# Patient Record
Sex: MALE | Race: WHITE | NOT HISPANIC OR LATINO
[De-identification: names, ages, dates, MRNs, and addresses within clinical notes are randomized per-mention and may not be internally consistent; named-entity substitution may affect disease eponyms.]

---

## 2017-01-20 ENCOUNTER — RX ONLY (OUTPATIENT)
Age: 82
Setting detail: RX ONLY
End: 2017-01-20

## 2017-04-10 ENCOUNTER — HOSPITAL ENCOUNTER (OUTPATIENT)
Dept: LAB | Facility: MEDICAL CENTER | Age: 82
End: 2017-04-10
Attending: FAMILY MEDICINE
Payer: MEDICARE

## 2017-04-10 LAB
25(OH)D3 SERPL-MCNC: 92 NG/ML (ref 30–100)
ALBUMIN SERPL BCP-MCNC: 4.1 G/DL (ref 3.2–4.9)
ALBUMIN/GLOB SERPL: 1.6 G/DL
ALP SERPL-CCNC: 55 U/L (ref 30–99)
ALT SERPL-CCNC: 16 U/L (ref 2–50)
ANION GAP SERPL CALC-SCNC: 5 MMOL/L (ref 0–11.9)
AST SERPL-CCNC: 22 U/L (ref 12–45)
BILIRUB SERPL-MCNC: 0.6 MG/DL (ref 0.1–1.5)
BUN SERPL-MCNC: 17 MG/DL (ref 8–22)
CALCIUM SERPL-MCNC: 9.4 MG/DL (ref 8.5–10.5)
CHLORIDE SERPL-SCNC: 111 MMOL/L (ref 96–112)
CHOLEST SERPL-MCNC: 122 MG/DL (ref 100–199)
CO2 SERPL-SCNC: 27 MMOL/L (ref 20–33)
CREAT SERPL-MCNC: 1.07 MG/DL (ref 0.5–1.4)
CRP SERPL HS-MCNC: 1.3 MG/L (ref 0–7.5)
GFR SERPL CREATININE-BSD FRML MDRD: >60 ML/MIN/1.73 M 2
GLOBULIN SER CALC-MCNC: 2.5 G/DL (ref 1.9–3.5)
GLUCOSE SERPL-MCNC: 111 MG/DL (ref 65–99)
HDLC SERPL-MCNC: 34 MG/DL
IRON SERPL-MCNC: 83 UG/DL (ref 50–180)
LDLC SERPL CALC-MCNC: 72 MG/DL
POTASSIUM SERPL-SCNC: 3.9 MMOL/L (ref 3.6–5.5)
PROT SERPL-MCNC: 6.6 G/DL (ref 6–8.2)
SODIUM SERPL-SCNC: 143 MMOL/L (ref 135–145)
TRIGL SERPL-MCNC: 80 MG/DL (ref 0–149)

## 2017-04-10 PROCEDURE — 36415 COLL VENOUS BLD VENIPUNCTURE: CPT

## 2017-04-10 PROCEDURE — 84270 ASSAY OF SEX HORMONE GLOBUL: CPT

## 2017-04-10 PROCEDURE — 83540 ASSAY OF IRON: CPT | Mod: GA

## 2017-04-10 PROCEDURE — 82306 VITAMIN D 25 HYDROXY: CPT | Mod: GA

## 2017-04-10 PROCEDURE — 85025 COMPLETE CBC W/AUTO DIFF WBC: CPT

## 2017-04-10 PROCEDURE — 86141 C-REACTIVE PROTEIN HS: CPT

## 2017-04-10 PROCEDURE — 80061 LIPID PANEL: CPT

## 2017-04-10 PROCEDURE — 80053 COMPREHEN METABOLIC PANEL: CPT

## 2017-04-10 PROCEDURE — 84402 ASSAY OF FREE TESTOSTERONE: CPT

## 2017-04-10 PROCEDURE — 84403 ASSAY OF TOTAL TESTOSTERONE: CPT

## 2017-04-11 LAB
BASOPHILS # BLD AUTO: 0.5 % (ref 0–1.8)
BASOPHILS # BLD: 0.04 K/UL (ref 0–0.12)
EOSINOPHIL # BLD AUTO: 0.14 K/UL (ref 0–0.51)
EOSINOPHIL NFR BLD: 1.7 % (ref 0–6.9)
ERYTHROCYTE [DISTWIDTH] IN BLOOD BY AUTOMATED COUNT: 48.8 FL (ref 35.9–50)
HCT VFR BLD AUTO: 50.2 % (ref 42–52)
HGB BLD-MCNC: 16.7 G/DL (ref 14–18)
IMM GRANULOCYTES # BLD AUTO: 0.02 K/UL (ref 0–0.11)
IMM GRANULOCYTES NFR BLD AUTO: 0.2 % (ref 0–0.9)
LYMPHOCYTES # BLD AUTO: 1.73 K/UL (ref 1–4.8)
LYMPHOCYTES NFR BLD: 21.4 % (ref 22–41)
MCH RBC QN AUTO: 32.1 PG (ref 27–33)
MCHC RBC AUTO-ENTMCNC: 33.3 G/DL (ref 33.7–35.3)
MCV RBC AUTO: 96.4 FL (ref 81.4–97.8)
MONOCYTES # BLD AUTO: 0.59 K/UL (ref 0–0.85)
MONOCYTES NFR BLD AUTO: 7.3 % (ref 0–13.4)
NEUTROPHILS # BLD AUTO: 5.57 K/UL (ref 1.82–7.42)
NEUTROPHILS NFR BLD: 68.9 % (ref 44–72)
NRBC # BLD AUTO: 0.02 K/UL
NRBC BLD AUTO-RTO: 0.2 /100 WBC
PLATELET # BLD AUTO: 192 K/UL (ref 164–446)
PMV BLD AUTO: 11.6 FL (ref 9–12.9)
RBC # BLD AUTO: 5.21 M/UL (ref 4.7–6.1)
SHBG SERPL-SCNC: 74 NMOL/L (ref 11–80)
TESTOST FREE MFR SERPL: 1.3 % (ref 1.6–2.9)
TESTOST FREE SERPL-MCNC: 136 PG/ML (ref 47–244)
TESTOST SERPL-MCNC: 1064 NG/DL (ref 300–720)
WBC # BLD AUTO: 8.1 K/UL (ref 4.8–10.8)

## 2017-07-31 ENCOUNTER — HOSPITAL ENCOUNTER (OUTPATIENT)
Dept: LAB | Facility: MEDICAL CENTER | Age: 82
End: 2017-07-31
Attending: NURSE PRACTITIONER
Payer: MEDICARE

## 2017-07-31 LAB
ALBUMIN SERPL BCP-MCNC: 4 G/DL (ref 3.2–4.9)
ALBUMIN/GLOB SERPL: 1.5 G/DL
ALP SERPL-CCNC: 52 U/L (ref 30–99)
ALT SERPL-CCNC: 17 U/L (ref 2–50)
ANION GAP SERPL CALC-SCNC: 7 MMOL/L (ref 0–11.9)
AST SERPL-CCNC: 23 U/L (ref 12–45)
BILIRUB SERPL-MCNC: 0.7 MG/DL (ref 0.1–1.5)
BUN SERPL-MCNC: 15 MG/DL (ref 8–22)
CALCIUM SERPL-MCNC: 9.6 MG/DL (ref 8.5–10.5)
CHLORIDE SERPL-SCNC: 110 MMOL/L (ref 96–112)
CHOLEST SERPL-MCNC: 120 MG/DL (ref 100–199)
CO2 SERPL-SCNC: 26 MMOL/L (ref 20–33)
CREAT SERPL-MCNC: 1.02 MG/DL (ref 0.5–1.4)
CREAT UR-MCNC: 165.6 MG/DL
CRP SERPL HS-MCNC: 0.6 MG/L (ref 0–7.5)
GFR SERPL CREATININE-BSD FRML MDRD: >60 ML/MIN/1.73 M 2
GLOBULIN SER CALC-MCNC: 2.7 G/DL (ref 1.9–3.5)
GLUCOSE SERPL-MCNC: 93 MG/DL (ref 65–99)
HDLC SERPL-MCNC: 38 MG/DL
LDLC SERPL CALC-MCNC: 67 MG/DL
MICROALBUMIN UR-MCNC: 4.8 MG/DL
MICROALBUMIN/CREAT UR: 29 MG/G (ref 0–30)
POTASSIUM SERPL-SCNC: 3.9 MMOL/L (ref 3.6–5.5)
PROT SERPL-MCNC: 6.7 G/DL (ref 6–8.2)
SODIUM SERPL-SCNC: 143 MMOL/L (ref 135–145)
T3FREE SERPL-MCNC: 3.18 PG/ML (ref 2.4–4.2)
T4 FREE SERPL-MCNC: 0.78 NG/DL (ref 0.53–1.43)
TRIGL SERPL-MCNC: 75 MG/DL (ref 0–149)
TSH SERPL DL<=0.005 MIU/L-ACNC: 0.79 UIU/ML (ref 0.3–3.7)

## 2017-07-31 PROCEDURE — 84443 ASSAY THYROID STIM HORMONE: CPT

## 2017-07-31 PROCEDURE — 80053 COMPREHEN METABOLIC PANEL: CPT

## 2017-07-31 PROCEDURE — 84439 ASSAY OF FREE THYROXINE: CPT

## 2017-07-31 PROCEDURE — 84270 ASSAY OF SEX HORMONE GLOBUL: CPT

## 2017-07-31 PROCEDURE — 84402 ASSAY OF FREE TESTOSTERONE: CPT

## 2017-07-31 PROCEDURE — 84481 FREE ASSAY (FT-3): CPT

## 2017-07-31 PROCEDURE — 82570 ASSAY OF URINE CREATININE: CPT

## 2017-07-31 PROCEDURE — 82043 UR ALBUMIN QUANTITATIVE: CPT

## 2017-07-31 PROCEDURE — 84403 ASSAY OF TOTAL TESTOSTERONE: CPT

## 2017-07-31 PROCEDURE — 80061 LIPID PANEL: CPT

## 2017-07-31 PROCEDURE — 86141 C-REACTIVE PROTEIN HS: CPT

## 2017-07-31 PROCEDURE — 36415 COLL VENOUS BLD VENIPUNCTURE: CPT

## 2017-08-01 LAB
SHBG SERPL-SCNC: 71 NMOL/L (ref 11–80)
TESTOST FREE MFR SERPL: 1.2 % (ref 1.6–2.9)
TESTOST FREE SERPL-MCNC: 94 PG/ML (ref 47–244)
TESTOST SERPL-MCNC: 767 NG/DL (ref 300–720)

## 2017-08-15 ENCOUNTER — HOSPITAL ENCOUNTER (OUTPATIENT)
Dept: LAB | Facility: MEDICAL CENTER | Age: 82
End: 2017-08-15
Attending: UROLOGY
Payer: MEDICARE

## 2017-08-15 LAB — PSA SERPL-MCNC: 2.61 NG/ML (ref 0–4)

## 2017-08-15 PROCEDURE — 36415 COLL VENOUS BLD VENIPUNCTURE: CPT

## 2017-08-15 PROCEDURE — 84153 ASSAY OF PSA TOTAL: CPT

## 2018-01-16 ENCOUNTER — HOSPITAL ENCOUNTER (OUTPATIENT)
Dept: LAB | Facility: MEDICAL CENTER | Age: 83
End: 2018-01-16
Attending: FAMILY MEDICINE
Payer: MEDICARE

## 2018-01-16 LAB
ALBUMIN SERPL BCP-MCNC: 4 G/DL (ref 3.2–4.9)
ALBUMIN/GLOB SERPL: 1.7 G/DL
ALP SERPL-CCNC: 47 U/L (ref 30–99)
ALT SERPL-CCNC: 20 U/L (ref 2–50)
ANION GAP SERPL CALC-SCNC: 4 MMOL/L (ref 0–11.9)
AST SERPL-CCNC: 22 U/L (ref 12–45)
BASOPHILS # BLD AUTO: 0.3 % (ref 0–1.8)
BASOPHILS # BLD: 0.02 K/UL (ref 0–0.12)
BILIRUB SERPL-MCNC: 0.5 MG/DL (ref 0.1–1.5)
BUN SERPL-MCNC: 18 MG/DL (ref 8–22)
CALCIUM SERPL-MCNC: 9 MG/DL (ref 8.5–10.5)
CHLORIDE SERPL-SCNC: 111 MMOL/L (ref 96–112)
CHOLEST SERPL-MCNC: 100 MG/DL (ref 100–199)
CO2 SERPL-SCNC: 27 MMOL/L (ref 20–33)
CREAT SERPL-MCNC: 0.97 MG/DL (ref 0.5–1.4)
EOSINOPHIL # BLD AUTO: 0.17 K/UL (ref 0–0.51)
EOSINOPHIL NFR BLD: 2.8 % (ref 0–6.9)
ERYTHROCYTE [DISTWIDTH] IN BLOOD BY AUTOMATED COUNT: 49.4 FL (ref 35.9–50)
GLOBULIN SER CALC-MCNC: 2.3 G/DL (ref 1.9–3.5)
GLUCOSE SERPL-MCNC: 100 MG/DL (ref 65–99)
HCT VFR BLD AUTO: 47.9 % (ref 42–52)
HDLC SERPL-MCNC: 33 MG/DL
HGB BLD-MCNC: 15.9 G/DL (ref 14–18)
IMM GRANULOCYTES # BLD AUTO: 0.01 K/UL (ref 0–0.11)
IMM GRANULOCYTES NFR BLD AUTO: 0.2 % (ref 0–0.9)
LDLC SERPL CALC-MCNC: 54 MG/DL
LYMPHOCYTES # BLD AUTO: 1.31 K/UL (ref 1–4.8)
LYMPHOCYTES NFR BLD: 21.9 % (ref 22–41)
MCH RBC QN AUTO: 32 PG (ref 27–33)
MCHC RBC AUTO-ENTMCNC: 33.2 G/DL (ref 33.7–35.3)
MCV RBC AUTO: 96.4 FL (ref 81.4–97.8)
MONOCYTES # BLD AUTO: 0.63 K/UL (ref 0–0.85)
MONOCYTES NFR BLD AUTO: 10.5 % (ref 0–13.4)
NEUTROPHILS # BLD AUTO: 3.84 K/UL (ref 1.82–7.42)
NEUTROPHILS NFR BLD: 64.3 % (ref 44–72)
NRBC # BLD AUTO: 0 K/UL
NRBC BLD-RTO: 0 /100 WBC
PLATELET # BLD AUTO: 169 K/UL (ref 164–446)
PMV BLD AUTO: 11.8 FL (ref 9–12.9)
POTASSIUM SERPL-SCNC: 3.7 MMOL/L (ref 3.6–5.5)
PROT SERPL-MCNC: 6.3 G/DL (ref 6–8.2)
RBC # BLD AUTO: 4.97 M/UL (ref 4.7–6.1)
SODIUM SERPL-SCNC: 142 MMOL/L (ref 135–145)
TRIGL SERPL-MCNC: 64 MG/DL (ref 0–149)
WBC # BLD AUTO: 6 K/UL (ref 4.8–10.8)

## 2018-01-16 PROCEDURE — 80053 COMPREHEN METABOLIC PANEL: CPT

## 2018-01-16 PROCEDURE — 80061 LIPID PANEL: CPT

## 2018-01-16 PROCEDURE — 84402 ASSAY OF FREE TESTOSTERONE: CPT

## 2018-01-16 PROCEDURE — 84439 ASSAY OF FREE THYROXINE: CPT

## 2018-01-16 PROCEDURE — 84443 ASSAY THYROID STIM HORMONE: CPT

## 2018-01-16 PROCEDURE — 36415 COLL VENOUS BLD VENIPUNCTURE: CPT

## 2018-01-16 PROCEDURE — 85025 COMPLETE CBC W/AUTO DIFF WBC: CPT

## 2018-01-16 PROCEDURE — 86800 THYROGLOBULIN ANTIBODY: CPT

## 2018-01-16 PROCEDURE — 84481 FREE ASSAY (FT-3): CPT

## 2018-01-16 PROCEDURE — 84403 ASSAY OF TOTAL TESTOSTERONE: CPT

## 2018-01-16 PROCEDURE — 82306 VITAMIN D 25 HYDROXY: CPT

## 2018-01-17 LAB
25(OH)D3 SERPL-MCNC: 84 NG/ML (ref 30–100)
T3FREE SERPL-MCNC: 3.94 PG/ML (ref 2.4–4.2)
T4 FREE SERPL-MCNC: 0.87 NG/DL (ref 0.53–1.43)
TESTOST SERPL-MCNC: 314 NG/DL (ref 175–781)
TSH SERPL DL<=0.005 MIU/L-ACNC: 0.03 UIU/ML (ref 0.38–5.33)

## 2018-01-18 LAB
TESTOST FREE SERPL-MCNC: 45 PG/ML (ref 47–244)
THYROGLOB AB SERPL-ACNC: <0.9 IU/ML (ref 0–4)

## 2018-03-22 ENCOUNTER — HOSPITAL ENCOUNTER (OUTPATIENT)
Dept: LAB | Facility: MEDICAL CENTER | Age: 83
End: 2018-03-22
Attending: FAMILY MEDICINE
Payer: MEDICARE

## 2018-03-22 LAB
25(OH)D3 SERPL-MCNC: 87 NG/ML (ref 30–100)
ALBUMIN SERPL BCP-MCNC: 4.2 G/DL (ref 3.2–4.9)
ALBUMIN/GLOB SERPL: 1.8 G/DL
ALP SERPL-CCNC: 48 U/L (ref 30–99)
ALT SERPL-CCNC: 24 U/L (ref 2–50)
ANION GAP SERPL CALC-SCNC: 9 MMOL/L (ref 0–11.9)
AST SERPL-CCNC: 26 U/L (ref 12–45)
BASOPHILS # BLD AUTO: 0.4 % (ref 0–1.8)
BASOPHILS # BLD: 0.03 K/UL (ref 0–0.12)
BILIRUB SERPL-MCNC: 0.9 MG/DL (ref 0.1–1.5)
BUN SERPL-MCNC: 16 MG/DL (ref 8–22)
CALCIUM SERPL-MCNC: 9.3 MG/DL (ref 8.5–10.5)
CHLORIDE SERPL-SCNC: 109 MMOL/L (ref 96–112)
CHOLEST SERPL-MCNC: 136 MG/DL (ref 100–199)
CO2 SERPL-SCNC: 24 MMOL/L (ref 20–33)
CREAT SERPL-MCNC: 0.98 MG/DL (ref 0.5–1.4)
EOSINOPHIL # BLD AUTO: 0.14 K/UL (ref 0–0.51)
EOSINOPHIL NFR BLD: 2 % (ref 0–6.9)
ERYTHROCYTE [DISTWIDTH] IN BLOOD BY AUTOMATED COUNT: 48.9 FL (ref 35.9–50)
GLOBULIN SER CALC-MCNC: 2.4 G/DL (ref 1.9–3.5)
GLUCOSE SERPL-MCNC: 98 MG/DL (ref 65–99)
HCT VFR BLD AUTO: 50.2 % (ref 42–52)
HDLC SERPL-MCNC: 32 MG/DL
HGB BLD-MCNC: 16.7 G/DL (ref 14–18)
IMM GRANULOCYTES # BLD AUTO: 0.02 K/UL (ref 0–0.11)
IMM GRANULOCYTES NFR BLD AUTO: 0.3 % (ref 0–0.9)
LDLC SERPL CALC-MCNC: 88 MG/DL
LYMPHOCYTES # BLD AUTO: 1.9 K/UL (ref 1–4.8)
LYMPHOCYTES NFR BLD: 27.2 % (ref 22–41)
MCH RBC QN AUTO: 32.3 PG (ref 27–33)
MCHC RBC AUTO-ENTMCNC: 33.3 G/DL (ref 33.7–35.3)
MCV RBC AUTO: 97.1 FL (ref 81.4–97.8)
MONOCYTES # BLD AUTO: 0.6 K/UL (ref 0–0.85)
MONOCYTES NFR BLD AUTO: 8.6 % (ref 0–13.4)
NEUTROPHILS # BLD AUTO: 4.3 K/UL (ref 1.82–7.42)
NEUTROPHILS NFR BLD: 61.5 % (ref 44–72)
NRBC # BLD AUTO: 0 K/UL
NRBC BLD-RTO: 0 /100 WBC
PLATELET # BLD AUTO: 196 K/UL (ref 164–446)
PMV BLD AUTO: 11.5 FL (ref 9–12.9)
POTASSIUM SERPL-SCNC: 3.8 MMOL/L (ref 3.6–5.5)
PROT SERPL-MCNC: 6.6 G/DL (ref 6–8.2)
RBC # BLD AUTO: 5.17 M/UL (ref 4.7–6.1)
SODIUM SERPL-SCNC: 142 MMOL/L (ref 135–145)
T3FREE SERPL-MCNC: 2.56 PG/ML (ref 2.4–4.2)
T4 FREE SERPL-MCNC: 0.66 NG/DL (ref 0.53–1.43)
TESTOST SERPL-MCNC: 764 NG/DL (ref 175–781)
TRIGL SERPL-MCNC: 79 MG/DL (ref 0–149)
TSH SERPL DL<=0.005 MIU/L-ACNC: 1.22 UIU/ML (ref 0.38–5.33)
WBC # BLD AUTO: 7 K/UL (ref 4.8–10.8)

## 2018-03-22 PROCEDURE — 85025 COMPLETE CBC W/AUTO DIFF WBC: CPT

## 2018-03-22 PROCEDURE — 84443 ASSAY THYROID STIM HORMONE: CPT

## 2018-03-22 PROCEDURE — 36415 COLL VENOUS BLD VENIPUNCTURE: CPT

## 2018-03-22 PROCEDURE — 86800 THYROGLOBULIN ANTIBODY: CPT

## 2018-03-22 PROCEDURE — 84439 ASSAY OF FREE THYROXINE: CPT

## 2018-03-22 PROCEDURE — 82306 VITAMIN D 25 HYDROXY: CPT

## 2018-03-22 PROCEDURE — 84402 ASSAY OF FREE TESTOSTERONE: CPT

## 2018-03-22 PROCEDURE — 80061 LIPID PANEL: CPT

## 2018-03-22 PROCEDURE — 80053 COMPREHEN METABOLIC PANEL: CPT

## 2018-03-22 PROCEDURE — 84403 ASSAY OF TOTAL TESTOSTERONE: CPT

## 2018-03-22 PROCEDURE — 84481 FREE ASSAY (FT-3): CPT

## 2018-03-23 LAB
TESTOST FREE SERPL-MCNC: 112 PG/ML (ref 47–244)
THYROGLOB AB SERPL-ACNC: <0.9 IU/ML (ref 0–4)

## 2018-06-28 ENCOUNTER — HOSPITAL ENCOUNTER (OUTPATIENT)
Dept: LAB | Facility: MEDICAL CENTER | Age: 83
End: 2018-06-28
Attending: FAMILY MEDICINE
Payer: MEDICARE

## 2018-06-28 LAB
25(OH)D3 SERPL-MCNC: 73 NG/ML (ref 30–100)
ALBUMIN SERPL BCP-MCNC: 3.7 G/DL (ref 3.2–4.9)
ALBUMIN/GLOB SERPL: 1.5 G/DL
ALP SERPL-CCNC: 52 U/L (ref 30–99)
ALT SERPL-CCNC: 18 U/L (ref 2–50)
ANION GAP SERPL CALC-SCNC: 8 MMOL/L (ref 0–11.9)
AST SERPL-CCNC: 22 U/L (ref 12–45)
BASOPHILS # BLD AUTO: 0.5 % (ref 0–1.8)
BASOPHILS # BLD: 0.03 K/UL (ref 0–0.12)
BILIRUB SERPL-MCNC: 0.5 MG/DL (ref 0.1–1.5)
BUN SERPL-MCNC: 15 MG/DL (ref 8–22)
CALCIUM SERPL-MCNC: 8.6 MG/DL (ref 8.5–10.5)
CHLORIDE SERPL-SCNC: 111 MMOL/L (ref 96–112)
CHOLEST SERPL-MCNC: 98 MG/DL (ref 100–199)
CO2 SERPL-SCNC: 25 MMOL/L (ref 20–33)
CREAT SERPL-MCNC: 0.81 MG/DL (ref 0.5–1.4)
EOSINOPHIL # BLD AUTO: 0.1 K/UL (ref 0–0.51)
EOSINOPHIL NFR BLD: 1.6 % (ref 0–6.9)
ERYTHROCYTE [DISTWIDTH] IN BLOOD BY AUTOMATED COUNT: 47.6 FL (ref 35.9–50)
FOLATE SERPL-MCNC: >24 NG/ML
GLOBULIN SER CALC-MCNC: 2.5 G/DL (ref 1.9–3.5)
GLUCOSE SERPL-MCNC: 92 MG/DL (ref 65–99)
HCT VFR BLD AUTO: 44.5 % (ref 42–52)
HDLC SERPL-MCNC: 33 MG/DL
HGB BLD-MCNC: 14.8 G/DL (ref 14–18)
IMM GRANULOCYTES # BLD AUTO: 0.02 K/UL (ref 0–0.11)
IMM GRANULOCYTES NFR BLD AUTO: 0.3 % (ref 0–0.9)
LDLC SERPL CALC-MCNC: 50 MG/DL
LYMPHOCYTES # BLD AUTO: 1.57 K/UL (ref 1–4.8)
LYMPHOCYTES NFR BLD: 25.2 % (ref 22–41)
MCH RBC QN AUTO: 32 PG (ref 27–33)
MCHC RBC AUTO-ENTMCNC: 33.3 G/DL (ref 33.7–35.3)
MCV RBC AUTO: 96.1 FL (ref 81.4–97.8)
MONOCYTES # BLD AUTO: 0.45 K/UL (ref 0–0.85)
MONOCYTES NFR BLD AUTO: 7.2 % (ref 0–13.4)
NEUTROPHILS # BLD AUTO: 4.06 K/UL (ref 1.82–7.42)
NEUTROPHILS NFR BLD: 65.2 % (ref 44–72)
NRBC # BLD AUTO: 0 K/UL
NRBC BLD-RTO: 0 /100 WBC
PLATELET # BLD AUTO: 178 K/UL (ref 164–446)
PMV BLD AUTO: 11.6 FL (ref 9–12.9)
POTASSIUM SERPL-SCNC: 3.9 MMOL/L (ref 3.6–5.5)
PROT SERPL-MCNC: 6.2 G/DL (ref 6–8.2)
RBC # BLD AUTO: 4.63 M/UL (ref 4.7–6.1)
SODIUM SERPL-SCNC: 144 MMOL/L (ref 135–145)
T3FREE SERPL-MCNC: 3.13 PG/ML (ref 2.4–4.2)
T4 FREE SERPL-MCNC: 0.75 NG/DL (ref 0.53–1.43)
TESTOST SERPL-MCNC: 1120 NG/DL (ref 175–781)
THYROPEROXIDASE AB SERPL-ACNC: <0.2 IU/ML (ref 0–9)
TRIGL SERPL-MCNC: 73 MG/DL (ref 0–149)
TSH SERPL DL<=0.005 MIU/L-ACNC: 0.43 UIU/ML (ref 0.38–5.33)
VIT B12 SERPL-MCNC: 636 PG/ML (ref 211–911)
WBC # BLD AUTO: 6.2 K/UL (ref 4.8–10.8)

## 2018-06-28 PROCEDURE — 84439 ASSAY OF FREE THYROXINE: CPT

## 2018-06-28 PROCEDURE — 85025 COMPLETE CBC W/AUTO DIFF WBC: CPT

## 2018-06-28 PROCEDURE — 86376 MICROSOMAL ANTIBODY EACH: CPT

## 2018-06-28 PROCEDURE — 80053 COMPREHEN METABOLIC PANEL: CPT

## 2018-06-28 PROCEDURE — 84402 ASSAY OF FREE TESTOSTERONE: CPT

## 2018-06-28 PROCEDURE — 84443 ASSAY THYROID STIM HORMONE: CPT

## 2018-06-28 PROCEDURE — 80061 LIPID PANEL: CPT

## 2018-06-28 PROCEDURE — 82746 ASSAY OF FOLIC ACID SERUM: CPT

## 2018-06-28 PROCEDURE — 84481 FREE ASSAY (FT-3): CPT

## 2018-06-28 PROCEDURE — 84403 ASSAY OF TOTAL TESTOSTERONE: CPT

## 2018-06-28 PROCEDURE — 82306 VITAMIN D 25 HYDROXY: CPT

## 2018-06-28 PROCEDURE — 82607 VITAMIN B-12: CPT

## 2018-06-28 PROCEDURE — 36415 COLL VENOUS BLD VENIPUNCTURE: CPT

## 2018-06-29 LAB — TESTOST FREE SERPL-MCNC: NORMAL PG/ML (ref 47–244)

## 2018-10-10 ENCOUNTER — HOSPITAL ENCOUNTER (OUTPATIENT)
Dept: LAB | Facility: MEDICAL CENTER | Age: 83
End: 2018-10-10
Attending: FAMILY MEDICINE
Payer: MEDICARE

## 2018-10-10 LAB
ALBUMIN SERPL BCP-MCNC: 4 G/DL (ref 3.2–4.9)
ALBUMIN/GLOB SERPL: 1.7 G/DL
ALP SERPL-CCNC: 50 U/L (ref 30–99)
ALT SERPL-CCNC: 19 U/L (ref 2–50)
ANION GAP SERPL CALC-SCNC: 7 MMOL/L (ref 0–11.9)
APPEARANCE UR: CLEAR
AST SERPL-CCNC: 26 U/L (ref 12–45)
BASOPHILS # BLD AUTO: 0.6 % (ref 0–1.8)
BASOPHILS # BLD: 0.04 K/UL (ref 0–0.12)
BILIRUB SERPL-MCNC: 0.6 MG/DL (ref 0.1–1.5)
BILIRUB UR QL STRIP.AUTO: NEGATIVE
BUN SERPL-MCNC: 17 MG/DL (ref 8–22)
CALCIUM SERPL-MCNC: 9.2 MG/DL (ref 8.5–10.5)
CHLORIDE SERPL-SCNC: 110 MMOL/L (ref 96–112)
CHOLEST SERPL-MCNC: 110 MG/DL (ref 100–199)
CO2 SERPL-SCNC: 26 MMOL/L (ref 20–33)
COLOR UR: YELLOW
CREAT SERPL-MCNC: 1.03 MG/DL (ref 0.5–1.4)
EOSINOPHIL # BLD AUTO: 0.15 K/UL (ref 0–0.51)
EOSINOPHIL NFR BLD: 2.3 % (ref 0–6.9)
ERYTHROCYTE [DISTWIDTH] IN BLOOD BY AUTOMATED COUNT: 49 FL (ref 35.9–50)
FASTING STATUS PATIENT QL REPORTED: NORMAL
GLOBULIN SER CALC-MCNC: 2.3 G/DL (ref 1.9–3.5)
GLUCOSE SERPL-MCNC: 94 MG/DL (ref 65–99)
GLUCOSE UR STRIP.AUTO-MCNC: NEGATIVE MG/DL
HCT VFR BLD AUTO: 50.3 % (ref 42–52)
HDLC SERPL-MCNC: 31 MG/DL
HGB BLD-MCNC: 16.4 G/DL (ref 14–18)
IMM GRANULOCYTES # BLD AUTO: 0.02 K/UL (ref 0–0.11)
IMM GRANULOCYTES NFR BLD AUTO: 0.3 % (ref 0–0.9)
KETONES UR STRIP.AUTO-MCNC: NEGATIVE MG/DL
LDLC SERPL CALC-MCNC: 63 MG/DL
LEUKOCYTE ESTERASE UR QL STRIP.AUTO: NEGATIVE
LYMPHOCYTES # BLD AUTO: 1.36 K/UL (ref 1–4.8)
LYMPHOCYTES NFR BLD: 20.5 % (ref 22–41)
MCH RBC QN AUTO: 31.1 PG (ref 27–33)
MCHC RBC AUTO-ENTMCNC: 32.6 G/DL (ref 33.7–35.3)
MCV RBC AUTO: 95.3 FL (ref 81.4–97.8)
MICRO URNS: NORMAL
MONOCYTES # BLD AUTO: 0.64 K/UL (ref 0–0.85)
MONOCYTES NFR BLD AUTO: 9.6 % (ref 0–13.4)
NEUTROPHILS # BLD AUTO: 4.44 K/UL (ref 1.82–7.42)
NEUTROPHILS NFR BLD: 66.7 % (ref 44–72)
NITRITE UR QL STRIP.AUTO: NEGATIVE
NRBC # BLD AUTO: 0 K/UL
NRBC BLD-RTO: 0 /100 WBC
PH UR STRIP.AUTO: 6 [PH]
PLATELET # BLD AUTO: 185 K/UL (ref 164–446)
PMV BLD AUTO: 11.1 FL (ref 9–12.9)
POTASSIUM SERPL-SCNC: 3.7 MMOL/L (ref 3.6–5.5)
PROT SERPL-MCNC: 6.3 G/DL (ref 6–8.2)
PROT UR QL STRIP: NEGATIVE MG/DL
RBC # BLD AUTO: 5.28 M/UL (ref 4.7–6.1)
RBC UR QL AUTO: NEGATIVE
SODIUM SERPL-SCNC: 143 MMOL/L (ref 135–145)
SP GR UR STRIP.AUTO: 1.02
TRIGL SERPL-MCNC: 81 MG/DL (ref 0–149)
UROBILINOGEN UR STRIP.AUTO-MCNC: 0.2 MG/DL
WBC # BLD AUTO: 6.7 K/UL (ref 4.8–10.8)

## 2018-10-10 PROCEDURE — 87086 URINE CULTURE/COLONY COUNT: CPT | Mod: GA

## 2018-10-10 PROCEDURE — 80061 LIPID PANEL: CPT

## 2018-10-10 PROCEDURE — 81003 URINALYSIS AUTO W/O SCOPE: CPT

## 2018-10-10 PROCEDURE — 80053 COMPREHEN METABOLIC PANEL: CPT

## 2018-10-10 PROCEDURE — 36415 COLL VENOUS BLD VENIPUNCTURE: CPT

## 2018-10-10 PROCEDURE — 85025 COMPLETE CBC W/AUTO DIFF WBC: CPT

## 2018-10-12 LAB
BACTERIA UR CULT: NORMAL
SIGNIFICANT IND 70042: NORMAL
SITE SITE: NORMAL
SOURCE SOURCE: NORMAL

## 2018-11-13 ENCOUNTER — HOSPITAL ENCOUNTER (OUTPATIENT)
Dept: LAB | Facility: MEDICAL CENTER | Age: 83
End: 2018-11-13
Attending: UROLOGY
Payer: MEDICARE

## 2018-11-13 PROCEDURE — 84153 ASSAY OF PSA TOTAL: CPT

## 2018-11-13 PROCEDURE — 36415 COLL VENOUS BLD VENIPUNCTURE: CPT

## 2018-11-14 LAB — PSA SERPL-MCNC: 2.74 NG/ML (ref 0–4)

## 2018-11-21 ENCOUNTER — APPOINTMENT (RX ONLY)
Dept: URBAN - METROPOLITAN AREA CLINIC 20 | Facility: CLINIC | Age: 83
Setting detail: DERMATOLOGY
End: 2018-11-21

## 2018-11-21 DIAGNOSIS — Z71.89 OTHER SPECIFIED COUNSELING: ICD-10-CM

## 2018-11-21 DIAGNOSIS — D22 MELANOCYTIC NEVI: ICD-10-CM

## 2018-11-21 DIAGNOSIS — L81.4 OTHER MELANIN HYPERPIGMENTATION: ICD-10-CM

## 2018-11-21 DIAGNOSIS — L72.8 OTHER FOLLICULAR CYSTS OF THE SKIN AND SUBCUTANEOUS TISSUE: ICD-10-CM

## 2018-11-21 DIAGNOSIS — L57.0 ACTINIC KERATOSIS: ICD-10-CM

## 2018-11-21 DIAGNOSIS — D18.0 HEMANGIOMA: ICD-10-CM

## 2018-11-21 DIAGNOSIS — L82.1 OTHER SEBORRHEIC KERATOSIS: ICD-10-CM

## 2018-11-21 PROBLEM — D22.61 MELANOCYTIC NEVI OF RIGHT UPPER LIMB, INCLUDING SHOULDER: Status: ACTIVE | Noted: 2018-11-21

## 2018-11-21 PROBLEM — D48.5 NEOPLASM OF UNCERTAIN BEHAVIOR OF SKIN: Status: ACTIVE | Noted: 2018-11-21

## 2018-11-21 PROBLEM — D22.72 MELANOCYTIC NEVI OF LEFT LOWER LIMB, INCLUDING HIP: Status: ACTIVE | Noted: 2018-11-21

## 2018-11-21 PROBLEM — D18.01 HEMANGIOMA OF SKIN AND SUBCUTANEOUS TISSUE: Status: ACTIVE | Noted: 2018-11-21

## 2018-11-21 PROBLEM — D22.39 MELANOCYTIC NEVI OF OTHER PARTS OF FACE: Status: ACTIVE | Noted: 2018-11-21

## 2018-11-21 PROBLEM — D22.5 MELANOCYTIC NEVI OF TRUNK: Status: ACTIVE | Noted: 2018-11-21

## 2018-11-21 PROBLEM — D22.62 MELANOCYTIC NEVI OF LEFT UPPER LIMB, INCLUDING SHOULDER: Status: ACTIVE | Noted: 2018-11-21

## 2018-11-21 PROBLEM — D22.71 MELANOCYTIC NEVI OF RIGHT LOWER LIMB, INCLUDING HIP: Status: ACTIVE | Noted: 2018-11-21

## 2018-11-21 PROCEDURE — 17000 DESTRUCT PREMALG LESION: CPT

## 2018-11-21 PROCEDURE — 99214 OFFICE O/P EST MOD 30 MIN: CPT | Mod: 25

## 2018-11-21 PROCEDURE — ? SUNSCREEN RECOMMENDATIONS

## 2018-11-21 PROCEDURE — ? COUNSELING

## 2018-11-21 PROCEDURE — ? BIOPSY BY SHAVE METHOD

## 2018-11-21 PROCEDURE — 17003 DESTRUCT PREMALG LES 2-14: CPT

## 2018-11-21 PROCEDURE — ? LIQUID NITROGEN

## 2018-11-21 PROCEDURE — 11100: CPT | Mod: 59

## 2018-11-21 ASSESSMENT — LOCATION SIMPLE DESCRIPTION DERM
LOCATION SIMPLE: RIGHT UPPER BACK
LOCATION SIMPLE: RIGHT LOWER BACK
LOCATION SIMPLE: RIGHT FOREHEAD
LOCATION SIMPLE: LEFT POSTERIOR UPPER ARM
LOCATION SIMPLE: RIGHT CHEEK
LOCATION SIMPLE: POSTERIOR NECK
LOCATION SIMPLE: RIGHT PRETIBIAL REGION
LOCATION SIMPLE: RIGHT EAR
LOCATION SIMPLE: LEFT PRETIBIAL REGION
LOCATION SIMPLE: UPPER BACK
LOCATION SIMPLE: RIGHT FOREARM
LOCATION SIMPLE: LEFT POSTERIOR THIGH
LOCATION SIMPLE: RIGHT POSTERIOR UPPER ARM
LOCATION SIMPLE: LEFT FOREARM
LOCATION SIMPLE: LEFT FOREHEAD
LOCATION SIMPLE: RIGHT POSTERIOR THIGH

## 2018-11-21 ASSESSMENT — LOCATION ZONE DERM
LOCATION ZONE: LEG
LOCATION ZONE: NECK
LOCATION ZONE: EAR
LOCATION ZONE: ARM
LOCATION ZONE: TRUNK
LOCATION ZONE: FACE

## 2018-11-21 ASSESSMENT — LOCATION DETAILED DESCRIPTION DERM
LOCATION DETAILED: RIGHT PROXIMAL PRETIBIAL REGION
LOCATION DETAILED: RIGHT INFERIOR CENTRAL MALAR CHEEK
LOCATION DETAILED: RIGHT INFERIOR MEDIAL UPPER BACK
LOCATION DETAILED: LEFT INFERIOR LATERAL FOREHEAD
LOCATION DETAILED: RIGHT VENTRAL PROXIMAL FOREARM
LOCATION DETAILED: RIGHT INFERIOR FOREHEAD
LOCATION DETAILED: LEFT LATERAL PROXIMAL PRETIBIAL REGION
LOCATION DETAILED: INFERIOR THORACIC SPINE
LOCATION DETAILED: LEFT PROXIMAL POSTERIOR UPPER ARM
LOCATION DETAILED: RIGHT DISTAL POSTERIOR THIGH
LOCATION DETAILED: RIGHT SUPERIOR UPPER BACK
LOCATION DETAILED: RIGHT INFERIOR MEDIAL MIDBACK
LOCATION DETAILED: RIGHT DISTAL POSTERIOR UPPER ARM
LOCATION DETAILED: LEFT VENTRAL PROXIMAL FOREARM
LOCATION DETAILED: MID POSTERIOR NECK
LOCATION DETAILED: RIGHT INFERIOR HELIX
LOCATION DETAILED: LEFT DISTAL POSTERIOR THIGH

## 2018-11-21 NOTE — PROCEDURE: LIQUID NITROGEN
Duration Of Freeze Thaw-Cycle (Seconds): 10
Post-Care Instructions: I reviewed with the patient in detail post-care instructions. Patient is to wear sunprotection, and avoid picking at any of the treated lesions. Pt may apply Vaseline to crusted or scabbing areas.
Consent: The patient's consent was obtained including but not limited to risks of crusting, scabbing, blistering, scarring, darker or lighter pigmentary change, recurrence, incomplete removal and infection. RTC in 2 months if lesion(s) persistent.
Render Post-Care Instructions In Note?: yes
Number Of Freeze-Thaw Cycles: 2 freeze-thaw cycles
Detail Level: Detailed

## 2018-11-21 NOTE — PROCEDURE: BIOPSY BY SHAVE METHOD
Size Of Lesion In Cm: 0
Render Post-Care Instructions In Note?: yes
Lab Facility: 
Wound Care: Bacitracin
Bill [id number] For Specimen Handling/Conveyance To Laboratory?: no
Billing Type: Third-Party Bill
Biopsy Type: H and E
Depth Of Biopsy: dermis
Anesthesia Volume In Cc: 1
Type Of Destruction Used: Curettage
Dressing: Band-Aid
Post-Care Instructions: I reviewed with the patient in detail post-care instructions. Patient is to keep the biopsy site clean once daily and then apply petroleum and bandaid  until healed.
Notification Instructions: Patient will be notified of biopsy results. However, patient instructed to call the office if not contacted within 2 weeks.
Lab: [id number]
Detail Level: Detailed
Hemostasis: Drysol and Electrocautery
Biopsy Method: Personna blade
Anesthesia Type: 1% lidocaine with 1:100,000 epinephrine and a 1:12 solution of 8.4% sodium bicarbonate
Consent: Written consent was obtained and risks were reviewed including but not limited to scarring, infection, bleeding, scabbing, incomplete removal, nerve damage and allergy to anesthesia.

## 2018-12-03 ENCOUNTER — APPOINTMENT (RX ONLY)
Dept: URBAN - METROPOLITAN AREA CLINIC 20 | Facility: CLINIC | Age: 83
Setting detail: DERMATOLOGY
End: 2018-12-03

## 2018-12-03 DIAGNOSIS — L72.8 OTHER FOLLICULAR CYSTS OF THE SKIN AND SUBCUTANEOUS TISSUE: ICD-10-CM

## 2018-12-03 PROBLEM — D48.5 NEOPLASM OF UNCERTAIN BEHAVIOR OF SKIN: Status: ACTIVE | Noted: 2018-12-03

## 2018-12-03 PROCEDURE — 12041 INTMD RPR N-HF/GENIT 2.5CM/<: CPT

## 2018-12-03 PROCEDURE — 11422 EXC H-F-NK-SP B9+MARG 1.1-2: CPT

## 2018-12-03 PROCEDURE — ? EXCISION

## 2018-12-03 ASSESSMENT — LOCATION ZONE DERM: LOCATION ZONE: NECK

## 2018-12-03 ASSESSMENT — LOCATION SIMPLE DESCRIPTION DERM: LOCATION SIMPLE: POSTERIOR NECK

## 2018-12-03 ASSESSMENT — LOCATION DETAILED DESCRIPTION DERM: LOCATION DETAILED: MID POSTERIOR NECK

## 2018-12-03 NOTE — PROCEDURE: EXCISION
Mucosal Advancement Flap Text: Given the location of the defect, shape of the defect and the proximity to free margins a mucosal advancement flap was deemed most appropriate. Incisions were made with a 15 blade scalpel in the appropriate fashion along the cutaneous vermillion border and the mucosal lip. The remaining actinically damaged mucosal tissue was excised.  The mucosal advancement flap was then elevated to the gingival sulcus with care taken to preserve the neurovascular structures and advanced into the primary defect. Care was taken to ensure that precise realignment of the vermilion border was achieved.
Anesthesia Type: 1% lidocaine with 1:100,000 epinephrine and a 1:12 solution of 8.4% sodium bicarbonate
Anesthesia Volume In Cc: 0
Island Pedicle Flap With Canthal Suspension Text: The defect edges were debeveled with a #15 scalpel blade.  Given the location of the defect, shape of the defect and the proximity to free margins an island pedicle advancement flap was deemed most appropriate.  Using a sterile surgical marker, an appropriate advancement flap was drawn incorporating the defect, outlining the appropriate donor tissue and placing the expected incisions within the relaxed skin tension lines where possible. The area thus outlined was incised deep to adipose tissue with a #15 scalpel blade.  The skin margins were undermined to an appropriate distance in all directions around the primary defect and laterally outward around the island pedicle utilizing iris scissors.  There was minimal undermining beneath the pedicle flap. A suspension suture was placed in the canthal tendon to prevent tension and prevent ectropion.
Complex Repair And Tissue Cultured Epidermal Autograft Text: The defect edges were debeveled with a #15 scalpel blade.  The primary defect was closed partially with a complex linear closure.  Given the location of the defect, shape of the defect and the proximity to free margins an tissue cultured epidermal autograft was deemed most appropriate to repair the remaining defect.  The graft was trimmed to fit the size of the remaining defect.  The graft was then placed in the primary defect, oriented appropriately, and sutured into place.
Fusiform Excision Additional Text (Leave Blank If You Do Not Want): The margin was drawn around the clinically apparent lesion.  A fusiform shape was then drawn on the skin incorporating the lesion and margins.  Incisions were then made along these lines to the appropriate tissue plane and the lesion was extirpated.
Interpolation Flap Text: A decision was made to reconstruct the defect utilizing an interpolation axial flap and a staged reconstruction.  A telfa template was made of the defect.  This telfa template was then used to outline the interpolation flap.  The donor area for the pedicle flap was then injected with anesthesia.  The flap was excised through the skin and subcutaneous tissue down to the layer of the underlying musculature.  The interpolation flap was carefully excised within this deep plane to maintain its blood supply.  The edges of the donor site were undermined.   The donor site was closed in a primary fashion.  The pedicle was then rotated into position and sutured.  Once the tube was sutured into place, adequate blood supply was confirmed with blanching and refill.  The pedicle was then wrapped with xeroform gauze and dressed appropriately with a telfa and gauze bandage to ensure continued blood supply and protect the attached pedicle.
Complex Repair And Modified Advancement Flap Text: The defect edges were debeveled with a #15 scalpel blade.  The primary defect was closed partially with a complex linear closure.  Given the location of the remaining defect, shape of the defect and the proximity to free margins a modified advancement flap was deemed most appropriate for complete closure of the defect.  Using a sterile surgical marker, an appropriate advancement flap was drawn incorporating the defect and placing the expected incisions within the relaxed skin tension lines where possible.    The area thus outlined was incised deep to adipose tissue with a #15 scalpel blade.  The skin margins were undermined to an appropriate distance in all directions utilizing iris scissors.
Excision Method: Elliptical
Show Additional Anesthesia Variables: Yes
Repair Type: Intermediate
Medical Necessity Clause: This procedure was medically necessary because the lesion that was treated was painful
Eliptical Excision Additional Text (Leave Blank If You Do Not Want): The margin was drawn around the clinically apparent lesion.  An elliptical shape was then drawn on the skin incorporating the lesion and margins.  Incisions were then made along these lines to the appropriate tissue plane and the lesion was extirpated.
Undermining Location (Optional): in the superficial subcutaneous fat
Hatchet Flap Text: The defect edges were debeveled with a #15 scalpel blade.  Given the location of the defect, shape of the defect and the proximity to free margins a hatchet flap was deemed most appropriate.  Using a sterile surgical marker, an appropriate hatchet flap was drawn incorporating the defect and placing the expected incisions within the relaxed skin tension lines where possible.    The area thus outlined was incised deep to adipose tissue with a #15 scalpel blade.  The skin margins were undermined to an appropriate distance in all directions utilizing iris scissors.
Complex Repair And Xenograft Text: The defect edges were debeveled with a #15 scalpel blade.  The primary defect was closed partially with a complex linear closure.  Given the location of the defect, shape of the defect and the proximity to free margins a xenograft was deemed most appropriate to repair the remaining defect.  The graft was trimmed to fit the size of the remaining defect.  The graft was then placed in the primary defect, oriented appropriately, and sutured into place.
Alar Island Pedicle Flap Text: The defect edges were debeveled with a #15 scalpel blade.  Given the location of the defect, shape of the defect and the proximity to the alar rim an island pedicle advancement flap was deemed most appropriate.  Using a sterile surgical marker, an appropriate advancement flap was drawn incorporating the defect, outlining the appropriate donor tissue and placing the expected incisions within the nasal ala running parallel to the alar rim. The area thus outlined was incised with a #15 scalpel blade.  The skin margins were undermined minimally to an appropriate distance in all directions around the primary defect and laterally outward around the island pedicle utilizing iris scissors.  There was minimal undermining beneath the pedicle flap.
Melolabial Interpolation Flap Text: A decision was made to reconstruct the defect utilizing an interpolation axial flap and a staged reconstruction.  A telfa template was made of the defect.  This telfa template was then used to outline the melolabial interpolation flap.  The donor area for the pedicle flap was then injected with anesthesia.  The flap was excised through the skin and subcutaneous tissue down to the layer of the underlying musculature.  The pedicle flap was carefully excised within this deep plane to maintain its blood supply.  The edges of the donor site were undermined.   The donor site was closed in a primary fashion.  The pedicle was then rotated into position and sutured.  Once the tube was sutured into place, adequate blood supply was confirmed with blanching and refill.  The pedicle was then wrapped with xeroform gauze and dressed appropriately with a telfa and gauze bandage to ensure continued blood supply and protect the attached pedicle.
Render Path Notes In Note?: no
Complex Repair And A-T Advancement Flap Text: The defect edges were debeveled with a #15 scalpel blade.  The primary defect was closed partially with a complex linear closure.  Given the location of the remaining defect, shape of the defect and the proximity to free margins an A-T advancement flap was deemed most appropriate for complete closure of the defect.  Using a sterile surgical marker, an appropriate advancement flap was drawn incorporating the defect and placing the expected incisions within the relaxed skin tension lines where possible.    The area thus outlined was incised deep to adipose tissue with a #15 scalpel blade.  The skin margins were undermined to an appropriate distance in all directions utilizing iris scissors.
Complex Repair And O-L Flap Text: The defect edges were debeveled with a #15 scalpel blade.  The primary defect was closed partially with a complex linear closure.  Given the location of the remaining defect, shape of the defect and the proximity to free margins an O-L flap was deemed most appropriate for complete closure of the defect.  Using a sterile surgical marker, an appropriate flap was drawn incorporating the defect and placing the expected incisions within the relaxed skin tension lines where possible.    The area thus outlined was incised deep to adipose tissue with a #15 scalpel blade.  The skin margins were undermined to an appropriate distance in all directions utilizing iris scissors.
Information: Selecting Yes will display possible errors in your note based on the variables you have selected. This validation is only offered as a suggestion for you. PLEASE NOTE THAT THE VALIDATION TEXT WILL BE REMOVED WHEN YOU FINALIZE YOUR NOTE. IF YOU WANT TO FAX A PRELIMINARY NOTE YOU WILL NEED TO TOGGLE THIS TO 'NO' IF YOU DO NOT WANT IT IN YOUR FAXED NOTE.
Wound Care: Petrolatum
Path Notes (To The Dermatopathologist): Please check margins.
Mastoid Interpolation Flap Text: A decision was made to reconstruct the defect utilizing an interpolation axial flap and a staged reconstruction.  A telfa template was made of the defect.  This telfa template was then used to outline the mastoid interpolation flap.  The donor area for the pedicle flap was then injected with anesthesia.  The flap was excised through the skin and subcutaneous tissue down to the layer of the underlying musculature.  The pedicle flap was carefully excised within this deep plane to maintain its blood supply.  The edges of the donor site were undermined.   The donor site was closed in a primary fashion.  The pedicle was then rotated into position and sutured.  Once the tube was sutured into place, adequate blood supply was confirmed with blanching and refill.  The pedicle was then wrapped with xeroform gauze and dressed appropriately with a telfa and gauze bandage to ensure continued blood supply and protect the attached pedicle.
Lab: [id number]
Saucerization Excision Additional Text (Leave Blank If You Do Not Want): The margin was drawn around the clinically apparent lesion.  Incisions were then made along these lines, in a tangential fashion, to the appropriate tissue plane and the lesion was extirpated.
Rotation Flap Text: The defect edges were debeveled with a #15 scalpel blade.  Given the location of the defect, shape of the defect and the proximity to free margins a rotation flap was deemed most appropriate.  Using a sterile surgical marker, an appropriate rotation flap was drawn incorporating the defect and placing the expected incisions within the relaxed skin tension lines where possible.    The area thus outlined was incised deep to adipose tissue with a #15 scalpel blade.  The skin margins were undermined to an appropriate distance in all directions utilizing iris scissors.
Complex Repair And O-T Advancement Flap Text: The defect edges were debeveled with a #15 scalpel blade.  The primary defect was closed partially with a complex linear closure.  Given the location of the remaining defect, shape of the defect and the proximity to free margins an O-T advancement flap was deemed most appropriate for complete closure of the defect.  Using a sterile surgical marker, an appropriate advancement flap was drawn incorporating the defect and placing the expected incisions within the relaxed skin tension lines where possible.    The area thus outlined was incised deep to adipose tissue with a #15 scalpel blade.  The skin margins were undermined to an appropriate distance in all directions utilizing iris scissors.
Double Island Pedicle Flap Text: The defect edges were debeveled with a #15 scalpel blade.  Given the location of the defect, shape of the defect and the proximity to free margins a double island pedicle advancement flap was deemed most appropriate.  Using a sterile surgical marker, an appropriate advancement flap was drawn incorporating the defect, outlining the appropriate donor tissue and placing the expected incisions within the relaxed skin tension lines where possible.    The area thus outlined was incised deep to adipose tissue with a #15 scalpel blade.  The skin margins were undermined to an appropriate distance in all directions around the primary defect and laterally outward around the island pedicle utilizing iris scissors.  There was minimal undermining beneath the pedicle flap.
Complex Repair And Skin Substitute Graft Text: The defect edges were debeveled with a #15 scalpel blade.  The primary defect was closed partially with a complex linear closure.  Given the location of the remaining defect, shape of the defect and the proximity to free margins a skin substitute graft was deemed most appropriate to repair the remaining defect.  The graft was trimmed to fit the size of the remaining defect.  The graft was then placed in the primary defect, oriented appropriately, and sutured into place.
Paramedian Forehead Flap Text: A decision was made to reconstruct the defect utilizing an interpolation axial flap and a staged reconstruction.  A telfa template was made of the defect.  This telfa template was then used to outline the paramedian forehead pedicle flap.  The donor area for the pedicle flap was then injected with anesthesia.  The flap was excised through the skin and subcutaneous tissue down to the layer of the underlying musculature.  The pedicle flap was carefully excised within this deep plane to maintain its blood supply.  The edges of the donor site were undermined.   The donor site was closed in a primary fashion.  The pedicle was then rotated into position and sutured.  Once the tube was sutured into place, adequate blood supply was confirmed with blanching and refill.  The pedicle was then wrapped with xeroform gauze and dressed appropriately with a telfa and gauze bandage to ensure continued blood supply and protect the attached pedicle.
Excisional Biopsy Additional Text (Leave Blank If You Do Not Want): The margin was drawn around the clinically apparent lesion. An elliptical shape was then drawn on the skin incorporating the lesion and margins.  Incisions were then made along these lines to the appropriate tissue plane and the lesion was extirpated.
Complex Repair And Bilobe Flap Text: The defect edges were debeveled with a #15 scalpel blade.  The primary defect was closed partially with a complex linear closure.  Given the location of the remaining defect, shape of the defect and the proximity to free margins a bilobe flap was deemed most appropriate for complete closure of the defect.  Using a sterile surgical marker, an appropriate advancement flap was drawn incorporating the defect and placing the expected incisions within the relaxed skin tension lines where possible.    The area thus outlined was incised deep to adipose tissue with a #15 scalpel blade.  The skin margins were undermined to an appropriate distance in all directions utilizing iris scissors.
Spiral Flap Text: The defect edges were debeveled with a #15 scalpel blade.  Given the location of the defect, shape of the defect and the proximity to free margins a spiral flap was deemed most appropriate.  Using a sterile surgical marker, an appropriate rotation flap was drawn incorporating the defect and placing the expected incisions within the relaxed skin tension lines where possible. The area thus outlined was incised deep to adipose tissue with a #15 scalpel blade.  The skin margins were undermined to an appropriate distance in all directions utilizing iris scissors.
Billing Type: Third-Party Bill
Deep Sutures: 3-0 Vicryl
Intermediate / Complex Repair - Final Wound Length In Cm: 1.2
Lab Facility: 
Island Pedicle Flap-Requiring Vessel Identification Text: The defect edges were debeveled with a #15 scalpel blade.  Given the location of the defect, shape of the defect and the proximity to free margins an island pedicle advancement flap was deemed most appropriate.  Using a sterile surgical marker, an appropriate advancement flap was drawn, based on the axial vessel mentioned above, incorporating the defect, outlining the appropriate donor tissue and placing the expected incisions within the relaxed skin tension lines where possible.    The area thus outlined was incised deep to adipose tissue with a #15 scalpel blade.  The skin margins were undermined to an appropriate distance in all directions around the primary defect and laterally outward around the island pedicle utilizing iris scissors.  There was minimal undermining beneath the pedicle flap.
Slit Excision Additional Text (Leave Blank If You Do Not Want): A linear line was drawn on the skin overlying the lesion. An incision was made slowly until the lesion was visualized.  Once visualized, the lesion was removed with blunt dissection.
Posterior Auricular Interpolation Flap Text: A decision was made to reconstruct the defect utilizing an interpolation axial flap and a staged reconstruction.  A telfa template was made of the defect.  This telfa template was then used to outline the posterior auricular interpolation flap.  The donor area for the pedicle flap was then injected with anesthesia.  The flap was excised through the skin and subcutaneous tissue down to the layer of the underlying musculature.  The pedicle flap was carefully excised within this deep plane to maintain its blood supply.  The edges of the donor site were undermined.   The donor site was closed in a primary fashion.  The pedicle was then rotated into position and sutured.  Once the tube was sutured into place, adequate blood supply was confirmed with blanching and refill.  The pedicle was then wrapped with xeroform gauze and dressed appropriately with a telfa and gauze bandage to ensure continued blood supply and protect the attached pedicle.
Perilesional Excision Additional Text (Leave Blank If You Do Not Want): The margin was drawn around the clinically apparent lesion. Incisions were then made along these lines to the appropriate tissue plane and the lesion was extirpated.
Lip Wedge Excision Repair Text: Given the location of the defect and the proximity to free margins a full thickness wedge repair was deemed most appropriate.  Using a sterile surgical marker, the appropriate repair was drawn incorporating the defect and placing the expected incisions perpendicular to the vermilion border.  The vermilion border was also meticulously outlined to ensure appropriate reapproximation during the repair.  The area thus outlined was incised through and through with a #15 scalpel blade.  The muscularis and dermis were reaproximated with deep sutures following hemostasis. Care was taken to realign the vermilion border before proceeding with the superficial closure.  Once the vermilion was realigned the superfical and mucosal closure was finished.
Complex Repair And Melolabial Flap Text: The defect edges were debeveled with a #15 scalpel blade.  The primary defect was closed partially with a complex linear closure.  Given the location of the remaining defect, shape of the defect and the proximity to free margins a melolabial flap was deemed most appropriate for complete closure of the defect.  Using a sterile surgical marker, an appropriate advancement flap was drawn incorporating the defect and placing the expected incisions within the relaxed skin tension lines where possible.    The area thus outlined was incised deep to adipose tissue with a #15 scalpel blade.  The skin margins were undermined to an appropriate distance in all directions utilizing iris scissors.
Transposition Flap Text: The defect edges were debeveled with a #15 scalpel blade.  Given the location of the defect and the proximity to free margins a transposition flap was deemed most appropriate.  Using a sterile surgical marker, an appropriate transposition flap was drawn incorporating the defect.    The area thus outlined was incised deep to adipose tissue with a #15 scalpel blade.  The skin margins were undermined to an appropriate distance in all directions utilizing iris scissors.
Dressing: pressure dressing
Star Wedge Flap Text: The defect edges were debeveled with a #15 scalpel blade.  Given the location of the defect, shape of the defect and the proximity to free margins a star wedge flap was deemed most appropriate.  Using a sterile surgical marker, an appropriate rotation flap was drawn incorporating the defect and placing the expected incisions within the relaxed skin tension lines where possible. The area thus outlined was incised deep to adipose tissue with a #15 scalpel blade.  The skin margins were undermined to an appropriate distance in all directions utilizing iris scissors.
Additional Anesthesia Volume In Cc: 6
Muscle Hinge Flap Text: The defect edges were debeveled with a #15 scalpel blade.  Given the size, depth and location of the defect and the proximity to free margins a muscle hinge flap was deemed most appropriate.  Using a sterile surgical marker, an appropriate hinge flap was drawn incorporating the defect. The area thus outlined was incised with a #15 scalpel blade.  The skin margins were undermined to an appropriate distance in all directions utilizing iris scissors.
Complex Repair And Rotation Flap Text: The defect edges were debeveled with a #15 scalpel blade.  The primary defect was closed partially with a complex linear closure.  Given the location of the remaining defect, shape of the defect and the proximity to free margins a rotation flap was deemed most appropriate for complete closure of the defect.  Using a sterile surgical marker, an appropriate advancement flap was drawn incorporating the defect and placing the expected incisions within the relaxed skin tension lines where possible.    The area thus outlined was incised deep to adipose tissue with a #15 scalpel blade.  The skin margins were undermined to an appropriate distance in all directions utilizing iris scissors.
Epidermal Closure Graft Donor Site (Optional): simple interrupted
Ftsg Text: The defect edges were debeveled with a #15 scalpel blade.  Given the location of the defect, shape of the defect and the proximity to free margins a full thickness skin graft was deemed most appropriate.  Using a sterile surgical marker, the primary defect shape was transferred to the donor site. The area thus outlined was incised deep to adipose tissue with a #15 scalpel blade.  The harvested graft was then trimmed of adipose tissue until only dermis and epidermis was left.  The skin margins of the secondary defect were undermined to an appropriate distance in all directions utilizing iris scissors.  The secondary defect was closed with interrupted buried subcutaneous sutures.  The skin edges were then re-apposed with running  sutures.  The skin graft was then placed in the primary defect and oriented appropriately.
Consent was obtained from the patient. The risks and benefits to therapy were discussed in detail. Specifically, the risks of infection, scarring, bleeding, prolonged wound healing, incomplete removal, allergy to anesthesia, nerve injury and recurrence were addressed. Prior to the procedure, the treatment site was clearly identified and confirmed by the patient. All components of Universal Protocol/PAUSE Rule completed.
Hemostasis: Electrocautery
Complex Repair And Rhombic Flap Text: The defect edges were debeveled with a #15 scalpel blade.  The primary defect was closed partially with a complex linear closure.  Given the location of the remaining defect, shape of the defect and the proximity to free margins a rhombic flap was deemed most appropriate for complete closure of the defect.  Using a sterile surgical marker, an appropriate advancement flap was drawn incorporating the defect and placing the expected incisions within the relaxed skin tension lines where possible.    The area thus outlined was incised deep to adipose tissue with a #15 scalpel blade.  The skin margins were undermined to an appropriate distance in all directions utilizing iris scissors.
Melolabial Transposition Flap Text: The defect edges were debeveled with a #15 scalpel blade.  Given the location of the defect and the proximity to free margins a melolabial flap was deemed most appropriate.  Using a sterile surgical marker, an appropriate melolabial transposition flap was drawn incorporating the defect.    The area thus outlined was incised deep to adipose tissue with a #15 scalpel blade.  The skin margins were undermined to an appropriate distance in all directions utilizing iris scissors.
Number Of Deep Sutures (Optional): 2
Split-Thickness Skin Graft Text: The defect edges were debeveled with a #15 scalpel blade.  Given the location of the defect, shape of the defect and the proximity to free margins a split thickness skin graft was deemed most appropriate.  Using a sterile surgical marker, the primary defect shape was transferred to the donor site. The split thickness graft was then harvested.  The skin graft was then placed in the primary defect and oriented appropriately.
Repair Performed By Another Provider Text (Leave Blank If You Do Not Want): After the tissue was excised the defect was repaired by another provider.
Graft Donor Site Bandage (Optional-Leave Blank If You Don't Want In Note): Steri-strips and a pressure bandage were applied to the donor site.
Keystone Flap Text: The defect edges were debeveled with a #15 scalpel blade.  Given the location of the defect, shape of the defect a keystone flap was deemed most appropriate.  Using a sterile surgical marker, an appropriate keystone flap was drawn incorporating the defect, outlining the appropriate donor tissue and placing the expected incisions within the relaxed skin tension lines where possible. The area thus outlined was incised deep to adipose tissue with a #15 scalpel blade.  The skin margins were undermined to an appropriate distance in all directions around the primary defect and laterally outward around the flap utilizing iris scissors.
No Repair - Repaired With Adjacent Surgical Defect Text (Leave Blank If You Do Not Want): After the excision the defect was repaired concurrently with another surgical defect which was in close approximation.
Cartilage Graft Text: The defect edges were debeveled with a #15 scalpel blade.  Given the location of the defect, shape of the defect, the fact the defect involved a full thickness cartilage defect a cartilage graft was deemed most appropriate.  An appropriate donor site was identified, cleansed, and anesthetized. The cartilage graft was then harvested and transferred to the recipient site, oriented appropriately and then sutured into place.  The secondary defect was then repaired using a primary closure.
Rhombic Flap Text: The defect edges were debeveled with a #15 scalpel blade.  Given the location of the defect and the proximity to free margins a rhombic flap was deemed most appropriate.  Using a sterile surgical marker, an appropriate rhombic flap was drawn incorporating the defect.    The area thus outlined was incised deep to adipose tissue with a #15 scalpel blade.  The skin margins were undermined to an appropriate distance in all directions utilizing iris scissors.
Complex Repair And Transposition Flap Text: The defect edges were debeveled with a #15 scalpel blade.  The primary defect was closed partially with a complex linear closure.  Given the location of the remaining defect, shape of the defect and the proximity to free margins a transposition flap was deemed most appropriate for complete closure of the defect.  Using a sterile surgical marker, an appropriate advancement flap was drawn incorporating the defect and placing the expected incisions within the relaxed skin tension lines where possible.    The area thus outlined was incised deep to adipose tissue with a #15 scalpel blade.  The skin margins were undermined to an appropriate distance in all directions utilizing iris scissors.
O-T Plasty Text: The defect edges were debeveled with a #15 scalpel blade.  Given the location of the defect, shape of the defect and the proximity to free margins an O-T plasty was deemed most appropriate.  Using a sterile surgical marker, an appropriate O-T plasty was drawn incorporating the defect and placing the expected incisions within the relaxed skin tension lines where possible.    The area thus outlined was incised deep to adipose tissue with a #15 scalpel blade.  The skin margins were undermined to an appropriate distance in all directions utilizing iris scissors.
Epidermal Autograft Text: The defect edges were debeveled with a #15 scalpel blade.  Given the location of the defect, shape of the defect and the proximity to free margins an epidermal autograft was deemed most appropriate.  Using a sterile surgical marker, the primary defect shape was transferred to the donor site. The epidermal graft was then harvested.  The skin graft was then placed in the primary defect and oriented appropriately.
Advancement Flap (Double) Text: The defect edges were debeveled with a #15 scalpel blade.  Given the location of the defect and the proximity to free margins a double advancement flap was deemed most appropriate.  Using a sterile surgical marker, the appropriate advancement flaps were drawn incorporating the defect and placing the expected incisions within the relaxed skin tension lines where possible.    The area thus outlined was incised deep to adipose tissue with a #15 scalpel blade.  The skin margins were undermined to an appropriate distance in all directions utilizing iris scissors.
Estimated Blood Loss (Cc): minimal
Medical Necessity Information: It is in your best interest to select a reason for this procedure from the list below. All of these items fulfill various CMS LCD requirements except lesion extends to a margin.
Dermal Closure: buried vertical mattress
Bi-Rhombic Flap Text: The defect edges were debeveled with a #15 scalpel blade.  Given the location of the defect and the proximity to free margins a bi-rhombic flap was deemed most appropriate.  Using a sterile surgical marker, an appropriate rhombic flap was drawn incorporating the defect. The area thus outlined was incised deep to adipose tissue with a #15 scalpel blade.  The skin margins were undermined to an appropriate distance in all directions utilizing iris scissors.
Complex Repair Preamble Text (Leave Blank If You Do Not Want): Extensive wide undermining was performed.
Composite Graft Text: The defect edges were debeveled with a #15 scalpel blade.  Given the location of the defect, shape of the defect, the proximity to free margins and the fact the defect was full thickness a composite graft was deemed most appropriate.  The defect was outline and then transferred to the donor site.  A full thickness graft was then excised from the donor site. The graft was then placed in the primary defect, oriented appropriately and then sutured into place.  The secondary defect was then repaired using a primary closure.
Detail Level: Detailed
Complex Repair And V-Y Plasty Text: The defect edges were debeveled with a #15 scalpel blade.  The primary defect was closed partially with a complex linear closure.  Given the location of the remaining defect, shape of the defect and the proximity to free margins a V-Y plasty was deemed most appropriate for complete closure of the defect.  Using a sterile surgical marker, an appropriate advancement flap was drawn incorporating the defect and placing the expected incisions within the relaxed skin tension lines where possible.    The area thus outlined was incised deep to adipose tissue with a #15 scalpel blade.  The skin margins were undermined to an appropriate distance in all directions utilizing iris scissors.
Advancement Flap (Single) Text: The defect edges were debeveled with a #15 scalpel blade.  Given the location of the defect and the proximity to free margins a single advancement flap was deemed most appropriate.  Using a sterile surgical marker, an appropriate advancement flap was drawn incorporating the defect and placing the expected incisions within the relaxed skin tension lines where possible.    The area thus outlined was incised deep to adipose tissue with a #15 scalpel blade.  The skin margins were undermined to an appropriate distance in all directions utilizing iris scissors.
Post-Care Instructions: I reviewed with the patient in detail post-care instructions. Patient is not to engage in any heavy lifting, exercise, or swimming for the next 14 days. Should the patient develop any fevers, chills, bleeding, severe pain patient will contact the office immediately.
Burow's Advancement Flap Text: The defect edges were debeveled with a #15 scalpel blade.  Given the location of the defect and the proximity to free margins a Burow's advancement flap was deemed most appropriate.  Using a sterile surgical marker, the appropriate advancement flap was drawn incorporating the defect and placing the expected incisions within the relaxed skin tension lines where possible.    The area thus outlined was incised deep to adipose tissue with a #15 scalpel blade.  The skin margins were undermined to an appropriate distance in all directions utilizing iris scissors.
Dermal Autograft Text: The defect edges were debeveled with a #15 scalpel blade.  Given the location of the defect, shape of the defect and the proximity to free margins a dermal autograft was deemed most appropriate.  Using a sterile surgical marker, the primary defect shape was transferred to the donor site. The area thus outlined was incised deep to adipose tissue with a #15 scalpel blade.  The harvested graft was then trimmed of adipose and epidermal tissue until only dermis was left.  The skin graft was then placed in the primary defect and oriented appropriately.
Bilateral Helical Rim Advancement Flap Text: The defect edges were debeveled with a #15 blade scalpel.  Given the location of the defect and the proximity to free margins (helical rim) a bilateral helical rim advancement flap was deemed most appropriate.  Using a sterile surgical marker, the appropriate advancement flaps were drawn incorporating the defect and placing the expected incisions between the helical rim and antihelix where possible.  The area thus outlined was incised through and through with a #15 scalpel blade.  With a skin hook and iris scissors, the flaps were gently and sharply undermined and freed up.
Suture Removal: 10 days
Complex Repair And Double M Plasty Text: The defect edges were debeveled with a #15 scalpel blade.  The primary defect was closed partially with a complex linear closure.  Given the location of the remaining defect, shape of the defect and the proximity to free margins a double M plasty was deemed most appropriate for complete closure of the defect.  Using a sterile surgical marker, an appropriate advancement flap was drawn incorporating the defect and placing the expected incisions within the relaxed skin tension lines where possible.    The area thus outlined was incised deep to adipose tissue with a #15 scalpel blade.  The skin margins were undermined to an appropriate distance in all directions utilizing iris scissors.
O-Z Plasty Text: The defect edges were debeveled with a #15 scalpel blade.  Given the location of the defect, shape of the defect and the proximity to free margins an O-Z plasty (double transposition flap) was deemed most appropriate.  Using a sterile surgical marker, the appropriate transposition flaps were drawn incorporating the defect and placing the expected incisions within the relaxed skin tension lines where possible.    The area thus outlined was incised deep to adipose tissue with a #15 scalpel blade.  The skin margins were undermined to an appropriate distance in all directions utilizing iris scissors.  Hemostasis was achieved with electrocautery.  The flaps were then transposed into place, one clockwise and the other counterclockwise, and anchored with interrupted buried subcutaneous sutures.
Complex Repair And M Plasty Text: The defect edges were debeveled with a #15 scalpel blade.  The primary defect was closed partially with a complex linear closure.  Given the location of the remaining defect, shape of the defect and the proximity to free margins an M plasty was deemed most appropriate for complete closure of the defect.  Using a sterile surgical marker, an appropriate advancement flap was drawn incorporating the defect and placing the expected incisions within the relaxed skin tension lines where possible.    The area thus outlined was incised deep to adipose tissue with a #15 scalpel blade.  The skin margins were undermined to an appropriate distance in all directions utilizing iris scissors.
Helical Rim Advancement Flap Text: The defect edges were debeveled with a #15 blade scalpel.  Given the location of the defect and the proximity to free margins (helical rim) a double helical rim advancement flap was deemed most appropriate.  Using a sterile surgical marker, the appropriate advancement flaps were drawn incorporating the defect and placing the expected incisions between the helical rim and antihelix where possible.  The area thus outlined was incised through and through with a #15 scalpel blade.  With a skin hook and iris scissors, the flaps were gently and sharply undermined and freed up.
Intermediate Repair Preamble Text (Leave Blank If You Do Not Want): Undermining was performed with blunt dissection.
Skin Substitute Text: The defect edges were debeveled with a #15 scalpel blade.  Given the location of the defect, shape of the defect and the proximity to free margins a skin substitute graft was deemed most appropriate.  The graft material was trimmed to fit the size of the defect. The graft was then placed in the primary defect and oriented appropriately.
Crescentic Advancement Flap Text: The defect edges were debeveled with a #15 scalpel blade.  Given the location of the defect and the proximity to free margins a crescentic advancement flap was deemed most appropriate.  Using a sterile surgical marker, the appropriate advancement flap was drawn incorporating the defect and placing the expected incisions within the relaxed skin tension lines where possible.    The area thus outlined was incised deep to adipose tissue with a #15 scalpel blade.  The skin margins were undermined to an appropriate distance in all directions utilizing iris scissors.
Complex Repair And W Plasty Text: The defect edges were debeveled with a #15 scalpel blade.  The primary defect was closed partially with a complex linear closure.  Given the location of the remaining defect, shape of the defect and the proximity to free margins a W plasty was deemed most appropriate for complete closure of the defect.  Using a sterile surgical marker, an appropriate advancement flap was drawn incorporating the defect and placing the expected incisions within the relaxed skin tension lines where possible.    The area thus outlined was incised deep to adipose tissue with a #15 scalpel blade.  The skin margins were undermined to an appropriate distance in all directions utilizing iris scissors.
Ear Star Wedge Flap Text: The defect edges were debeveled with a #15 blade scalpel.  Given the location of the defect and the proximity to free margins (helical rim) an ear star wedge flap was deemed most appropriate.  Using a sterile surgical marker, the appropriate flap was drawn incorporating the defect and placing the expected incisions between the helical rim and antihelix where possible.  The area thus outlined was incised through and through with a #15 scalpel blade.
S Plasty Text: Given the location and shape of the defect, and the orientation of relaxed skin tension lines, an S-plasty was deemed most appropriate for repair.  Using a sterile surgical marker, the appropriate outline of the S-plasty was drawn, incorporating the defect and placing the expected incisions within the relaxed skin tension lines where possible.  The area thus outlined was incised deep to adipose tissue with a #15 scalpel blade.  The skin margins were undermined to an appropriate distance in all directions utilizing iris scissors. The skin flaps were advanced over the defect.  The opposing margins were then approximated with interrupted buried subcutaneous sutures.
Excision Depth: adipose tissue
Scalpel Size: 15C blade
Complex Repair And Z Plasty Text: The defect edges were debeveled with a #15 scalpel blade.  The primary defect was closed partially with a complex linear closure.  Given the location of the remaining defect, shape of the defect and the proximity to free margins a Z plasty was deemed most appropriate for complete closure of the defect.  Using a sterile surgical marker, an appropriate advancement flap was drawn incorporating the defect and placing the expected incisions within the relaxed skin tension lines where possible.    The area thus outlined was incised deep to adipose tissue with a #15 scalpel blade.  The skin margins were undermined to an appropriate distance in all directions utilizing iris scissors.
Banner Transposition Flap Text: The defect edges were debeveled with a #15 scalpel blade.  Given the location of the defect and the proximity to free margins a Banner transposition flap was deemed most appropriate.  Using a sterile surgical marker, an appropriate flap drawn around the defect. The area thus outlined was incised deep to adipose tissue with a #15 scalpel blade.  The skin margins were undermined to an appropriate distance in all directions utilizing iris scissors.
V-Y Plasty Text: The defect edges were debeveled with a #15 scalpel blade.  Given the location of the defect, shape of the defect and the proximity to free margins an V-Y advancement flap was deemed most appropriate.  Using a sterile surgical marker, an appropriate advancement flap was drawn incorporating the defect and placing the expected incisions within the relaxed skin tension lines where possible.    The area thus outlined was incised deep to adipose tissue with a #15 scalpel blade.  The skin margins were undermined to an appropriate distance in all directions utilizing iris scissors.
Tissue Cultured Epidermal Autograft Text: The defect edges were debeveled with a #15 scalpel blade.  Given the location of the defect, shape of the defect and the proximity to free margins a tissue cultured epidermal autograft was deemed most appropriate.  The graft was then trimmed to fit the size of the defect.  The graft was then placed in the primary defect and oriented appropriately.
A-T Advancement Flap Text: The defect edges were debeveled with a #15 scalpel blade.  Given the location of the defect, shape of the defect and the proximity to free margins an A-T advancement flap was deemed most appropriate.  Using a sterile surgical marker, an appropriate advancement flap was drawn incorporating the defect and placing the expected incisions within the relaxed skin tension lines where possible.    The area thus outlined was incised deep to adipose tissue with a #15 scalpel blade.  The skin margins were undermined to an appropriate distance in all directions utilizing iris scissors.
H Plasty Text: Given the location of the defect, shape of the defect and the proximity to free margins a H-plasty was deemed most appropriate for repair.  Using a sterile surgical marker, the appropriate advancement arms of the H-plasty were drawn incorporating the defect and placing the expected incisions within the relaxed skin tension lines where possible. The area thus outlined was incised deep to adipose tissue with a #15 scalpel blade. The skin margins were undermined to an appropriate distance in all directions utilizing iris scissors.  The opposing advancement arms were then advanced into place in opposite direction and anchored with interrupted buried subcutaneous sutures.
O-T Advancement Flap Text: The defect edges were debeveled with a #15 scalpel blade.  Given the location of the defect, shape of the defect and the proximity to free margins an O-T advancement flap was deemed most appropriate.  Using a sterile surgical marker, an appropriate advancement flap was drawn incorporating the defect and placing the expected incisions within the relaxed skin tension lines where possible.    The area thus outlined was incised deep to adipose tissue with a #15 scalpel blade.  The skin margins were undermined to an appropriate distance in all directions utilizing iris scissors.
Xenograft Text: The defect edges were debeveled with a #15 scalpel blade.  Given the location of the defect, shape of the defect and the proximity to free margins a xenograft was deemed most appropriate.  The graft was then trimmed to fit the size of the defect.  The graft was then placed in the primary defect and oriented appropriately.
Bilobed Flap Text: The defect edges were debeveled with a #15 scalpel blade.  Given the location of the defect and the proximity to free margins a bilobe flap was deemed most appropriate.  Using a sterile surgical marker, an appropriate bilobe flap drawn around the defect.    The area thus outlined was incised deep to adipose tissue with a #15 scalpel blade.  The skin margins were undermined to an appropriate distance in all directions utilizing iris scissors.
Complex Repair And Dorsal Nasal Flap Text: The defect edges were debeveled with a #15 scalpel blade.  The primary defect was closed partially with a complex linear closure.  Given the location of the remaining defect, shape of the defect and the proximity to free margins a dorsal nasal flap was deemed most appropriate for complete closure of the defect.  Using a sterile surgical marker, an appropriate flap was drawn incorporating the defect and placing the expected incisions within the relaxed skin tension lines where possible.    The area thus outlined was incised deep to adipose tissue with a #15 scalpel blade.  The skin margins were undermined to an appropriate distance in all directions utilizing iris scissors.
V-Y Flap Text: The defect edges were debeveled with a #15 scalpel blade.  Given the location of the defect, shape of the defect and the proximity to free margins a V-Y flap was deemed most appropriate.  Using a sterile surgical marker, an appropriate advancement flap was drawn incorporating the defect and placing the expected incisions within the relaxed skin tension lines where possible.    The area thus outlined was incised deep to adipose tissue with a #15 scalpel blade.  The skin margins were undermined to an appropriate distance in all directions utilizing iris scissors.
Purse String (Simple) Text: Given the location of the defect and the characteristics of the surrounding skin a purse string simple closure was deemed most appropriate.  Undermining was performed circumferentially around the surgical defect.  A purse string suture was then placed and tightened.
Number Of Epidermal Sutures (Optional): 4
W Plasty Text: The lesion was extirpated to the level of the fat with a #15 scalpel blade.  Given the location of the defect, shape of the defect and the proximity to free margins a W-plasty was deemed most appropriate for repair.  Using a sterile surgical marker, the appropriate transposition arms of the W-plasty were drawn incorporating the defect and placing the expected incisions within the relaxed skin tension lines where possible.    The area thus outlined was incised deep to adipose tissue with a #15 scalpel blade.  The skin margins were undermined to an appropriate distance in all directions utilizing iris scissors.  The opposing transposition arms were then transposed into place in opposite direction and anchored with interrupted buried subcutaneous sutures.
Purse String (Intermediate) Text: Given the location of the defect and the characteristics of the surrounding skin a purse string intermediate closure was deemed most appropriate.  Undermining was performed circumferentially around the surgical defect.  A purse string suture was then placed and tightened.
O-L Flap Text: The defect edges were debeveled with a #15 scalpel blade.  Given the location of the defect, shape of the defect and the proximity to free margins an O-L flap was deemed most appropriate.  Using a sterile surgical marker, an appropriate advancement flap was drawn incorporating the defect and placing the expected incisions within the relaxed skin tension lines where possible.    The area thus outlined was incised deep to adipose tissue with a #15 scalpel blade.  The skin margins were undermined to an appropriate distance in all directions utilizing iris scissors.
Bilobed Transposition Flap Text: The defect edges were debeveled with a #15 scalpel blade.  Given the location of the defect and the proximity to free margins a bilobed transposition flap was deemed most appropriate.  Using a sterile surgical marker, an appropriate bilobe flap drawn around the defect.    The area thus outlined was incised deep to adipose tissue with a #15 scalpel blade.  The skin margins were undermined to an appropriate distance in all directions utilizing iris scissors.
Complex Repair And Ftsg Text: The defect edges were debeveled with a #15 scalpel blade.  The primary defect was closed partially with a complex linear closure.  Given the location of the defect, shape of the defect and the proximity to free margins a full thickness skin graft was deemed most appropriate to repair the remaining defect.  The graft was trimmed to fit the size of the remaining defect.  The graft was then placed in the primary defect, oriented appropriately, and sutured into place.
Complex Repair And Epidermal Autograft Text: The defect edges were debeveled with a #15 scalpel blade.  The primary defect was closed partially with a complex linear closure.  Given the location of the defect, shape of the defect and the proximity to free margins an epidermal autograft was deemed most appropriate to repair the remaining defect.  The graft was trimmed to fit the size of the remaining defect.  The graft was then placed in the primary defect, oriented appropriately, and sutured into place.
Dorsal Nasal Flap Text: The defect edges were debeveled with a #15 scalpel blade.  Given the location of the defect and the proximity to free margins a dorsal nasal flap was deemed most appropriate.  Using a sterile surgical marker, an appropriate dorsal nasal flap was drawn around the defect.    The area thus outlined was incised deep to adipose tissue with a #15 scalpel blade.  The skin margins were undermined to an appropriate distance in all directions utilizing iris scissors.
Complex Repair And Single Advancement Flap Text: The defect edges were debeveled with a #15 scalpel blade.  The primary defect was closed partially with a complex linear closure.  Given the location of the remaining defect, shape of the defect and the proximity to free margins a single advancement flap was deemed most appropriate for complete closure of the defect.  Using a sterile surgical marker, an appropriate advancement flap was drawn incorporating the defect and placing the expected incisions within the relaxed skin tension lines where possible.    The area thus outlined was incised deep to adipose tissue with a #15 scalpel blade.  The skin margins were undermined to an appropriate distance in all directions utilizing iris scissors.
Complex Repair And Split-Thickness Skin Graft Text: The defect edges were debeveled with a #15 scalpel blade.  The primary defect was closed partially with a complex linear closure.  Given the location of the defect, shape of the defect and the proximity to free margins a split thickness skin graft was deemed most appropriate to repair the remaining defect.  The graft was trimmed to fit the size of the remaining defect.  The graft was then placed in the primary defect, oriented appropriately, and sutured into place.
Trilobed Flap Text: The defect edges were debeveled with a #15 scalpel blade.  Given the location of the defect and the proximity to free margins a trilobed flap was deemed most appropriate.  Using a sterile surgical marker, an appropriate trilobed flap drawn around the defect.    The area thus outlined was incised deep to adipose tissue with a #15 scalpel blade.  The skin margins were undermined to an appropriate distance in all directions utilizing iris scissors.
Z Plasty Text: The lesion was extirpated to the level of the fat with a #15 scalpel blade.  Given the location of the defect, shape of the defect and the proximity to free margins a Z-plasty was deemed most appropriate for repair.  Using a sterile surgical marker, the appropriate transposition arms of the Z-plasty were drawn incorporating the defect and placing the expected incisions within the relaxed skin tension lines where possible.    The area thus outlined was incised deep to adipose tissue with a #15 scalpel blade.  The skin margins were undermined to an appropriate distance in all directions utilizing iris scissors.  The opposing transposition arms were then transposed into place in opposite direction and anchored with interrupted buried subcutaneous sutures.
Cheek-To-Nose Interpolation Flap Text: A decision was made to reconstruct the defect utilizing an interpolation axial flap and a staged reconstruction.  A telfa template was made of the defect.  This telfa template was then used to outline the Cheek-To-Nose Interpolation flap.  The donor area for the pedicle flap was then injected with anesthesia.  The flap was excised through the skin and subcutaneous tissue down to the layer of the underlying musculature.  The interpolation flap was carefully excised within this deep plane to maintain its blood supply.  The edges of the donor site were undermined.   The donor site was closed in a primary fashion.  The pedicle was then rotated into position and sutured.  Once the tube was sutured into place, adequate blood supply was confirmed with blanching and refill.  The pedicle was then wrapped with xeroform gauze and dressed appropriately with a telfa and gauze bandage to ensure continued blood supply and protect the attached pedicle.
Modified Advancement Flap Text: The defect edges were debeveled with a #15 scalpel blade.  Given the location of the defect, shape of the defect and the proximity to free margins a modified advancement flap was deemed most appropriate.  Using a sterile surgical marker, an appropriate advancement flap was drawn incorporating the defect and placing the expected incisions within the relaxed skin tension lines where possible.    The area thus outlined was incised deep to adipose tissue with a #15 scalpel blade.  The skin margins were undermined to an appropriate distance in all directions utilizing iris scissors.
Complex Repair And Double Advancement Flap Text: The defect edges were debeveled with a #15 scalpel blade.  The primary defect was closed partially with a complex linear closure.  Given the location of the remaining defect, shape of the defect and the proximity to free margins a double advancement flap was deemed most appropriate for complete closure of the defect.  Using a sterile surgical marker, an appropriate advancement flap was drawn incorporating the defect and placing the expected incisions within the relaxed skin tension lines where possible.    The area thus outlined was incised deep to adipose tissue with a #15 scalpel blade.  The skin margins were undermined to an appropriate distance in all directions utilizing iris scissors.
Island Pedicle Flap Text: The defect edges were debeveled with a #15 scalpel blade.  Given the location of the defect, shape of the defect and the proximity to free margins an island pedicle advancement flap was deemed most appropriate.  Using a sterile surgical marker, an appropriate advancement flap was drawn incorporating the defect, outlining the appropriate donor tissue and placing the expected incisions within the relaxed skin tension lines where possible.    The area thus outlined was incised deep to adipose tissue with a #15 scalpel blade.  The skin margins were undermined to an appropriate distance in all directions around the primary defect and laterally outward around the island pedicle utilizing iris scissors.  There was minimal undermining beneath the pedicle flap.
Complex Repair And Dermal Autograft Text: The defect edges were debeveled with a #15 scalpel blade.  The primary defect was closed partially with a complex linear closure.  Given the location of the defect, shape of the defect and the proximity to free margins an dermal autograft was deemed most appropriate to repair the remaining defect.  The graft was trimmed to fit the size of the remaining defect.  The graft was then placed in the primary defect, oriented appropriately, and sutured into place.
Cheek Interpolation Flap Text: A decision was made to reconstruct the defect utilizing an interpolation axial flap and a staged reconstruction.  A telfa template was made of the defect.  This telfa template was then used to outline the Cheek Interpolation flap.  The donor area for the pedicle flap was then injected with anesthesia.  The flap was excised through the skin and subcutaneous tissue down to the layer of the underlying musculature.  The interpolation flap was carefully excised within this deep plane to maintain its blood supply.  The edges of the donor site were undermined.   The donor site was closed in a primary fashion.  The pedicle was then rotated into position and sutured.  Once the tube was sutured into place, adequate blood supply was confirmed with blanching and refill.  The pedicle was then wrapped with xeroform gauze and dressed appropriately with a telfa and gauze bandage to ensure continued blood supply and protect the attached pedicle.
Anesthesia Type: 1% lidocaine with epinephrine
Mercedes Flap Text: The defect edges were debeveled with a #15 scalpel blade.  Given the location of the defect, shape of the defect and the proximity to free margins a Mercedes flap was deemed most appropriate.  Using a sterile surgical marker, an appropriate advancement flap was drawn incorporating the defect and placing the expected incisions within the relaxed skin tension lines where possible. The area thus outlined was incised deep to adipose tissue with a #15 scalpel blade.  The skin margins were undermined to an appropriate distance in all directions utilizing iris scissors.

## 2018-12-12 ENCOUNTER — APPOINTMENT (RX ONLY)
Dept: URBAN - METROPOLITAN AREA CLINIC 20 | Facility: CLINIC | Age: 83
Setting detail: DERMATOLOGY
End: 2018-12-12

## 2018-12-12 PROBLEM — C44.311 BASAL CELL CARCINOMA OF SKIN OF NOSE: Status: ACTIVE | Noted: 2018-12-12

## 2018-12-12 PROCEDURE — ? MOHS SURGERY PHONE CONSULTATION

## 2018-12-12 NOTE — PROCEDURE: MOHS SURGERY PHONE CONSULTATION
Has The Patient Ever Had A Heart Attack Or Stroke?: No
Has The Pathology Report Been Received?: Yes
Time Of Mohs Surgery: 9:00am
Detail Level: Simple
Date Of Surgical Consultation If Needed: 12/12/2018
Patient Reported Location: Nasal Supratip
Referring Provider: Shelly Garcia
If Yes- Additional Details: right first toe, 10 years ago
Which Antibiotic Do They Take For Surgical Prophylaxis?: Amoxicillin (2 grams)
Date Of Mohs Surgery: 01/15/2019
Patient's Insurance: 
Pathology Accession #: [id number]
Patient Preferred Phone Number: [phone]
Office Location Of Mohs Surgery: [place] [id number]

## 2018-12-14 ENCOUNTER — APPOINTMENT (RX ONLY)
Dept: URBAN - METROPOLITAN AREA CLINIC 20 | Facility: CLINIC | Age: 83
Setting detail: DERMATOLOGY
End: 2018-12-14

## 2018-12-21 ENCOUNTER — HOSPITAL ENCOUNTER (OUTPATIENT)
Dept: RADIOLOGY | Facility: MEDICAL CENTER | Age: 83
End: 2018-12-21
Attending: PHYSICAL MEDICINE & REHABILITATION
Payer: MEDICARE

## 2018-12-21 DIAGNOSIS — M54.17 LUMBOSACRAL RADICULOPATHY: ICD-10-CM

## 2018-12-21 PROCEDURE — 72148 MRI LUMBAR SPINE W/O DYE: CPT

## 2019-01-15 ENCOUNTER — APPOINTMENT (RX ONLY)
Dept: URBAN - METROPOLITAN AREA CLINIC 36 | Facility: CLINIC | Age: 84
Setting detail: DERMATOLOGY
End: 2019-01-15

## 2019-01-15 VITALS — DIASTOLIC BLOOD PRESSURE: 79 MMHG | HEART RATE: 62 BPM | SYSTOLIC BLOOD PRESSURE: 126 MMHG

## 2019-01-15 DIAGNOSIS — L57.0 ACTINIC KERATOSIS: ICD-10-CM

## 2019-01-15 DIAGNOSIS — L57.8 OTHER SKIN CHANGES DUE TO CHRONIC EXPOSURE TO NONIONIZING RADIATION: ICD-10-CM

## 2019-01-15 PROBLEM — C44.319 BASAL CELL CARCINOMA OF SKIN OF OTHER PARTS OF FACE: Status: ACTIVE | Noted: 2019-01-15

## 2019-01-15 PROCEDURE — 17312 MOHS ADDL STAGE: CPT

## 2019-01-15 PROCEDURE — ? PRESCRIPTION

## 2019-01-15 PROCEDURE — 14040 TIS TRNFR F/C/C/M/N/A/G/H/F: CPT

## 2019-01-15 PROCEDURE — ? COUNSELING

## 2019-01-15 PROCEDURE — ? PHOTODYNAMIC THERAPY COUNSELING

## 2019-01-15 PROCEDURE — ? MOHS SURGERY

## 2019-01-15 PROCEDURE — 17311 MOHS 1 STAGE H/N/HF/G: CPT

## 2019-01-15 RX ORDER — CEPHALEXIN 500 MG/1
CAPSULE ORAL
Qty: 21 | Refills: 0 | Status: ERX | COMMUNITY
Start: 2019-01-15

## 2019-01-15 RX ADMIN — CEPHALEXIN: 500 CAPSULE ORAL at 20:16

## 2019-01-15 NOTE — PROCEDURE: MOHS SURGERY
Rhombic Flap Text: The defect edges were debeveled with a #15 scalpel blade.  Given the location of the defect and the proximity to free margins a rhombic flap was deemed most appropriate.  Using a sterile surgical marker, an appropriate rhombic flap was drawn incorporating the defect.    The area thus outlined was incised deep to adipose tissue with a #15 scalpel blade.  The skin margins were undermined to an appropriate distance in all directions utilizing iris scissors.
Repair Hemostasis (Optional): Pinpoint electrocautery
Consent 3/Introductory Paragraph: I gave the patient a chance to ask questions they had about the procedure.  Following this I explained the Mohs procedure and consent was obtained. The risks, benefits and alternatives to therapy were discussed in detail. Specifically, the risks of infection, scarring, bleeding, prolonged wound healing, incomplete removal, allergy to anesthesia, nerve injury and recurrence were addressed. Prior to the procedure, the treatment site was clearly identified and confirmed by the patient. All components of Universal Protocol/PAUSE Rule completed.
Tarsorrhaphy Text: A tarsorrhaphy was performed using Frost sutures.
Muscle Hinge Flap Text: The defect edges were debeveled with a #15 scalpel blade.  Given the size, depth and location of the defect and the proximity to free margins a muscle hinge flap was deemed most appropriate.  Using a sterile surgical marker, an appropriate hinge flap was drawn incorporating the defect. The area thus outlined was incised with a #15 scalpel blade.  The skin margins were undermined to an appropriate distance in all directions utilizing iris scissors.
Surgical Defect Length In Cm (Optional): 1.7
Star Wedge Flap Text: The defect edges were debeveled with a #15 scalpel blade.  Given the location of the defect, shape of the defect and the proximity to free margins a star wedge flap was deemed most appropriate.  Using a sterile surgical marker, an appropriate rotation flap was drawn incorporating the defect and placing the expected incisions within the relaxed skin tension lines where possible. The area thus outlined was incised deep to adipose tissue with a #15 scalpel blade.  The skin margins were undermined to an appropriate distance in all directions utilizing iris scissors.
Crescentic Advancement Flap Text: The defect edges were debeveled with a #15 scalpel blade.  Given the location of the defect and the proximity to free margins a crescentic advancement flap was deemed most appropriate.  Using a sterile surgical marker, the appropriate advancement flap was drawn incorporating the defect and placing the expected incisions within the relaxed skin tension lines where possible.    The area thus outlined was incised deep to adipose tissue with a #15 scalpel blade.  The skin margins were undermined to an appropriate distance in all directions utilizing iris scissors.
Postop Diagnosis: same
Dermal Autograft Text: The defect edges were debeveled with a #15 scalpel blade.  Given the location of the defect, shape of the defect and the proximity to free margins a dermal autograft was deemed most appropriate.  Using a sterile surgical marker, the primary defect shape was transferred to the donor site. The area thus outlined was incised deep to adipose tissue with a #15 scalpel blade.  The harvested graft was then trimmed of adipose and epidermal tissue until only dermis was left.  The skin graft was then placed in the primary defect and oriented appropriately.
Show Repair Surgeon Variable: Yes
Stage 6: Number Of Blocks?: 0
Same Histology In Subsequent Stages Text: The pattern and morphology of the tumor is as described in the first stage.
O-T Advancement Flap Text: The defect edges were debeveled with a #15 scalpel blade.  Given the location of the defect, shape of the defect and the proximity to free margins an O-T advancement flap was deemed most appropriate.  Using a sterile surgical marker, an appropriate advancement flap was drawn incorporating the defect and placing the expected incisions within the relaxed skin tension lines where possible.    The area thus outlined was incised deep to adipose tissue with a #15 scalpel blade.  The skin margins were undermined to an appropriate distance in all directions utilizing iris scissors.
M-Plasty Complex Repair Preamble Text (Leave Blank If You Do Not Want): Extensive wide undermining was performed.
Area M Indication Text: Tumors in this location are included in Area M (cheek, forehead, scalp, neck, jawline and pretibial skin).  Mohs surgery is indicated for tumors in these anatomic locations.
Tarsorrhaphy Performed?: No
Ear Wedge Repair Text: A wedge excision was completed by carrying down an excision through the full thickness of the ear and cartilage with an inward facing Burow's triangle. The wound was then closed in a layered fashion.
Cheek Interpolation Flap Text: A decision was made to reconstruct the defect utilizing an interpolation axial flap and a staged reconstruction.  A telfa template was made of the defect.  This telfa template was then used to outline the Cheek Interpolation flap.  The donor area for the pedicle flap was then injected with anesthesia.  The flap was excised through the skin and subcutaneous tissue down to the layer of the underlying musculature.  The interpolation flap was carefully excised within this deep plane to maintain its blood supply.  The edges of the donor site were undermined.   The donor site was closed in a primary fashion.  The pedicle was then rotated into position and sutured.  Once the tube was sutured into place, adequate blood supply was confirmed with blanching and refill.  The pedicle was then wrapped with xeroform gauze and dressed appropriately with a telfa and gauze bandage to ensure continued blood supply and protect the attached pedicle.
Closure 2 Information: This tab is for additional flaps and grafts, including complex repair and grafts and complex repair and flaps. You can also specify a different location for the additional defect, if the location is the same you do not need to select a new one. We will insert the automated text for the repair you select below just as we do for solitary flaps and grafts. Please note that at this time if you select a location with a different insurance zone you will need to override the ICD10 and CPT if appropriate.
Surgeon/Pathologist Verbiage (Will Incorporate Name Of Surgeon From Intro If Not Blank): operated in two distinct and integrated capacities as the surgeon and pathologist.
Mid-Level Procedure Text (C): After obtaining clear surgical margins the patient was sent to a mid-level provider for surgical repair.  The patient understands they will receive post-surgical care and follow-up from the mid-level provider.
Consent (Lip)/Introductory Paragraph: The rationale for Mohs was explained to the patient and consent was obtained. The risks, benefits and alternatives to therapy were discussed in detail. Specifically, the risks of lip deformity, changes in the oral aperture, infection, scarring, bleeding, prolonged wound healing, incomplete removal, allergy to anesthesia, nerve injury and recurrence were addressed. Prior to the procedure, the treatment site was clearly identified and confirmed by the patient. All components of Universal Protocol/PAUSE Rule completed.
Consent (Temporal Branch)/Introductory Paragraph: The rationale for Mohs was explained to the patient and consent was obtained. The risks, benefits and alternatives to therapy were discussed in detail. Specifically, the risks of damage to the temporal branch of the facial nerve, infection, scarring, bleeding, prolonged wound healing, incomplete removal, allergy to anesthesia, and recurrence were addressed. Prior to the procedure, the treatment site was clearly identified and confirmed by the patient. All components of Universal Protocol/PAUSE Rule completed.
O-Z Plasty Text: The defect edges were debeveled with a #15 scalpel blade.  Given the location of the defect, shape of the defect and the proximity to free margins an O-Z plasty (double transposition flap) was deemed most appropriate.  Using a sterile surgical marker, the appropriate transposition flaps were drawn incorporating the defect and placing the expected incisions within the relaxed skin tension lines where possible.    The area thus outlined was incised deep to adipose tissue with a #15 scalpel blade.  The skin margins were undermined to an appropriate distance in all directions utilizing iris scissors.  Hemostasis was achieved with electrocautery.  The flaps were then transposed into place, one clockwise and the other counterclockwise, and anchored with interrupted buried subcutaneous sutures.
Surgeon Performing Repair (Optional): Keily Davila MD
Oculoplastic Surgeon Procedure Text (F): After obtaining clear surgical margins the patient was sent to oculoplastics for surgical repair.  The patient understands they will receive post-surgical care and follow-up from the referring physician's office.
Stage 3: Additional Anesthesia Type: 1% lidocaine with epinephrine
Otolaryngologist Procedure Text (F): After obtaining clear surgical margins the patient was sent to otolaryngology for surgical repair.  The patient understands they will receive post-surgical care and follow-up from the referring physician's office.
Mohs Method Verbiage: An incision at a 45 degree angle following the standard Mohs approach was done and the specimen was harvested as a microscopic controlled layer.
Information: Selecting Yes will display possible errors in your note based on the variables you have selected. This validation is only offered as a suggestion for you. PLEASE NOTE THAT THE VALIDATION TEXT WILL BE REMOVED WHEN YOU FINALIZE YOUR NOTE. IF YOU WANT TO FAX A PRELIMINARY NOTE YOU WILL NEED TO TOGGLE THIS TO 'NO' IF YOU DO NOT WANT IT IN YOUR FAXED NOTE.
Transposition Flap Text: The defect edges were debeveled with a #15 scalpel blade.  Given the location of the defect and the proximity to free margins a transposition flap was deemed most appropriate.  Using a sterile surgical marker, an appropriate transposition flap was drawn incorporating the defect.    The area thus outlined was incised deep to adipose tissue with a #15 scalpel blade.  The skin margins were undermined to an appropriate distance in all directions utilizing iris scissors.
Epidermal Closure Graft Donor Site (Optional): running
Split-Thickness Skin Graft Text: The defect edges were debeveled with a #15 scalpel blade.  Given the location of the defect, shape of the defect and the proximity to free margins a split thickness skin graft was deemed most appropriate.  Using a sterile surgical marker, the primary defect shape was transferred to the donor site. The split thickness graft was then harvested.  The skin graft was then placed in the primary defect and oriented appropriately.
Plastic Surgeon Procedure Text (E): After obtaining clear surgical margins the patient was sent to plastics for surgical repair.  The patient understands they will receive post-surgical care and follow-up from the referring physician's office.
Consent (Near Eyelid Margin)/Introductory Paragraph: The rationale for Mohs was explained to the patient and consent was obtained. The risks, benefits and alternatives to therapy were discussed in detail. Specifically, the risks of ectropion or eyelid deformity, infection, scarring, bleeding, prolonged wound healing, incomplete removal, allergy to anesthesia, nerve injury and recurrence were addressed. Prior to the procedure, the treatment site was clearly identified and confirmed by the patient. All components of Universal Protocol/PAUSE Rule completed.
Non-Graft Cartilage Fenestration Text: The cartilage was fenestrated with a 2mm punch biopsy to help facilitate healing.
Anesthesia Volume In Cc: 9
Bcc Infiltrative Histology Text: There were numerous aggregates of basaloid cells demonstrating an infiltrative pattern.
Island Pedicle Flap Text: The defect edges were debeveled with a #15 scalpel blade.  Given the location of the defect, shape of the defect and the proximity to free margins an island pedicle advancement flap was deemed most appropriate.  Using a sterile surgical marker, an appropriate advancement flap was drawn incorporating the defect, outlining the appropriate donor tissue and placing the expected incisions within the relaxed skin tension lines where possible.    The area thus outlined was incised deep to adipose tissue with a #15 scalpel blade.  The skin margins were undermined to an appropriate distance in all directions around the primary defect and laterally outward around the island pedicle utilizing iris scissors.  There was minimal undermining beneath the pedicle flap.
Undermining Location (Optional): in the superficial subcutaneous fat
Purse String (Simple) Text: Given the location of the defect and the characteristics of the surrounding skin a purse string closure was deemed most appropriate.  Undermining was performed circumfirentially around the surgical defect.  A purse string suture was then placed and tightened.
Cheiloplasty (Complex) Text: A decision was made to reconstruct the defect with a  cheiloplasty.  The defect was undermined extensively.  Additional obicularis oris muscle was excised with a 15 blade scalpel.  The defect was converted into a full thickness wedge to facilite a better cosmetic result.  Small vessels were then tied off with 5-0 monocyrl. The obicularis oris, superficial fascia, adipose and dermis were then reapproximated.  After the deeper layers were approximated the epidermis was reapproximated with particular care given to realign the vermilion border.
Closure 4 Information: This tab is for additional flaps and grafts above and beyond our usual structured repairs.  Please note if you enter information here it will not currently bill and you will need to add the billing information manually.
Hatchet Flap Text: The defect edges were debeveled with a #15 scalpel blade.  Given the location of the defect, shape of the defect and the proximity to free margins a hatchet flap was deemed most appropriate.  Using a sterile surgical marker, an appropriate hatchet flap was drawn incorporating the defect and placing the expected incisions within the relaxed skin tension lines where possible.    The area thus outlined was incised deep to adipose tissue with a #15 scalpel blade.  The skin margins were undermined to an appropriate distance in all directions utilizing iris scissors.
A-T Advancement Flap Text: The defect edges were debeveled with a #15 scalpel blade.  Given the location of the defect, shape of the defect and the proximity to free margins an A-T advancement flap was deemed most appropriate.  Using a sterile surgical marker, an appropriate advancement flap was drawn incorporating the defect and placing the expected incisions within the relaxed skin tension lines where possible.    The area thus outlined was incised deep to adipose tissue with a #15 scalpel blade.  The skin margins were undermined to an appropriate distance in all directions utilizing iris scissors.
Partial Purse String (Intermediate) Text: Given the location of the defect and the characteristics of the surrounding skin an intermediate purse string closure was deemed most appropriate.  Undermining was performed circumfirentially around the surgical defect.  A purse string suture was then placed and tightened. Wound tension only allowed a partial closure of the circular defect.
Burow's Advancement Flap Text: The defect edges were debeveled with a #15 scalpel blade.  Given the location of the defect and the proximity to free margins a Burow's advancement flap was deemed most appropriate.  Using a sterile surgical marker, the appropriate advancement flap was drawn incorporating the defect and placing the expected incisions within the relaxed skin tension lines where possible.    The area thus outlined was incised deep to adipose tissue with a #15 scalpel blade.  The skin margins were undermined to an appropriate distance in all directions utilizing iris scissors.
Dorsal Nasal Flap Text: The defect edges were debeveled with a #15 scalpel blade.  Given the location of the defect and the proximity to free margins a dorsal nasal flap was deemed most appropriate.  Using a sterile surgical marker, an appropriate dorsal nasal flap was drawn around the defect.    The area thus outlined was incised deep to adipose tissue with a #15 scalpel blade.  The skin margins were undermined to an appropriate distance in all directions utilizing iris scissors.
Wound Check: 6 weeks
No Repair - Repaired With Adjacent Surgical Defect Text (Leave Blank If You Do Not Want): After obtaining clear surgical margins the defect was repaired concurrently with another surgical defect which was in close approximation.
Secondary Intention Text (Leave Blank If You Do Not Want): The defect will heal with secondary intention.
Medical Necessity Statement: Based on my medical judgement, Mohs surgery is the most appropriate treatment for this cancer compared to other treatments.
Advancement Flap (Single) Text: The defect edges were debeveled with a #15 scalpel blade.  Given the location of the defect and the proximity to free margins a single advancement flap was deemed most appropriate.  Using a sterile surgical marker, an appropriate advancement flap was drawn incorporating the defect and placing the expected incisions within the relaxed skin tension lines where possible.    The area thus outlined was incised deep to adipose tissue with a #15 scalpel blade.  The skin margins were undermined to an appropriate distance in all directions utilizing iris scissors.
Consent Type: Consent 1 (Standard)
Referring Physician (Optional): [email or web address] Sutton
Stage 1: Number Of Blocks?: 1
Mauc Instructions: By selecting yes to the question below the MAUC number will be added into the note.  This will be calculated automatically based on the diagnosis chosen, the size entered, the body zone selected (H,M,L) and the specific indications you chose. You will also have the option to override the Mohs AUC if you disagree with the automatically calculated number and this option is found in the Case Summary tab.
Mohs Rapid Report Verbiage: The area of clinically evident tumor was marked with skin marking ink and appropriately hatched.  The initial incision was made following the Mohs approach through the skin.  The specimen was taken to the lab, divided into the necessary number of pieces, chromacoded and processed according to the Mohs protocol.  This was repeated in successive stages until a tumor free defect was achieved.
S Plasty Text: Given the location and shape of the defect, and the orientation of relaxed skin tension lines, an S-plasty was deemed most appropriate for repair.  Using a sterile surgical marker, the appropriate outline of the S-plasty was drawn, incorporating the defect and placing the expected incisions within the relaxed skin tension lines where possible.  The area thus outlined was incised deep to adipose tissue with a #15 scalpel blade.  The skin margins were undermined to an appropriate distance in all directions utilizing iris scissors. The skin flaps were advanced over the defect.  The opposing margins were then approximated with interrupted buried subcutaneous sutures.
Graft Donor Site Bandage (Optional-Leave Blank If You Don't Want In Note): Aquaplast was fitted to the graft site and sewn into place. A pressure bandage were applied to the donor site and over the aquaplast bolster.
Purse String (Intermediate) Text: Given the location of the defect and the characteristics of the surrounding skin a purse string intermediate closure was deemed most appropriate.  Undermining was performed circumfirentially around the surgical defect.  A purse string suture was then placed and tightened.
Advancement Flap (Double) Text: The defect edges were debeveled with a #15 scalpel blade.  Given the location of the defect and the proximity to free margins a double advancement flap was deemed most appropriate.  Using a sterile surgical marker, the appropriate advancement flaps were drawn incorporating the defect and placing the expected incisions within the relaxed skin tension lines where possible.    The area thus outlined was incised deep to adipose tissue with a #15 scalpel blade.  The skin margins were undermined to an appropriate distance in all directions utilizing iris scissors.
Additional Anesthesia Volume In Cc: 6
Modified Advancement Flap Text: The defect edges were debeveled with a #15 scalpel blade.  Given the location of the defect, shape of the defect and the proximity to free margins a modified advancement flap was deemed most appropriate.  Using a sterile surgical marker, an appropriate advancement flap was drawn incorporating the defect and placing the expected incisions within the relaxed skin tension lines where possible.    The area thus outlined was incised deep to adipose tissue with a #15 scalpel blade.  The skin margins were undermined to an appropriate distance in all directions utilizing iris scissors.
Mohs Histo Method Verbiage: Each section was then chromacoded and processed in the Mohs lab using the Mohs protocol and submitted for frozen section.
Xenograft Text: The defect edges were debeveled with a #15 scalpel blade.  Given the location of the defect, shape of the defect and the proximity to free margins a xenograft was deemed most appropriate.  The graft was then trimmed to fit the size of the defect.  The graft was then placed in the primary defect and oriented appropriately.
Complex Repair And Flap Additional Text (Will Appearing After The Standard Complex Repair Text): The complex repair was not sufficient to completely close the primary defect. The remaining additional defect was repaired with the flap mentioned below.
Dressing: pressure dressing with telfa
Skin Substitute Text: The defect edges were debeveled with a #15 scalpel blade.  Given the location of the defect, shape of the defect and the proximity to free margins a skin substitute graft was deemed most appropriate.  The graft material was trimmed to fit the size of the defect. The graft was then placed in the primary defect and oriented appropriately.
Secondary Defect Length In Cm (Required For Flaps): 1.5
Graft Cartilage Fenestration Text: The cartilage was fenestrated with a 2mm punch biopsy to help facilitate graft survival and healing.
Keystone Flap Text: The defect edges were debeveled with a #15 scalpel blade.  Given the location of the defect, shape of the defect a keystone flap was deemed most appropriate.  Using a sterile surgical marker, an appropriate keystone flap was drawn incorporating the defect, outlining the appropriate donor tissue and placing the expected incisions within the relaxed skin tension lines where possible. The area thus outlined was incised deep to adipose tissue with a #15 scalpel blade.  The skin margins were undermined to an appropriate distance in all directions around the primary defect and laterally outward around the flap utilizing iris scissors.
Asc Procedure Text (C): After obtaining clear surgical margins the patient was sent to an ASC for surgical repair.  The patient understands they will receive post-surgical care and follow-up from the ASC physician.
Advancement-Rotation Flap Text: The defect edges were debeveled with a #15 scalpel blade.  Given the location of the defect, shape of the defect and the proximity to free margins an advancement-rotation flap was deemed most appropriate.  Using a sterile surgical marker, an appropriate flap was drawn incorporating the defect and placing the expected incisions within the relaxed skin tension lines where possible. The area thus outlined was incised deep to adipose tissue with a #15 scalpel blade.  The skin margins were undermined to an appropriate distance in all directions utilizing iris scissors.
Bilobed Flap Text: The defect edges were debeveled with a #15 scalpel blade.  Given the location of the defect and the proximity to free margins a bilobe flap was deemed most appropriate.  Using a sterile surgical marker, an appropriate bilobe flap drawn around the defect.    The area thus outlined was incised deep to adipose tissue with a #15 scalpel blade.  The skin margins were undermined to an appropriate distance in all directions utilizing iris scissors.
Consent 2/Introductory Paragraph: Mohs surgery was explained to the patient and consent was obtained. The risks, benefits and alternatives to therapy were discussed in detail. Specifically, the risks of infection, scarring, bleeding, prolonged wound healing, incomplete removal, allergy to anesthesia, nerve injury and recurrence were addressed. Prior to the procedure, the treatment site was clearly identified and confirmed by the patient. All components of Universal Protocol/PAUSE Rule completed.
Primary Defect Width In Cm (Final Defect Size - Required For Flaps/Grafts): 2
Localized Dermabrasion With Wire Brush Text: The patient was draped in routine manner.  Localized dermabrasion using 3 x 17 mm wire brush was performed in routine manner to papillary dermis. This spot dermabrasion is being performed to complete skin cancer reconstruction. It also will eliminate the other sun damaged precancerous cells that are known to be part of the regional effect of a lifetime's worth of sun exposure. This localized dermabrasion is therapeutic and should not be considered cosmetic in any regard.
Full Thickness Lip Wedge Repair (Flap) Text: Given the location of the defect and the proximity to free margins a full thickness wedge repair was deemed most appropriate.  Using a sterile surgical marker, the appropriate repair was drawn incorporating the defect and placing the expected incisions perpendicular to the vermilion border.  The vermilion border was also meticulously outlined to ensure appropriate reapproximation during the repair.  The area thus outlined was incised through and through with a #15 scalpel blade.  The muscularis and dermis were reaproximated with deep sutures following hemostasis. Care was taken to realign the vermilion border before proceeding with the superficial closure.  Once the vermilion was realigned the superfical and mucosal closure was finished.
Repair Type: Flap
Rotation Flap Text: The defect edges were debeveled with a #15 scalpel blade.  Given the location of the defect, shape of the defect and the proximity to free margins a rotation flap was deemed most appropriate.  Using a sterile surgical marker, an appropriate rotation flap was drawn incorporating the defect and placing the expected incisions within the relaxed skin tension lines where possible.    The area thus outlined was incised deep to adipose tissue with a #15 scalpel blade.  The skin margins were undermined to an appropriate distance in all directions utilizing iris scissors.
Consent (Spinal Accessory)/Introductory Paragraph: The rationale for Mohs was explained to the patient and consent was obtained. The risks, benefits and alternatives to therapy were discussed in detail. Specifically, the risks of damage to the spinal accessory nerve, infection, scarring, bleeding, prolonged wound healing, incomplete removal, allergy to anesthesia, and recurrence were addressed. Prior to the procedure, the treatment site was clearly identified and confirmed by the patient. All components of Universal Protocol/PAUSE Rule completed.
Home Suture Removal Text: Patient was provided instructions on removing sutures and will remove their sutures at home.  If they have any questions or difficulties they will call the office.
Complex Repair And Graft Additional Text (Will Appearing After The Standard Complex Repair Text): The complex repair was not sufficient to completely close the primary defect. The remaining additional defect was repaired with the graft mentioned below.
V-Y Plasty Text: The defect edges were debeveled with a #15 scalpel blade.  Given the location of the defect, shape of the defect and the proximity to free margins an V-Y advancement flap was deemed most appropriate.  Using a sterile surgical marker, an appropriate advancement flap was drawn incorporating the defect and placing the expected incisions within the relaxed skin tension lines where possible.    The area thus outlined was incised deep to adipose tissue with a #15 scalpel blade.  The skin margins were undermined to an appropriate distance in all directions utilizing iris scissors.
W Plasty Text: The lesion was extirpated to the level of the fat with a #15 scalpel blade.  Given the location of the defect, shape of the defect and the proximity to free margins a W-plasty was deemed most appropriate for repair.  Using a sterile surgical marker, the appropriate transposition arms of the W-plasty were drawn incorporating the defect and placing the expected incisions within the relaxed skin tension lines where possible.    The area thus outlined was incised deep to adipose tissue with a #15 scalpel blade.  The skin margins were undermined to an appropriate distance in all directions utilizing iris scissors.  The opposing transposition arms were then transposed into place in opposite direction and anchored with interrupted buried subcutaneous sutures.
Bcc Histology Text: There were numerous aggregates of basaloid cells.
Provider Procedure Text (C): After obtaining clear surgical margins the defect was repaired by another provider.
No Residual Tumor Seen Histology Text: There were no malignant cells seen in the sections examined.
Inflammation Suggestive Of Cancer Camouflage Histology Text: There was a dense lymphocytic infiltrate which prevented adequate histologic evaluation of adjacent structures.
Donor Site Anesthesia Type: same as repair anesthesia
Bilobed Transposition Flap Text: The defect edges were debeveled with a #15 scalpel blade.  Given the location of the defect and the proximity to free margins a bilobed transposition flap was deemed most appropriate.  Using a sterile surgical marker, an appropriate bilobe flap drawn around the defect.    The area thus outlined was incised deep to adipose tissue with a #15 scalpel blade.  The skin margins were undermined to an appropriate distance in all directions utilizing iris scissors.
Post-Care Instructions: I reviewed with the patient in detail post-care instructions. Patient is not to engage in any heavy lifting, exercise, or swimming for the next 14 days. Should the patient develop any fevers, chills, bleeding, severe pain patient will contact the office immediately.
Number Of Stages: 3
V-Y Flap Text: The defect edges were debeveled with a #15 scalpel blade.  Given the location of the defect, shape of the defect and the proximity to free margins a V-Y flap was deemed most appropriate.  Using a sterile surgical marker, an appropriate advancement flap was drawn incorporating the defect and placing the expected incisions within the relaxed skin tension lines where possible.    The area thus outlined was incised deep to adipose tissue with a #15 scalpel blade.  The skin margins were undermined to an appropriate distance in all directions utilizing iris scissors.
Composite Graft Text: The defect edges were debeveled with a #15 scalpel blade.  Given the location of the defect, shape of the defect, the proximity to free margins and the fact the defect was full thickness a composite graft was deemed most appropriate.  The defect was outline and then transferred to the donor site.  A full thickness graft was then excised from the donor site. The graft was then placed in the primary defect, oriented appropriately and then sutured into place.  The secondary defect was then repaired using a primary closure.
Z Plasty Text: The lesion was extirpated to the level of the fat with a #15 scalpel blade.  Given the location of the defect, shape of the defect and the proximity to free margins a Z-plasty was deemed most appropriate for repair.  Using a sterile surgical marker, the appropriate transposition arms of the Z-plasty were drawn incorporating the defect and placing the expected incisions within the relaxed skin tension lines where possible.    The area thus outlined was incised deep to adipose tissue with a #15 scalpel blade.  The skin margins were undermined to an appropriate distance in all directions utilizing iris scissors.  The opposing transposition arms were then transposed into place in opposite direction and anchored with interrupted buried subcutaneous sutures.
Wound Care: Vaseline
Crescentic Intermediate Repair Preamble Text (Leave Blank If You Do Not Want): Undermining was performed with blunt dissection.
Banner Transposition Flap Text: The defect edges were debeveled with a #15 scalpel blade.  Given the location of the defect and the proximity to free margins a Banner transposition flap was deemed most appropriate.  Using a sterile surgical marker, an appropriate flap drawn around the defect. The area thus outlined was incised deep to adipose tissue with a #15 scalpel blade.  The skin margins were undermined to an appropriate distance in all directions utilizing iris scissors.
Consent (Ear)/Introductory Paragraph: The rationale for Mohs was explained to the patient and consent was obtained. The risks, benefits and alternatives to therapy were discussed in detail. Specifically, the risks of ear deformity, infection, scarring, bleeding, prolonged wound healing, incomplete removal, allergy to anesthesia, nerve injury and recurrence were addressed. Prior to the procedure, the treatment site was clearly identified and confirmed by the patient. All components of Universal Protocol/PAUSE Rule completed.
Helical Rim Advancement Flap Text: The defect edges were debeveled with a #15 blade scalpel.  Given the location of the defect and the proximity to free margins (helical rim) a double helical rim advancement flap was deemed most appropriate.  Using a sterile surgical marker, the appropriate advancement flaps were drawn incorporating the defect and placing the expected incisions between the helical rim and antihelix where possible.  The area thus outlined was incised through and through with a #15 scalpel blade.  With a skin hook and iris scissors, the flaps were gently and sharply undermined and freed up.
Alar Island Pedicle Flap Text: The defect edges were debeveled with a #15 scalpel blade.  Given the location of the defect, shape of the defect and the proximity to the alar rim an island pedicle advancement flap was deemed most appropriate.  Using a sterile surgical marker, an appropriate advancement flap was drawn incorporating the defect, outlining the appropriate donor tissue and placing the expected incisions within the nasal ala running parallel to the alar rim. The area thus outlined was incised with a #15 scalpel blade.  The skin margins were undermined minimally to an appropriate distance in all directions around the primary defect and laterally outward around the island pedicle utilizing iris scissors.  There was minimal undermining beneath the pedicle flap.
Subsequent Stages Histo Method Verbiage: Using a similar technique to that described above, a thin layer of tissue was removed from all areas where tumor was visible on the previous stage.  The tissue was again oriented, mapped, dyed, and processed as above.
Consent 1/Introductory Paragraph: The rationale for Mohs was explained to the patient and consent was obtained. The risks, benefits and alternatives to therapy were discussed in detail. Specifically, the risks of infection, scarring, bleeding, prolonged wound healing, incomplete removal, allergy to anesthesia, nerve injury and recurrence were addressed. Prior to the procedure, the treatment site was clearly identified and confirmed by the patient. All components of Universal Protocol/PAUSE Rule completed.
Suture Removal: 7 days
O-L Flap Text: The defect edges were debeveled with a #15 scalpel blade.  Given the location of the defect, shape of the defect and the proximity to free margins an O-L flap was deemed most appropriate.  Using a sterile surgical marker, an appropriate advancement flap was drawn incorporating the defect and placing the expected incisions within the relaxed skin tension lines where possible.    The area thus outlined was incised deep to adipose tissue with a #15 scalpel blade.  The skin margins were undermined to an appropriate distance in all directions utilizing iris scissors.
Unna Boot Text: An Unna boot was placed to help immobilize the limb and facilitate more rapid healing.
H Plasty Text: Given the location of the defect, shape of the defect and the proximity to free margins a H-plasty was deemed most appropriate for repair.  Using a sterile surgical marker, the appropriate advancement arms of the H-plasty were drawn incorporating the defect and placing the expected incisions within the relaxed skin tension lines where possible. The area thus outlined was incised deep to adipose tissue with a #15 scalpel blade. The skin margins were undermined to an appropriate distance in all directions utilizing iris scissors.  The opposing advancement arms were then advanced into place in opposite direction and anchored with interrupted buried subcutaneous sutures.
Mastoid Interpolation Flap Text: A decision was made to reconstruct the defect utilizing an interpolation axial flap and a staged reconstruction.  A telfa template was made of the defect.  This telfa template was then used to outline the mastoid interpolation flap.  The donor area for the pedicle flap was then injected with anesthesia.  The flap was excised through the skin and subcutaneous tissue down to the layer of the underlying musculature.  The pedicle flap was carefully excised within this deep plane to maintain its blood supply.  The edges of the donor site were undermined.   The donor site was closed in a primary fashion.  The pedicle was then rotated into position and sutured.  Once the tube was sutured into place, adequate blood supply was confirmed with blanching and refill.  The pedicle was then wrapped with xeroform gauze and dressed appropriately with a telfa and gauze bandage to ensure continued blood supply and protect the attached pedicle.
Melolabial Transposition Flap Text: The defect edges were debeveled with a #15 scalpel blade.  Given the location of the defect and the proximity to free margins a melolabial flap was deemed most appropriate.  Using a sterile surgical marker, an appropriate melolabial transposition flap was drawn incorporating the defect.    The area thus outlined was incised deep to adipose tissue with a #15 scalpel blade.  The skin margins were undermined to an appropriate distance in all directions utilizing iris scissors.
Ftsg Text: The defect edges were debeveled with a #15 scalpel blade.  Given the location of the defect, shape of the defect and the proximity to free margins a full thickness skin graft was deemed most appropriate.  Using a sterile surgical marker, the primary defect shape was transferred to the donor site. The area thus outlined was incised deep to adipose tissue with a #15 scalpel blade.  The harvested graft was then trimmed of adipose tissue until only dermis and epidermis was left.  The skin margins of the secondary defect were undermined to an appropriate distance in all directions utilizing iris scissors.  The secondary defect was closed with interrupted buried subcutaneous sutures.  The skin edges were then re-apposed with running  sutures.  The skin graft was then placed in the primary defect and oriented appropriately.
Trilobed Flap Text: The defect edges were debeveled with a #15 scalpel blade.  Given the location of the defect and the proximity to free margins a trilobed flap was deemed most appropriate.  Using a sterile surgical marker, an appropriate trilobed flap drawn around the defect.    The area thus outlined was incised deep to adipose tissue with a #15 scalpel blade.  The skin margins were undermined to an appropriate distance in all directions utilizing iris scissors.
Flap Type: Advancement Flap (Single)
Area H Indication Text: Tumors in this location are included in Area H (eyelids, eyebrows, nose, lips, chin, ear, pre-auricular, post-auricular, temple, genitalia, hands, feet, ankles and areola).  Tissue conservation is critical in these anatomic locations.
Surgical Defect Width In Cm (Optional): 0.8
X Size Of Lesion In Cm (Optional): 0.6
Mohs Case Number: [id number]
Posterior Auricular Interpolation Flap Text: A decision was made to reconstruct the defect utilizing an interpolation axial flap and a staged reconstruction.  A telfa template was made of the defect.  This telfa template was then used to outline the posterior auricular interpolation flap.  The donor area for the pedicle flap was then injected with anesthesia.  The flap was excised through the skin and subcutaneous tissue down to the layer of the underlying musculature.  The pedicle flap was carefully excised within this deep plane to maintain its blood supply.  The edges of the donor site were undermined.   The donor site was closed in a primary fashion.  The pedicle was then rotated into position and sutured.  Once the tube was sutured into place, adequate blood supply was confirmed with blanching and refill.  The pedicle was then wrapped with xeroform gauze and dressed appropriately with a telfa and gauze bandage to ensure continued blood supply and protect the attached pedicle.
Island Pedicle Flap With Canthal Suspension Text: The defect edges were debeveled with a #15 scalpel blade.  Given the location of the defect, shape of the defect and the proximity to free margins an island pedicle advancement flap was deemed most appropriate.  Using a sterile surgical marker, an appropriate advancement flap was drawn incorporating the defect, outlining the appropriate donor tissue and placing the expected incisions within the relaxed skin tension lines where possible. The area thus outlined was incised deep to adipose tissue with a #15 scalpel blade.  The skin margins were undermined to an appropriate distance in all directions around the primary defect and laterally outward around the island pedicle utilizing iris scissors.  There was minimal undermining beneath the pedicle flap. A suspension suture was placed in the canthal tendon to prevent tension and prevent ectropion.
Repair Anesthesia Method: local infiltration
Mercedes Flap Text: The defect edges were debeveled with a #15 scalpel blade.  Given the location of the defect, shape of the defect and the proximity to free margins a Mercedes flap was deemed most appropriate.  Using a sterile surgical marker, an appropriate advancement flap was drawn incorporating the defect and placing the expected incisions within the relaxed skin tension lines where possible. The area thus outlined was incised deep to adipose tissue with a #15 scalpel blade.  The skin margins were undermined to an appropriate distance in all directions utilizing iris scissors.
Mucosal Advancement Flap Text: Given the location of the defect, shape of the defect and the proximity to free margins a mucosal advancement flap was deemed most appropriate. Incisions were made with a 15 blade scalpel in the appropriate fashion along the cutaneous vermilion border and the mucosal lip. The remaining actinically damaged mucosal tissue was excised.  The mucosal advancement flap was then elevated to the gingival sulcus with care taken to preserve the neurovascular structures and advanced into the primary defect. Care was taken to ensure that precise realignment of the vermilion border was achieved.
Tissue Cultured Epidermal Autograft Text: The defect edges were debeveled with a #15 scalpel blade.  Given the location of the defect, shape of the defect and the proximity to free margins a tissue cultured epidermal autograft was deemed most appropriate.  The graft was then trimmed to fit the size of the defect.  The graft was then placed in the primary defect and oriented appropriately.
Detail Level: Detailed
Alternatives Discussed Intro (Do Not Add Period): I discussed alternative treatments to Mohs surgery and specifically discussed the risks and benefits of
Body Location Override (Optional - Billing Will Still Be Based On Selected Body Map Location If Applicable): Nasal supratip
Epidermal Autograft Text: The defect edges were debeveled with a #15 scalpel blade.  Given the location of the defect, shape of the defect and the proximity to free margins an epidermal autograft was deemed most appropriate.  Using a sterile surgical marker, the primary defect shape was transferred to the donor site. The epidermal graft was then harvested.  The skin graft was then placed in the primary defect and oriented appropriately.
Surgical Defect Length In Cm (Optional): 1.3
Double Island Pedicle Flap Text: The defect edges were debeveled with a #15 scalpel blade.  Given the location of the defect, shape of the defect and the proximity to free margins a double island pedicle advancement flap was deemed most appropriate.  Using a sterile surgical marker, an appropriate advancement flap was drawn incorporating the defect, outlining the appropriate donor tissue and placing the expected incisions within the relaxed skin tension lines where possible.    The area thus outlined was incised deep to adipose tissue with a #15 scalpel blade.  The skin margins were undermined to an appropriate distance in all directions around the primary defect and laterally outward around the island pedicle utilizing iris scissors.  There was minimal undermining beneath the pedicle flap.
Bi-Rhombic Flap Text: The defect edges were debeveled with a #15 scalpel blade.  Given the location of the defect and the proximity to free margins a bi-rhombic flap was deemed most appropriate.  Using a sterile surgical marker, an appropriate rhombic flap was drawn incorporating the defect. The area thus outlined was incised deep to adipose tissue with a #15 scalpel blade.  The skin margins were undermined to an appropriate distance in all directions utilizing iris scissors.
Depth Of Tumor Invasion (For Histology): dermis
Ear Star Wedge Flap Text: The defect edges were debeveled with a #15 blade scalpel.  Given the location of the defect and the proximity to free margins (helical rim) an ear star wedge flap was deemed most appropriate.  Using a sterile surgical marker, the appropriate flap was drawn incorporating the defect and placing the expected incisions between the helical rim and antihelix where possible.  The area thus outlined was incised through and through with a #15 scalpel blade.
Interpolation Flap Text: A decision was made to reconstruct the defect utilizing an interpolation axial flap and a staged reconstruction.  A telfa template was made of the defect.  This telfa template was then used to outline the interpolation flap.  The donor area for the pedicle flap was then injected with anesthesia.  The flap was excised through the skin and subcutaneous tissue down to the layer of the underlying musculature.  The interpolation flap was carefully excised within this deep plane to maintain its blood supply.  The edges of the donor site were undermined.   The donor site was closed in a primary fashion.  The pedicle was then rotated into position and sutured.  Once the tube was sutured into place, adequate blood supply was confirmed with blanching and refill.  The pedicle was then wrapped with xeroform gauze and dressed appropriately with a telfa and gauze bandage to ensure continued blood supply and protect the attached pedicle.
Cheek-To-Nose Interpolation Flap Text: A decision was made to reconstruct the defect utilizing an interpolation axial flap and a staged reconstruction.  A telfa template was made of the defect.  This telfa template was then used to outline the Cheek-To-Nose Interpolation flap.  The donor area for the pedicle flap was then injected with anesthesia.  The flap was excised through the skin and subcutaneous tissue down to the layer of the underlying musculature.  The interpolation flap was carefully excised within this deep plane to maintain its blood supply.  The edges of the donor site were undermined.   The donor site was closed in a primary fashion.  The pedicle was then rotated into position and sutured.  Once the tube was sutured into place, adequate blood supply was confirmed with blanching and refill.  The pedicle was then wrapped with xeroform gauze and dressed appropriately with a telfa and gauze bandage to ensure continued blood supply and protect the attached pedicle.
Consent (Scalp)/Introductory Paragraph: The rationale for Mohs was explained to the patient and consent was obtained. The risks, benefits and alternatives to therapy were discussed in detail. Specifically, the risks of changes in hair growth pattern secondary to repair, infection, scarring, bleeding, prolonged wound healing, incomplete removal, allergy to anesthesia, nerve injury and recurrence were addressed. Prior to the procedure, the treatment site was clearly identified and confirmed by the patient. All components of Universal Protocol/PAUSE Rule completed.
Histology Selection Override (Optional- Will Default To Parent Diagnosis If N/A): Squamous Cell Carcinoma
Cheiloplasty (Less Than 50%) Text: A decision was made to reconstruct the defect with a  cheiloplasty.  The defect was undermined extensively.  Additional obicularis oris muscle was excised with a 15 blade scalpel.  The defect was converted into a full thickness wedge, of less than 50% of the vertical height of the lip, to facilite a better cosmetic result.  Small vessels were then tied off with 5-0 monocyrl. The obicularis oris, superficial fascia, adipose and dermis were then reapproximated.  After the deeper layers were approximated the epidermis was reapproximated with particular care given to realign the vermilion border.
Area L Indication Text: Tumors in this location are included in Area L (trunk and extremities).  Mohs surgery is indicated for larger tumors, or tumors with aggressive histologic features, in these anatomic locations.
O-T Plasty Text: The defect edges were debeveled with a #15 scalpel blade.  Given the location of the defect, shape of the defect and the proximity to free margins an O-T plasty was deemed most appropriate.  Using a sterile surgical marker, an appropriate O-T plasty was drawn incorporating the defect and placing the expected incisions within the relaxed skin tension lines where possible.    The area thus outlined was incised deep to adipose tissue with a #15 scalpel blade.  The skin margins were undermined to an appropriate distance in all directions utilizing iris scissors.
Location Indication Override (Is Already Calculated Based On Selected Body Location): Area H
Previous Accession (Optional): [id number]
Wound Care (No Sutures): Petrolatum
Eye Protection Verbiage: Before proceeding with the stage, a plastic scleral shield was inserted. The globe was anesthetized with a few drops of 1% lidocaine with 1:100,000 epinephrine. Then, an appropriate sized scleral shield was chosen and coated with lacrilube ointment. The shield was gently inserted and left in place for the duration of each stage. After the stage was completed, the shield was gently removed.
Bilateral Helical Rim Advancement Flap Text: The defect edges were debeveled with a #15 blade scalpel.  Given the location of the defect and the proximity to free margins (helical rim) a bilateral helical rim advancement flap was deemed most appropriate.  Using a sterile surgical marker, the appropriate advancement flaps were drawn incorporating the defect and placing the expected incisions between the helical rim and antihelix where possible.  The area thus outlined was incised through and through with a #15 scalpel blade.  With a skin hook and iris scissors, the flaps were gently and sharply undermined and freed up.
Spiral Flap Text: The defect edges were debeveled with a #15 scalpel blade.  Given the location of the defect, shape of the defect and the proximity to free margins a spiral flap was deemed most appropriate.  Using a sterile surgical marker, an appropriate rotation flap was drawn incorporating the defect and placing the expected incisions within the relaxed skin tension lines where possible. The area thus outlined was incised deep to adipose tissue with a #15 scalpel blade.  The skin margins were undermined to an appropriate distance in all directions utilizing iris scissors.
Consent (Marginal Mandibular)/Introductory Paragraph: The rationale for Mohs was explained to the patient and consent was obtained. The risks, benefits and alternatives to therapy were discussed in detail. Specifically, the risks of damage to the marginal mandibular branch of the facial nerve, infection, scarring, bleeding, prolonged wound healing, incomplete removal, allergy to anesthesia, and recurrence were addressed. Prior to the procedure, the treatment site was clearly identified and confirmed by the patient. All components of Universal Protocol/PAUSE Rule completed.
Island Pedicle Flap-Requiring Vessel Identification Text: The defect edges were debeveled with a #15 scalpel blade.  Given the location of the defect, shape of the defect and the proximity to free margins an island pedicle advancement flap was deemed most appropriate.  Using a sterile surgical marker, an appropriate advancement flap was drawn, based on the axial vessel mentioned above, incorporating the defect, outlining the appropriate donor tissue and placing the expected incisions within the relaxed skin tension lines where possible.    The area thus outlined was incised deep to adipose tissue with a #15 scalpel blade.  The skin margins were undermined to an appropriate distance in all directions around the primary defect and laterally outward around the island pedicle utilizing iris scissors.  There was minimal undermining beneath the pedicle flap.
Manual Repair Warning Statement: We plan on removing the manually selected variable below in favor of our much easier automatic structured text blocks found in the previous tab. We decided to do this to help make the flow better and give you the full power of structured data. Manual selection is never going to be ideal in our platform and I would encourage you to avoid using manual selection from this point on, especially since I will be sunsetting this feature. It is important that you do one of two things with the customized text below. First, you can save all of the text in a word file so you can have it for future reference. Second, transfer the text to the appropriate area in the Library tab. Lastly, if there is a flap or graft type which we do not have you need to let us know right away so I can add it in before the variable is hidden. No need to panic, we plan to give you roughly 6 months to make the change.
Surgical Defect Width In Cm (Optional): 1.8
Epidermal Closure: running cuticular
Epidermal Sutures: 5-0 Surgipro
Partial Purse String (Simple) Text: Given the location of the defect and the characteristics of the surrounding skin a simple purse string closure was deemed most appropriate.  Undermining was performed circumfirentially around the surgical defect.  A purse string suture was then placed and tightened. Wound tension only allowed a partial closure of the circular defect.
Deep Sutures: 5-0 Polysorb
Estimated Blood Loss (Cc): minimal
Cartilage Graft Text: The defect edges were debeveled with a #15 scalpel blade.  Given the location of the defect, shape of the defect, the fact the defect involved a full thickness cartilage defect a cartilage graft was deemed most appropriate.  An appropriate donor site was identified, cleansed, and anesthetized. The cartilage graft was then harvested and transferred to the recipient site, oriented appropriately and then sutured into place.  The secondary defect was then repaired using a primary closure.
Consent (Nose)/Introductory Paragraph: The rationale for Mohs was explained to the patient and consent was obtained. The risks, benefits and alternatives to therapy were discussed in detail. Specifically, the risks of nasal deformity, changes in the flow of air through the nose, infection, scarring, bleeding, prolonged wound healing, incomplete removal, allergy to anesthesia, nerve injury and recurrence were addressed. Prior to the procedure, the treatment site was clearly identified and confirmed by the patient. All components of Universal Protocol/PAUSE Rule completed.
Melolabial Interpolation Flap Text: A decision was made to reconstruct the defect utilizing an interpolation axial flap and a staged reconstruction.  A telfa template was made of the defect.  This telfa template was then used to outline the melolabial interpolation flap.  The donor area for the pedicle flap was then injected with anesthesia.  The flap was excised through the skin and subcutaneous tissue down to the layer of the underlying musculature.  The pedicle flap was carefully excised within this deep plane to maintain its blood supply.  The edges of the donor site were undermined.   The donor site was closed in a primary fashion.  The pedicle was then rotated into position and sutured.  Once the tube was sutured into place, adequate blood supply was confirmed with blanching and refill.  The pedicle was then wrapped with xeroform gauze and dressed appropriately with a telfa and gauze bandage to ensure continued blood supply and protect the attached pedicle.
Paramedian Forehead Flap Text: A decision was made to reconstruct the defect utilizing an interpolation axial flap and a staged reconstruction.  A telfa template was made of the defect.  This telfa template was then used to outline the paramedian forehead pedicle flap.  The donor area for the pedicle flap was then injected with anesthesia.  The flap was excised through the skin and subcutaneous tissue down to the layer of the underlying musculature.  The pedicle flap was carefully excised within this deep plane to maintain its blood supply.  The edges of the donor site were undermined.   The donor site was closed in a primary fashion.  The pedicle was then rotated into position and sutured.  Once the tube was sutured into place, adequate blood supply was confirmed with blanching and refill.  The pedicle was then wrapped with xeroform gauze and dressed appropriately with a telfa and gauze bandage to ensure continued blood supply and protect the attached pedicle.
Hemostasis: Electrocautery
Rhomboid Transposition Flap Text: The defect edges were debeveled with a #15 scalpel blade.  Given the location of the defect and the proximity to free margins a rhomboid transposition flap was deemed most appropriate.  Using a sterile surgical marker, an appropriate rhomboid flap was drawn incorporating the defect.    The area thus outlined was incised deep to adipose tissue with a #15 scalpel blade.  The skin margins were undermined to an appropriate distance in all directions utilizing iris scissors.
Double O-Z Plasty Text: The defect edges were debeveled with a #15 scalpel blade.  Given the location of the defect, shape of the defect and the proximity to free margins a Double O-Z plasty (double transposition flap) was deemed most appropriate.  Using a sterile surgical marker, the appropriate transposition flaps were drawn incorporating the defect and placing the expected incisions within the relaxed skin tension lines where possible. The area thus outlined was incised deep to adipose tissue with a #15 scalpel blade.  The skin margins were undermined to an appropriate distance in all directions utilizing iris scissors.  Hemostasis was achieved with electrocautery.  The flaps were then transposed into place, one clockwise and the other counterclockwise, and anchored with interrupted buried subcutaneous sutures.

## 2019-01-25 ENCOUNTER — APPOINTMENT (RX ONLY)
Dept: URBAN - METROPOLITAN AREA CLINIC 36 | Facility: CLINIC | Age: 84
Setting detail: DERMATOLOGY
End: 2019-01-25

## 2019-01-25 DIAGNOSIS — Z48.02 ENCOUNTER FOR REMOVAL OF SUTURES: ICD-10-CM

## 2019-01-25 PROCEDURE — ? SUTURE REMOVAL (GLOBAL PERIOD)

## 2019-01-25 PROCEDURE — 99024 POSTOP FOLLOW-UP VISIT: CPT

## 2019-01-25 ASSESSMENT — LOCATION SIMPLE DESCRIPTION DERM: LOCATION SIMPLE: NOSE

## 2019-01-25 ASSESSMENT — LOCATION DETAILED DESCRIPTION DERM: LOCATION DETAILED: NASAL DORSUM

## 2019-01-25 ASSESSMENT — LOCATION ZONE DERM: LOCATION ZONE: NOSE

## 2019-01-25 NOTE — PROCEDURE: SUTURE REMOVAL (GLOBAL PERIOD)
Detail Level: Detailed
Add 53350 Cpt? (Important Note: In 2017 The Use Of 79897 Is Being Tracked By Cms To Determine Future Global Period Reimbursement For Global Periods): yes

## 2019-02-26 ENCOUNTER — HOSPITAL ENCOUNTER (OUTPATIENT)
Dept: LAB | Facility: MEDICAL CENTER | Age: 84
End: 2019-02-26
Attending: FAMILY MEDICINE
Payer: MEDICARE

## 2019-02-26 LAB
25(OH)D3 SERPL-MCNC: 91 NG/ML (ref 30–100)
ALBUMIN SERPL BCP-MCNC: 4.4 G/DL (ref 3.2–4.9)
ALBUMIN/GLOB SERPL: 2.1 G/DL
ALP SERPL-CCNC: 57 U/L (ref 30–99)
ALT SERPL-CCNC: 20 U/L (ref 2–50)
ANION GAP SERPL CALC-SCNC: 5 MMOL/L (ref 0–11.9)
AST SERPL-CCNC: 24 U/L (ref 12–45)
BASOPHILS # BLD AUTO: 0.6 % (ref 0–1.8)
BASOPHILS # BLD: 0.04 K/UL (ref 0–0.12)
BILIRUB SERPL-MCNC: 0.6 MG/DL (ref 0.1–1.5)
BUN SERPL-MCNC: 19 MG/DL (ref 8–22)
CALCIUM SERPL-MCNC: 9.8 MG/DL (ref 8.5–10.5)
CHLORIDE SERPL-SCNC: 108 MMOL/L (ref 96–112)
CHOLEST SERPL-MCNC: 155 MG/DL (ref 100–199)
CO2 SERPL-SCNC: 28 MMOL/L (ref 20–33)
CREAT SERPL-MCNC: 0.91 MG/DL (ref 0.5–1.4)
CRP SERPL HS-MCNC: 0.6 MG/L (ref 0–7.5)
EOSINOPHIL # BLD AUTO: 0.16 K/UL (ref 0–0.51)
EOSINOPHIL NFR BLD: 2.3 % (ref 0–6.9)
ERYTHROCYTE [DISTWIDTH] IN BLOOD BY AUTOMATED COUNT: 49.8 FL (ref 35.9–50)
FASTING STATUS PATIENT QL REPORTED: NORMAL
GLOBULIN SER CALC-MCNC: 2.1 G/DL (ref 1.9–3.5)
GLUCOSE SERPL-MCNC: 105 MG/DL (ref 65–99)
HCT VFR BLD AUTO: 52 % (ref 42–52)
HDLC SERPL-MCNC: 35 MG/DL
HGB BLD-MCNC: 17.4 G/DL (ref 14–18)
IMM GRANULOCYTES # BLD AUTO: 0.02 K/UL (ref 0–0.11)
IMM GRANULOCYTES NFR BLD AUTO: 0.3 % (ref 0–0.9)
LDLC SERPL CALC-MCNC: 98 MG/DL
LYMPHOCYTES # BLD AUTO: 1.61 K/UL (ref 1–4.8)
LYMPHOCYTES NFR BLD: 23.5 % (ref 22–41)
MCH RBC QN AUTO: 32.8 PG (ref 27–33)
MCHC RBC AUTO-ENTMCNC: 33.5 G/DL (ref 33.7–35.3)
MCV RBC AUTO: 97.9 FL (ref 81.4–97.8)
MONOCYTES # BLD AUTO: 0.58 K/UL (ref 0–0.85)
MONOCYTES NFR BLD AUTO: 8.5 % (ref 0–13.4)
NEUTROPHILS # BLD AUTO: 4.45 K/UL (ref 1.82–7.42)
NEUTROPHILS NFR BLD: 64.8 % (ref 44–72)
NRBC # BLD AUTO: 0 K/UL
NRBC BLD-RTO: 0 /100 WBC
PLATELET # BLD AUTO: 198 K/UL (ref 164–446)
PMV BLD AUTO: 10.8 FL (ref 9–12.9)
POTASSIUM SERPL-SCNC: 3.9 MMOL/L (ref 3.6–5.5)
PROT SERPL-MCNC: 6.5 G/DL (ref 6–8.2)
RBC # BLD AUTO: 5.31 M/UL (ref 4.7–6.1)
SODIUM SERPL-SCNC: 141 MMOL/L (ref 135–145)
T3FREE SERPL-MCNC: 2.54 PG/ML (ref 2.4–4.2)
T4 FREE SERPL-MCNC: 0.73 NG/DL (ref 0.53–1.43)
TESTOST SERPL-MCNC: 480 NG/DL (ref 175–781)
TRIGL SERPL-MCNC: 109 MG/DL (ref 0–149)
TSH SERPL DL<=0.005 MIU/L-ACNC: 0.54 UIU/ML (ref 0.38–5.33)
WBC # BLD AUTO: 6.9 K/UL (ref 4.8–10.8)

## 2019-02-26 PROCEDURE — 84443 ASSAY THYROID STIM HORMONE: CPT

## 2019-02-26 PROCEDURE — 84403 ASSAY OF TOTAL TESTOSTERONE: CPT

## 2019-02-26 PROCEDURE — 36415 COLL VENOUS BLD VENIPUNCTURE: CPT

## 2019-02-26 PROCEDURE — 82306 VITAMIN D 25 HYDROXY: CPT

## 2019-02-26 PROCEDURE — 86141 C-REACTIVE PROTEIN HS: CPT

## 2019-02-26 PROCEDURE — 84481 FREE ASSAY (FT-3): CPT

## 2019-02-26 PROCEDURE — 86800 THYROGLOBULIN ANTIBODY: CPT

## 2019-02-26 PROCEDURE — 84402 ASSAY OF FREE TESTOSTERONE: CPT

## 2019-02-26 PROCEDURE — 85025 COMPLETE CBC W/AUTO DIFF WBC: CPT

## 2019-02-26 PROCEDURE — 80061 LIPID PANEL: CPT

## 2019-02-26 PROCEDURE — 80053 COMPREHEN METABOLIC PANEL: CPT

## 2019-02-26 PROCEDURE — 84439 ASSAY OF FREE THYROXINE: CPT

## 2019-02-27 ENCOUNTER — APPOINTMENT (RX ONLY)
Dept: URBAN - METROPOLITAN AREA CLINIC 36 | Facility: CLINIC | Age: 84
Setting detail: DERMATOLOGY
End: 2019-02-27

## 2019-02-27 DIAGNOSIS — L90.5 SCAR CONDITIONS AND FIBROSIS OF SKIN: ICD-10-CM

## 2019-02-27 LAB
TESTOST FREE SERPL-MCNC: 75 PG/ML (ref 47–244)
THYROGLOB AB SERPL-ACNC: <0.9 IU/ML (ref 0–4)

## 2019-02-27 PROCEDURE — ? POST-OP WOUND CHECK

## 2019-02-27 PROCEDURE — 99024 POSTOP FOLLOW-UP VISIT: CPT

## 2019-02-27 ASSESSMENT — LOCATION ZONE DERM: LOCATION ZONE: NOSE

## 2019-02-27 ASSESSMENT — LOCATION DETAILED DESCRIPTION DERM: LOCATION DETAILED: LEFT NASAL SIDEWALL

## 2019-02-27 ASSESSMENT — LOCATION SIMPLE DESCRIPTION DERM: LOCATION SIMPLE: LEFT NOSE

## 2019-02-27 NOTE — PROCEDURE: POST-OP WOUND CHECK
Detail Level: Detailed
Add 65218 Cpt? (Important Note: In 2017 The Use Of 92510 Is Being Tracked By Cms To Determine Future Global Period Reimbursement For Global Periods): yes
Wound Evaluated By: Keily Davila MD
Additional Comments: Follow up with primary dermatologist for continued skin surveillance

## 2019-03-18 ENCOUNTER — APPOINTMENT (RX ONLY)
Dept: URBAN - METROPOLITAN AREA CLINIC 20 | Facility: CLINIC | Age: 84
Setting detail: DERMATOLOGY
End: 2019-03-18

## 2019-03-18 DIAGNOSIS — L82.0 INFLAMED SEBORRHEIC KERATOSIS: ICD-10-CM

## 2019-03-18 DIAGNOSIS — L57.0 ACTINIC KERATOSIS: ICD-10-CM

## 2019-03-18 DIAGNOSIS — L81.4 OTHER MELANIN HYPERPIGMENTATION: ICD-10-CM

## 2019-03-18 PROBLEM — D48.5 NEOPLASM OF UNCERTAIN BEHAVIOR OF SKIN: Status: ACTIVE | Noted: 2019-03-18

## 2019-03-18 PROBLEM — Z85.828 PERSONAL HISTORY OF OTHER MALIGNANT NEOPLASM OF SKIN: Status: ACTIVE | Noted: 2019-03-18

## 2019-03-18 PROCEDURE — 11102 TANGNTL BX SKIN SINGLE LES: CPT | Mod: 79

## 2019-03-18 PROCEDURE — 99213 OFFICE O/P EST LOW 20 MIN: CPT | Mod: 24,25

## 2019-03-18 PROCEDURE — ? COUNSELING

## 2019-03-18 PROCEDURE — 17000 DESTRUCT PREMALG LESION: CPT | Mod: 59,79

## 2019-03-18 PROCEDURE — ? SUNSCREEN RECOMMENDATIONS

## 2019-03-18 PROCEDURE — ? LIQUID NITROGEN

## 2019-03-18 PROCEDURE — ? BIOPSY BY SHAVE METHOD

## 2019-03-18 ASSESSMENT — LOCATION DETAILED DESCRIPTION DERM
LOCATION DETAILED: LEFT CENTRAL FRONTAL SCALP
LOCATION DETAILED: GLABELLA
LOCATION DETAILED: LEFT ANTECUBITAL SKIN

## 2019-03-18 ASSESSMENT — LOCATION SIMPLE DESCRIPTION DERM
LOCATION SIMPLE: LEFT SCALP
LOCATION SIMPLE: LEFT ELBOW
LOCATION SIMPLE: GLABELLA

## 2019-03-18 ASSESSMENT — LOCATION ZONE DERM
LOCATION ZONE: ARM
LOCATION ZONE: SCALP
LOCATION ZONE: FACE

## 2019-03-18 NOTE — PROCEDURE: BIOPSY BY SHAVE METHOD
Type Of Destruction Used: Curettage
Bill 01419 For Specimen Handling/Conveyance To Laboratory?: no
Billing Type: Third-Party Bill
Anesthesia Volume In Cc: 1
Dressing: Band-Aid
Post-Care Instructions: I reviewed with the patient in detail post-care instructions. Patient is to keep the biopsy site clean once daily and then apply petroleum and bandaid  until healed.
Was A Bandage Applied: Yes
Biopsy Method: Personna blade
Hemostasis: Drysol and Electrocautery
Detail Level: Detailed
Notification Instructions: Patient will be notified of biopsy results. However, patient instructed to call the office if not contacted within 2 weeks.
Lab: [id number]
Consent: Written consent was obtained and risks were reviewed including but not limited to scarring, infection, bleeding, scabbing, incomplete removal, nerve damage and allergy to anesthesia.
Size Of Lesion In Cm: 0
Anesthesia Type: 1% lidocaine with 1:100,000 epinephrine and a 1:12 solution of 8.4% sodium bicarbonate
Wound Care: Bacitracin
Depth Of Biopsy: dermis
Lab Facility: 
Biopsy Type: H and E

## 2019-03-18 NOTE — PROCEDURE: LIQUID NITROGEN
Consent: The patient's consent was obtained including but not limited to risks of crusting, scabbing, blistering, scarring, darker or lighter pigmentary change, recurrence, incomplete removal and infection. RTC in 2 months if lesion(s) persistent.
Detail Level: Detailed
Duration Of Freeze Thaw-Cycle (Seconds): 10
Post-Care Instructions: I reviewed with the patient in detail post-care instructions. Patient is to wear sunprotection, and avoid picking at any of the treated lesions. Pt may apply Vaseline to crusted or scabbing areas.
Number Of Freeze-Thaw Cycles: 2 freeze-thaw cycles
Render Post-Care Instructions In Note?: yes

## 2019-06-10 ENCOUNTER — APPOINTMENT (RX ONLY)
Dept: URBAN - METROPOLITAN AREA CLINIC 20 | Facility: CLINIC | Age: 84
Setting detail: DERMATOLOGY
End: 2019-06-10

## 2019-06-10 DIAGNOSIS — D18.0 HEMANGIOMA: ICD-10-CM

## 2019-06-10 DIAGNOSIS — L82.1 OTHER SEBORRHEIC KERATOSIS: ICD-10-CM

## 2019-06-10 DIAGNOSIS — Z85.828 PERSONAL HISTORY OF OTHER MALIGNANT NEOPLASM OF SKIN: ICD-10-CM

## 2019-06-10 DIAGNOSIS — Z71.89 OTHER SPECIFIED COUNSELING: ICD-10-CM

## 2019-06-10 DIAGNOSIS — L82.0 INFLAMED SEBORRHEIC KERATOSIS: ICD-10-CM

## 2019-06-10 DIAGNOSIS — D22 MELANOCYTIC NEVI: ICD-10-CM

## 2019-06-10 DIAGNOSIS — L81.4 OTHER MELANIN HYPERPIGMENTATION: ICD-10-CM

## 2019-06-10 PROBLEM — D22.62 MELANOCYTIC NEVI OF LEFT UPPER LIMB, INCLUDING SHOULDER: Status: ACTIVE | Noted: 2019-06-10

## 2019-06-10 PROBLEM — D18.01 HEMANGIOMA OF SKIN AND SUBCUTANEOUS TISSUE: Status: ACTIVE | Noted: 2019-06-10

## 2019-06-10 PROBLEM — D22.5 MELANOCYTIC NEVI OF TRUNK: Status: ACTIVE | Noted: 2019-06-10

## 2019-06-10 PROBLEM — L57.0 ACTINIC KERATOSIS: Status: ACTIVE | Noted: 2019-06-10

## 2019-06-10 PROBLEM — D22.61 MELANOCYTIC NEVI OF RIGHT UPPER LIMB, INCLUDING SHOULDER: Status: ACTIVE | Noted: 2019-06-10

## 2019-06-10 PROCEDURE — ? PRESCRIPTION SAMPLES PROVIDED

## 2019-06-10 PROCEDURE — ? SUNSCREEN RECOMMENDATIONS

## 2019-06-10 PROCEDURE — 17110 DESTRUCTION B9 LES UP TO 14: CPT

## 2019-06-10 PROCEDURE — ? LIQUID NITROGEN

## 2019-06-10 PROCEDURE — ? COUNSELING

## 2019-06-10 PROCEDURE — 99214 OFFICE O/P EST MOD 30 MIN: CPT | Mod: 25

## 2019-06-10 ASSESSMENT — LOCATION ZONE DERM
LOCATION ZONE: FACE
LOCATION ZONE: NOSE
LOCATION ZONE: ARM
LOCATION ZONE: HAND
LOCATION ZONE: TRUNK

## 2019-06-10 ASSESSMENT — LOCATION DETAILED DESCRIPTION DERM
LOCATION DETAILED: STERNUM
LOCATION DETAILED: RIGHT RADIAL DORSAL HAND
LOCATION DETAILED: NASAL SUPRATIP
LOCATION DETAILED: LEFT PROXIMAL DORSAL FOREARM
LOCATION DETAILED: RIGHT INFERIOR UPPER BACK
LOCATION DETAILED: RIGHT MEDIAL UPPER BACK
LOCATION DETAILED: LEFT RADIAL DORSAL HAND
LOCATION DETAILED: INFERIOR THORACIC SPINE
LOCATION DETAILED: SUPERIOR THORACIC SPINE
LOCATION DETAILED: RIGHT INFERIOR MEDIAL FOREHEAD
LOCATION DETAILED: RIGHT VENTRAL DISTAL FOREARM
LOCATION DETAILED: LEFT MEDIAL INFERIOR CHEST
LOCATION DETAILED: LEFT VENTRAL PROXIMAL FOREARM
LOCATION DETAILED: RIGHT PROXIMAL DORSAL FOREARM

## 2019-06-10 ASSESSMENT — LOCATION SIMPLE DESCRIPTION DERM
LOCATION SIMPLE: LEFT FOREARM
LOCATION SIMPLE: NOSE
LOCATION SIMPLE: UPPER BACK
LOCATION SIMPLE: CHEST
LOCATION SIMPLE: RIGHT FOREARM
LOCATION SIMPLE: RIGHT HAND
LOCATION SIMPLE: RIGHT UPPER BACK
LOCATION SIMPLE: RIGHT FOREHEAD
LOCATION SIMPLE: LEFT HAND

## 2019-06-10 NOTE — PROCEDURE: LIQUID NITROGEN
Detail Level: Detailed
Consent: The patient's consent was obtained including but not limited to risks of crusting, scabbing, blistering, scarring, darker or lighter pigmentary change, recurrence, incomplete removal and infection.
Include Z78.9 (Other Specified Conditions Influencing Health Status) As An Associated Diagnosis?: No
Number Of Freeze-Thaw Cycles: 2 freeze-thaw cycles
Medical Necessity Clause: This procedure was medically necessary because the lesions that were treated were:
Post-Care Instructions: I reviewed with the patient in detail post-care instructions. Patient is to wear sunprotection, and avoid picking at any of the treated lesions. Pt may apply Vaseline to crusted or scabbing areas.
Medical Necessity Information: It is in your best interest to select a reason for this procedure from the list below. All of these items fulfill various CMS LCD requirements except the new and changing color options.

## 2019-06-17 ENCOUNTER — HOSPITAL ENCOUNTER (OUTPATIENT)
Dept: LAB | Facility: MEDICAL CENTER | Age: 84
End: 2019-06-17
Attending: FAMILY MEDICINE
Payer: MEDICARE

## 2019-06-17 LAB
25(OH)D3 SERPL-MCNC: 53 NG/ML (ref 30–100)
ALBUMIN SERPL BCP-MCNC: 3.9 G/DL (ref 3.2–4.9)
ALBUMIN/GLOB SERPL: 1.6 G/DL
ALP SERPL-CCNC: 52 U/L (ref 30–99)
ALT SERPL-CCNC: 20 U/L (ref 2–50)
ANION GAP SERPL CALC-SCNC: 8 MMOL/L (ref 0–11.9)
AST SERPL-CCNC: 24 U/L (ref 12–45)
BASOPHILS # BLD AUTO: 0.5 % (ref 0–1.8)
BASOPHILS # BLD: 0.04 K/UL (ref 0–0.12)
BILIRUB SERPL-MCNC: 0.7 MG/DL (ref 0.1–1.5)
BUN SERPL-MCNC: 14 MG/DL (ref 8–22)
CALCIUM SERPL-MCNC: 9.6 MG/DL (ref 8.5–10.5)
CHLORIDE SERPL-SCNC: 107 MMOL/L (ref 96–112)
CHOLEST SERPL-MCNC: 120 MG/DL (ref 100–199)
CO2 SERPL-SCNC: 28 MMOL/L (ref 20–33)
CREAT SERPL-MCNC: 0.97 MG/DL (ref 0.5–1.4)
EOSINOPHIL # BLD AUTO: 0.15 K/UL (ref 0–0.51)
EOSINOPHIL NFR BLD: 1.8 % (ref 0–6.9)
ERYTHROCYTE [DISTWIDTH] IN BLOOD BY AUTOMATED COUNT: 49.7 FL (ref 35.9–50)
FASTING STATUS PATIENT QL REPORTED: NORMAL
GLOBULIN SER CALC-MCNC: 2.5 G/DL (ref 1.9–3.5)
GLUCOSE SERPL-MCNC: 110 MG/DL (ref 65–99)
HCT VFR BLD AUTO: 53.4 % (ref 42–52)
HDLC SERPL-MCNC: 32 MG/DL
HGB BLD-MCNC: 17 G/DL (ref 14–18)
IMM GRANULOCYTES # BLD AUTO: 0.02 K/UL (ref 0–0.11)
IMM GRANULOCYTES NFR BLD AUTO: 0.2 % (ref 0–0.9)
LDLC SERPL CALC-MCNC: 66 MG/DL
LYMPHOCYTES # BLD AUTO: 1.99 K/UL (ref 1–4.8)
LYMPHOCYTES NFR BLD: 24 % (ref 22–41)
MCH RBC QN AUTO: 31.2 PG (ref 27–33)
MCHC RBC AUTO-ENTMCNC: 31.8 G/DL (ref 33.7–35.3)
MCV RBC AUTO: 98 FL (ref 81.4–97.8)
MONOCYTES # BLD AUTO: 0.69 K/UL (ref 0–0.85)
MONOCYTES NFR BLD AUTO: 8.3 % (ref 0–13.4)
NEUTROPHILS # BLD AUTO: 5.39 K/UL (ref 1.82–7.42)
NEUTROPHILS NFR BLD: 65.2 % (ref 44–72)
NRBC # BLD AUTO: 0 K/UL
NRBC BLD-RTO: 0 /100 WBC
PLATELET # BLD AUTO: 196 K/UL (ref 164–446)
PMV BLD AUTO: 10.6 FL (ref 9–12.9)
POTASSIUM SERPL-SCNC: 3.9 MMOL/L (ref 3.6–5.5)
PROT SERPL-MCNC: 6.4 G/DL (ref 6–8.2)
RBC # BLD AUTO: 5.45 M/UL (ref 4.7–6.1)
SODIUM SERPL-SCNC: 143 MMOL/L (ref 135–145)
T3FREE SERPL-MCNC: 3.08 PG/ML (ref 2.4–4.2)
T4 FREE SERPL-MCNC: 0.8 NG/DL (ref 0.53–1.43)
TESTOST SERPL-MCNC: 835 NG/DL (ref 175–781)
TRIGL SERPL-MCNC: 109 MG/DL (ref 0–149)
TSH SERPL DL<=0.005 MIU/L-ACNC: 0.7 UIU/ML (ref 0.38–5.33)
WBC # BLD AUTO: 8.3 K/UL (ref 4.8–10.8)

## 2019-06-17 PROCEDURE — 84403 ASSAY OF TOTAL TESTOSTERONE: CPT

## 2019-06-17 PROCEDURE — 84443 ASSAY THYROID STIM HORMONE: CPT

## 2019-06-17 PROCEDURE — 80053 COMPREHEN METABOLIC PANEL: CPT

## 2019-06-17 PROCEDURE — 80061 LIPID PANEL: CPT

## 2019-06-17 PROCEDURE — 82306 VITAMIN D 25 HYDROXY: CPT

## 2019-06-17 PROCEDURE — 86800 THYROGLOBULIN ANTIBODY: CPT

## 2019-06-17 PROCEDURE — 36415 COLL VENOUS BLD VENIPUNCTURE: CPT

## 2019-06-17 PROCEDURE — 85025 COMPLETE CBC W/AUTO DIFF WBC: CPT

## 2019-06-17 PROCEDURE — 84439 ASSAY OF FREE THYROXINE: CPT

## 2019-06-17 PROCEDURE — 84481 FREE ASSAY (FT-3): CPT

## 2019-06-17 NOTE — HPI: PROCEDURE (MOHS)
Message noted. Thank you.  See pt on my schedule
Has The Growth Been Previously Biopsied?: has been previously biopsied
Additional History: Referral from YAAKOV Sutton.

## 2019-06-18 LAB
TESTOST FREE SERPL-MCNC: 123 PG/ML (ref 47–244)
THYROGLOB AB SERPL-ACNC: <0.9 IU/ML (ref 0–4)

## 2019-08-06 ENCOUNTER — HOSPITAL ENCOUNTER (OUTPATIENT)
Dept: LAB | Facility: MEDICAL CENTER | Age: 84
End: 2019-08-06
Attending: FAMILY MEDICINE
Payer: MEDICARE

## 2019-12-10 ENCOUNTER — APPOINTMENT (RX ONLY)
Dept: URBAN - METROPOLITAN AREA CLINIC 20 | Facility: CLINIC | Age: 84
Setting detail: DERMATOLOGY
End: 2019-12-10

## 2019-12-10 DIAGNOSIS — D22 MELANOCYTIC NEVI: ICD-10-CM

## 2019-12-10 DIAGNOSIS — L81.4 OTHER MELANIN HYPERPIGMENTATION: ICD-10-CM

## 2019-12-10 DIAGNOSIS — L82.1 OTHER SEBORRHEIC KERATOSIS: ICD-10-CM

## 2019-12-10 DIAGNOSIS — Z85.828 PERSONAL HISTORY OF OTHER MALIGNANT NEOPLASM OF SKIN: ICD-10-CM

## 2019-12-10 DIAGNOSIS — L57.0 ACTINIC KERATOSIS: ICD-10-CM

## 2019-12-10 DIAGNOSIS — L72.8 OTHER FOLLICULAR CYSTS OF THE SKIN AND SUBCUTANEOUS TISSUE: ICD-10-CM

## 2019-12-10 DIAGNOSIS — Z71.89 OTHER SPECIFIED COUNSELING: ICD-10-CM

## 2019-12-10 DIAGNOSIS — D18.0 HEMANGIOMA: ICD-10-CM

## 2019-12-10 PROBLEM — D22.5 MELANOCYTIC NEVI OF TRUNK: Status: ACTIVE | Noted: 2019-12-10

## 2019-12-10 PROBLEM — D22.62 MELANOCYTIC NEVI OF LEFT UPPER LIMB, INCLUDING SHOULDER: Status: ACTIVE | Noted: 2019-12-10

## 2019-12-10 PROBLEM — D22.61 MELANOCYTIC NEVI OF RIGHT UPPER LIMB, INCLUDING SHOULDER: Status: ACTIVE | Noted: 2019-12-10

## 2019-12-10 PROBLEM — D18.01 HEMANGIOMA OF SKIN AND SUBCUTANEOUS TISSUE: Status: ACTIVE | Noted: 2019-12-10

## 2019-12-10 PROCEDURE — 17003 DESTRUCT PREMALG LES 2-14: CPT

## 2019-12-10 PROCEDURE — ? COUNSELING

## 2019-12-10 PROCEDURE — ? PRESCRIPTION SAMPLES PROVIDED

## 2019-12-10 PROCEDURE — ? SUNSCREEN RECOMMENDATIONS

## 2019-12-10 PROCEDURE — 17000 DESTRUCT PREMALG LESION: CPT

## 2019-12-10 PROCEDURE — 99214 OFFICE O/P EST MOD 30 MIN: CPT | Mod: 25

## 2019-12-10 PROCEDURE — ? LIQUID NITROGEN

## 2019-12-10 ASSESSMENT — LOCATION DETAILED DESCRIPTION DERM
LOCATION DETAILED: RIGHT CENTRAL FRONTAL SCALP
LOCATION DETAILED: LEFT VENTRAL PROXIMAL FOREARM
LOCATION DETAILED: LEFT PROXIMAL DORSAL FOREARM
LOCATION DETAILED: LEFT CENTRAL FRONTAL SCALP
LOCATION DETAILED: LEFT SUPERIOR MEDIAL BUCCAL CHEEK
LOCATION DETAILED: RIGHT SUPERIOR LATERAL BUCCAL CHEEK
LOCATION DETAILED: RIGHT INFERIOR MEDIAL FOREHEAD
LOCATION DETAILED: RIGHT RADIAL DORSAL HAND
LOCATION DETAILED: INFERIOR THORACIC SPINE
LOCATION DETAILED: LEFT RADIAL DORSAL HAND
LOCATION DETAILED: LEFT INFERIOR CENTRAL MALAR CHEEK
LOCATION DETAILED: SUPERIOR THORACIC SPINE
LOCATION DETAILED: LEFT LOWER CUTANEOUS LIP
LOCATION DETAILED: RIGHT INFERIOR UPPER BACK
LOCATION DETAILED: RIGHT PROXIMAL DORSAL FOREARM
LOCATION DETAILED: RIGHT MEDIAL UPPER BACK
LOCATION DETAILED: NASAL SUPRATIP
LOCATION DETAILED: RIGHT VENTRAL DISTAL FOREARM
LOCATION DETAILED: LEFT LATERAL FOREHEAD

## 2019-12-10 ASSESSMENT — LOCATION SIMPLE DESCRIPTION DERM
LOCATION SIMPLE: RIGHT UPPER BACK
LOCATION SIMPLE: LEFT SCALP
LOCATION SIMPLE: RIGHT CHEEK
LOCATION SIMPLE: LEFT FOREHEAD
LOCATION SIMPLE: LEFT CHEEK
LOCATION SIMPLE: LEFT HAND
LOCATION SIMPLE: RIGHT SCALP
LOCATION SIMPLE: RIGHT FOREHEAD
LOCATION SIMPLE: NOSE
LOCATION SIMPLE: RIGHT HAND
LOCATION SIMPLE: LEFT LIP
LOCATION SIMPLE: RIGHT FOREARM
LOCATION SIMPLE: UPPER BACK
LOCATION SIMPLE: LEFT FOREARM

## 2019-12-10 ASSESSMENT — LOCATION ZONE DERM
LOCATION ZONE: NOSE
LOCATION ZONE: TRUNK
LOCATION ZONE: SCALP
LOCATION ZONE: HAND
LOCATION ZONE: LIP
LOCATION ZONE: FACE
LOCATION ZONE: ARM

## 2019-12-10 NOTE — PROCEDURE: LIQUID NITROGEN
Detail Level: Detailed
Consent: The patient's consent was obtained including but not limited to risks of crusting, scabbing, blistering, scarring, darker or lighter pigmentary change, recurrence, incomplete removal and infection. RTC in 2 months if lesion(s) persistent.
Render Note In Bullet Format When Appropriate: No
Post-Care Instructions: I reviewed with the patient in detail post-care instructions. Patient is to wear sunprotection, and avoid picking at any of the treated lesions. Pt may apply Vaseline to crusted or scabbing areas.
Duration Of Freeze Thaw-Cycle (Seconds): 10
Number Of Freeze-Thaw Cycles: 2 freeze-thaw cycles
Render Post-Care Instructions In Note?: yes

## 2020-01-01 ENCOUNTER — APPOINTMENT (OUTPATIENT)
Dept: RADIOLOGY | Facility: MEDICAL CENTER | Age: 85
DRG: 291 | End: 2020-01-01
Attending: FAMILY MEDICINE
Payer: MEDICARE

## 2020-01-01 ENCOUNTER — HOSPITAL ENCOUNTER (OUTPATIENT)
Dept: LAB | Facility: MEDICAL CENTER | Age: 85
End: 2020-01-01
Attending: FAMILY MEDICINE
Payer: MEDICARE

## 2020-01-01 ENCOUNTER — OFFICE VISIT (OUTPATIENT)
Dept: PULMONOLOGY | Facility: HOSPICE | Age: 85
End: 2020-01-01
Payer: MEDICARE

## 2020-01-01 ENCOUNTER — APPOINTMENT (OUTPATIENT)
Dept: RADIOLOGY | Facility: MEDICAL CENTER | Age: 85
End: 2020-01-01
Attending: STUDENT IN AN ORGANIZED HEALTH CARE EDUCATION/TRAINING PROGRAM
Payer: MEDICARE

## 2020-01-01 ENCOUNTER — APPOINTMENT (OUTPATIENT)
Dept: RADIOLOGY | Facility: MEDICAL CENTER | Age: 85
DRG: 186 | End: 2020-01-01
Attending: EMERGENCY MEDICINE
Payer: MEDICARE

## 2020-01-01 ENCOUNTER — APPOINTMENT (OUTPATIENT)
Dept: RADIOLOGY | Facility: MEDICAL CENTER | Age: 85
DRG: 516 | End: 2020-01-01
Attending: HOSPITALIST
Payer: MEDICARE

## 2020-01-01 ENCOUNTER — HOSPITAL ENCOUNTER (OUTPATIENT)
Dept: RADIOLOGY | Facility: MEDICAL CENTER | Age: 85
End: 2020-02-26
Attending: FAMILY MEDICINE
Payer: MEDICARE

## 2020-01-01 ENCOUNTER — HOSPITAL ENCOUNTER (INPATIENT)
Facility: MEDICAL CENTER | Age: 85
LOS: 11 days | DRG: 291 | End: 2020-06-06
Attending: EMERGENCY MEDICINE | Admitting: INTERNAL MEDICINE
Payer: MEDICARE

## 2020-01-01 ENCOUNTER — APPOINTMENT (OUTPATIENT)
Dept: RADIOLOGY | Facility: MEDICAL CENTER | Age: 85
DRG: 516 | End: 2020-01-01
Attending: EMERGENCY MEDICINE
Payer: MEDICARE

## 2020-01-01 ENCOUNTER — APPOINTMENT (OUTPATIENT)
Dept: RADIOLOGY | Facility: MEDICAL CENTER | Age: 85
DRG: 291 | End: 2020-01-01
Attending: INTERNAL MEDICINE
Payer: MEDICARE

## 2020-01-01 ENCOUNTER — HOSPITAL ENCOUNTER (OUTPATIENT)
Dept: RADIOLOGY | Facility: MEDICAL CENTER | Age: 85
End: 2020-05-26
Attending: FAMILY MEDICINE
Payer: MEDICARE

## 2020-01-01 ENCOUNTER — APPOINTMENT (OUTPATIENT)
Dept: RADIOLOGY | Facility: MEDICAL CENTER | Age: 85
End: 2020-01-01
Attending: EMERGENCY MEDICINE
Payer: MEDICARE

## 2020-01-01 ENCOUNTER — TELEPHONE (OUTPATIENT)
Dept: PULMONOLOGY | Facility: HOSPICE | Age: 85
End: 2020-01-01

## 2020-01-01 ENCOUNTER — PATIENT OUTREACH (OUTPATIENT)
Dept: HEALTH INFORMATION MANAGEMENT | Facility: OTHER | Age: 85
End: 2020-01-01

## 2020-01-01 ENCOUNTER — APPOINTMENT (OUTPATIENT)
Dept: RADIOLOGY | Facility: MEDICAL CENTER | Age: 85
End: 2020-01-01
Attending: INTERNAL MEDICINE
Payer: MEDICARE

## 2020-01-01 ENCOUNTER — APPOINTMENT (OUTPATIENT)
Dept: RADIOLOGY | Facility: MEDICAL CENTER | Age: 85
DRG: 186 | End: 2020-01-01
Attending: INTERNAL MEDICINE
Payer: MEDICARE

## 2020-01-01 ENCOUNTER — HOSPITAL ENCOUNTER (INPATIENT)
Facility: MEDICAL CENTER | Age: 85
LOS: 1 days | DRG: 516 | End: 2020-09-30
Attending: EMERGENCY MEDICINE | Admitting: HOSPITALIST
Payer: MEDICARE

## 2020-01-01 ENCOUNTER — HOSPITAL ENCOUNTER (OUTPATIENT)
Dept: LAB | Facility: MEDICAL CENTER | Age: 85
End: 2020-08-19
Attending: FAMILY MEDICINE
Payer: MEDICARE

## 2020-01-01 ENCOUNTER — HOSPITAL ENCOUNTER (OUTPATIENT)
Facility: MEDICAL CENTER | Age: 85
End: 2020-11-06
Attending: EMERGENCY MEDICINE | Admitting: HOSPITALIST
Payer: MEDICARE

## 2020-01-01 ENCOUNTER — HOSPITAL ENCOUNTER (OUTPATIENT)
Facility: MEDICAL CENTER | Age: 85
End: 2020-02-28
Attending: EMERGENCY MEDICINE | Admitting: HOSPITALIST
Payer: MEDICARE

## 2020-01-01 ENCOUNTER — APPOINTMENT (OUTPATIENT)
Dept: CARDIOLOGY | Facility: MEDICAL CENTER | Age: 85
DRG: 291 | End: 2020-01-01
Attending: FAMILY MEDICINE
Payer: MEDICARE

## 2020-01-01 ENCOUNTER — HOSPITAL ENCOUNTER (INPATIENT)
Facility: MEDICAL CENTER | Age: 85
LOS: 6 days | DRG: 186 | End: 2020-09-16
Attending: EMERGENCY MEDICINE | Admitting: HOSPITALIST
Payer: MEDICARE

## 2020-01-01 ENCOUNTER — HOSPITAL ENCOUNTER (OUTPATIENT)
Dept: LAB | Facility: MEDICAL CENTER | Age: 85
End: 2020-05-19
Attending: FAMILY MEDICINE
Payer: MEDICARE

## 2020-01-01 ENCOUNTER — APPOINTMENT (OUTPATIENT)
Dept: RADIOLOGY | Facility: MEDICAL CENTER | Age: 85
DRG: 186 | End: 2020-01-01
Attending: STUDENT IN AN ORGANIZED HEALTH CARE EDUCATION/TRAINING PROGRAM
Payer: MEDICARE

## 2020-01-01 ENCOUNTER — HOSPITAL ENCOUNTER (OUTPATIENT)
Facility: MEDICAL CENTER | Age: 85
End: 2020-08-28
Attending: EMERGENCY MEDICINE | Admitting: INTERNAL MEDICINE
Payer: MEDICARE

## 2020-01-01 ENCOUNTER — HOME HEALTH ADMISSION (OUTPATIENT)
Dept: HOME HEALTH SERVICES | Facility: HOME HEALTHCARE | Age: 85
End: 2020-01-01
Payer: MEDICARE

## 2020-01-01 ENCOUNTER — APPOINTMENT (OUTPATIENT)
Dept: RADIOLOGY | Facility: MEDICAL CENTER | Age: 85
End: 2020-01-01
Attending: HOSPITALIST
Payer: MEDICARE

## 2020-01-01 ENCOUNTER — HOSPITAL ENCOUNTER (OUTPATIENT)
Dept: LAB | Facility: MEDICAL CENTER | Age: 85
End: 2020-02-10
Attending: UROLOGY
Payer: MEDICARE

## 2020-01-01 ENCOUNTER — PHARMACY VISIT (OUTPATIENT)
Dept: PHARMACY | Facility: MEDICAL CENTER | Age: 85
End: 2020-01-01
Payer: MEDICARE

## 2020-01-01 ENCOUNTER — TELEPHONE (OUTPATIENT)
Dept: SLEEP MEDICINE | Facility: MEDICAL CENTER | Age: 85
End: 2020-01-01

## 2020-01-01 ENCOUNTER — APPOINTMENT (OUTPATIENT)
Dept: PULMONOLOGY | Facility: HOSPICE | Age: 85
End: 2020-01-01
Payer: MEDICARE

## 2020-01-01 ENCOUNTER — HOSPITAL ENCOUNTER (OUTPATIENT)
Dept: RADIOLOGY | Facility: MEDICAL CENTER | Age: 85
End: 2020-05-13
Attending: FAMILY MEDICINE
Payer: MEDICARE

## 2020-01-01 ENCOUNTER — APPOINTMENT (OUTPATIENT)
Dept: RADIOLOGY | Facility: MEDICAL CENTER | Age: 85
End: 2020-01-01
Payer: MEDICARE

## 2020-01-01 VITALS
SYSTOLIC BLOOD PRESSURE: 119 MMHG | BODY MASS INDEX: 21.27 KG/M2 | OXYGEN SATURATION: 96 % | RESPIRATION RATE: 17 BRPM | WEIGHT: 151.9 LBS | HEIGHT: 71 IN | DIASTOLIC BLOOD PRESSURE: 77 MMHG | HEART RATE: 88 BPM | TEMPERATURE: 97.5 F

## 2020-01-01 VITALS
HEIGHT: 71 IN | TEMPERATURE: 98 F | OXYGEN SATURATION: 97 % | SYSTOLIC BLOOD PRESSURE: 124 MMHG | HEART RATE: 57 BPM | DIASTOLIC BLOOD PRESSURE: 67 MMHG | RESPIRATION RATE: 16 BRPM | WEIGHT: 162.92 LBS | BODY MASS INDEX: 22.81 KG/M2

## 2020-01-01 VITALS
TEMPERATURE: 98.8 F | HEIGHT: 71 IN | DIASTOLIC BLOOD PRESSURE: 75 MMHG | OXYGEN SATURATION: 94 % | SYSTOLIC BLOOD PRESSURE: 113 MMHG | BODY MASS INDEX: 18.92 KG/M2 | HEART RATE: 85 BPM | RESPIRATION RATE: 18 BRPM | WEIGHT: 135.14 LBS

## 2020-01-01 VITALS
BODY MASS INDEX: 20.3 KG/M2 | TEMPERATURE: 97.8 F | HEART RATE: 82 BPM | RESPIRATION RATE: 17 BRPM | SYSTOLIC BLOOD PRESSURE: 108 MMHG | HEIGHT: 71 IN | DIASTOLIC BLOOD PRESSURE: 67 MMHG | WEIGHT: 145 LBS | OXYGEN SATURATION: 93 %

## 2020-01-01 VITALS
HEART RATE: 77 BPM | DIASTOLIC BLOOD PRESSURE: 58 MMHG | HEIGHT: 71 IN | TEMPERATURE: 98.7 F | OXYGEN SATURATION: 96 % | WEIGHT: 155 LBS | RESPIRATION RATE: 14 BRPM | BODY MASS INDEX: 21.7 KG/M2 | SYSTOLIC BLOOD PRESSURE: 104 MMHG

## 2020-01-01 VITALS
DIASTOLIC BLOOD PRESSURE: 70 MMHG | HEIGHT: 71 IN | SYSTOLIC BLOOD PRESSURE: 116 MMHG | BODY MASS INDEX: 21.6 KG/M2 | OXYGEN SATURATION: 94 % | WEIGHT: 154.25 LBS | HEART RATE: 76 BPM

## 2020-01-01 VITALS
SYSTOLIC BLOOD PRESSURE: 130 MMHG | DIASTOLIC BLOOD PRESSURE: 60 MMHG | WEIGHT: 135 LBS | HEIGHT: 71 IN | BODY MASS INDEX: 18.9 KG/M2 | HEART RATE: 82 BPM | OXYGEN SATURATION: 96 % | RESPIRATION RATE: 16 BRPM

## 2020-01-01 VITALS
BODY MASS INDEX: 20.91 KG/M2 | HEART RATE: 65 BPM | WEIGHT: 149.38 LBS | SYSTOLIC BLOOD PRESSURE: 110 MMHG | OXYGEN SATURATION: 97 % | DIASTOLIC BLOOD PRESSURE: 60 MMHG | HEIGHT: 71 IN

## 2020-01-01 VITALS
BODY MASS INDEX: 20.68 KG/M2 | RESPIRATION RATE: 19 BRPM | HEART RATE: 75 BPM | DIASTOLIC BLOOD PRESSURE: 71 MMHG | TEMPERATURE: 98.2 F | SYSTOLIC BLOOD PRESSURE: 117 MMHG | WEIGHT: 147.71 LBS | OXYGEN SATURATION: 96 % | HEIGHT: 71 IN

## 2020-01-01 DIAGNOSIS — J96.11 CHRONIC HYPOXEMIC RESPIRATORY FAILURE (HCC): ICD-10-CM

## 2020-01-01 DIAGNOSIS — R07.81 PLEURITIC CHEST PAIN: ICD-10-CM

## 2020-01-01 DIAGNOSIS — S32.010D COMPRESSION FRACTURE OF L1 VERTEBRA WITH ROUTINE HEALING, SUBSEQUENT ENCOUNTER: ICD-10-CM

## 2020-01-01 DIAGNOSIS — I26.99 OTHER ACUTE PULMONARY EMBOLISM WITHOUT ACUTE COR PULMONALE (HCC): ICD-10-CM

## 2020-01-01 DIAGNOSIS — F03.90 DEMENTIA WITHOUT BEHAVIORAL DISTURBANCE, UNSPECIFIED DEMENTIA TYPE: ICD-10-CM

## 2020-01-01 DIAGNOSIS — J90 PLEURAL EFFUSION ON LEFT: ICD-10-CM

## 2020-01-01 DIAGNOSIS — S32.010A CLOSED COMPRESSION FRACTURE OF BODY OF L1 VERTEBRA (HCC): ICD-10-CM

## 2020-01-01 DIAGNOSIS — R41.3 MEMORY DIFFICULTIES: ICD-10-CM

## 2020-01-01 DIAGNOSIS — J96.01 ACUTE HYPOXEMIC RESPIRATORY FAILURE (HCC): ICD-10-CM

## 2020-01-01 DIAGNOSIS — R91.1 PULMONARY NODULE: ICD-10-CM

## 2020-01-01 DIAGNOSIS — R42 DIZZINESS: ICD-10-CM

## 2020-01-01 DIAGNOSIS — Z09 HOSPITAL DISCHARGE FOLLOW-UP: ICD-10-CM

## 2020-01-01 DIAGNOSIS — I50.23 ACUTE ON CHRONIC SYSTOLIC (CONGESTIVE) HEART FAILURE (HCC): ICD-10-CM

## 2020-01-01 DIAGNOSIS — R10.30 LOWER ABDOMINAL PAIN: ICD-10-CM

## 2020-01-01 DIAGNOSIS — J98.4 DISEASE OF LUNG: ICD-10-CM

## 2020-01-01 DIAGNOSIS — J90 RECURRENT PLEURAL EFFUSION ON LEFT: ICD-10-CM

## 2020-01-01 DIAGNOSIS — R06.00 DYSPNEA, PAROXYSMAL NOCTURNAL: ICD-10-CM

## 2020-01-01 DIAGNOSIS — M54.50 LUMBAR BACK PAIN: ICD-10-CM
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
## 2020-01-01 DIAGNOSIS — R07.9 CHEST PAIN, UNSPECIFIED TYPE: ICD-10-CM

## 2020-01-01 DIAGNOSIS — R42 VERTIGO: ICD-10-CM

## 2020-01-01 DIAGNOSIS — Z78.9 NONSMOKER: ICD-10-CM

## 2020-01-01 DIAGNOSIS — H65.191 ACUTE EFFUSION OF RIGHT EAR: ICD-10-CM

## 2020-01-01 DIAGNOSIS — R07.9 ACUTE CHEST PAIN: ICD-10-CM

## 2020-01-01 DIAGNOSIS — R93.2 ABNORMAL LIVER CT: ICD-10-CM

## 2020-01-01 DIAGNOSIS — J90 PLEURAL EFFUSION: ICD-10-CM

## 2020-01-01 DIAGNOSIS — J96.11 CHRONIC HYPOXEMIC RESPIRATORY FAILURE (HCC): Chronic | ICD-10-CM

## 2020-01-01 LAB
25(OH)D3 SERPL-MCNC: 56 NG/ML (ref 30–100)
25(OH)D3 SERPL-MCNC: 73 NG/ML (ref 30–100)
AFP-TM SERPL-MCNC: 2 NG/ML (ref 0–9)
ALBUMIN SERPL BCP-MCNC: 2.7 G/DL (ref 3.2–4.9)
ALBUMIN SERPL BCP-MCNC: 2.7 G/DL (ref 3.2–4.9)
ALBUMIN SERPL BCP-MCNC: 2.8 G/DL (ref 3.2–4.9)
ALBUMIN SERPL BCP-MCNC: 3 G/DL (ref 3.2–4.9)
ALBUMIN SERPL BCP-MCNC: 3.1 G/DL (ref 3.2–4.9)
ALBUMIN SERPL BCP-MCNC: 3.2 G/DL (ref 3.2–4.9)
ALBUMIN SERPL BCP-MCNC: 3.3 G/DL (ref 3.2–4.9)
ALBUMIN SERPL BCP-MCNC: 3.3 G/DL (ref 3.2–4.9)
ALBUMIN SERPL BCP-MCNC: 3.4 G/DL (ref 3.2–4.9)
ALBUMIN SERPL BCP-MCNC: 3.4 G/DL (ref 3.2–4.9)
ALBUMIN SERPL BCP-MCNC: 3.8 G/DL (ref 3.2–4.9)
ALBUMIN SERPL BCP-MCNC: 3.9 G/DL (ref 3.2–4.9)
ALBUMIN/GLOB SERPL: 0.8 G/DL
ALBUMIN/GLOB SERPL: 0.8 G/DL
ALBUMIN/GLOB SERPL: 0.9 G/DL
ALBUMIN/GLOB SERPL: 1 G/DL
ALBUMIN/GLOB SERPL: 1.2 G/DL
ALBUMIN/GLOB SERPL: 1.4 G/DL
ALBUMIN/GLOB SERPL: 1.5 G/DL
ALBUMIN/GLOB SERPL: 1.5 G/DL
ALP SERPL-CCNC: 59 U/L (ref 30–99)
ALP SERPL-CCNC: 60 U/L (ref 30–99)
ALP SERPL-CCNC: 65 U/L (ref 30–99)
ALP SERPL-CCNC: 66 U/L (ref 30–99)
ALP SERPL-CCNC: 68 U/L (ref 30–99)
ALP SERPL-CCNC: 69 U/L (ref 30–99)
ALP SERPL-CCNC: 73 U/L (ref 30–99)
ALP SERPL-CCNC: 73 U/L (ref 30–99)
ALP SERPL-CCNC: 74 U/L (ref 30–99)
ALP SERPL-CCNC: 81 U/L (ref 30–99)
ALP SERPL-CCNC: 81 U/L (ref 30–99)
ALP SERPL-CCNC: 84 U/L (ref 30–99)
ALP SERPL-CCNC: 84 U/L (ref 30–99)
ALP SERPL-CCNC: 85 U/L (ref 30–99)
ALP SERPL-CCNC: 86 U/L (ref 30–99)
ALP SERPL-CCNC: 93 U/L (ref 30–99)
ALT SERPL-CCNC: 11 U/L (ref 2–50)
ALT SERPL-CCNC: 11 U/L (ref 2–50)
ALT SERPL-CCNC: 12 U/L (ref 2–50)
ALT SERPL-CCNC: 13 U/L (ref 2–50)
ALT SERPL-CCNC: 14 U/L (ref 2–50)
ALT SERPL-CCNC: 14 U/L (ref 2–50)
ALT SERPL-CCNC: 15 U/L (ref 2–50)
ALT SERPL-CCNC: 22 U/L (ref 2–50)
ALT SERPL-CCNC: 26 U/L (ref 2–50)
ALT SERPL-CCNC: 27 U/L (ref 2–50)
ALT SERPL-CCNC: 6 U/L (ref 2–50)
ALT SERPL-CCNC: 6 U/L (ref 2–50)
ALT SERPL-CCNC: 7 U/L (ref 2–50)
ALT SERPL-CCNC: 7 U/L (ref 2–50)
ALT SERPL-CCNC: 8 U/L (ref 2–50)
ALT SERPL-CCNC: 8 U/L (ref 2–50)
AMYLASE FLD-CCNC: 66 U/L
ANA INTERPRETIVE COMMENT Q5143: ABNORMAL
ANA PATTERN Q5144: ABNORMAL
ANA TITER Q5145: ABNORMAL
ANION GAP SERPL CALC-SCNC: 10 MMOL/L (ref 7–16)
ANION GAP SERPL CALC-SCNC: 11 MMOL/L (ref 7–16)
ANION GAP SERPL CALC-SCNC: 11 MMOL/L (ref 7–16)
ANION GAP SERPL CALC-SCNC: 12 MMOL/L (ref 7–16)
ANION GAP SERPL CALC-SCNC: 13 MMOL/L (ref 7–16)
ANION GAP SERPL CALC-SCNC: 13 MMOL/L (ref 7–16)
ANION GAP SERPL CALC-SCNC: 14 MMOL/L (ref 7–16)
ANION GAP SERPL CALC-SCNC: 15 MMOL/L (ref 7–16)
ANION GAP SERPL CALC-SCNC: 15 MMOL/L (ref 7–16)
ANION GAP SERPL CALC-SCNC: 16 MMOL/L (ref 7–16)
ANION GAP SERPL CALC-SCNC: 8 MMOL/L (ref 7–16)
ANION GAP SERPL CALC-SCNC: 9 MMOL/L (ref 7–16)
ANTINUCLEAR ANTIBODY (ANA), HEP-2, IGG Q5142: DETECTED
APPEARANCE FLD: NORMAL
APPEARANCE FLD: NORMAL
APPEARANCE UR: CLEAR
APTT PPP: 28.7 SEC (ref 24.7–36)
APTT PPP: 29.9 SEC (ref 24.7–36)
APTT PPP: 35.3 SEC (ref 24.7–36)
APTT PPP: 38.5 SEC (ref 24.7–36)
APTT PPP: 45.2 SEC (ref 24.7–36)
AST SERPL-CCNC: 11 U/L (ref 12–45)
AST SERPL-CCNC: 12 U/L (ref 12–45)
AST SERPL-CCNC: 12 U/L (ref 12–45)
AST SERPL-CCNC: 16 U/L (ref 12–45)
AST SERPL-CCNC: 16 U/L (ref 12–45)
AST SERPL-CCNC: 19 U/L (ref 12–45)
AST SERPL-CCNC: 20 U/L (ref 12–45)
AST SERPL-CCNC: 20 U/L (ref 12–45)
AST SERPL-CCNC: 22 U/L (ref 12–45)
AST SERPL-CCNC: 24 U/L (ref 12–45)
AST SERPL-CCNC: 25 U/L (ref 12–45)
AST SERPL-CCNC: 25 U/L (ref 12–45)
AST SERPL-CCNC: 28 U/L (ref 12–45)
AST SERPL-CCNC: 29 U/L (ref 12–45)
AST SERPL-CCNC: 8 U/L (ref 12–45)
AST SERPL-CCNC: 8 U/L (ref 12–45)
BACTERIA BLD CULT: NORMAL
BACTERIA FLD AEROBE CULT: NORMAL
BACTERIA FLD AEROBE CULT: NORMAL
BACTERIA SPEC ANAEROBE CULT: NORMAL
BACTERIA SPEC ANAEROBE CULT: NORMAL
BACTERIA UR CULT: NORMAL
BASOPHILS # BLD AUTO: 0.2 % (ref 0–1.8)
BASOPHILS # BLD AUTO: 0.3 % (ref 0–1.8)
BASOPHILS # BLD AUTO: 0.4 % (ref 0–1.8)
BASOPHILS # BLD AUTO: 0.6 % (ref 0–1.8)
BASOPHILS # BLD AUTO: 0.6 % (ref 0–1.8)
BASOPHILS # BLD AUTO: 0.7 % (ref 0–1.8)
BASOPHILS # BLD: 0.02 K/UL (ref 0–0.12)
BASOPHILS # BLD: 0.03 K/UL (ref 0–0.12)
BASOPHILS # BLD: 0.04 K/UL (ref 0–0.12)
BASOPHILS # BLD: 0.05 K/UL (ref 0–0.12)
BASOPHILS # BLD: 0.07 K/UL (ref 0–0.12)
BASOPHILS NFR FLD: 1 %
BASOPHILS NFR FLD: 2 %
BILIRUB SERPL-MCNC: 0.2 MG/DL (ref 0.1–1.5)
BILIRUB SERPL-MCNC: 0.3 MG/DL (ref 0.1–1.5)
BILIRUB SERPL-MCNC: 0.4 MG/DL (ref 0.1–1.5)
BILIRUB SERPL-MCNC: 0.5 MG/DL (ref 0.1–1.5)
BILIRUB SERPL-MCNC: 0.5 MG/DL (ref 0.1–1.5)
BILIRUB SERPL-MCNC: 0.6 MG/DL (ref 0.1–1.5)
BILIRUB UR QL STRIP.AUTO: NEGATIVE
BODY FLD TYPE: NORMAL
BUN SERPL-MCNC: 10 MG/DL (ref 8–22)
BUN SERPL-MCNC: 11 MG/DL (ref 8–22)
BUN SERPL-MCNC: 11 MG/DL (ref 8–22)
BUN SERPL-MCNC: 12 MG/DL (ref 8–22)
BUN SERPL-MCNC: 13 MG/DL (ref 8–22)
BUN SERPL-MCNC: 15 MG/DL (ref 8–22)
BUN SERPL-MCNC: 16 MG/DL (ref 8–22)
BUN SERPL-MCNC: 17 MG/DL (ref 8–22)
BUN SERPL-MCNC: 17 MG/DL (ref 8–22)
BUN SERPL-MCNC: 19 MG/DL (ref 8–22)
BUN SERPL-MCNC: 19 MG/DL (ref 8–22)
BUN SERPL-MCNC: 20 MG/DL (ref 8–22)
BUN SERPL-MCNC: 21 MG/DL (ref 8–22)
BUN SERPL-MCNC: 24 MG/DL (ref 8–22)
BUN SERPL-MCNC: 34 MG/DL (ref 8–22)
BUN SERPL-MCNC: 44 MG/DL (ref 8–22)
BUN SERPL-MCNC: 46 MG/DL (ref 8–22)
BUN SERPL-MCNC: 49 MG/DL (ref 8–22)
BUN SERPL-MCNC: 8 MG/DL (ref 8–22)
BUN SERPL-MCNC: 9 MG/DL (ref 8–22)
CALCIUM SERPL-MCNC: 8.1 MG/DL (ref 8.4–10.2)
CALCIUM SERPL-MCNC: 8.1 MG/DL (ref 8.4–10.2)
CALCIUM SERPL-MCNC: 8.3 MG/DL (ref 8.5–10.5)
CALCIUM SERPL-MCNC: 8.4 MG/DL (ref 8.4–10.2)
CALCIUM SERPL-MCNC: 8.4 MG/DL (ref 8.5–10.5)
CALCIUM SERPL-MCNC: 8.5 MG/DL (ref 8.4–10.2)
CALCIUM SERPL-MCNC: 8.6 MG/DL (ref 8.5–10.5)
CALCIUM SERPL-MCNC: 8.6 MG/DL (ref 8.5–10.5)
CALCIUM SERPL-MCNC: 8.7 MG/DL (ref 8.4–10.2)
CALCIUM SERPL-MCNC: 8.8 MG/DL (ref 8.4–10.2)
CALCIUM SERPL-MCNC: 8.8 MG/DL (ref 8.5–10.5)
CALCIUM SERPL-MCNC: 8.8 MG/DL (ref 8.5–10.5)
CALCIUM SERPL-MCNC: 8.9 MG/DL (ref 8.4–10.2)
CALCIUM SERPL-MCNC: 8.9 MG/DL (ref 8.5–10.5)
CALCIUM SERPL-MCNC: 9 MG/DL (ref 8.4–10.2)
CALCIUM SERPL-MCNC: 9 MG/DL (ref 8.4–10.2)
CALCIUM SERPL-MCNC: 9 MG/DL (ref 8.5–10.5)
CALCIUM SERPL-MCNC: 9 MG/DL (ref 8.5–10.5)
CALCIUM SERPL-MCNC: 9.1 MG/DL (ref 8.4–10.2)
CALCIUM SERPL-MCNC: 9.1 MG/DL (ref 8.4–10.2)
CALCIUM SERPL-MCNC: 9.1 MG/DL (ref 8.5–10.5)
CALCIUM SERPL-MCNC: 9.1 MG/DL (ref 8.5–10.5)
CALCIUM SERPL-MCNC: 9.3 MG/DL (ref 8.4–10.2)
CANCER AG125 SERPL-ACNC: 10.7 U/ML (ref 0–35)
CANCER AG19-9 SERPL-ACNC: 12.7 U/ML (ref 0–35)
CANCER AG19-9 SERPL-ACNC: 14.6 U/ML (ref 0–35)
CCP IGG SERPL-ACNC: 4 UNITS (ref 0–19)
CEA SERPL-MCNC: 1.1 NG/ML (ref 0–3)
CHLORIDE SERPL-SCNC: 100 MMOL/L (ref 96–112)
CHLORIDE SERPL-SCNC: 102 MMOL/L (ref 96–112)
CHLORIDE SERPL-SCNC: 103 MMOL/L (ref 96–112)
CHLORIDE SERPL-SCNC: 103 MMOL/L (ref 96–112)
CHLORIDE SERPL-SCNC: 104 MMOL/L (ref 96–112)
CHLORIDE SERPL-SCNC: 104 MMOL/L (ref 96–112)
CHLORIDE SERPL-SCNC: 105 MMOL/L (ref 96–112)
CHLORIDE SERPL-SCNC: 105 MMOL/L (ref 96–112)
CHLORIDE SERPL-SCNC: 107 MMOL/L (ref 96–112)
CHLORIDE SERPL-SCNC: 108 MMOL/L (ref 96–112)
CHLORIDE SERPL-SCNC: 87 MMOL/L (ref 96–112)
CHLORIDE SERPL-SCNC: 89 MMOL/L (ref 96–112)
CHLORIDE SERPL-SCNC: 89 MMOL/L (ref 96–112)
CHLORIDE SERPL-SCNC: 90 MMOL/L (ref 96–112)
CHLORIDE SERPL-SCNC: 91 MMOL/L (ref 96–112)
CHLORIDE SERPL-SCNC: 92 MMOL/L (ref 96–112)
CHLORIDE SERPL-SCNC: 94 MMOL/L (ref 96–112)
CHLORIDE SERPL-SCNC: 95 MMOL/L (ref 96–112)
CHLORIDE SERPL-SCNC: 97 MMOL/L (ref 96–112)
CHLORIDE SERPL-SCNC: 98 MMOL/L (ref 96–112)
CHLORIDE SERPL-SCNC: 99 MMOL/L (ref 96–112)
CHLORIDE SERPL-SCNC: 99 MMOL/L (ref 96–112)
CHOLEST FLD-MCNC: 72 MG/DL
CHOLEST SERPL-MCNC: 100 MG/DL (ref 100–199)
CHOLEST SERPL-MCNC: 121 MG/DL (ref 100–199)
CHOLEST SERPL-MCNC: 136 MG/DL (ref 100–199)
CO2 SERPL-SCNC: 23 MMOL/L (ref 20–33)
CO2 SERPL-SCNC: 24 MMOL/L (ref 20–33)
CO2 SERPL-SCNC: 24 MMOL/L (ref 20–33)
CO2 SERPL-SCNC: 25 MMOL/L (ref 20–33)
CO2 SERPL-SCNC: 26 MMOL/L (ref 20–33)
CO2 SERPL-SCNC: 27 MMOL/L (ref 20–33)
CO2 SERPL-SCNC: 28 MMOL/L (ref 20–33)
CO2 SERPL-SCNC: 29 MMOL/L (ref 20–33)
CO2 SERPL-SCNC: 30 MMOL/L (ref 20–33)
CO2 SERPL-SCNC: 30 MMOL/L (ref 20–33)
CO2 SERPL-SCNC: 31 MMOL/L (ref 20–33)
CO2 SERPL-SCNC: 31 MMOL/L (ref 20–33)
CO2 SERPL-SCNC: 32 MMOL/L (ref 20–33)
COLOR FLD: NORMAL
COLOR FLD: YELLOW
COLOR UR: YELLOW
COVID ORDER STATUS COVID19: NORMAL
CREAT SERPL-MCNC: 0.44 MG/DL (ref 0.5–1.4)
CREAT SERPL-MCNC: 0.48 MG/DL (ref 0.5–1.4)
CREAT SERPL-MCNC: 0.51 MG/DL (ref 0.5–1.4)
CREAT SERPL-MCNC: 0.52 MG/DL (ref 0.5–1.4)
CREAT SERPL-MCNC: 0.54 MG/DL (ref 0.5–1.4)
CREAT SERPL-MCNC: 0.55 MG/DL (ref 0.5–1.4)
CREAT SERPL-MCNC: 0.56 MG/DL (ref 0.5–1.4)
CREAT SERPL-MCNC: 0.56 MG/DL (ref 0.5–1.4)
CREAT SERPL-MCNC: 0.58 MG/DL (ref 0.5–1.4)
CREAT SERPL-MCNC: 0.6 MG/DL (ref 0.5–1.4)
CREAT SERPL-MCNC: 0.61 MG/DL (ref 0.5–1.4)
CREAT SERPL-MCNC: 0.62 MG/DL (ref 0.5–1.4)
CREAT SERPL-MCNC: 0.64 MG/DL (ref 0.5–1.4)
CREAT SERPL-MCNC: 0.72 MG/DL (ref 0.5–1.4)
CREAT SERPL-MCNC: 0.79 MG/DL (ref 0.5–1.4)
CREAT SERPL-MCNC: 0.81 MG/DL (ref 0.5–1.4)
CREAT SERPL-MCNC: 0.83 MG/DL (ref 0.5–1.4)
CREAT SERPL-MCNC: 0.85 MG/DL (ref 0.5–1.4)
CREAT SERPL-MCNC: 0.87 MG/DL (ref 0.5–1.4)
CREAT SERPL-MCNC: 0.88 MG/DL (ref 0.5–1.4)
CREAT SERPL-MCNC: 0.9 MG/DL (ref 0.5–1.4)
CREAT SERPL-MCNC: 0.91 MG/DL (ref 0.5–1.4)
CREAT SERPL-MCNC: 0.92 MG/DL (ref 0.5–1.4)
CREAT SERPL-MCNC: 1 MG/DL (ref 0.5–1.4)
CREAT SERPL-MCNC: 1.05 MG/DL (ref 0.5–1.4)
CREAT SERPL-MCNC: 1.08 MG/DL (ref 0.5–1.4)
CREAT SERPL-MCNC: 1.17 MG/DL (ref 0.5–1.4)
CREAT SERPL-MCNC: 1.24 MG/DL (ref 0.5–1.4)
CRP SERPL HS-MCNC: 13.27 MG/DL (ref 0–0.75)
CRP SERPL HS-MCNC: 4.8 MG/L (ref 0–7.5)
CSF COMMENTS 1658: NORMAL
CYTOLOGY REG CYTOL: NORMAL
CYTOLOGY REG CYTOL: NORMAL
D DIMER PPP IA.FEU-MCNC: 2.14 UG/ML (FEU) (ref 0–0.5)
DSDNA AB TITR SER CLIF: NORMAL {TITER}
EKG IMPRESSION: NORMAL
ENA JO1 AB TITR SER: 1 AU/ML (ref 0–40)
ENA SCL70 IGG SER QL: 3 AU/ML (ref 0–40)
ENA SM IGG SER-ACNC: 1 AU/ML (ref 0–40)
ENA SS-B IGG SER IA-ACNC: 0 AU/ML (ref 0–40)
EOSINOPHIL # BLD AUTO: 0.02 K/UL (ref 0–0.51)
EOSINOPHIL # BLD AUTO: 0.02 K/UL (ref 0–0.51)
EOSINOPHIL # BLD AUTO: 0.03 K/UL (ref 0–0.51)
EOSINOPHIL # BLD AUTO: 0.04 K/UL (ref 0–0.51)
EOSINOPHIL # BLD AUTO: 0.04 K/UL (ref 0–0.51)
EOSINOPHIL # BLD AUTO: 0.05 K/UL (ref 0–0.51)
EOSINOPHIL # BLD AUTO: 0.08 K/UL (ref 0–0.51)
EOSINOPHIL # BLD AUTO: 0.08 K/UL (ref 0–0.51)
EOSINOPHIL # BLD AUTO: 0.09 K/UL (ref 0–0.51)
EOSINOPHIL # BLD AUTO: 0.13 K/UL (ref 0–0.51)
EOSINOPHIL # BLD AUTO: 0.14 K/UL (ref 0–0.51)
EOSINOPHIL # BLD AUTO: 0.14 K/UL (ref 0–0.51)
EOSINOPHIL # BLD AUTO: 0.15 K/UL (ref 0–0.51)
EOSINOPHIL # BLD AUTO: 0.19 K/UL (ref 0–0.51)
EOSINOPHIL # BLD AUTO: 0.19 K/UL (ref 0–0.51)
EOSINOPHIL # BLD AUTO: 0.2 K/UL (ref 0–0.51)
EOSINOPHIL # BLD AUTO: 0.21 K/UL (ref 0–0.51)
EOSINOPHIL # BLD AUTO: 0.21 K/UL (ref 0–0.51)
EOSINOPHIL # BLD AUTO: 0.23 K/UL (ref 0–0.51)
EOSINOPHIL # BLD AUTO: 0.27 K/UL (ref 0–0.51)
EOSINOPHIL NFR BLD: 0.2 % (ref 0–6.9)
EOSINOPHIL NFR BLD: 0.3 % (ref 0–6.9)
EOSINOPHIL NFR BLD: 0.4 % (ref 0–6.9)
EOSINOPHIL NFR BLD: 0.4 % (ref 0–6.9)
EOSINOPHIL NFR BLD: 0.6 % (ref 0–6.9)
EOSINOPHIL NFR BLD: 0.7 % (ref 0–6.9)
EOSINOPHIL NFR BLD: 0.8 % (ref 0–6.9)
EOSINOPHIL NFR BLD: 0.8 % (ref 0–6.9)
EOSINOPHIL NFR BLD: 1.1 % (ref 0–6.9)
EOSINOPHIL NFR BLD: 1.3 % (ref 0–6.9)
EOSINOPHIL NFR BLD: 1.4 % (ref 0–6.9)
EOSINOPHIL NFR BLD: 1.5 % (ref 0–6.9)
EOSINOPHIL NFR BLD: 1.6 % (ref 0–6.9)
EOSINOPHIL NFR BLD: 1.7 % (ref 0–6.9)
EOSINOPHIL NFR BLD: 1.7 % (ref 0–6.9)
EOSINOPHIL NFR BLD: 1.9 % (ref 0–6.9)
EOSINOPHIL NFR BLD: 1.9 % (ref 0–6.9)
EOSINOPHIL NFR BLD: 2.1 % (ref 0–6.9)
EOSINOPHIL NFR BLD: 2.2 % (ref 0–6.9)
EOSINOPHIL NFR BLD: 2.6 % (ref 0–6.9)
EOSINOPHIL NFR FLD: 1 %
EOSINOPHIL NFR FLD: 2 %
ERYTHROCYTE [DISTWIDTH] IN BLOOD BY AUTOMATED COUNT: 42 FL (ref 35.9–50)
ERYTHROCYTE [DISTWIDTH] IN BLOOD BY AUTOMATED COUNT: 42.8 FL (ref 35.9–50)
ERYTHROCYTE [DISTWIDTH] IN BLOOD BY AUTOMATED COUNT: 43.8 FL (ref 35.9–50)
ERYTHROCYTE [DISTWIDTH] IN BLOOD BY AUTOMATED COUNT: 44.1 FL (ref 35.9–50)
ERYTHROCYTE [DISTWIDTH] IN BLOOD BY AUTOMATED COUNT: 45.3 FL (ref 35.9–50)
ERYTHROCYTE [DISTWIDTH] IN BLOOD BY AUTOMATED COUNT: 45.5 FL (ref 35.9–50)
ERYTHROCYTE [DISTWIDTH] IN BLOOD BY AUTOMATED COUNT: 47.2 FL (ref 35.9–50)
ERYTHROCYTE [DISTWIDTH] IN BLOOD BY AUTOMATED COUNT: 47.3 FL (ref 35.9–50)
ERYTHROCYTE [DISTWIDTH] IN BLOOD BY AUTOMATED COUNT: 47.4 FL (ref 35.9–50)
ERYTHROCYTE [DISTWIDTH] IN BLOOD BY AUTOMATED COUNT: 47.5 FL (ref 35.9–50)
ERYTHROCYTE [DISTWIDTH] IN BLOOD BY AUTOMATED COUNT: 47.9 FL (ref 35.9–50)
ERYTHROCYTE [DISTWIDTH] IN BLOOD BY AUTOMATED COUNT: 47.9 FL (ref 35.9–50)
ERYTHROCYTE [DISTWIDTH] IN BLOOD BY AUTOMATED COUNT: 48 FL (ref 35.9–50)
ERYTHROCYTE [DISTWIDTH] IN BLOOD BY AUTOMATED COUNT: 48 FL (ref 35.9–50)
ERYTHROCYTE [DISTWIDTH] IN BLOOD BY AUTOMATED COUNT: 48.1 FL (ref 35.9–50)
ERYTHROCYTE [DISTWIDTH] IN BLOOD BY AUTOMATED COUNT: 48.1 FL (ref 35.9–50)
ERYTHROCYTE [DISTWIDTH] IN BLOOD BY AUTOMATED COUNT: 48.3 FL (ref 35.9–50)
ERYTHROCYTE [DISTWIDTH] IN BLOOD BY AUTOMATED COUNT: 48.6 FL (ref 35.9–50)
ERYTHROCYTE [DISTWIDTH] IN BLOOD BY AUTOMATED COUNT: 48.6 FL (ref 35.9–50)
ERYTHROCYTE [DISTWIDTH] IN BLOOD BY AUTOMATED COUNT: 48.7 FL (ref 35.9–50)
ERYTHROCYTE [DISTWIDTH] IN BLOOD BY AUTOMATED COUNT: 48.8 FL (ref 35.9–50)
ERYTHROCYTE [DISTWIDTH] IN BLOOD BY AUTOMATED COUNT: 48.9 FL (ref 35.9–50)
ERYTHROCYTE [DISTWIDTH] IN BLOOD BY AUTOMATED COUNT: 49.1 FL (ref 35.9–50)
ERYTHROCYTE [DISTWIDTH] IN BLOOD BY AUTOMATED COUNT: 49.5 FL (ref 35.9–50)
ERYTHROCYTE [DISTWIDTH] IN BLOOD BY AUTOMATED COUNT: 51 FL (ref 35.9–50)
ERYTHROCYTE [DISTWIDTH] IN BLOOD BY AUTOMATED COUNT: 51.5 FL (ref 35.9–50)
ERYTHROCYTE [SEDIMENTATION RATE] IN BLOOD BY WESTERGREN METHOD: 119 MM/HOUR (ref 0–20)
FASTING STATUS PATIENT QL REPORTED: NORMAL
FERRITIN SERPL-MCNC: 1281 NG/ML (ref 22–322)
FOLATE SERPL-MCNC: 10.8 NG/ML
FOLATE SERPL-MCNC: 12.3 NG/ML
GAMMA INTERFERON BACKGROUND BLD IA-ACNC: 0.01 IU/ML
GAMMA INTERFERON BACKGROUND BLD IA-ACNC: 0.04 IU/ML
GLOBULIN SER CALC-MCNC: 2.3 G/DL (ref 1.9–3.5)
GLOBULIN SER CALC-MCNC: 2.5 G/DL (ref 1.9–3.5)
GLOBULIN SER CALC-MCNC: 2.7 G/DL (ref 1.9–3.5)
GLOBULIN SER CALC-MCNC: 2.9 G/DL (ref 1.9–3.5)
GLOBULIN SER CALC-MCNC: 2.9 G/DL (ref 1.9–3.5)
GLOBULIN SER CALC-MCNC: 3 G/DL (ref 1.9–3.5)
GLOBULIN SER CALC-MCNC: 3 G/DL (ref 1.9–3.5)
GLOBULIN SER CALC-MCNC: 3.2 G/DL (ref 1.9–3.5)
GLOBULIN SER CALC-MCNC: 3.3 G/DL (ref 1.9–3.5)
GLOBULIN SER CALC-MCNC: 3.4 G/DL (ref 1.9–3.5)
GLOBULIN SER CALC-MCNC: 3.5 G/DL (ref 1.9–3.5)
GLOBULIN SER CALC-MCNC: 3.5 G/DL (ref 1.9–3.5)
GLOBULIN SER CALC-MCNC: 3.6 G/DL (ref 1.9–3.5)
GLOBULIN SER CALC-MCNC: 3.6 G/DL (ref 1.9–3.5)
GLOBULIN SER CALC-MCNC: 3.7 G/DL (ref 1.9–3.5)
GLUCOSE FLD-MCNC: 134 MG/DL
GLUCOSE FLD-MCNC: 138 MG/DL
GLUCOSE SERPL-MCNC: 105 MG/DL (ref 65–99)
GLUCOSE SERPL-MCNC: 108 MG/DL (ref 65–99)
GLUCOSE SERPL-MCNC: 110 MG/DL (ref 65–99)
GLUCOSE SERPL-MCNC: 115 MG/DL (ref 65–99)
GLUCOSE SERPL-MCNC: 116 MG/DL (ref 65–99)
GLUCOSE SERPL-MCNC: 117 MG/DL (ref 65–99)
GLUCOSE SERPL-MCNC: 118 MG/DL (ref 65–99)
GLUCOSE SERPL-MCNC: 119 MG/DL (ref 65–99)
GLUCOSE SERPL-MCNC: 119 MG/DL (ref 65–99)
GLUCOSE SERPL-MCNC: 121 MG/DL (ref 65–99)
GLUCOSE SERPL-MCNC: 122 MG/DL (ref 65–99)
GLUCOSE SERPL-MCNC: 123 MG/DL (ref 65–99)
GLUCOSE SERPL-MCNC: 123 MG/DL (ref 65–99)
GLUCOSE SERPL-MCNC: 125 MG/DL (ref 65–99)
GLUCOSE SERPL-MCNC: 126 MG/DL (ref 65–99)
GLUCOSE SERPL-MCNC: 127 MG/DL (ref 65–99)
GLUCOSE SERPL-MCNC: 129 MG/DL (ref 65–99)
GLUCOSE SERPL-MCNC: 136 MG/DL (ref 65–99)
GLUCOSE SERPL-MCNC: 147 MG/DL (ref 65–99)
GLUCOSE SERPL-MCNC: 150 MG/DL (ref 65–99)
GLUCOSE SERPL-MCNC: 153 MG/DL (ref 65–99)
GLUCOSE SERPL-MCNC: 232 MG/DL (ref 65–99)
GLUCOSE SERPL-MCNC: 92 MG/DL (ref 65–99)
GLUCOSE SERPL-MCNC: 92 MG/DL (ref 65–99)
GLUCOSE UR STRIP.AUTO-MCNC: NEGATIVE MG/DL
GRAM STN SPEC: NORMAL
HCT VFR BLD AUTO: 36 % (ref 42–52)
HCT VFR BLD AUTO: 37.6 % (ref 42–52)
HCT VFR BLD AUTO: 37.6 % (ref 42–52)
HCT VFR BLD AUTO: 37.8 % (ref 42–52)
HCT VFR BLD AUTO: 38.8 % (ref 42–52)
HCT VFR BLD AUTO: 38.8 % (ref 42–52)
HCT VFR BLD AUTO: 39.1 % (ref 42–52)
HCT VFR BLD AUTO: 39.6 % (ref 42–52)
HCT VFR BLD AUTO: 40.1 % (ref 42–52)
HCT VFR BLD AUTO: 40.1 % (ref 42–52)
HCT VFR BLD AUTO: 40.6 % (ref 42–52)
HCT VFR BLD AUTO: 41.4 % (ref 42–52)
HCT VFR BLD AUTO: 41.4 % (ref 42–52)
HCT VFR BLD AUTO: 41.6 % (ref 42–52)
HCT VFR BLD AUTO: 44.8 % (ref 42–52)
HCT VFR BLD AUTO: 47.6 % (ref 42–52)
HCT VFR BLD AUTO: 49 % (ref 42–52)
HCT VFR BLD AUTO: 50.1 % (ref 42–52)
HCT VFR BLD AUTO: 51 % (ref 42–52)
HCT VFR BLD AUTO: 51.2 % (ref 42–52)
HCT VFR BLD AUTO: 54.1 % (ref 42–52)
HCT VFR BLD AUTO: 55.3 % (ref 42–52)
HCT VFR BLD AUTO: 55.6 % (ref 42–52)
HCT VFR BLD AUTO: 56.7 % (ref 42–52)
HCT VFR BLD AUTO: 56.9 % (ref 42–52)
HCT VFR BLD AUTO: 57.1 % (ref 42–52)
HCT VFR BLD AUTO: 59 % (ref 42–52)
HCT VFR BLD AUTO: 59.1 % (ref 42–52)
HDLC SERPL-MCNC: 25 MG/DL
HDLC SERPL-MCNC: 32 MG/DL
HDLC SERPL-MCNC: 35 MG/DL
HEMOCCULT STL QL: NEGATIVE
HGB BLD-MCNC: 11.5 G/DL (ref 14–18)
HGB BLD-MCNC: 11.9 G/DL (ref 14–18)
HGB BLD-MCNC: 12 G/DL (ref 14–18)
HGB BLD-MCNC: 12 G/DL (ref 14–18)
HGB BLD-MCNC: 12.1 G/DL (ref 14–18)
HGB BLD-MCNC: 12.2 G/DL (ref 14–18)
HGB BLD-MCNC: 12.3 G/DL (ref 14–18)
HGB BLD-MCNC: 12.4 G/DL (ref 14–18)
HGB BLD-MCNC: 12.5 G/DL (ref 14–18)
HGB BLD-MCNC: 12.5 G/DL (ref 14–18)
HGB BLD-MCNC: 12.6 G/DL (ref 14–18)
HGB BLD-MCNC: 12.7 G/DL (ref 14–18)
HGB BLD-MCNC: 12.8 G/DL (ref 14–18)
HGB BLD-MCNC: 13.2 G/DL (ref 14–18)
HGB BLD-MCNC: 14.3 G/DL (ref 14–18)
HGB BLD-MCNC: 15.5 G/DL (ref 14–18)
HGB BLD-MCNC: 15.8 G/DL (ref 14–18)
HGB BLD-MCNC: 16.5 G/DL (ref 14–18)
HGB BLD-MCNC: 16.6 G/DL (ref 14–18)
HGB BLD-MCNC: 16.8 G/DL (ref 14–18)
HGB BLD-MCNC: 17.7 G/DL (ref 14–18)
HGB BLD-MCNC: 18 G/DL (ref 14–18)
HGB BLD-MCNC: 18.1 G/DL (ref 14–18)
HGB BLD-MCNC: 18.5 G/DL (ref 14–18)
HGB BLD-MCNC: 18.7 G/DL (ref 14–18)
HGB BLD-MCNC: 19 G/DL (ref 14–18)
HGB BLD-MCNC: 19.6 G/DL (ref 14–18)
HGB BLD-MCNC: 19.7 G/DL (ref 14–18)
HISTIOCYTES NFR FLD: 6 %
IMM GRANULOCYTES # BLD AUTO: 0.02 K/UL (ref 0–0.11)
IMM GRANULOCYTES # BLD AUTO: 0.03 K/UL (ref 0–0.11)
IMM GRANULOCYTES # BLD AUTO: 0.04 K/UL (ref 0–0.11)
IMM GRANULOCYTES # BLD AUTO: 0.06 K/UL (ref 0–0.11)
IMM GRANULOCYTES # BLD AUTO: 0.06 K/UL (ref 0–0.11)
IMM GRANULOCYTES # BLD AUTO: 0.07 K/UL (ref 0–0.11)
IMM GRANULOCYTES # BLD AUTO: 0.08 K/UL (ref 0–0.11)
IMM GRANULOCYTES # BLD AUTO: 0.09 K/UL (ref 0–0.11)
IMM GRANULOCYTES # BLD AUTO: 0.09 K/UL (ref 0–0.11)
IMM GRANULOCYTES # BLD AUTO: 0.1 K/UL (ref 0–0.11)
IMM GRANULOCYTES # BLD AUTO: 0.11 K/UL (ref 0–0.11)
IMM GRANULOCYTES # BLD AUTO: 0.11 K/UL (ref 0–0.11)
IMM GRANULOCYTES # BLD AUTO: 0.14 K/UL (ref 0–0.11)
IMM GRANULOCYTES # BLD AUTO: 0.14 K/UL (ref 0–0.11)
IMM GRANULOCYTES NFR BLD AUTO: 0.2 % (ref 0–0.9)
IMM GRANULOCYTES NFR BLD AUTO: 0.3 % (ref 0–0.9)
IMM GRANULOCYTES NFR BLD AUTO: 0.5 % (ref 0–0.9)
IMM GRANULOCYTES NFR BLD AUTO: 0.5 % (ref 0–0.9)
IMM GRANULOCYTES NFR BLD AUTO: 0.6 % (ref 0–0.9)
IMM GRANULOCYTES NFR BLD AUTO: 0.7 % (ref 0–0.9)
IMM GRANULOCYTES NFR BLD AUTO: 0.8 % (ref 0–0.9)
IMM GRANULOCYTES NFR BLD AUTO: 0.9 % (ref 0–0.9)
INR PPP: 1.03 (ref 0.87–1.13)
INR PPP: 1.06 (ref 0.87–1.13)
INR PPP: 1.13 (ref 0.87–1.13)
INR PPP: 1.19 (ref 0.87–1.13)
INR PPP: 1.27 (ref 0.87–1.13)
IRON SATN MFR SERPL: 18 % (ref 15–55)
IRON SERPL-MCNC: 26 UG/DL (ref 50–180)
KETONES UR STRIP.AUTO-MCNC: NEGATIVE MG/DL
LACTATE BLD-SCNC: 1.1 MMOL/L (ref 0.5–2)
LACTATE BLD-SCNC: 2 MMOL/L (ref 0.5–2)
LDH FLD L TO P-CCNC: 344 U/L
LDH FLD L TO P-CCNC: 437 U/L
LDH SERPL L TO P-CCNC: 191 U/L (ref 107–266)
LDH SERPL L TO P-CCNC: 197 U/L (ref 107–266)
LDLC SERPL CALC-MCNC: 54 MG/DL
LDLC SERPL CALC-MCNC: 68 MG/DL
LDLC SERPL CALC-MCNC: 79 MG/DL
LEUKOCYTE ESTERASE UR QL STRIP.AUTO: NEGATIVE
LIPASE SERPL-CCNC: 58 U/L (ref 11–82)
LV EJECT FRACT  99904: 45
LYMPHOCYTES # BLD AUTO: 0.83 K/UL (ref 1–4.8)
LYMPHOCYTES # BLD AUTO: 0.96 K/UL (ref 1–4.8)
LYMPHOCYTES # BLD AUTO: 1.16 K/UL (ref 1–4.8)
LYMPHOCYTES # BLD AUTO: 1.22 K/UL (ref 1–4.8)
LYMPHOCYTES # BLD AUTO: 1.23 K/UL (ref 1–4.8)
LYMPHOCYTES # BLD AUTO: 1.33 K/UL (ref 1–4.8)
LYMPHOCYTES # BLD AUTO: 1.37 K/UL (ref 1–4.8)
LYMPHOCYTES # BLD AUTO: 1.39 K/UL (ref 1–4.8)
LYMPHOCYTES # BLD AUTO: 1.4 K/UL (ref 1–4.8)
LYMPHOCYTES # BLD AUTO: 1.5 K/UL (ref 1–4.8)
LYMPHOCYTES # BLD AUTO: 1.51 K/UL (ref 1–4.8)
LYMPHOCYTES # BLD AUTO: 1.57 K/UL (ref 1–4.8)
LYMPHOCYTES # BLD AUTO: 1.62 K/UL (ref 1–4.8)
LYMPHOCYTES # BLD AUTO: 1.64 K/UL (ref 1–4.8)
LYMPHOCYTES # BLD AUTO: 1.67 K/UL (ref 1–4.8)
LYMPHOCYTES # BLD AUTO: 1.7 K/UL (ref 1–4.8)
LYMPHOCYTES # BLD AUTO: 1.71 K/UL (ref 1–4.8)
LYMPHOCYTES # BLD AUTO: 1.71 K/UL (ref 1–4.8)
LYMPHOCYTES # BLD AUTO: 1.72 K/UL (ref 1–4.8)
LYMPHOCYTES # BLD AUTO: 1.8 K/UL (ref 1–4.8)
LYMPHOCYTES # BLD AUTO: 1.87 K/UL (ref 1–4.8)
LYMPHOCYTES # BLD AUTO: 1.88 K/UL (ref 1–4.8)
LYMPHOCYTES NFR BLD: 10.2 % (ref 22–41)
LYMPHOCYTES NFR BLD: 10.6 % (ref 22–41)
LYMPHOCYTES NFR BLD: 11.6 % (ref 22–41)
LYMPHOCYTES NFR BLD: 11.8 % (ref 22–41)
LYMPHOCYTES NFR BLD: 12 % (ref 22–41)
LYMPHOCYTES NFR BLD: 12.6 % (ref 22–41)
LYMPHOCYTES NFR BLD: 13.3 % (ref 22–41)
LYMPHOCYTES NFR BLD: 13.5 % (ref 22–41)
LYMPHOCYTES NFR BLD: 13.6 % (ref 22–41)
LYMPHOCYTES NFR BLD: 15 % (ref 22–41)
LYMPHOCYTES NFR BLD: 15 % (ref 22–41)
LYMPHOCYTES NFR BLD: 15.7 % (ref 22–41)
LYMPHOCYTES NFR BLD: 16.1 % (ref 22–41)
LYMPHOCYTES NFR BLD: 16.7 % (ref 22–41)
LYMPHOCYTES NFR BLD: 16.9 % (ref 22–41)
LYMPHOCYTES NFR BLD: 19 % (ref 22–41)
LYMPHOCYTES NFR BLD: 21.2 % (ref 22–41)
LYMPHOCYTES NFR BLD: 6.7 % (ref 22–41)
LYMPHOCYTES NFR BLD: 8 % (ref 22–41)
LYMPHOCYTES NFR BLD: 9.4 % (ref 22–41)
LYMPHOCYTES NFR BLD: 9.5 % (ref 22–41)
LYMPHOCYTES NFR BLD: 9.9 % (ref 22–41)
LYMPHOCYTES NFR FLD: 28 %
LYMPHOCYTES NFR FLD: 51 %
M TB IFN-G BLD-IMP: NEGATIVE
M TB IFN-G BLD-IMP: NEGATIVE
M TB IFN-G CD4+ BCKGRND COR BLD-ACNC: 0 IU/ML
M TB IFN-G CD4+ BCKGRND COR BLD-ACNC: 0 IU/ML
MAGNESIUM SERPL-MCNC: 2 MG/DL (ref 1.5–2.5)
MAGNESIUM SERPL-MCNC: 2.1 MG/DL (ref 1.5–2.5)
MAGNESIUM SERPL-MCNC: 2.2 MG/DL (ref 1.5–2.5)
MAGNESIUM SERPL-MCNC: 2.6 MG/DL (ref 1.5–2.5)
MCH RBC QN AUTO: 28.4 PG (ref 27–33)
MCH RBC QN AUTO: 28.4 PG (ref 27–33)
MCH RBC QN AUTO: 28.6 PG (ref 27–33)
MCH RBC QN AUTO: 29.7 PG (ref 27–33)
MCH RBC QN AUTO: 29.8 PG (ref 27–33)
MCH RBC QN AUTO: 29.8 PG (ref 27–33)
MCH RBC QN AUTO: 30 PG (ref 27–33)
MCH RBC QN AUTO: 30.1 PG (ref 27–33)
MCH RBC QN AUTO: 30.2 PG (ref 27–33)
MCH RBC QN AUTO: 30.3 PG (ref 27–33)
MCH RBC QN AUTO: 30.4 PG (ref 27–33)
MCH RBC QN AUTO: 30.5 PG (ref 27–33)
MCH RBC QN AUTO: 30.5 PG (ref 27–33)
MCH RBC QN AUTO: 30.6 PG (ref 27–33)
MCH RBC QN AUTO: 30.6 PG (ref 27–33)
MCH RBC QN AUTO: 30.7 PG (ref 27–33)
MCH RBC QN AUTO: 30.9 PG (ref 27–33)
MCH RBC QN AUTO: 31.3 PG (ref 27–33)
MCHC RBC AUTO-ENTMCNC: 30 G/DL (ref 33.7–35.3)
MCHC RBC AUTO-ENTMCNC: 30.4 G/DL (ref 33.7–35.3)
MCHC RBC AUTO-ENTMCNC: 30.5 G/DL (ref 33.7–35.3)
MCHC RBC AUTO-ENTMCNC: 30.8 G/DL (ref 33.7–35.3)
MCHC RBC AUTO-ENTMCNC: 30.9 G/DL (ref 33.7–35.3)
MCHC RBC AUTO-ENTMCNC: 31.5 G/DL (ref 33.7–35.3)
MCHC RBC AUTO-ENTMCNC: 31.5 G/DL (ref 33.7–35.3)
MCHC RBC AUTO-ENTMCNC: 31.6 G/DL (ref 33.7–35.3)
MCHC RBC AUTO-ENTMCNC: 31.7 G/DL (ref 33.7–35.3)
MCHC RBC AUTO-ENTMCNC: 31.9 G/DL (ref 33.7–35.3)
MCHC RBC AUTO-ENTMCNC: 32.2 G/DL (ref 33.7–35.3)
MCHC RBC AUTO-ENTMCNC: 32.5 G/DL (ref 33.7–35.3)
MCHC RBC AUTO-ENTMCNC: 32.5 G/DL (ref 33.7–35.3)
MCHC RBC AUTO-ENTMCNC: 32.6 G/DL (ref 33.7–35.3)
MCHC RBC AUTO-ENTMCNC: 32.7 G/DL (ref 33.7–35.3)
MCHC RBC AUTO-ENTMCNC: 32.9 G/DL (ref 33.7–35.3)
MCHC RBC AUTO-ENTMCNC: 32.9 G/DL (ref 33.7–35.3)
MCHC RBC AUTO-ENTMCNC: 33.1 G/DL (ref 33.7–35.3)
MCHC RBC AUTO-ENTMCNC: 33.2 G/DL (ref 33.7–35.3)
MCHC RBC AUTO-ENTMCNC: 33.3 G/DL (ref 33.7–35.3)
MCHC RBC AUTO-ENTMCNC: 33.4 G/DL (ref 33.7–35.3)
MCHC RBC AUTO-ENTMCNC: 33.5 G/DL (ref 33.7–35.3)
MCV RBC AUTO: 90.8 FL (ref 81.4–97.8)
MCV RBC AUTO: 91 FL (ref 81.4–97.8)
MCV RBC AUTO: 91.2 FL (ref 81.4–97.8)
MCV RBC AUTO: 92.3 FL (ref 81.4–97.8)
MCV RBC AUTO: 92.3 FL (ref 81.4–97.8)
MCV RBC AUTO: 92.6 FL (ref 81.4–97.8)
MCV RBC AUTO: 92.7 FL (ref 81.4–97.8)
MCV RBC AUTO: 93 FL (ref 81.4–97.8)
MCV RBC AUTO: 93.1 FL (ref 81.4–97.8)
MCV RBC AUTO: 93.5 FL (ref 81.4–97.8)
MCV RBC AUTO: 93.5 FL (ref 81.4–97.8)
MCV RBC AUTO: 93.8 FL (ref 81.4–97.8)
MCV RBC AUTO: 93.8 FL (ref 81.4–97.8)
MCV RBC AUTO: 94 FL (ref 81.4–97.8)
MCV RBC AUTO: 94 FL (ref 81.4–97.8)
MCV RBC AUTO: 94.2 FL (ref 81.4–97.8)
MCV RBC AUTO: 94.5 FL (ref 81.4–97.8)
MCV RBC AUTO: 94.6 FL (ref 81.4–97.8)
MCV RBC AUTO: 94.9 FL (ref 81.4–97.8)
MCV RBC AUTO: 95.2 FL (ref 81.4–97.8)
MCV RBC AUTO: 95.4 FL (ref 81.4–97.8)
MCV RBC AUTO: 95.6 FL (ref 81.4–97.8)
MCV RBC AUTO: 95.8 FL (ref 81.4–97.8)
MCV RBC AUTO: 95.9 FL (ref 81.4–97.8)
MCV RBC AUTO: 96 FL (ref 81.4–97.8)
MCV RBC AUTO: 96.7 FL (ref 81.4–97.8)
MCV RBC AUTO: 97.4 FL (ref 81.4–97.8)
MCV RBC AUTO: 97.7 FL (ref 81.4–97.8)
MESOTHL CELL NFR FLD: 2 %
MESOTHL CELL NFR FLD: 4 %
MICRO URNS: ABNORMAL
MICRO URNS: ABNORMAL
MICRO URNS: NORMAL
MITOGEN IGNF BCKGRD COR BLD-ACNC: >10 IU/ML
MITOGEN IGNF BCKGRD COR BLD-ACNC: >10 IU/ML
MONOCYTES # BLD AUTO: 0.65 K/UL (ref 0–0.85)
MONOCYTES # BLD AUTO: 0.72 K/UL (ref 0–0.85)
MONOCYTES # BLD AUTO: 0.73 K/UL (ref 0–0.85)
MONOCYTES # BLD AUTO: 0.75 K/UL (ref 0–0.85)
MONOCYTES # BLD AUTO: 0.79 K/UL (ref 0–0.85)
MONOCYTES # BLD AUTO: 0.84 K/UL (ref 0–0.85)
MONOCYTES # BLD AUTO: 0.85 K/UL (ref 0–0.85)
MONOCYTES # BLD AUTO: 0.87 K/UL (ref 0–0.85)
MONOCYTES # BLD AUTO: 0.87 K/UL (ref 0–0.85)
MONOCYTES # BLD AUTO: 0.91 K/UL (ref 0–0.85)
MONOCYTES # BLD AUTO: 0.92 K/UL (ref 0–0.85)
MONOCYTES # BLD AUTO: 0.98 K/UL (ref 0–0.85)
MONOCYTES # BLD AUTO: 0.99 K/UL (ref 0–0.85)
MONOCYTES # BLD AUTO: 1 K/UL (ref 0–0.85)
MONOCYTES # BLD AUTO: 1.02 K/UL (ref 0–0.85)
MONOCYTES # BLD AUTO: 1.1 K/UL (ref 0–0.85)
MONOCYTES # BLD AUTO: 1.13 K/UL (ref 0–0.85)
MONOCYTES # BLD AUTO: 1.15 K/UL (ref 0–0.85)
MONOCYTES # BLD AUTO: 1.18 K/UL (ref 0–0.85)
MONOCYTES # BLD AUTO: 1.25 K/UL (ref 0–0.85)
MONOCYTES # BLD AUTO: 1.33 K/UL (ref 0–0.85)
MONOCYTES # BLD AUTO: 1.35 K/UL (ref 0–0.85)
MONOCYTES NFR BLD AUTO: 5.2 % (ref 0–13.4)
MONOCYTES NFR BLD AUTO: 6.6 % (ref 0–13.4)
MONOCYTES NFR BLD AUTO: 7 % (ref 0–13.4)
MONOCYTES NFR BLD AUTO: 7.1 % (ref 0–13.4)
MONOCYTES NFR BLD AUTO: 7.4 % (ref 0–13.4)
MONOCYTES NFR BLD AUTO: 7.5 % (ref 0–13.4)
MONOCYTES NFR BLD AUTO: 7.6 % (ref 0–13.4)
MONOCYTES NFR BLD AUTO: 8 % (ref 0–13.4)
MONOCYTES NFR BLD AUTO: 8.3 % (ref 0–13.4)
MONOCYTES NFR BLD AUTO: 8.4 % (ref 0–13.4)
MONOCYTES NFR BLD AUTO: 8.5 % (ref 0–13.4)
MONOCYTES NFR BLD AUTO: 8.7 % (ref 0–13.4)
MONOCYTES NFR BLD AUTO: 8.9 % (ref 0–13.4)
MONOCYTES NFR BLD AUTO: 9.2 % (ref 0–13.4)
MONOCYTES NFR BLD AUTO: 9.2 % (ref 0–13.4)
MONOCYTES NFR BLD AUTO: 9.4 % (ref 0–13.4)
MONOCYTES NFR BLD AUTO: 9.4 % (ref 0–13.4)
MONOCYTES NFR BLD AUTO: 9.8 % (ref 0–13.4)
MONOCYTES NFR BLD AUTO: 9.9 % (ref 0–13.4)
MONONUC CELLS NFR FLD: 14 %
MONONUC CELLS NFR FLD: 49 %
NEUTROPHILS # BLD AUTO: 10.1 K/UL (ref 1.82–7.42)
NEUTROPHILS # BLD AUTO: 10.14 K/UL (ref 1.82–7.42)
NEUTROPHILS # BLD AUTO: 10.18 K/UL (ref 1.82–7.42)
NEUTROPHILS # BLD AUTO: 10.27 K/UL (ref 1.82–7.42)
NEUTROPHILS # BLD AUTO: 10.45 K/UL (ref 1.82–7.42)
NEUTROPHILS # BLD AUTO: 10.7 K/UL (ref 1.82–7.42)
NEUTROPHILS # BLD AUTO: 12.89 K/UL (ref 1.82–7.42)
NEUTROPHILS # BLD AUTO: 14.56 K/UL (ref 1.82–7.42)
NEUTROPHILS # BLD AUTO: 5.03 K/UL (ref 1.82–7.42)
NEUTROPHILS # BLD AUTO: 5.35 K/UL (ref 1.82–7.42)
NEUTROPHILS # BLD AUTO: 7.03 K/UL (ref 1.82–7.42)
NEUTROPHILS # BLD AUTO: 7.06 K/UL (ref 1.82–7.42)
NEUTROPHILS # BLD AUTO: 7.39 K/UL (ref 1.82–7.42)
NEUTROPHILS # BLD AUTO: 7.65 K/UL (ref 1.82–7.42)
NEUTROPHILS # BLD AUTO: 8.04 K/UL (ref 1.82–7.42)
NEUTROPHILS # BLD AUTO: 8.33 K/UL (ref 1.82–7.42)
NEUTROPHILS # BLD AUTO: 8.81 K/UL (ref 1.82–7.42)
NEUTROPHILS # BLD AUTO: 9.09 K/UL (ref 1.82–7.42)
NEUTROPHILS # BLD AUTO: 9.14 K/UL (ref 1.82–7.42)
NEUTROPHILS # BLD AUTO: 9.19 K/UL (ref 1.82–7.42)
NEUTROPHILS # BLD AUTO: 9.23 K/UL (ref 1.82–7.42)
NEUTROPHILS # BLD AUTO: 9.27 K/UL (ref 1.82–7.42)
NEUTROPHILS NFR BLD: 66.5 % (ref 44–72)
NEUTROPHILS NFR BLD: 68.8 % (ref 44–72)
NEUTROPHILS NFR BLD: 71.8 % (ref 44–72)
NEUTROPHILS NFR BLD: 72.2 % (ref 44–72)
NEUTROPHILS NFR BLD: 72.5 % (ref 44–72)
NEUTROPHILS NFR BLD: 73.1 % (ref 44–72)
NEUTROPHILS NFR BLD: 73.3 % (ref 44–72)
NEUTROPHILS NFR BLD: 73.4 % (ref 44–72)
NEUTROPHILS NFR BLD: 74 % (ref 44–72)
NEUTROPHILS NFR BLD: 74.9 % (ref 44–72)
NEUTROPHILS NFR BLD: 75.8 % (ref 44–72)
NEUTROPHILS NFR BLD: 75.9 % (ref 44–72)
NEUTROPHILS NFR BLD: 77.5 % (ref 44–72)
NEUTROPHILS NFR BLD: 78.1 % (ref 44–72)
NEUTROPHILS NFR BLD: 78.2 % (ref 44–72)
NEUTROPHILS NFR BLD: 79.1 % (ref 44–72)
NEUTROPHILS NFR BLD: 80.9 % (ref 44–72)
NEUTROPHILS NFR BLD: 82 % (ref 44–72)
NEUTROPHILS NFR BLD: 82.4 % (ref 44–72)
NEUTROPHILS NFR BLD: 82.4 % (ref 44–72)
NEUTROPHILS NFR BLD: 84.1 % (ref 44–72)
NEUTROPHILS NFR BLD: 84.3 % (ref 44–72)
NEUTROPHILS NFR FLD: 11 %
NEUTROPHILS NFR FLD: 19 %
NITRITE UR QL STRIP.AUTO: NEGATIVE
NRBC # BLD AUTO: 0 K/UL
NRBC BLD-RTO: 0 /100 WBC
NT-PROBNP SERPL IA-MCNC: 120 PG/ML (ref 0–125)
NT-PROBNP SERPL IA-MCNC: 178 PG/ML (ref 0–125)
NT-PROBNP SERPL IA-MCNC: 57 PG/ML (ref 0–125)
NT-PROBNP SERPL IA-MCNC: 81 PG/ML (ref 0–125)
NUCLEAR IGG SER QL IA: DETECTED
PH FLD: 7.5 [PH]
PH FLD: 8 [PH]
PH UR STRIP.AUTO: 5 [PH] (ref 5–8)
PH UR STRIP.AUTO: 7 [PH] (ref 5–8)
PH UR STRIP.AUTO: 7 [PH] (ref 5–8)
PHOSPHATE SERPL-MCNC: 2.5 MG/DL (ref 2.5–4.5)
PHOSPHATE SERPL-MCNC: 3.2 MG/DL (ref 2.5–4.5)
PHOSPHATE SERPL-MCNC: 3.6 MG/DL (ref 2.5–4.5)
PHOSPHATE SERPL-MCNC: 3.7 MG/DL (ref 2.5–4.5)
PHOSPHATE SERPL-MCNC: 3.7 MG/DL (ref 2.5–4.5)
PHOSPHATE SERPL-MCNC: 3.9 MG/DL (ref 2.5–4.5)
PLATELET # BLD AUTO: 213 K/UL (ref 164–446)
PLATELET # BLD AUTO: 246 K/UL (ref 164–446)
PLATELET # BLD AUTO: 254 K/UL (ref 164–446)
PLATELET # BLD AUTO: 263 K/UL (ref 164–446)
PLATELET # BLD AUTO: 264 K/UL (ref 164–446)
PLATELET # BLD AUTO: 265 K/UL (ref 164–446)
PLATELET # BLD AUTO: 266 K/UL (ref 164–446)
PLATELET # BLD AUTO: 278 K/UL (ref 164–446)
PLATELET # BLD AUTO: 281 K/UL (ref 164–446)
PLATELET # BLD AUTO: 282 K/UL (ref 164–446)
PLATELET # BLD AUTO: 315 K/UL (ref 164–446)
PLATELET # BLD AUTO: 322 K/UL (ref 164–446)
PLATELET # BLD AUTO: 325 K/UL (ref 164–446)
PLATELET # BLD AUTO: 328 K/UL (ref 164–446)
PLATELET # BLD AUTO: 337 K/UL (ref 164–446)
PLATELET # BLD AUTO: 338 K/UL (ref 164–446)
PLATELET # BLD AUTO: 343 K/UL (ref 164–446)
PLATELET # BLD AUTO: 356 K/UL (ref 164–446)
PLATELET # BLD AUTO: 360 K/UL (ref 164–446)
PLATELET # BLD AUTO: 373 K/UL (ref 164–446)
PLATELET # BLD AUTO: 383 K/UL (ref 164–446)
PLATELET # BLD AUTO: 387 K/UL (ref 164–446)
PLATELET # BLD AUTO: 392 K/UL (ref 164–446)
PLATELET # BLD AUTO: 395 K/UL (ref 164–446)
PLATELET # BLD AUTO: 401 K/UL (ref 164–446)
PLATELET # BLD AUTO: 403 K/UL (ref 164–446)
PLATELET # BLD AUTO: 409 K/UL (ref 164–446)
PLATELET # BLD AUTO: 419 K/UL (ref 164–446)
PMV BLD AUTO: 10.1 FL (ref 9–12.9)
PMV BLD AUTO: 10.3 FL (ref 9–12.9)
PMV BLD AUTO: 10.5 FL (ref 9–12.9)
PMV BLD AUTO: 10.6 FL (ref 9–12.9)
PMV BLD AUTO: 9 FL (ref 9–12.9)
PMV BLD AUTO: 9.1 FL (ref 9–12.9)
PMV BLD AUTO: 9.2 FL (ref 9–12.9)
PMV BLD AUTO: 9.3 FL (ref 9–12.9)
PMV BLD AUTO: 9.4 FL (ref 9–12.9)
PMV BLD AUTO: 9.5 FL (ref 9–12.9)
PMV BLD AUTO: 9.6 FL (ref 9–12.9)
PMV BLD AUTO: 9.7 FL (ref 9–12.9)
PMV BLD AUTO: 9.8 FL (ref 9–12.9)
PMV BLD AUTO: 9.9 FL (ref 9–12.9)
PMV BLD AUTO: 9.9 FL (ref 9–12.9)
POTASSIUM SERPL-SCNC: 3.3 MMOL/L (ref 3.6–5.5)
POTASSIUM SERPL-SCNC: 3.4 MMOL/L (ref 3.6–5.5)
POTASSIUM SERPL-SCNC: 3.5 MMOL/L (ref 3.6–5.5)
POTASSIUM SERPL-SCNC: 3.6 MMOL/L (ref 3.6–5.5)
POTASSIUM SERPL-SCNC: 3.7 MMOL/L (ref 3.6–5.5)
POTASSIUM SERPL-SCNC: 3.8 MMOL/L (ref 3.6–5.5)
POTASSIUM SERPL-SCNC: 3.9 MMOL/L (ref 3.6–5.5)
POTASSIUM SERPL-SCNC: 4.1 MMOL/L (ref 3.6–5.5)
POTASSIUM SERPL-SCNC: 4.5 MMOL/L (ref 3.6–5.5)
POTASSIUM SERPL-SCNC: 4.7 MMOL/L (ref 3.6–5.5)
PROCALCITONIN SERPL-MCNC: 0.08 NG/ML
PROCALCITONIN SERPL-MCNC: 0.18 NG/ML
PROT FLD-MCNC: 4 G/DL
PROT FLD-MCNC: 4.3 G/DL
PROT SERPL-MCNC: 5.6 G/DL (ref 6–8.2)
PROT SERPL-MCNC: 5.7 G/DL (ref 6–8.2)
PROT SERPL-MCNC: 5.7 G/DL (ref 6–8.2)
PROT SERPL-MCNC: 5.8 G/DL (ref 6–8.2)
PROT SERPL-MCNC: 5.8 G/DL (ref 6–8.2)
PROT SERPL-MCNC: 6 G/DL (ref 6–8.2)
PROT SERPL-MCNC: 6.2 G/DL (ref 6–8.2)
PROT SERPL-MCNC: 6.2 G/DL (ref 6–8.2)
PROT SERPL-MCNC: 6.3 G/DL (ref 6–8.2)
PROT SERPL-MCNC: 6.4 G/DL (ref 6–8.2)
PROT SERPL-MCNC: 6.5 G/DL (ref 6–8.2)
PROT SERPL-MCNC: 6.5 G/DL (ref 6–8.2)
PROT SERPL-MCNC: 6.6 G/DL (ref 6–8.2)
PROT SERPL-MCNC: 6.6 G/DL (ref 6–8.2)
PROT SERPL-MCNC: 6.7 G/DL (ref 6–8.2)
PROT SERPL-MCNC: 6.7 G/DL (ref 6–8.2)
PROT SERPL-MCNC: 6.9 G/DL (ref 6–8.2)
PROT UR QL STRIP: 30 MG/DL
PROT UR QL STRIP: NEGATIVE MG/DL
PROT UR QL STRIP: NEGATIVE MG/DL
PROTHROMBIN TIME: 13.6 SEC (ref 12–14.6)
PROTHROMBIN TIME: 13.9 SEC (ref 12–14.6)
PROTHROMBIN TIME: 14.8 SEC (ref 12–14.6)
PROTHROMBIN TIME: 15.5 SEC (ref 12–14.6)
PROTHROMBIN TIME: 16.3 SEC (ref 12–14.6)
PSA FREE MFR SERPL: 20 %
PSA FREE MFR SERPL: NORMAL %
PSA FREE SERPL-MCNC: 1 NG/ML
PSA FREE SERPL-MCNC: NORMAL NG/ML
PSA SERPL-MCNC: 0.9 NG/ML (ref 0–4)
PSA SERPL-MCNC: 1.35 NG/ML (ref 0–4)
PSA SERPL-MCNC: 4.38 NG/ML (ref 0–4)
PSA SERPL-MCNC: 4.9 NG/ML (ref 0–4)
QFT TB2 - NIL TBQ2: 0 IU/ML
QFT TB2 - NIL TBQ2: 0 IU/ML
RBC # BLD AUTO: 3.83 M/UL (ref 4.7–6.1)
RBC # BLD AUTO: 3.91 M/UL (ref 4.7–6.1)
RBC # BLD AUTO: 3.97 M/UL (ref 4.7–6.1)
RBC # BLD AUTO: 3.99 M/UL (ref 4.7–6.1)
RBC # BLD AUTO: 4.01 M/UL (ref 4.7–6.1)
RBC # BLD AUTO: 4.09 M/UL (ref 4.7–6.1)
RBC # BLD AUTO: 4.19 M/UL (ref 4.7–6.1)
RBC # BLD AUTO: 4.19 M/UL (ref 4.7–6.1)
RBC # BLD AUTO: 4.24 M/UL (ref 4.7–6.1)
RBC # BLD AUTO: 4.27 M/UL (ref 4.7–6.1)
RBC # BLD AUTO: 4.29 M/UL (ref 4.7–6.1)
RBC # BLD AUTO: 4.34 M/UL (ref 4.7–6.1)
RBC # BLD AUTO: 4.4 M/UL (ref 4.7–6.1)
RBC # BLD AUTO: 4.43 M/UL (ref 4.7–6.1)
RBC # BLD AUTO: 4.72 M/UL (ref 4.7–6.1)
RBC # BLD AUTO: 4.96 M/UL (ref 4.7–6.1)
RBC # BLD AUTO: 5.11 M/UL (ref 4.7–6.1)
RBC # BLD AUTO: 5.38 M/UL (ref 4.7–6.1)
RBC # BLD AUTO: 5.38 M/UL (ref 4.7–6.1)
RBC # BLD AUTO: 5.44 M/UL (ref 4.7–6.1)
RBC # BLD AUTO: 5.82 M/UL (ref 4.7–6.1)
RBC # BLD AUTO: 5.88 M/UL (ref 4.7–6.1)
RBC # BLD AUTO: 5.96 M/UL (ref 4.7–6.1)
RBC # BLD AUTO: 6.12 M/UL (ref 4.7–6.1)
RBC # BLD AUTO: 6.14 M/UL (ref 4.7–6.1)
RBC # BLD AUTO: 6.23 M/UL (ref 4.7–6.1)
RBC # BLD AUTO: 6.4 M/UL (ref 4.7–6.1)
RBC # BLD AUTO: 6.47 M/UL (ref 4.7–6.1)
RBC # FLD: 2000 CELLS/UL
RBC # FLD: 5000 CELLS/UL
RBC # URNS HPF: ABNORMAL /HPF
RBC UR QL AUTO: ABNORMAL
RBC UR QL AUTO: NEGATIVE
RBC UR QL AUTO: NEGATIVE
RHEUMATOID FACT SER IA-ACNC: <10 IU/ML (ref 0–14)
S PYO AG THROAT QL: NORMAL
S PYO SPEC QL CULT: NORMAL
SARS-COV-2 RDRP RESP QL NAA+PROBE: NOTDETECTED
SARS-COV-2 RNA RESP QL NAA+PROBE: NOTDETECTED
SIGNIFICANT IND 70042: NORMAL
SITE SITE: NORMAL
SODIUM SERPL-SCNC: 126 MMOL/L (ref 135–145)
SODIUM SERPL-SCNC: 131 MMOL/L (ref 135–145)
SODIUM SERPL-SCNC: 131 MMOL/L (ref 135–145)
SODIUM SERPL-SCNC: 132 MMOL/L (ref 135–145)
SODIUM SERPL-SCNC: 133 MMOL/L (ref 135–145)
SODIUM SERPL-SCNC: 133 MMOL/L (ref 135–145)
SODIUM SERPL-SCNC: 135 MMOL/L (ref 135–145)
SODIUM SERPL-SCNC: 135 MMOL/L (ref 135–145)
SODIUM SERPL-SCNC: 136 MMOL/L (ref 135–145)
SODIUM SERPL-SCNC: 137 MMOL/L (ref 135–145)
SODIUM SERPL-SCNC: 138 MMOL/L (ref 135–145)
SODIUM SERPL-SCNC: 139 MMOL/L (ref 135–145)
SODIUM SERPL-SCNC: 140 MMOL/L (ref 135–145)
SODIUM SERPL-SCNC: 141 MMOL/L (ref 135–145)
SODIUM SERPL-SCNC: 141 MMOL/L (ref 135–145)
SODIUM SERPL-SCNC: 142 MMOL/L (ref 135–145)
SODIUM SERPL-SCNC: 143 MMOL/L (ref 135–145)
SODIUM SERPL-SCNC: 144 MMOL/L (ref 135–145)
SODIUM SERPL-SCNC: 144 MMOL/L (ref 135–145)
SOURCE SOURCE: NORMAL
SP GR UR REFRACTOMETRY: 1.04
SP GR UR STRIP.AUTO: 1.01
SP GR UR STRIP.AUTO: 1.02
SPECIMEN SOURCE: NORMAL
SSA52 R0ENA AB IGG Q0420: 1 AU/ML (ref 0–40)
SSA60 R0ENA AB IGG Q0419: 1 AU/ML (ref 0–40)
T3FREE SERPL-MCNC: 2.6 PG/ML (ref 2–4.4)
T3FREE SERPL-MCNC: 2.87 PG/ML (ref 2–4.4)
T4 FREE SERPL-MCNC: 1.09 NG/DL (ref 0.58–1.64)
T4 FREE SERPL-MCNC: 1.14 NG/DL (ref 0.93–1.7)
T4 FREE SERPL-MCNC: 1.16 NG/DL (ref 0.93–1.7)
TESTOST FREE SERPL-MCNC: 124 PG/ML (ref 47–244)
TESTOST FREE SERPL-MCNC: 2 PG/ML (ref 47–244)
TESTOST SERPL-MCNC: 12 NG/DL (ref 175–781)
TESTOST SERPL-MCNC: 896 NG/DL (ref 175–781)
THYROGLOB AB SERPL-ACNC: <0.9 IU/ML (ref 0–4)
THYROGLOB AB SERPL-ACNC: <0.9 IU/ML (ref 0–4)
THYROPEROXIDASE AB SERPL-ACNC: <0.3 IU/ML (ref 0–9)
TIBC SERPL-MCNC: 142 UG/DL (ref 250–450)
TRIGL FLD-MCNC: 40 MG/DL
TRIGL SERPL-MCNC: 103 MG/DL (ref 0–149)
TRIGL SERPL-MCNC: 103 MG/DL (ref 0–149)
TRIGL SERPL-MCNC: 112 MG/DL (ref 0–149)
TROPONIN T SERPL-MCNC: 10 NG/L (ref 6–19)
TROPONIN T SERPL-MCNC: 11 NG/L (ref 6–19)
TROPONIN T SERPL-MCNC: 12 NG/L (ref 6–19)
TROPONIN T SERPL-MCNC: 13 NG/L (ref 6–19)
TROPONIN T SERPL-MCNC: 14 NG/L (ref 6–19)
TROPONIN T SERPL-MCNC: 38 NG/L (ref 6–19)
TROPONIN T SERPL-MCNC: 47 NG/L (ref 6–19)
TROPONIN T SERPL-MCNC: 48 NG/L (ref 6–19)
TROPONIN T SERPL-MCNC: 7 NG/L (ref 6–19)
TSH SERPL DL<=0.005 MIU/L-ACNC: 0.41 UIU/ML (ref 0.38–5.33)
TSH SERPL DL<=0.005 MIU/L-ACNC: 0.46 UIU/ML (ref 0.38–5.33)
TSH SERPL DL<=0.005 MIU/L-ACNC: 0.62 UIU/ML (ref 0.38–5.33)
U1 SNRNP IGG SER QL: 4 AU/ML (ref 0–40)
UFH PPP CHRO-ACNC: 0.33 IU/ML
UFH PPP CHRO-ACNC: 0.41 IU/ML
UFH PPP CHRO-ACNC: 0.49 IU/ML
UFH PPP CHRO-ACNC: 0.59 IU/ML
UFH PPP CHRO-ACNC: 0.71 IU/ML
UFH PPP CHRO-ACNC: 0.84 IU/ML
UFH PPP CHRO-ACNC: <0.1 IU/ML
UIBC SERPL-MCNC: 116 UG/DL (ref 110–370)
UROBILINOGEN UR STRIP.AUTO-MCNC: 1 MG/DL
VIT B12 SERPL-MCNC: 448 PG/ML (ref 211–911)
VIT B12 SERPL-MCNC: 509 PG/ML (ref 211–911)
WBC # BLD AUTO: 10.2 K/UL (ref 4.8–10.8)
WBC # BLD AUTO: 10.3 K/UL (ref 4.8–10.8)
WBC # BLD AUTO: 10.4 K/UL (ref 4.8–10.8)
WBC # BLD AUTO: 11.3 K/UL (ref 4.8–10.8)
WBC # BLD AUTO: 11.4 K/UL (ref 4.8–10.8)
WBC # BLD AUTO: 11.5 K/UL (ref 4.8–10.8)
WBC # BLD AUTO: 11.8 K/UL (ref 4.8–10.8)
WBC # BLD AUTO: 12 K/UL (ref 4.8–10.8)
WBC # BLD AUTO: 12 K/UL (ref 4.8–10.8)
WBC # BLD AUTO: 12.3 K/UL (ref 4.8–10.8)
WBC # BLD AUTO: 12.3 K/UL (ref 4.8–10.8)
WBC # BLD AUTO: 12.4 K/UL (ref 4.8–10.8)
WBC # BLD AUTO: 12.4 K/UL (ref 4.8–10.8)
WBC # BLD AUTO: 12.5 K/UL (ref 4.8–10.8)
WBC # BLD AUTO: 12.6 K/UL (ref 4.8–10.8)
WBC # BLD AUTO: 12.9 K/UL (ref 4.8–10.8)
WBC # BLD AUTO: 13.6 K/UL (ref 4.8–10.8)
WBC # BLD AUTO: 13.6 K/UL (ref 4.8–10.8)
WBC # BLD AUTO: 13.7 K/UL (ref 4.8–10.8)
WBC # BLD AUTO: 15.9 K/UL (ref 4.8–10.8)
WBC # BLD AUTO: 17.7 K/UL (ref 4.8–10.8)
WBC # BLD AUTO: 7.3 K/UL (ref 4.8–10.8)
WBC # BLD AUTO: 8.1 K/UL (ref 4.8–10.8)
WBC # BLD AUTO: 8.9 K/UL (ref 4.8–10.8)
WBC # BLD AUTO: 9.4 K/UL (ref 4.8–10.8)
WBC # BLD AUTO: 9.6 K/UL (ref 4.8–10.8)
WBC # BLD AUTO: 9.8 K/UL (ref 4.8–10.8)
WBC # BLD AUTO: 9.8 K/UL (ref 4.8–10.8)
WBC # FLD: 203 CELLS/UL
WBC # FLD: 484 CELLS/UL
WBC #/AREA URNS HPF: ABNORMAL /HPF
WBC OTHER NFR FLD: 10 %

## 2020-01-01 PROCEDURE — 80048 BASIC METABOLIC PNL TOTAL CA: CPT

## 2020-01-01 PROCEDURE — 700102 HCHG RX REV CODE 250 W/ 637 OVERRIDE(OP): Performed by: INTERNAL MEDICINE

## 2020-01-01 PROCEDURE — 97535 SELF CARE MNGMENT TRAINING: CPT

## 2020-01-01 PROCEDURE — 84153 ASSAY OF PSA TOTAL: CPT | Mod: GA

## 2020-01-01 PROCEDURE — 86431 RHEUMATOID FACTOR QUANT: CPT

## 2020-01-01 PROCEDURE — 74174 CTA ABD&PLVS W/CONTRAST: CPT

## 2020-01-01 PROCEDURE — 86200 CCP ANTIBODY: CPT

## 2020-01-01 PROCEDURE — 80053 COMPREHEN METABOLIC PANEL: CPT

## 2020-01-01 PROCEDURE — 85610 PROTHROMBIN TIME: CPT

## 2020-01-01 PROCEDURE — 81003 URINALYSIS AUTO W/O SCOPE: CPT

## 2020-01-01 PROCEDURE — A9270 NON-COVERED ITEM OR SERVICE: HCPCS | Performed by: INTERNAL MEDICINE

## 2020-01-01 PROCEDURE — C9803 HOPD COVID-19 SPEC COLLECT: HCPCS | Performed by: HOSPITALIST

## 2020-01-01 PROCEDURE — 86235 NUCLEAR ANTIGEN ANTIBODY: CPT

## 2020-01-01 PROCEDURE — 71250 CT THORAX DX C-: CPT

## 2020-01-01 PROCEDURE — A9270 NON-COVERED ITEM OR SERVICE: HCPCS | Performed by: EMERGENCY MEDICINE

## 2020-01-01 PROCEDURE — 83615 LACTATE (LD) (LDH) ENZYME: CPT

## 2020-01-01 PROCEDURE — 96366 THER/PROPH/DIAG IV INF ADDON: CPT

## 2020-01-01 PROCEDURE — 770020 HCHG ROOM/CARE - TELE (206)

## 2020-01-01 PROCEDURE — 700102 HCHG RX REV CODE 250 W/ 637 OVERRIDE(OP): Performed by: HOSPITALIST

## 2020-01-01 PROCEDURE — 700102 HCHG RX REV CODE 250 W/ 637 OVERRIDE(OP): Performed by: STUDENT IN AN ORGANIZED HEALTH CARE EDUCATION/TRAINING PROGRAM

## 2020-01-01 PROCEDURE — 700111 HCHG RX REV CODE 636 W/ 250 OVERRIDE (IP): Performed by: FAMILY MEDICINE

## 2020-01-01 PROCEDURE — 74177 CT ABD & PELVIS W/CONTRAST: CPT

## 2020-01-01 PROCEDURE — 99226 PR SUBSEQUENT OBSERVATION CARE,LEVEL III: CPT | Performed by: INTERNAL MEDICINE

## 2020-01-01 PROCEDURE — 700101 HCHG RX REV CODE 250: Performed by: STUDENT IN AN ORGANIZED HEALTH CARE EDUCATION/TRAINING PROGRAM

## 2020-01-01 PROCEDURE — 96372 THER/PROPH/DIAG INJ SC/IM: CPT

## 2020-01-01 PROCEDURE — 86376 MICROSOMAL ANTIBODY EACH: CPT

## 2020-01-01 PROCEDURE — 86301 IMMUNOASSAY TUMOR CA 19-9: CPT

## 2020-01-01 PROCEDURE — 700102 HCHG RX REV CODE 250 W/ 637 OVERRIDE(OP): Performed by: EMERGENCY MEDICINE

## 2020-01-01 PROCEDURE — A9270 NON-COVERED ITEM OR SERVICE: HCPCS | Performed by: FAMILY MEDICINE

## 2020-01-01 PROCEDURE — A9270 NON-COVERED ITEM OR SERVICE: HCPCS | Performed by: HOSPITALIST

## 2020-01-01 PROCEDURE — 84443 ASSAY THYROID STIM HORMONE: CPT

## 2020-01-01 PROCEDURE — 86225 DNA ANTIBODY NATIVE: CPT

## 2020-01-01 PROCEDURE — 71046 X-RAY EXAM CHEST 2 VIEWS: CPT

## 2020-01-01 PROCEDURE — 99285 EMERGENCY DEPT VISIT HI MDM: CPT

## 2020-01-01 PROCEDURE — 86480 TB TEST CELL IMMUN MEASURE: CPT

## 2020-01-01 PROCEDURE — 87070 CULTURE OTHR SPECIMN AEROBIC: CPT

## 2020-01-01 PROCEDURE — 700105 HCHG RX REV CODE 258: Performed by: INTERNAL MEDICINE

## 2020-01-01 PROCEDURE — 82607 VITAMIN B-12: CPT

## 2020-01-01 PROCEDURE — 97161 PT EVAL LOW COMPLEX 20 MIN: CPT

## 2020-01-01 PROCEDURE — 770001 HCHG ROOM/CARE - MED/SURG/GYN PRIV*

## 2020-01-01 PROCEDURE — 99219 PR INITIAL OBSERVATION CARE,LEVL II: CPT | Performed by: HOSPITALIST

## 2020-01-01 PROCEDURE — 700111 HCHG RX REV CODE 636 W/ 250 OVERRIDE (IP): Performed by: INTERNAL MEDICINE

## 2020-01-01 PROCEDURE — 71275 CT ANGIOGRAPHY CHEST: CPT

## 2020-01-01 PROCEDURE — G0378 HOSPITAL OBSERVATION PER HR: HCPCS

## 2020-01-01 PROCEDURE — 89051 BODY FLUID CELL COUNT: CPT

## 2020-01-01 PROCEDURE — 83735 ASSAY OF MAGNESIUM: CPT

## 2020-01-01 PROCEDURE — 87075 CULTR BACTERIA EXCEPT BLOOD: CPT

## 2020-01-01 PROCEDURE — 71045 X-RAY EXAM CHEST 1 VIEW: CPT

## 2020-01-01 PROCEDURE — 36415 COLL VENOUS BLD VENIPUNCTURE: CPT

## 2020-01-01 PROCEDURE — 85025 COMPLETE CBC W/AUTO DIFF WBC: CPT

## 2020-01-01 PROCEDURE — A9270 NON-COVERED ITEM OR SERVICE: HCPCS | Performed by: STUDENT IN AN ORGANIZED HEALTH CARE EDUCATION/TRAINING PROGRAM

## 2020-01-01 PROCEDURE — 70450 CT HEAD/BRAIN W/O DYE: CPT

## 2020-01-01 PROCEDURE — 700102 HCHG RX REV CODE 250 W/ 637 OVERRIDE(OP): Performed by: FAMILY MEDICINE

## 2020-01-01 PROCEDURE — 85730 THROMBOPLASTIN TIME PARTIAL: CPT

## 2020-01-01 PROCEDURE — 84484 ASSAY OF TROPONIN QUANT: CPT

## 2020-01-01 PROCEDURE — 700111 HCHG RX REV CODE 636 W/ 250 OVERRIDE (IP): Performed by: STUDENT IN AN ORGANIZED HEALTH CARE EDUCATION/TRAINING PROGRAM

## 2020-01-01 PROCEDURE — 99232 SBSQ HOSP IP/OBS MODERATE 35: CPT | Performed by: INTERNAL MEDICINE

## 2020-01-01 PROCEDURE — 80061 LIPID PANEL: CPT

## 2020-01-01 PROCEDURE — 700111 HCHG RX REV CODE 636 W/ 250 OVERRIDE (IP): Performed by: EMERGENCY MEDICINE

## 2020-01-01 PROCEDURE — RXMED WILLOW AMBULATORY MEDICATION CHARGE: Performed by: STUDENT IN AN ORGANIZED HEALTH CARE EDUCATION/TRAINING PROGRAM

## 2020-01-01 PROCEDURE — 86038 ANTINUCLEAR ANTIBODIES: CPT

## 2020-01-01 PROCEDURE — 87015 SPECIMEN INFECT AGNT CONCNTJ: CPT

## 2020-01-01 PROCEDURE — 84403 ASSAY OF TOTAL TESTOSTERONE: CPT

## 2020-01-01 PROCEDURE — 99232 SBSQ HOSP IP/OBS MODERATE 35: CPT | Performed by: HOSPITALIST

## 2020-01-01 PROCEDURE — 96365 THER/PROPH/DIAG IV INF INIT: CPT

## 2020-01-01 PROCEDURE — A9576 INJ PROHANCE MULTIPACK: HCPCS | Performed by: INTERNAL MEDICINE

## 2020-01-01 PROCEDURE — 86800 THYROGLOBULIN ANTIBODY: CPT

## 2020-01-01 PROCEDURE — 97165 OT EVAL LOW COMPLEX 30 MIN: CPT

## 2020-01-01 PROCEDURE — 0QU03JZ SUPPLEMENT LUMBAR VERTEBRA WITH SYNTHETIC SUBSTITUTE, PERCUTANEOUS APPROACH: ICD-10-PCS | Performed by: RADIOLOGY

## 2020-01-01 PROCEDURE — 700101 HCHG RX REV CODE 250: Performed by: HOSPITALIST

## 2020-01-01 PROCEDURE — 96375 TX/PRO/DX INJ NEW DRUG ADDON: CPT | Mod: XU

## 2020-01-01 PROCEDURE — 72148 MRI LUMBAR SPINE W/O DYE: CPT

## 2020-01-01 PROCEDURE — 72131 CT LUMBAR SPINE W/O DYE: CPT

## 2020-01-01 PROCEDURE — 82746 ASSAY OF FOLIC ACID SERUM: CPT

## 2020-01-01 PROCEDURE — 99223 1ST HOSP IP/OBS HIGH 75: CPT | Mod: AI | Performed by: INTERNAL MEDICINE

## 2020-01-01 PROCEDURE — 87040 BLOOD CULTURE FOR BACTERIA: CPT | Mod: 91

## 2020-01-01 PROCEDURE — 99232 SBSQ HOSP IP/OBS MODERATE 35: CPT | Performed by: FAMILY MEDICINE

## 2020-01-01 PROCEDURE — 85027 COMPLETE CBC AUTOMATED: CPT

## 2020-01-01 PROCEDURE — 95992 CANALITH REPOSITIONING PROC: CPT

## 2020-01-01 PROCEDURE — 99223 1ST HOSP IP/OBS HIGH 75: CPT | Performed by: INTERNAL MEDICINE

## 2020-01-01 PROCEDURE — 700117 HCHG RX CONTRAST REV CODE 255: Performed by: EMERGENCY MEDICINE

## 2020-01-01 PROCEDURE — 85520 HEPARIN ASSAY: CPT | Mod: 91

## 2020-01-01 PROCEDURE — 88112 CYTOPATH CELL ENHANCE TECH: CPT

## 2020-01-01 PROCEDURE — 99217 PR OBSERVATION CARE DISCHARGE: CPT | Mod: GC | Performed by: HOSPITALIST

## 2020-01-01 PROCEDURE — 97530 THERAPEUTIC ACTIVITIES: CPT | Mod: XU

## 2020-01-01 PROCEDURE — 99220 PR INITIAL OBSERVATION CARE,LEVL III: CPT | Performed by: HOSPITALIST

## 2020-01-01 PROCEDURE — U0004 COV-19 TEST NON-CDC HGH THRU: HCPCS

## 2020-01-01 PROCEDURE — 84481 FREE ASSAY (FT-3): CPT

## 2020-01-01 PROCEDURE — 93005 ELECTROCARDIOGRAM TRACING: CPT | Performed by: EMERGENCY MEDICINE

## 2020-01-01 PROCEDURE — 99217 PR OBSERVATION CARE DISCHARGE: CPT | Performed by: INTERNAL MEDICINE

## 2020-01-01 PROCEDURE — 81001 URINALYSIS AUTO W/SCOPE: CPT

## 2020-01-01 PROCEDURE — 700117 HCHG RX CONTRAST REV CODE 255

## 2020-01-01 PROCEDURE — 83605 ASSAY OF LACTIC ACID: CPT

## 2020-01-01 PROCEDURE — 99233 SBSQ HOSP IP/OBS HIGH 50: CPT | Mod: GC | Performed by: INTERNAL MEDICINE

## 2020-01-01 PROCEDURE — 99225 PR SUBSEQUENT OBSERVATION CARE,LEVEL II: CPT | Performed by: HOSPITALIST

## 2020-01-01 PROCEDURE — 99224 PR SUBSEQUENT OBSERVATION CARE,LEVEL I: CPT | Mod: GC | Performed by: HOSPITALIST

## 2020-01-01 PROCEDURE — 82378 CARCINOEMBRYONIC ANTIGEN: CPT

## 2020-01-01 PROCEDURE — 700117 HCHG RX CONTRAST REV CODE 255: Performed by: INTERNAL MEDICINE

## 2020-01-01 PROCEDURE — 84311 SPECTROPHOTOMETRY: CPT

## 2020-01-01 PROCEDURE — 83550 IRON BINDING TEST: CPT

## 2020-01-01 PROCEDURE — 97164 PT RE-EVAL EST PLAN CARE: CPT

## 2020-01-01 PROCEDURE — 71260 CT THORAX DX C+: CPT

## 2020-01-01 PROCEDURE — 85379 FIBRIN DEGRADATION QUANT: CPT

## 2020-01-01 PROCEDURE — 87205 SMEAR GRAM STAIN: CPT

## 2020-01-01 PROCEDURE — 86039 ANTINUCLEAR ANTIBODIES (ANA): CPT

## 2020-01-01 PROCEDURE — 84478 ASSAY OF TRIGLYCERIDES: CPT

## 2020-01-01 PROCEDURE — 700105 HCHG RX REV CODE 258

## 2020-01-01 PROCEDURE — 84153 ASSAY OF PSA TOTAL: CPT

## 2020-01-01 PROCEDURE — 94760 N-INVAS EAR/PLS OXIMETRY 1: CPT

## 2020-01-01 PROCEDURE — C9803 HOPD COVID-19 SPEC COLLECT: HCPCS | Performed by: INTERNAL MEDICINE

## 2020-01-01 PROCEDURE — 87081 CULTURE SCREEN ONLY: CPT

## 2020-01-01 PROCEDURE — 307059 PAD,EAR PROTECTOR: Performed by: STUDENT IN AN ORGANIZED HEALTH CARE EDUCATION/TRAINING PROGRAM

## 2020-01-01 PROCEDURE — 97166 OT EVAL MOD COMPLEX 45 MIN: CPT

## 2020-01-01 PROCEDURE — 96372 THER/PROPH/DIAG INJ SC/IM: CPT | Mod: XU

## 2020-01-01 PROCEDURE — 78452 HT MUSCLE IMAGE SPECT MULT: CPT | Mod: 26 | Performed by: INTERNAL MEDICINE

## 2020-01-01 PROCEDURE — 85652 RBC SED RATE AUTOMATED: CPT

## 2020-01-01 PROCEDURE — 85520 HEPARIN ASSAY: CPT

## 2020-01-01 PROCEDURE — 0W9B3ZZ DRAINAGE OF LEFT PLEURAL CAVITY, PERCUTANEOUS APPROACH: ICD-10-PCS | Performed by: INTERNAL MEDICINE

## 2020-01-01 PROCEDURE — 86304 IMMUNOASSAY TUMOR CA 125: CPT

## 2020-01-01 PROCEDURE — 700105 HCHG RX REV CODE 258: Performed by: EMERGENCY MEDICINE

## 2020-01-01 PROCEDURE — 99214 OFFICE O/P EST MOD 30 MIN: CPT | Performed by: INTERNAL MEDICINE

## 2020-01-01 PROCEDURE — 84439 ASSAY OF FREE THYROXINE: CPT

## 2020-01-01 PROCEDURE — 32555 ASPIRATE PLEURA W/ IMAGING: CPT | Performed by: INTERNAL MEDICINE

## 2020-01-01 PROCEDURE — 99233 SBSQ HOSP IP/OBS HIGH 50: CPT | Performed by: FAMILY MEDICINE

## 2020-01-01 PROCEDURE — 76705 ECHO EXAM OF ABDOMEN: CPT

## 2020-01-01 PROCEDURE — 96374 THER/PROPH/DIAG INJ IV PUSH: CPT | Mod: XU

## 2020-01-01 PROCEDURE — 97162 PT EVAL MOD COMPLEX 30 MIN: CPT

## 2020-01-01 PROCEDURE — 82306 VITAMIN D 25 HYDROXY: CPT

## 2020-01-01 PROCEDURE — 83986 ASSAY PH BODY FLUID NOS: CPT

## 2020-01-01 PROCEDURE — 84155 ASSAY OF PROTEIN SERUM: CPT

## 2020-01-01 PROCEDURE — 99232 SBSQ HOSP IP/OBS MODERATE 35: CPT | Mod: GC | Performed by: INTERNAL MEDICINE

## 2020-01-01 PROCEDURE — 700111 HCHG RX REV CODE 636 W/ 250 OVERRIDE (IP): Performed by: HOSPITALIST

## 2020-01-01 PROCEDURE — 84154 ASSAY OF PSA FREE: CPT

## 2020-01-01 PROCEDURE — 700105 HCHG RX REV CODE 258: Performed by: STUDENT IN AN ORGANIZED HEALTH CARE EDUCATION/TRAINING PROGRAM

## 2020-01-01 PROCEDURE — 700101 HCHG RX REV CODE 250: Performed by: INTERNAL MEDICINE

## 2020-01-01 PROCEDURE — 99233 SBSQ HOSP IP/OBS HIGH 50: CPT | Performed by: INTERNAL MEDICINE

## 2020-01-01 PROCEDURE — 87086 URINE CULTURE/COLONY COUNT: CPT

## 2020-01-01 PROCEDURE — 84295 ASSAY OF SERUM SODIUM: CPT

## 2020-01-01 PROCEDURE — 93306 TTE W/DOPPLER COMPLETE: CPT

## 2020-01-01 PROCEDURE — 99214 OFFICE O/P EST MOD 30 MIN: CPT | Performed by: NURSE PRACTITIONER

## 2020-01-01 PROCEDURE — U0003 INFECTIOUS AGENT DETECTION BY NUCLEIC ACID (DNA OR RNA); SEVERE ACUTE RESPIRATORY SYNDROME CORONAVIRUS 2 (SARS-COV-2) (CORONAVIRUS DISEASE [COVID-19]), AMPLIFIED PROBE TECHNIQUE, MAKING USE OF HIGH THROUGHPUT TECHNOLOGIES AS DESCRIBED BY CMS-2020-01-R: HCPCS

## 2020-01-01 PROCEDURE — 700105 HCHG RX REV CODE 258: Performed by: HOSPITALIST

## 2020-01-01 PROCEDURE — 96365 THER/PROPH/DIAG IV INF INIT: CPT | Mod: XU

## 2020-01-01 PROCEDURE — 83880 ASSAY OF NATRIURETIC PEPTIDE: CPT | Mod: GA

## 2020-01-01 PROCEDURE — 86141 C-REACTIVE PROTEIN HS: CPT

## 2020-01-01 PROCEDURE — 97530 THERAPEUTIC ACTIVITIES: CPT

## 2020-01-01 PROCEDURE — 36415 COLL VENOUS BLD VENIPUNCTURE: CPT | Mod: GA

## 2020-01-01 PROCEDURE — 93005 ELECTROCARDIOGRAM TRACING: CPT

## 2020-01-01 PROCEDURE — 700111 HCHG RX REV CODE 636 W/ 250 OVERRIDE (IP)

## 2020-01-01 PROCEDURE — 99225 PR SUBSEQUENT OBSERVATION CARE,LEVEL II: CPT | Mod: GC | Performed by: HOSPITALIST

## 2020-01-01 PROCEDURE — 83540 ASSAY OF IRON: CPT

## 2020-01-01 PROCEDURE — 84100 ASSAY OF PHOSPHORUS: CPT

## 2020-01-01 PROCEDURE — 93010 ELECTROCARDIOGRAM REPORT: CPT | Performed by: INTERNAL MEDICINE

## 2020-01-01 PROCEDURE — 83690 ASSAY OF LIPASE: CPT

## 2020-01-01 PROCEDURE — 87880 STREP A ASSAY W/OPTIC: CPT

## 2020-01-01 PROCEDURE — 770006 HCHG ROOM/CARE - MED/SURG/GYN SEMI*

## 2020-01-01 PROCEDURE — 84145 PROCALCITONIN (PCT): CPT

## 2020-01-01 PROCEDURE — A9502 TC99M TETROFOSMIN: HCPCS

## 2020-01-01 PROCEDURE — 70553 MRI BRAIN STEM W/O & W/DYE: CPT

## 2020-01-01 PROCEDURE — 82272 OCCULT BLD FECES 1-3 TESTS: CPT

## 2020-01-01 PROCEDURE — C9803 HOPD COVID-19 SPEC COLLECT: HCPCS | Performed by: EMERGENCY MEDICINE

## 2020-01-01 PROCEDURE — 700111 HCHG RX REV CODE 636 W/ 250 OVERRIDE (IP): Performed by: RADIOLOGY

## 2020-01-01 PROCEDURE — 0QS03ZZ REPOSITION LUMBAR VERTEBRA, PERCUTANEOUS APPROACH: ICD-10-PCS | Performed by: RADIOLOGY

## 2020-01-01 PROCEDURE — 93018 CV STRESS TEST I&R ONLY: CPT | Performed by: INTERNAL MEDICINE

## 2020-01-01 PROCEDURE — 83880 ASSAY OF NATRIURETIC PEPTIDE: CPT

## 2020-01-01 PROCEDURE — 97116 GAIT TRAINING THERAPY: CPT

## 2020-01-01 PROCEDURE — 99238 HOSP IP/OBS DSCHRG MGMT 30/<: CPT | Performed by: HOSPITALIST

## 2020-01-01 PROCEDURE — 86140 C-REACTIVE PROTEIN: CPT

## 2020-01-01 PROCEDURE — 51798 US URINE CAPACITY MEASURE: CPT

## 2020-01-01 PROCEDURE — 93005 ELECTROCARDIOGRAM TRACING: CPT | Performed by: INTERNAL MEDICINE

## 2020-01-01 PROCEDURE — 99232 SBSQ HOSP IP/OBS MODERATE 35: CPT | Performed by: STUDENT IN AN ORGANIZED HEALTH CARE EDUCATION/TRAINING PROGRAM

## 2020-01-01 PROCEDURE — 96376 TX/PRO/DX INJ SAME DRUG ADON: CPT

## 2020-01-01 PROCEDURE — 700117 HCHG RX CONTRAST REV CODE 255: Performed by: FAMILY MEDICINE

## 2020-01-01 PROCEDURE — 307059 PAD,EAR PROTECTOR: Performed by: HOSPITALIST

## 2020-01-01 PROCEDURE — 99153 MOD SED SAME PHYS/QHP EA: CPT

## 2020-01-01 PROCEDURE — 99239 HOSP IP/OBS DSCHRG MGMT >30: CPT | Performed by: INTERNAL MEDICINE

## 2020-01-01 PROCEDURE — 82105 ALPHA-FETOPROTEIN SERUM: CPT

## 2020-01-01 PROCEDURE — 99221 1ST HOSP IP/OBS SF/LOW 40: CPT | Performed by: INTERNAL MEDICINE

## 2020-01-01 PROCEDURE — 70551 MRI BRAIN STEM W/O DYE: CPT

## 2020-01-01 PROCEDURE — 82150 ASSAY OF AMYLASE: CPT

## 2020-01-01 PROCEDURE — 88305 TISSUE EXAM BY PATHOLOGIST: CPT

## 2020-01-01 PROCEDURE — 84157 ASSAY OF PROTEIN OTHER: CPT

## 2020-01-01 PROCEDURE — 96367 TX/PROPH/DG ADDL SEQ IV INF: CPT

## 2020-01-01 PROCEDURE — 99220 PR INITIAL OBSERVATION CARE,LEVL III: CPT | Mod: AI,GC | Performed by: HOSPITALIST

## 2020-01-01 PROCEDURE — C9803 HOPD COVID-19 SPEC COLLECT: HCPCS | Performed by: STUDENT IN AN ORGANIZED HEALTH CARE EDUCATION/TRAINING PROGRAM

## 2020-01-01 PROCEDURE — 84402 ASSAY OF FREE TESTOSTERONE: CPT

## 2020-01-01 PROCEDURE — 99233 SBSQ HOSP IP/OBS HIGH 50: CPT | Performed by: HOSPITALIST

## 2020-01-01 PROCEDURE — 700117 HCHG RX CONTRAST REV CODE 255: Performed by: STUDENT IN AN ORGANIZED HEALTH CARE EDUCATION/TRAINING PROGRAM

## 2020-01-01 PROCEDURE — 82728 ASSAY OF FERRITIN: CPT

## 2020-01-01 PROCEDURE — 93880 EXTRACRANIAL BILAT STUDY: CPT

## 2020-01-01 PROCEDURE — 96375 TX/PRO/DX INJ NEW DRUG ADDON: CPT

## 2020-01-01 PROCEDURE — 99239 HOSP IP/OBS DSCHRG MGMT >30: CPT | Performed by: STUDENT IN AN ORGANIZED HEALTH CARE EDUCATION/TRAINING PROGRAM

## 2020-01-01 PROCEDURE — 99220 PR INITIAL OBSERVATION CARE,LEVL III: CPT | Performed by: INTERNAL MEDICINE

## 2020-01-01 PROCEDURE — 80069 RENAL FUNCTION PANEL: CPT

## 2020-01-01 PROCEDURE — 93970 EXTREMITY STUDY: CPT

## 2020-01-01 PROCEDURE — C1729 CATH, DRAINAGE: HCPCS

## 2020-01-01 PROCEDURE — 82945 GLUCOSE OTHER FLUID: CPT

## 2020-01-01 PROCEDURE — 93306 TTE W/DOPPLER COMPLETE: CPT | Mod: 26 | Performed by: INTERNAL MEDICINE

## 2020-01-01 PROCEDURE — 99233 SBSQ HOSP IP/OBS HIGH 50: CPT | Mod: 25 | Performed by: INTERNAL MEDICINE

## 2020-01-01 RX ORDER — AMOXICILLIN 250 MG
2 CAPSULE ORAL 2 TIMES DAILY
Status: DISCONTINUED | OUTPATIENT
Start: 2020-01-01 | End: 2020-01-01 | Stop reason: HOSPADM

## 2020-01-01 RX ORDER — HEPARIN SODIUM 5000 [USP'U]/100ML
0-30 INJECTION, SOLUTION INTRAVENOUS CONTINUOUS
Status: DISCONTINUED | OUTPATIENT
Start: 2020-01-01 | End: 2020-01-01

## 2020-01-01 RX ORDER — IBUPROFEN 800 MG/1
800 TABLET ORAL EVERY 8 HOURS PRN
Status: SHIPPED | COMMUNITY
Start: 2020-01-01 | End: 2020-01-01

## 2020-01-01 RX ORDER — BISACODYL 10 MG
10 SUPPOSITORY, RECTAL RECTAL
Status: DISCONTINUED | OUTPATIENT
Start: 2020-01-01 | End: 2020-01-01

## 2020-01-01 RX ORDER — MECLIZINE HYDROCHLORIDE 25 MG/1
25 TABLET ORAL 3 TIMES DAILY PRN
Qty: 30 TAB | Refills: 0 | Status: ON HOLD | OUTPATIENT
Start: 2020-01-01 | End: 2020-01-01

## 2020-01-01 RX ORDER — BISACODYL 10 MG
10 SUPPOSITORY, RECTAL RECTAL
Status: DISCONTINUED | OUTPATIENT
Start: 2020-01-01 | End: 2020-01-01 | Stop reason: HOSPADM

## 2020-01-01 RX ORDER — LEVOTHYROXINE SODIUM 0.07 MG/1
75 TABLET ORAL
Status: DISCONTINUED | OUTPATIENT
Start: 2020-01-01 | End: 2020-01-01 | Stop reason: HOSPADM

## 2020-01-01 RX ORDER — PRAVASTATIN SODIUM 20 MG
20 TABLET ORAL NIGHTLY
COMMUNITY
End: 2020-01-01

## 2020-01-01 RX ORDER — POLYETHYLENE GLYCOL 3350 17 G/17G
1 POWDER, FOR SOLUTION ORAL
Status: DISCONTINUED | OUTPATIENT
Start: 2020-01-01 | End: 2020-01-01 | Stop reason: HOSPADM

## 2020-01-01 RX ORDER — METOPROLOL SUCCINATE 25 MG/1
25 TABLET, EXTENDED RELEASE ORAL
Status: DISCONTINUED | OUTPATIENT
Start: 2020-01-01 | End: 2020-01-01 | Stop reason: HOSPADM

## 2020-01-01 RX ORDER — ONDANSETRON 2 MG/ML
4 INJECTION INTRAMUSCULAR; INTRAVENOUS PRN
Status: ACTIVE | OUTPATIENT
Start: 2020-01-01 | End: 2020-01-01

## 2020-01-01 RX ORDER — POTASSIUM CHLORIDE 20 MEQ/1
20 TABLET, EXTENDED RELEASE ORAL 2 TIMES DAILY
COMMUNITY
End: 2020-01-01

## 2020-01-01 RX ORDER — ONDANSETRON 2 MG/ML
4 INJECTION INTRAMUSCULAR; INTRAVENOUS EVERY 4 HOURS PRN
Status: DISCONTINUED | OUTPATIENT
Start: 2020-01-01 | End: 2020-01-01 | Stop reason: HOSPADM

## 2020-01-01 RX ORDER — SODIUM CHLORIDE 9 MG/ML
INJECTION, SOLUTION INTRAVENOUS
Status: COMPLETED
Start: 2020-01-01 | End: 2020-01-01

## 2020-01-01 RX ORDER — MECLIZINE HYDROCHLORIDE 25 MG/1
25 TABLET ORAL ONCE
Status: COMPLETED | OUTPATIENT
Start: 2020-01-01 | End: 2020-01-01

## 2020-01-01 RX ORDER — LABETALOL HYDROCHLORIDE 5 MG/ML
10 INJECTION, SOLUTION INTRAVENOUS EVERY 4 HOURS PRN
Status: DISCONTINUED | OUTPATIENT
Start: 2020-01-01 | End: 2020-01-01 | Stop reason: HOSPADM

## 2020-01-01 RX ORDER — ENALAPRILAT 1.25 MG/ML
1.25 INJECTION INTRAVENOUS EVERY 6 HOURS PRN
Status: DISCONTINUED | OUTPATIENT
Start: 2020-01-01 | End: 2020-01-01 | Stop reason: HOSPADM

## 2020-01-01 RX ORDER — METOLAZONE 5 MG/1
5 TABLET ORAL
Status: DISCONTINUED | OUTPATIENT
Start: 2020-01-01 | End: 2020-01-01

## 2020-01-01 RX ORDER — ONDANSETRON 4 MG/1
4 TABLET, ORALLY DISINTEGRATING ORAL EVERY 4 HOURS PRN
Status: DISCONTINUED | OUTPATIENT
Start: 2020-01-01 | End: 2020-01-01 | Stop reason: HOSPADM

## 2020-01-01 RX ORDER — POTASSIUM CHLORIDE 20 MEQ/1
20 TABLET, EXTENDED RELEASE ORAL 2 TIMES DAILY
Status: DISCONTINUED | OUTPATIENT
Start: 2020-01-01 | End: 2020-01-01 | Stop reason: HOSPADM

## 2020-01-01 RX ORDER — FUROSEMIDE 10 MG/ML
20 INJECTION INTRAMUSCULAR; INTRAVENOUS ONCE
Status: COMPLETED | OUTPATIENT
Start: 2020-01-01 | End: 2020-01-01

## 2020-01-01 RX ORDER — ACETAMINOPHEN 325 MG/1
650 TABLET ORAL EVERY 6 HOURS PRN
Status: DISCONTINUED | OUTPATIENT
Start: 2020-01-01 | End: 2020-01-01 | Stop reason: HOSPADM

## 2020-01-01 RX ORDER — POTASSIUM CHLORIDE 20 MEQ/1
40 TABLET, EXTENDED RELEASE ORAL ONCE
Status: COMPLETED | OUTPATIENT
Start: 2020-01-01 | End: 2020-01-01

## 2020-01-01 RX ORDER — AZITHROMYCIN 250 MG/1
500 TABLET, FILM COATED ORAL DAILY
Status: DISCONTINUED | OUTPATIENT
Start: 2020-01-01 | End: 2020-01-01

## 2020-01-01 RX ORDER — MULTIVIT WITH MINERALS/LUTEIN
1 TABLET ORAL DAILY
COMMUNITY
End: 2020-01-01

## 2020-01-01 RX ORDER — FLUTICASONE PROPIONATE 50 MCG
1 SPRAY, SUSPENSION (ML) NASAL DAILY
Status: DISCONTINUED | OUTPATIENT
Start: 2020-01-01 | End: 2020-01-01 | Stop reason: HOSPADM

## 2020-01-01 RX ORDER — HEPARIN SODIUM 1000 [USP'U]/ML
40 INJECTION, SOLUTION INTRAVENOUS; SUBCUTANEOUS PRN
Status: DISCONTINUED | OUTPATIENT
Start: 2020-01-01 | End: 2020-01-01

## 2020-01-01 RX ORDER — FUROSEMIDE 40 MG/1
40 TABLET ORAL 2 TIMES DAILY
Status: DISCONTINUED | OUTPATIENT
Start: 2020-01-01 | End: 2020-01-01

## 2020-01-01 RX ORDER — LIDOCAINE 50 MG/G
1 PATCH TOPICAL EVERY 24 HOURS
Status: DISCONTINUED | OUTPATIENT
Start: 2020-01-01 | End: 2020-01-01 | Stop reason: HOSPADM

## 2020-01-01 RX ORDER — ASPIRIN 325 MG
325 TABLET ORAL DAILY
Status: DISCONTINUED | OUTPATIENT
Start: 2020-01-01 | End: 2020-01-01 | Stop reason: HOSPADM

## 2020-01-01 RX ORDER — REGADENOSON 0.08 MG/ML
0.4 INJECTION, SOLUTION INTRAVENOUS
Status: COMPLETED | OUTPATIENT
Start: 2020-01-01 | End: 2020-01-01

## 2020-01-01 RX ORDER — MORPHINE SULFATE 4 MG/ML
2 INJECTION, SOLUTION INTRAMUSCULAR; INTRAVENOUS
Status: DISCONTINUED | OUTPATIENT
Start: 2020-01-01 | End: 2020-01-01

## 2020-01-01 RX ORDER — PRAVASTATIN SODIUM 20 MG
20 TABLET ORAL NIGHTLY
Status: DISCONTINUED | OUTPATIENT
Start: 2020-01-01 | End: 2020-01-01

## 2020-01-01 RX ORDER — FUROSEMIDE 40 MG/1
40 TABLET ORAL 2 TIMES DAILY
Status: ON HOLD | COMMUNITY
Start: 2020-01-01 | End: 2020-01-01

## 2020-01-01 RX ORDER — LIOTHYRONINE SODIUM 5 UG/1
5 TABLET ORAL DAILY
Status: ON HOLD | COMMUNITY
End: 2020-01-01

## 2020-01-01 RX ORDER — LEVOTHYROXINE SODIUM 0.07 MG/1
TABLET ORAL
Status: SHIPPED | COMMUNITY
Start: 2020-01-01 | End: 2020-01-01

## 2020-01-01 RX ORDER — POLYETHYLENE GLYCOL 3350 17 G/17G
1 POWDER, FOR SOLUTION ORAL DAILY
Status: DISCONTINUED | OUTPATIENT
Start: 2020-01-01 | End: 2020-01-01

## 2020-01-01 RX ORDER — OXYCODONE HYDROCHLORIDE 5 MG/1
5 TABLET ORAL EVERY 6 HOURS PRN
Qty: 28 TAB | Refills: 0 | Status: SHIPPED | OUTPATIENT
Start: 2020-01-01 | End: 2020-01-01

## 2020-01-01 RX ORDER — ASPIRIN 81 MG/1
324 TABLET, CHEWABLE ORAL DAILY
Status: DISCONTINUED | OUTPATIENT
Start: 2020-01-01 | End: 2020-01-01 | Stop reason: HOSPADM

## 2020-01-01 RX ORDER — METOPROLOL SUCCINATE 25 MG/1
12.5 TABLET, EXTENDED RELEASE ORAL
Status: DISCONTINUED | OUTPATIENT
Start: 2020-01-01 | End: 2020-01-01

## 2020-01-01 RX ORDER — DONEPEZIL HYDROCHLORIDE 5 MG/1
5 TABLET, FILM COATED ORAL NIGHTLY
COMMUNITY

## 2020-01-01 RX ORDER — FUROSEMIDE 40 MG/1
20 TABLET ORAL 2 TIMES DAILY
Qty: 30 TAB | Refills: 0 | Status: ON HOLD
Start: 2020-01-01 | End: 2020-01-01

## 2020-01-01 RX ORDER — ASPIRIN 81 MG/1
81 TABLET, CHEWABLE ORAL ONCE
Status: COMPLETED | OUTPATIENT
Start: 2020-01-01 | End: 2020-01-01

## 2020-01-01 RX ORDER — HEPARIN SODIUM 1000 [USP'U]/ML
80 INJECTION, SOLUTION INTRAVENOUS; SUBCUTANEOUS ONCE
Status: COMPLETED | OUTPATIENT
Start: 2020-01-01 | End: 2020-01-01

## 2020-01-01 RX ORDER — MIDAZOLAM HYDROCHLORIDE 1 MG/ML
INJECTION INTRAMUSCULAR; INTRAVENOUS
Status: COMPLETED
Start: 2020-01-01 | End: 2020-01-01

## 2020-01-01 RX ORDER — LEVOTHYROXINE SODIUM 0.07 MG/1
75 TABLET ORAL
COMMUNITY

## 2020-01-01 RX ORDER — LIOTHYRONINE SODIUM 5 UG/1
5 TABLET ORAL DAILY
Status: DISCONTINUED | OUTPATIENT
Start: 2020-01-01 | End: 2020-01-01 | Stop reason: HOSPADM

## 2020-01-01 RX ORDER — AMOXICILLIN 250 MG
2 CAPSULE ORAL 2 TIMES DAILY
Status: DISCONTINUED | OUTPATIENT
Start: 2020-01-01 | End: 2020-01-01

## 2020-01-01 RX ORDER — POTASSIUM CHLORIDE 750 MG/1
20 TABLET, FILM COATED, EXTENDED RELEASE ORAL 2 TIMES DAILY
Status: DISCONTINUED | OUTPATIENT
Start: 2020-01-01 | End: 2020-01-01

## 2020-01-01 RX ORDER — MECLIZINE HYDROCHLORIDE 25 MG/1
25 TABLET ORAL 3 TIMES DAILY PRN
Status: DISCONTINUED | OUTPATIENT
Start: 2020-01-01 | End: 2020-01-01

## 2020-01-01 RX ORDER — TESTOSTERONE CYPIONATE 200 MG/ML
50 INJECTION, SOLUTION INTRAMUSCULAR
COMMUNITY
End: 2020-01-01

## 2020-01-01 RX ORDER — PREDNISONE 20 MG/1
20 TABLET ORAL DAILY
Qty: 3 TAB | Refills: 0 | Status: SHIPPED | OUTPATIENT
Start: 2020-01-01 | End: 2020-01-01

## 2020-01-01 RX ORDER — RANITIDINE 150 MG/1
150 TABLET ORAL EVERY EVENING
COMMUNITY
End: 2020-01-01

## 2020-01-01 RX ORDER — CEFAZOLIN SODIUM 1 G/3ML
INJECTION, POWDER, FOR SOLUTION INTRAMUSCULAR; INTRAVENOUS
Status: COMPLETED
Start: 2020-01-01 | End: 2020-01-01

## 2020-01-01 RX ORDER — METOPROLOL SUCCINATE 25 MG/1
25 TABLET, EXTENDED RELEASE ORAL DAILY
Status: DISCONTINUED | OUTPATIENT
Start: 2020-01-01 | End: 2020-01-01 | Stop reason: HOSPADM

## 2020-01-01 RX ORDER — DONEPEZIL HYDROCHLORIDE 5 MG/1
5 TABLET, FILM COATED ORAL NIGHTLY
Status: DISCONTINUED | OUTPATIENT
Start: 2020-01-01 | End: 2020-01-01 | Stop reason: HOSPADM

## 2020-01-01 RX ORDER — DONEPEZIL HYDROCHLORIDE 5 MG/1
TABLET, FILM COATED ORAL
Status: SHIPPED | COMMUNITY
Start: 2020-01-01 | End: 2020-01-01

## 2020-01-01 RX ORDER — ASPIRIN 600 MG/1
300 SUPPOSITORY RECTAL DAILY
Status: DISCONTINUED | OUTPATIENT
Start: 2020-01-01 | End: 2020-01-01 | Stop reason: HOSPADM

## 2020-01-01 RX ORDER — METAXALONE 800 MG/1
800 TABLET ORAL 3 TIMES DAILY PRN
Status: DISCONTINUED | OUTPATIENT
Start: 2020-01-01 | End: 2020-01-01 | Stop reason: HOSPADM

## 2020-01-01 RX ORDER — PREDNISONE 20 MG/1
TABLET ORAL
Status: COMPLETED
Start: 2020-01-01 | End: 2020-01-01

## 2020-01-01 RX ORDER — OXYCODONE HYDROCHLORIDE AND ACETAMINOPHEN 5; 325 MG/1; MG/1
1 TABLET ORAL 2 TIMES DAILY PRN
Status: DISCONTINUED | OUTPATIENT
Start: 2020-01-01 | End: 2020-01-01 | Stop reason: HOSPADM

## 2020-01-01 RX ORDER — TESTOSTERONE CYPIONATE 200 MG/ML
100 INJECTION, SOLUTION INTRAMUSCULAR
COMMUNITY
End: 2020-01-01

## 2020-01-01 RX ORDER — PRAVASTATIN SODIUM 20 MG
20 TABLET ORAL NIGHTLY
Status: DISCONTINUED | OUTPATIENT
Start: 2020-01-01 | End: 2020-01-01 | Stop reason: HOSPADM

## 2020-01-01 RX ORDER — FUROSEMIDE 40 MG/1
40 TABLET ORAL
Status: DISCONTINUED | OUTPATIENT
Start: 2020-01-01 | End: 2020-01-01 | Stop reason: HOSPADM

## 2020-01-01 RX ORDER — FUROSEMIDE 10 MG/ML
20 INJECTION INTRAMUSCULAR; INTRAVENOUS
Status: DISCONTINUED | OUTPATIENT
Start: 2020-01-01 | End: 2020-01-01

## 2020-01-01 RX ORDER — FAMOTIDINE 20 MG/1
TABLET, FILM COATED ORAL
Status: SHIPPED | COMMUNITY
Start: 2020-01-01 | End: 2020-01-01

## 2020-01-01 RX ORDER — PREDNISONE 20 MG/1
20 TABLET ORAL DAILY
Status: DISCONTINUED | OUTPATIENT
Start: 2020-01-01 | End: 2020-01-01 | Stop reason: HOSPADM

## 2020-01-01 RX ORDER — FAMOTIDINE 20 MG/1
20 TABLET, FILM COATED ORAL 2 TIMES DAILY
COMMUNITY
End: 2020-01-01

## 2020-01-01 RX ORDER — FLUTICASONE PROPIONATE 50 MCG
1 SPRAY, SUSPENSION (ML) NASAL DAILY
COMMUNITY
End: 2020-01-01

## 2020-01-01 RX ORDER — HEPARIN SODIUM 5000 [USP'U]/ML
5000 INJECTION, SOLUTION INTRAVENOUS; SUBCUTANEOUS EVERY 8 HOURS
Status: DISCONTINUED | OUTPATIENT
Start: 2020-01-01 | End: 2020-01-01 | Stop reason: HOSPADM

## 2020-01-01 RX ORDER — FUROSEMIDE 20 MG/1
20 TABLET ORAL
Status: DISCONTINUED | OUTPATIENT
Start: 2020-01-01 | End: 2020-01-01 | Stop reason: HOSPADM

## 2020-01-01 RX ORDER — PRAVASTATIN SODIUM 10 MG
20 TABLET ORAL NIGHTLY
Status: DISCONTINUED | OUTPATIENT
Start: 2020-01-01 | End: 2020-01-01 | Stop reason: HOSPADM

## 2020-01-01 RX ORDER — FLUTICASONE PROPIONATE 50 MCG
1 SPRAY, SUSPENSION (ML) NASAL DAILY
Status: DISCONTINUED | OUTPATIENT
Start: 2020-01-01 | End: 2020-01-01

## 2020-01-01 RX ORDER — POLYETHYLENE GLYCOL 3350 17 G/17G
1 POWDER, FOR SOLUTION ORAL
Status: DISCONTINUED | OUTPATIENT
Start: 2020-01-01 | End: 2020-01-01

## 2020-01-01 RX ORDER — TRIAMCINOLONE ACETONIDE 55 UG/1
1 SPRAY, METERED NASAL
COMMUNITY
End: 2020-01-01

## 2020-01-01 RX ORDER — AMINOPHYLLINE 25 MG/ML
100 INJECTION, SOLUTION INTRAVENOUS
Status: DISCONTINUED | OUTPATIENT
Start: 2020-01-01 | End: 2020-01-01 | Stop reason: HOSPADM

## 2020-01-01 RX ORDER — LEVOTHYROXINE SODIUM 0.05 MG/1
75 TABLET ORAL
Status: DISCONTINUED | OUTPATIENT
Start: 2020-01-01 | End: 2020-01-01 | Stop reason: HOSPADM

## 2020-01-01 RX ORDER — POLYETHYLENE GLYCOL 3350 17 G/17G
17 POWDER, FOR SOLUTION ORAL DAILY
Qty: 15 EACH | Refills: 0 | Status: SHIPPED | OUTPATIENT
Start: 2020-01-01 | End: 2020-01-01

## 2020-01-01 RX ORDER — POLYETHYLENE GLYCOL 3350 17 G/17G
1 POWDER, FOR SOLUTION ORAL DAILY
Status: DISCONTINUED | OUTPATIENT
Start: 2020-01-01 | End: 2020-01-01 | Stop reason: HOSPADM

## 2020-01-01 RX ORDER — FUROSEMIDE 40 MG/1
40 TABLET ORAL 2 TIMES DAILY
Status: ON HOLD | COMMUNITY
End: 2020-01-01 | Stop reason: SDUPTHER

## 2020-01-01 RX ORDER — POTASSIUM CHLORIDE 1500 MG/1
20 TABLET, EXTENDED RELEASE ORAL 2 TIMES DAILY
Status: ON HOLD | COMMUNITY
Start: 2020-01-01 | End: 2020-01-01

## 2020-01-01 RX ORDER — FLUTICASONE PROPIONATE 50 MCG
1 SPRAY, SUSPENSION (ML) NASAL
Status: DISCONTINUED | OUTPATIENT
Start: 2020-01-01 | End: 2020-01-01 | Stop reason: HOSPADM

## 2020-01-01 RX ORDER — LIDOCAINE 50 MG/G
1 PATCH TOPICAL EVERY 24 HOURS
Qty: 10 PATCH | Refills: 0 | Status: SHIPPED | OUTPATIENT
Start: 2020-01-01

## 2020-01-01 RX ORDER — MECLIZINE HYDROCHLORIDE 25 MG/1
25 TABLET ORAL 3 TIMES DAILY PRN
Status: DISCONTINUED | OUTPATIENT
Start: 2020-01-01 | End: 2020-01-01 | Stop reason: HOSPADM

## 2020-01-01 RX ORDER — ASPIRIN 81 MG/1
324 TABLET, CHEWABLE ORAL DAILY
Qty: 100 TAB | Refills: 1 | COMMUNITY
Start: 2020-01-01 | End: 2020-01-01

## 2020-01-01 RX ORDER — NITROGLYCERIN 0.4 MG/1
0.4 TABLET SUBLINGUAL ONCE
Status: COMPLETED | OUTPATIENT
Start: 2020-01-01 | End: 2020-01-01

## 2020-01-01 RX ORDER — AMOXICILLIN 250 MG
2 CAPSULE ORAL 2 TIMES DAILY PRN
Status: DISCONTINUED | OUTPATIENT
Start: 2020-01-01 | End: 2020-01-01 | Stop reason: HOSPADM

## 2020-01-01 RX ORDER — POTASSIUM CHLORIDE 20 MEQ/1
20 TABLET, EXTENDED RELEASE ORAL ONCE
Status: COMPLETED | OUTPATIENT
Start: 2020-01-01 | End: 2020-01-01

## 2020-01-01 RX ORDER — SODIUM CHLORIDE 9 MG/ML
INJECTION, SOLUTION INTRAVENOUS CONTINUOUS
Status: DISCONTINUED | OUTPATIENT
Start: 2020-01-01 | End: 2020-01-01

## 2020-01-01 RX ORDER — MORPHINE SULFATE 4 MG/ML
1 INJECTION, SOLUTION INTRAMUSCULAR; INTRAVENOUS ONCE
Status: COMPLETED | OUTPATIENT
Start: 2020-01-01 | End: 2020-01-01

## 2020-01-01 RX ORDER — NAPHAZOLINE/ZINC SULF/GLYCERIN 0.012-0.25
1 DROPS OPHTHALMIC (EYE) PRN
Status: ON HOLD | COMMUNITY
End: 2020-01-01

## 2020-01-01 RX ORDER — REGADENOSON 0.08 MG/ML
INJECTION, SOLUTION INTRAVENOUS
Status: COMPLETED
Start: 2020-01-01 | End: 2020-01-01

## 2020-01-01 RX ORDER — FAMOTIDINE 20 MG/1
20 TABLET, FILM COATED ORAL EVERY EVENING
COMMUNITY
End: 2020-01-01

## 2020-01-01 RX ORDER — PRAVASTATIN SODIUM 20 MG
20 TABLET ORAL NIGHTLY
Status: ON HOLD | COMMUNITY
End: 2020-01-01

## 2020-01-01 RX ORDER — MIDAZOLAM HYDROCHLORIDE 1 MG/ML
.5-2 INJECTION INTRAMUSCULAR; INTRAVENOUS PRN
Status: ACTIVE | OUTPATIENT
Start: 2020-01-01 | End: 2020-01-01

## 2020-01-01 RX ORDER — FAMOTIDINE 20 MG/1
20 TABLET, FILM COATED ORAL 2 TIMES DAILY
Status: DISCONTINUED | OUTPATIENT
Start: 2020-01-01 | End: 2020-01-01 | Stop reason: HOSPADM

## 2020-01-01 RX ORDER — FAMOTIDINE 20 MG/1
20 TABLET, FILM COATED ORAL EVERY EVENING
Status: DISCONTINUED | OUTPATIENT
Start: 2020-01-01 | End: 2020-01-01 | Stop reason: HOSPADM

## 2020-01-01 RX ORDER — OXYCODONE HYDROCHLORIDE AND ACETAMINOPHEN 5; 325 MG/1; MG/1
1 TABLET ORAL 2 TIMES DAILY PRN
COMMUNITY

## 2020-01-01 RX ORDER — SODIUM CHLORIDE 9 MG/ML
500 INJECTION, SOLUTION INTRAVENOUS
Status: ACTIVE | OUTPATIENT
Start: 2020-01-01 | End: 2020-01-01

## 2020-01-01 RX ORDER — METOPROLOL SUCCINATE 25 MG/1
25 TABLET, EXTENDED RELEASE ORAL DAILY
Qty: 30 TAB | Refills: 1 | Status: SHIPPED | OUTPATIENT
Start: 2020-01-01 | End: 2020-01-01

## 2020-01-01 RX ORDER — CARBOXYMETHYLCELLULOSE SODIUM 5 MG/ML
1 SOLUTION/ DROPS OPHTHALMIC PRN
Status: DISCONTINUED | OUTPATIENT
Start: 2020-01-01 | End: 2020-01-01 | Stop reason: HOSPADM

## 2020-01-01 RX ORDER — OXYCODONE HYDROCHLORIDE 5 MG/1
2.5 TABLET ORAL
Status: DISCONTINUED | OUTPATIENT
Start: 2020-01-01 | End: 2020-01-01 | Stop reason: HOSPADM

## 2020-01-01 RX ORDER — CEFAZOLIN SODIUM 2 G/100ML
2 INJECTION, SOLUTION INTRAVENOUS ONCE
Status: COMPLETED | OUTPATIENT
Start: 2020-01-01 | End: 2020-01-01

## 2020-01-01 RX ORDER — METOPROLOL SUCCINATE 25 MG/1
25 TABLET, EXTENDED RELEASE ORAL
COMMUNITY

## 2020-01-01 RX ORDER — HEPARIN SODIUM 1000 [USP'U]/ML
40 INJECTION, SOLUTION INTRAVENOUS; SUBCUTANEOUS PRN
Status: DISCONTINUED | OUTPATIENT
Start: 2020-01-01 | End: 2020-01-01 | Stop reason: ALTCHOICE

## 2020-01-01 RX ORDER — OXYCODONE HYDROCHLORIDE 5 MG/1
5 TABLET ORAL
Status: DISCONTINUED | OUTPATIENT
Start: 2020-01-01 | End: 2020-01-01 | Stop reason: HOSPADM

## 2020-01-01 RX ORDER — FAMOTIDINE 20 MG/1
20 TABLET, FILM COATED ORAL 2 TIMES DAILY
Status: DISCONTINUED | OUTPATIENT
Start: 2020-01-01 | End: 2020-01-01

## 2020-01-01 RX ORDER — HEPARIN SODIUM 5000 [USP'U]/100ML
0-30 INJECTION, SOLUTION INTRAVENOUS CONTINUOUS
Status: DISCONTINUED | OUTPATIENT
Start: 2020-01-01 | End: 2020-01-01 | Stop reason: ALTCHOICE

## 2020-01-01 RX ADMIN — DONEPEZIL HYDROCHLORIDE 5 MG: 5 TABLET, FILM COATED ORAL at 22:10

## 2020-01-01 RX ADMIN — PRAVASTATIN SODIUM 20 MG: 10 TABLET ORAL at 22:13

## 2020-01-01 RX ADMIN — DOCUSATE SODIUM 50 MG AND SENNOSIDES 8.6 MG 2 TABLET: 8.6; 5 TABLET, FILM COATED ORAL at 05:46

## 2020-01-01 RX ADMIN — RIVAROXABAN 15 MG: 15 TABLET, FILM COATED ORAL at 16:49

## 2020-01-01 RX ADMIN — LEVOTHYROXINE SODIUM 75 MCG: 50 TABLET ORAL at 15:21

## 2020-01-01 RX ADMIN — DOCUSATE SODIUM 50 MG AND SENNOSIDES 8.6 MG 2 TABLET: 8.6; 5 TABLET, FILM COATED ORAL at 05:16

## 2020-01-01 RX ADMIN — RIVAROXABAN 15 MG: 15 TABLET, FILM COATED ORAL at 18:54

## 2020-01-01 RX ADMIN — RIVAROXABAN 15 MG: 15 TABLET, FILM COATED ORAL at 08:04

## 2020-01-01 RX ADMIN — METOPROLOL SUCCINATE 25 MG: 25 TABLET, EXTENDED RELEASE ORAL at 06:00

## 2020-01-01 RX ADMIN — PRAVASTATIN SODIUM 20 MG: 20 TABLET ORAL at 21:11

## 2020-01-01 RX ADMIN — OXYCODONE HYDROCHLORIDE 5 MG: 5 TABLET ORAL at 15:39

## 2020-01-01 RX ADMIN — POTASSIUM CHLORIDE 20 MEQ: 1500 TABLET, EXTENDED RELEASE ORAL at 07:06

## 2020-01-01 RX ADMIN — FUROSEMIDE 20 MG: 10 INJECTION, SOLUTION INTRAMUSCULAR; INTRAVENOUS at 11:58

## 2020-01-01 RX ADMIN — OXYCODONE HYDROCHLORIDE 5 MG: 5 TABLET ORAL at 05:37

## 2020-01-01 RX ADMIN — ACETAMINOPHEN 650 MG: 325 TABLET, FILM COATED ORAL at 09:15

## 2020-01-01 RX ADMIN — SENNOSIDES-DOCUSATE SODIUM TAB 8.6-50 MG 2 TABLET: 8.6-5 TAB at 05:53

## 2020-01-01 RX ADMIN — FAMOTIDINE 20 MG: 20 TABLET, FILM COATED ORAL at 18:54

## 2020-01-01 RX ADMIN — DONEPEZIL HYDROCHLORIDE 5 MG: 5 TABLET, FILM COATED ORAL at 23:58

## 2020-01-01 RX ADMIN — LIDOCAINE 1 PATCH: 50 PATCH CUTANEOUS at 16:00

## 2020-01-01 RX ADMIN — FUROSEMIDE 20 MG: 10 INJECTION, SOLUTION INTRAMUSCULAR; INTRAVENOUS at 16:20

## 2020-01-01 RX ADMIN — ENOXAPARIN SODIUM 80 MG: 100 INJECTION SUBCUTANEOUS at 16:50

## 2020-01-01 RX ADMIN — HEPARIN SODIUM 5000 UNITS: 5000 INJECTION, SOLUTION INTRAVENOUS; SUBCUTANEOUS at 15:25

## 2020-01-01 RX ADMIN — PREDNISONE 20 MG: 20 TABLET ORAL at 12:21

## 2020-01-01 RX ADMIN — DONEPEZIL HYDROCHLORIDE 5 MG: 5 TABLET, FILM COATED ORAL at 22:38

## 2020-01-01 RX ADMIN — FAMOTIDINE 20 MG: 20 TABLET, FILM COATED ORAL at 05:23

## 2020-01-01 RX ADMIN — AZITHROMYCIN MONOHYDRATE 500 MG: 250 TABLET ORAL at 05:33

## 2020-01-01 RX ADMIN — DONEPEZIL HYDROCHLORIDE 5 MG: 5 TABLET, FILM COATED ORAL at 20:27

## 2020-01-01 RX ADMIN — FUROSEMIDE 40 MG: 40 TABLET ORAL at 17:49

## 2020-01-01 RX ADMIN — MIDAZOLAM HYDROCHLORIDE 2 MG: 1 INJECTION, SOLUTION INTRAMUSCULAR; INTRAVENOUS at 16:34

## 2020-01-01 RX ADMIN — FUROSEMIDE 20 MG: 10 INJECTION, SOLUTION INTRAMUSCULAR; INTRAVENOUS at 05:29

## 2020-01-01 RX ADMIN — POTASSIUM CHLORIDE 40 MEQ: 1500 TABLET, EXTENDED RELEASE ORAL at 02:43

## 2020-01-01 RX ADMIN — SODIUM CHLORIDE 250 ML: 9 INJECTION, SOLUTION INTRAVENOUS at 02:46

## 2020-01-01 RX ADMIN — DONEPEZIL HYDROCHLORIDE 5 MG: 5 TABLET, FILM COATED ORAL at 20:36

## 2020-01-01 RX ADMIN — PRAVASTATIN SODIUM 20 MG: 20 TABLET ORAL at 20:15

## 2020-01-01 RX ADMIN — HEPARIN SODIUM 18 UNITS/KG/HR: 5000 INJECTION, SOLUTION INTRAVENOUS at 17:10

## 2020-01-01 RX ADMIN — MORPHINE SULFATE 2 MG: 4 INJECTION INTRAVENOUS at 20:01

## 2020-01-01 RX ADMIN — DOCUSATE SODIUM - SENNOSIDES 2 TABLET: 50; 8.6 TABLET, FILM COATED ORAL at 05:13

## 2020-01-01 RX ADMIN — ACETAMINOPHEN 650 MG: 325 TABLET, FILM COATED ORAL at 09:49

## 2020-01-01 RX ADMIN — POTASSIUM CHLORIDE 20 MEQ: 1500 TABLET, EXTENDED RELEASE ORAL at 16:23

## 2020-01-01 RX ADMIN — FUROSEMIDE 20 MG: 10 INJECTION, SOLUTION INTRAMUSCULAR; INTRAVENOUS at 16:34

## 2020-01-01 RX ADMIN — FUROSEMIDE 20 MG: 10 INJECTION, SOLUTION INTRAMUSCULAR; INTRAVENOUS at 16:44

## 2020-01-01 RX ADMIN — RIVAROXABAN 15 MG: 15 TABLET, FILM COATED ORAL at 07:19

## 2020-01-01 RX ADMIN — LIOTHYRONINE SODIUM 5 MCG: 5 TABLET ORAL at 05:26

## 2020-01-01 RX ADMIN — METOPROLOL SUCCINATE 25 MG: 25 TABLET, EXTENDED RELEASE ORAL at 05:23

## 2020-01-01 RX ADMIN — POTASSIUM CHLORIDE 20 MEQ: 1500 TABLET, EXTENDED RELEASE ORAL at 06:27

## 2020-01-01 RX ADMIN — OXYCODONE HYDROCHLORIDE 5 MG: 5 TABLET ORAL at 10:58

## 2020-01-01 RX ADMIN — FUROSEMIDE 20 MG: 10 INJECTION, SOLUTION INTRAMUSCULAR; INTRAVENOUS at 17:34

## 2020-01-01 RX ADMIN — REGADENOSON 0.4 MG: 0.08 INJECTION, SOLUTION INTRAVENOUS at 11:37

## 2020-01-01 RX ADMIN — METOPROLOL SUCCINATE 25 MG: 25 TABLET, EXTENDED RELEASE ORAL at 06:15

## 2020-01-01 RX ADMIN — METOPROLOL SUCCINATE 25 MG: 25 TABLET, EXTENDED RELEASE ORAL at 05:00

## 2020-01-01 RX ADMIN — OXYCODONE HYDROCHLORIDE 5 MG: 5 TABLET ORAL at 13:36

## 2020-01-01 RX ADMIN — PRAVASTATIN SODIUM 20 MG: 20 TABLET ORAL at 20:32

## 2020-01-01 RX ADMIN — PRAVASTATIN SODIUM 20 MG: 20 TABLET ORAL at 21:15

## 2020-01-01 RX ADMIN — LEVOTHYROXINE SODIUM 75 MCG: 50 TABLET ORAL at 05:54

## 2020-01-01 RX ADMIN — LIDOCAINE 1 PATCH: 50 PATCH TOPICAL at 18:23

## 2020-01-01 RX ADMIN — DONEPEZIL HYDROCHLORIDE 5 MG: 5 TABLET, FILM COATED ORAL at 21:15

## 2020-01-01 RX ADMIN — DOCUSATE SODIUM 50 MG AND SENNOSIDES 8.6 MG 2 TABLET: 8.6; 5 TABLET, FILM COATED ORAL at 06:15

## 2020-01-01 RX ADMIN — HEPARIN SODIUM 5000 UNITS: 5000 INJECTION, SOLUTION INTRAVENOUS; SUBCUTANEOUS at 21:46

## 2020-01-01 RX ADMIN — LIOTHYRONINE SODIUM 5 MCG: 5 TABLET ORAL at 15:20

## 2020-01-01 RX ADMIN — METOLAZONE 5 MG: 5 TABLET ORAL at 05:15

## 2020-01-01 RX ADMIN — FLUTICASONE PROPIONATE 50 MCG: 50 SPRAY, METERED NASAL at 05:49

## 2020-01-01 RX ADMIN — POTASSIUM CHLORIDE 40 MEQ: 1500 TABLET, EXTENDED RELEASE ORAL at 11:12

## 2020-01-01 RX ADMIN — LEVOTHYROXINE SODIUM 75 MCG: 75 TABLET ORAL at 05:14

## 2020-01-01 RX ADMIN — HEPARIN SODIUM 4800 UNITS: 1000 INJECTION, SOLUTION INTRAVENOUS; SUBCUTANEOUS at 17:10

## 2020-01-01 RX ADMIN — METOPROLOL SUCCINATE 12.5 MG: 25 TABLET, EXTENDED RELEASE ORAL at 05:53

## 2020-01-01 RX ADMIN — HEPARIN SODIUM 5000 UNITS: 5000 INJECTION, SOLUTION INTRAVENOUS; SUBCUTANEOUS at 21:09

## 2020-01-01 RX ADMIN — PRAVASTATIN SODIUM 20 MG: 20 TABLET ORAL at 20:02

## 2020-01-01 RX ADMIN — ENOXAPARIN SODIUM 40 MG: 40 INJECTION SUBCUTANEOUS at 05:59

## 2020-01-01 RX ADMIN — OXYCODONE HYDROCHLORIDE AND ACETAMINOPHEN 1 TABLET: 5; 325 TABLET ORAL at 15:56

## 2020-01-01 RX ADMIN — MORPHINE SULFATE 1 MG: 4 INJECTION INTRAVENOUS at 05:59

## 2020-01-01 RX ADMIN — FUROSEMIDE 40 MG: 40 TABLET ORAL at 06:27

## 2020-01-01 RX ADMIN — LEVOTHYROXINE SODIUM 75 MCG: 0.07 TABLET ORAL at 04:23

## 2020-01-01 RX ADMIN — POTASSIUM CHLORIDE 20 MEQ: 1500 TABLET, EXTENDED RELEASE ORAL at 05:31

## 2020-01-01 RX ADMIN — HEPARIN SODIUM 5000 UNITS: 5000 INJECTION, SOLUTION INTRAVENOUS; SUBCUTANEOUS at 16:02

## 2020-01-01 RX ADMIN — METOPROLOL SUCCINATE 25 MG: 25 TABLET, EXTENDED RELEASE ORAL at 06:02

## 2020-01-01 RX ADMIN — FUROSEMIDE 20 MG: 10 INJECTION, SOLUTION INTRAMUSCULAR; INTRAVENOUS at 05:16

## 2020-01-01 RX ADMIN — SENNOSIDES-DOCUSATE SODIUM TAB 8.6-50 MG 2 TABLET: 8.6-5 TAB at 05:30

## 2020-01-01 RX ADMIN — PRAVASTATIN SODIUM 20 MG: 20 TABLET ORAL at 21:46

## 2020-01-01 RX ADMIN — DONEPEZIL HYDROCHLORIDE 5 MG: 5 TABLET, FILM COATED ORAL at 21:51

## 2020-01-01 RX ADMIN — OXYCODONE HYDROCHLORIDE 5 MG: 5 TABLET ORAL at 21:28

## 2020-01-01 RX ADMIN — DONEPEZIL HYDROCHLORIDE 5 MG: 5 TABLET, FILM COATED ORAL at 21:19

## 2020-01-01 RX ADMIN — FAMOTIDINE 20 MG: 20 TABLET ORAL at 17:37

## 2020-01-01 RX ADMIN — PRAVASTATIN SODIUM 20 MG: 20 TABLET ORAL at 20:36

## 2020-01-01 RX ADMIN — DOCUSATE SODIUM 50 MG AND SENNOSIDES 8.6 MG 2 TABLET: 8.6; 5 TABLET, FILM COATED ORAL at 16:01

## 2020-01-01 RX ADMIN — ACETAMINOPHEN 650 MG: 325 TABLET, FILM COATED ORAL at 06:25

## 2020-01-01 RX ADMIN — DONEPEZIL HYDROCHLORIDE 5 MG: 5 TABLET, FILM COATED ORAL at 20:32

## 2020-01-01 RX ADMIN — METOPROLOL SUCCINATE 25 MG: 25 TABLET, EXTENDED RELEASE ORAL at 05:48

## 2020-01-01 RX ADMIN — LIOTHYRONINE SODIUM 5 MCG: 5 TABLET ORAL at 05:52

## 2020-01-01 RX ADMIN — PRAVASTATIN SODIUM 20 MG: 20 TABLET ORAL at 20:09

## 2020-01-01 RX ADMIN — LEVOTHYROXINE SODIUM 75 MCG: 0.07 TABLET ORAL at 05:23

## 2020-01-01 RX ADMIN — LIDOCAINE 1 PATCH: 50 PATCH CUTANEOUS at 15:39

## 2020-01-01 RX ADMIN — PRAVASTATIN SODIUM 20 MG: 20 TABLET ORAL at 23:59

## 2020-01-01 RX ADMIN — DONEPEZIL HYDROCHLORIDE 5 MG: 5 TABLET, FILM COATED ORAL at 21:11

## 2020-01-01 RX ADMIN — FLUTICASONE PROPIONATE 50 MCG: 50 SPRAY, METERED NASAL at 05:37

## 2020-01-01 RX ADMIN — MECLIZINE HYDROCHLORIDE 25 MG: 25 TABLET ORAL at 22:13

## 2020-01-01 RX ADMIN — ASPIRIN 81 MG CHEWABLE TABLET 324 MG: 81 TABLET CHEWABLE at 05:11

## 2020-01-01 RX ADMIN — METOPROLOL SUCCINATE 12.5 MG: 25 TABLET, EXTENDED RELEASE ORAL at 05:12

## 2020-01-01 RX ADMIN — LIOTHYRONINE SODIUM 5 MCG: 5 TABLET ORAL at 05:15

## 2020-01-01 RX ADMIN — POLYETHYLENE GLYCOL 3350 1 PACKET: 17 POWDER, FOR SOLUTION ORAL at 04:23

## 2020-01-01 RX ADMIN — HEPARIN SODIUM 5000 UNITS: 5000 INJECTION, SOLUTION INTRAVENOUS; SUBCUTANEOUS at 14:32

## 2020-01-01 RX ADMIN — LEVOTHYROXINE SODIUM 75 MCG: 50 TABLET ORAL at 06:27

## 2020-01-01 RX ADMIN — FUROSEMIDE 20 MG: 10 INJECTION, SOLUTION INTRAMUSCULAR; INTRAVENOUS at 12:41

## 2020-01-01 RX ADMIN — DONEPEZIL HYDROCHLORIDE 5 MG: 5 TABLET, FILM COATED ORAL at 20:35

## 2020-01-01 RX ADMIN — LEVOTHYROXINE SODIUM 75 MCG: 0.07 TABLET ORAL at 05:00

## 2020-01-01 RX ADMIN — DOCUSATE SODIUM - SENNOSIDES 2 TABLET: 50; 8.6 TABLET, FILM COATED ORAL at 05:45

## 2020-01-01 RX ADMIN — FUROSEMIDE 20 MG: 10 INJECTION, SOLUTION INTRAMUSCULAR; INTRAVENOUS at 05:09

## 2020-01-01 RX ADMIN — FUROSEMIDE 20 MG: 20 TABLET ORAL at 16:15

## 2020-01-01 RX ADMIN — DOCUSATE SODIUM - SENNOSIDES 2 TABLET: 50; 8.6 TABLET, FILM COATED ORAL at 17:03

## 2020-01-01 RX ADMIN — POTASSIUM CHLORIDE 20 MEQ: 1500 TABLET, EXTENDED RELEASE ORAL at 05:38

## 2020-01-01 RX ADMIN — DOCUSATE SODIUM 50 MG AND SENNOSIDES 8.6 MG 2 TABLET: 8.6; 5 TABLET, FILM COATED ORAL at 17:11

## 2020-01-01 RX ADMIN — FAMOTIDINE 20 MG: 20 TABLET, FILM COATED ORAL at 16:48

## 2020-01-01 RX ADMIN — ACETAMINOPHEN 650 MG: 325 TABLET, FILM COATED ORAL at 12:42

## 2020-01-01 RX ADMIN — POLYETHYLENE GLYCOL 3350 1 PACKET: 17 POWDER, FOR SOLUTION ORAL at 04:19

## 2020-01-01 RX ADMIN — LEVOTHYROXINE SODIUM 75 MCG: 0.07 TABLET ORAL at 05:48

## 2020-01-01 RX ADMIN — IOHEXOL 75 ML: 350 INJECTION, SOLUTION INTRAVENOUS at 13:22

## 2020-01-01 RX ADMIN — FLUTICASONE PROPIONATE 50 MCG: 50 SPRAY, METERED NASAL at 15:26

## 2020-01-01 RX ADMIN — METOPROLOL SUCCINATE 12.5 MG: 25 TABLET, EXTENDED RELEASE ORAL at 05:09

## 2020-01-01 RX ADMIN — AMPICILLIN SODIUM AND SULBACTAM SODIUM 3 G: 2; 1 INJECTION, POWDER, FOR SOLUTION INTRAMUSCULAR; INTRAVENOUS at 05:31

## 2020-01-01 RX ADMIN — OXYCODONE HYDROCHLORIDE 5 MG: 5 TABLET ORAL at 16:26

## 2020-01-01 RX ADMIN — RIVAROXABAN 15 MG: 15 TABLET, FILM COATED ORAL at 18:12

## 2020-01-01 RX ADMIN — LIOTHYRONINE SODIUM 5 MCG: 5 TABLET ORAL at 05:29

## 2020-01-01 RX ADMIN — HEPARIN SODIUM 5000 UNITS: 5000 INJECTION, SOLUTION INTRAVENOUS; SUBCUTANEOUS at 14:00

## 2020-01-01 RX ADMIN — HEPARIN SODIUM 5000 UNITS: 5000 INJECTION, SOLUTION INTRAVENOUS; SUBCUTANEOUS at 05:43

## 2020-01-01 RX ADMIN — LEVOTHYROXINE SODIUM 75 MCG: 0.07 TABLET ORAL at 06:25

## 2020-01-01 RX ADMIN — ACETAMINOPHEN 650 MG: 325 TABLET, FILM COATED ORAL at 05:00

## 2020-01-01 RX ADMIN — METOPROLOL SUCCINATE 25 MG: 25 TABLET, EXTENDED RELEASE ORAL at 06:29

## 2020-01-01 RX ADMIN — ENOXAPARIN SODIUM 40 MG: 40 INJECTION SUBCUTANEOUS at 15:26

## 2020-01-01 RX ADMIN — DOCUSATE SODIUM 50 MG AND SENNOSIDES 8.6 MG 2 TABLET: 8.6; 5 TABLET, FILM COATED ORAL at 16:31

## 2020-01-01 RX ADMIN — METOLAZONE 5 MG: 5 TABLET ORAL at 05:52

## 2020-01-01 RX ADMIN — PRAVASTATIN SODIUM 20 MG: 20 TABLET ORAL at 20:25

## 2020-01-01 RX ADMIN — PRAVASTATIN SODIUM 20 MG: 10 TABLET ORAL at 20:32

## 2020-01-01 RX ADMIN — METOPROLOL SUCCINATE 25 MG: 25 TABLET, EXTENDED RELEASE ORAL at 05:37

## 2020-01-01 RX ADMIN — FLUTICASONE PROPIONATE 50 MCG: 50 SPRAY, METERED NASAL at 05:50

## 2020-01-01 RX ADMIN — METOPROLOL SUCCINATE 25 MG: 25 TABLET, EXTENDED RELEASE ORAL at 06:26

## 2020-01-01 RX ADMIN — METOPROLOL SUCCINATE 12.5 MG: 25 TABLET, EXTENDED RELEASE ORAL at 09:58

## 2020-01-01 RX ADMIN — DONEPEZIL HYDROCHLORIDE 5 MG: 5 TABLET, FILM COATED ORAL at 20:00

## 2020-01-01 RX ADMIN — FAMOTIDINE 20 MG: 20 TABLET, FILM COATED ORAL at 18:00

## 2020-01-01 RX ADMIN — LEVOTHYROXINE SODIUM 75 MCG: 0.07 TABLET ORAL at 05:37

## 2020-01-01 RX ADMIN — SENNOSIDES-DOCUSATE SODIUM TAB 8.6-50 MG 2 TABLET: 8.6-5 TAB at 06:28

## 2020-01-01 RX ADMIN — DONEPEZIL HYDROCHLORIDE 5 MG: 5 TABLET, FILM COATED ORAL at 20:34

## 2020-01-01 RX ADMIN — LIOTHYRONINE SODIUM 5 MCG: 5 TABLET ORAL at 05:33

## 2020-01-01 RX ADMIN — FAMOTIDINE 20 MG: 20 TABLET, FILM COATED ORAL at 06:03

## 2020-01-01 RX ADMIN — LEVOTHYROXINE SODIUM 75 MCG: 75 TABLET ORAL at 05:12

## 2020-01-01 RX ADMIN — DONEPEZIL HYDROCHLORIDE 5 MG: 5 TABLET, FILM COATED ORAL at 22:13

## 2020-01-01 RX ADMIN — ENOXAPARIN SODIUM 80 MG: 100 INJECTION SUBCUTANEOUS at 04:18

## 2020-01-01 RX ADMIN — POTASSIUM CHLORIDE 20 MEQ: 1500 TABLET, EXTENDED RELEASE ORAL at 16:16

## 2020-01-01 RX ADMIN — FAMOTIDINE 20 MG: 20 TABLET, FILM COATED ORAL at 05:28

## 2020-01-01 RX ADMIN — FLUTICASONE PROPIONATE 50 MCG: 50 SPRAY, METERED NASAL at 05:23

## 2020-01-01 RX ADMIN — SODIUM CHLORIDE: 9 INJECTION, SOLUTION INTRAVENOUS at 12:08

## 2020-01-01 RX ADMIN — RIVAROXABAN 15 MG: 15 TABLET, FILM COATED ORAL at 09:21

## 2020-01-01 RX ADMIN — HEPARIN SODIUM 5000 UNITS: 5000 INJECTION, SOLUTION INTRAVENOUS; SUBCUTANEOUS at 05:08

## 2020-01-01 RX ADMIN — FAMOTIDINE 20 MG: 20 TABLET, FILM COATED ORAL at 16:59

## 2020-01-01 RX ADMIN — LEVOTHYROXINE SODIUM 75 MCG: 50 TABLET ORAL at 05:30

## 2020-01-01 RX ADMIN — FLUTICASONE PROPIONATE 50 MCG: 50 SPRAY, METERED NASAL at 05:30

## 2020-01-01 RX ADMIN — FAMOTIDINE 20 MG: 20 TABLET, FILM COATED ORAL at 05:44

## 2020-01-01 RX ADMIN — HEPARIN SODIUM 5000 UNITS: 5000 INJECTION, SOLUTION INTRAVENOUS; SUBCUTANEOUS at 05:12

## 2020-01-01 RX ADMIN — RIVAROXABAN 15 MG: 15 TABLET, FILM COATED ORAL at 17:30

## 2020-01-01 RX ADMIN — PRAVASTATIN SODIUM 20 MG: 20 TABLET ORAL at 20:39

## 2020-01-01 RX ADMIN — METOLAZONE 5 MG: 5 TABLET ORAL at 11:41

## 2020-01-01 RX ADMIN — HEPARIN SODIUM 5000 UNITS: 5000 INJECTION, SOLUTION INTRAVENOUS; SUBCUTANEOUS at 14:18

## 2020-01-01 RX ADMIN — FENTANYL CITRATE 25 MCG: 50 INJECTION, SOLUTION INTRAMUSCULAR; INTRAVENOUS at 16:34

## 2020-01-01 RX ADMIN — LIOTHYRONINE SODIUM 5 MCG: 5 TABLET ORAL at 05:11

## 2020-01-01 RX ADMIN — LEVOTHYROXINE SODIUM 75 MCG: 75 TABLET ORAL at 05:15

## 2020-01-01 RX ADMIN — VANCOMYCIN HYDROCHLORIDE 1500 MG: 500 INJECTION, POWDER, LYOPHILIZED, FOR SOLUTION INTRAVENOUS at 02:43

## 2020-01-01 RX ADMIN — DONEPEZIL HYDROCHLORIDE 5 MG: 5 TABLET, FILM COATED ORAL at 20:28

## 2020-01-01 RX ADMIN — IOHEXOL 75 ML: 350 INJECTION, SOLUTION INTRAVENOUS at 15:30

## 2020-01-01 RX ADMIN — DONEPEZIL HYDROCHLORIDE 5 MG: 5 TABLET, FILM COATED ORAL at 20:41

## 2020-01-01 RX ADMIN — METOPROLOL SUCCINATE 25 MG: 25 TABLET, EXTENDED RELEASE ORAL at 05:28

## 2020-01-01 RX ADMIN — HEPARIN SODIUM 5000 UNITS: 5000 INJECTION, SOLUTION INTRAVENOUS; SUBCUTANEOUS at 21:15

## 2020-01-01 RX ADMIN — FUROSEMIDE 40 MG: 40 TABLET ORAL at 06:26

## 2020-01-01 RX ADMIN — LEVOTHYROXINE SODIUM 75 MCG: 0.07 TABLET ORAL at 05:05

## 2020-01-01 RX ADMIN — PRAVASTATIN SODIUM 20 MG: 20 TABLET ORAL at 21:43

## 2020-01-01 RX ADMIN — HEPARIN SODIUM 5000 UNITS: 5000 INJECTION, SOLUTION INTRAVENOUS; SUBCUTANEOUS at 21:41

## 2020-01-01 RX ADMIN — POTASSIUM CHLORIDE 40 MEQ: 1500 TABLET, EXTENDED RELEASE ORAL at 08:43

## 2020-01-01 RX ADMIN — LIDOCAINE 1 PATCH: 50 PATCH TOPICAL at 15:29

## 2020-01-01 RX ADMIN — BENZOCAINE AND MENTHOL, UNSPECIFIED FORM 1 LOZENGE: 15; 3.6 LOZENGE ORAL at 08:41

## 2020-01-01 RX ADMIN — METOLAZONE 5 MG: 5 TABLET ORAL at 05:09

## 2020-01-01 RX ADMIN — LIOTHYRONINE SODIUM 5 MCG: 5 TABLET ORAL at 05:09

## 2020-01-01 RX ADMIN — LEVOTHYROXINE SODIUM 75 MCG: 75 TABLET ORAL at 05:09

## 2020-01-01 RX ADMIN — OXYCODONE HYDROCHLORIDE 5 MG: 5 TABLET ORAL at 22:38

## 2020-01-01 RX ADMIN — FUROSEMIDE 20 MG: 10 INJECTION, SOLUTION INTRAMUSCULAR; INTRAVENOUS at 05:51

## 2020-01-01 RX ADMIN — LEVOTHYROXINE SODIUM 75 MCG: 0.07 TABLET ORAL at 04:18

## 2020-01-01 RX ADMIN — PRAVASTATIN SODIUM 20 MG: 10 TABLET ORAL at 20:35

## 2020-01-01 RX ADMIN — FAMOTIDINE 20 MG: 20 TABLET, FILM COATED ORAL at 05:48

## 2020-01-01 RX ADMIN — OXYCODONE HYDROCHLORIDE AND ACETAMINOPHEN 1 TABLET: 5; 325 TABLET ORAL at 01:16

## 2020-01-01 RX ADMIN — AMPICILLIN SODIUM AND SULBACTAM SODIUM 3 G: 2; 1 INJECTION, POWDER, FOR SOLUTION INTRAMUSCULAR; INTRAVENOUS at 00:00

## 2020-01-01 RX ADMIN — LIDOCAINE 1 PATCH: 50 PATCH CUTANEOUS at 17:54

## 2020-01-01 RX ADMIN — POLYETHYLENE GLYCOL 3350 1 PACKET: 17 POWDER, FOR SOLUTION ORAL at 04:18

## 2020-01-01 RX ADMIN — RIVAROXABAN 15 MG: 15 TABLET, FILM COATED ORAL at 16:59

## 2020-01-01 RX ADMIN — POLYETHYLENE GLYCOL 3350 1 PACKET: 17 POWDER, FOR SOLUTION ORAL at 17:12

## 2020-01-01 RX ADMIN — MECLIZINE HYDROCHLORIDE 25 MG: 25 TABLET ORAL at 16:36

## 2020-01-01 RX ADMIN — ENOXAPARIN SODIUM 40 MG: 40 INJECTION SUBCUTANEOUS at 05:29

## 2020-01-01 RX ADMIN — PRAVASTATIN SODIUM 20 MG: 10 TABLET ORAL at 20:06

## 2020-01-01 RX ADMIN — DONEPEZIL HYDROCHLORIDE 5 MG: 5 TABLET, FILM COATED ORAL at 20:02

## 2020-01-01 RX ADMIN — LEVOTHYROXINE SODIUM 75 MCG: 0.07 TABLET ORAL at 05:44

## 2020-01-01 RX ADMIN — HEPARIN SODIUM 5000 UNITS: 5000 INJECTION, SOLUTION INTRAVENOUS; SUBCUTANEOUS at 05:13

## 2020-01-01 RX ADMIN — LEVOTHYROXINE SODIUM 75 MCG: 75 TABLET ORAL at 05:26

## 2020-01-01 RX ADMIN — METAXALONE 800 MG: 800 TABLET ORAL at 08:06

## 2020-01-01 RX ADMIN — PIPERACILLIN AND TAZOBACTAM 4.5 G: 4; .5 INJECTION, POWDER, LYOPHILIZED, FOR SOLUTION INTRAVENOUS; PARENTERAL at 21:25

## 2020-01-01 RX ADMIN — MECLIZINE HYDROCHLORIDE 25 MG: 25 TABLET ORAL at 10:38

## 2020-01-01 RX ADMIN — FLUTICASONE PROPIONATE 50 MCG: 50 SPRAY, METERED NASAL at 05:39

## 2020-01-01 RX ADMIN — DOCUSATE SODIUM 50 MG AND SENNOSIDES 8.6 MG 2 TABLET: 8.6; 5 TABLET, FILM COATED ORAL at 17:12

## 2020-01-01 RX ADMIN — DONEPEZIL HYDROCHLORIDE 5 MG: 5 TABLET, FILM COATED ORAL at 20:39

## 2020-01-01 RX ADMIN — ASPIRIN 81 MG CHEWABLE TABLET 324 MG: 81 TABLET CHEWABLE at 05:21

## 2020-01-01 RX ADMIN — DOCUSATE SODIUM - SENNOSIDES 2 TABLET: 50; 8.6 TABLET, FILM COATED ORAL at 05:14

## 2020-01-01 RX ADMIN — DOCUSATE SODIUM - SENNOSIDES 2 TABLET: 50; 8.6 TABLET, FILM COATED ORAL at 23:59

## 2020-01-01 RX ADMIN — IOHEXOL 100 ML: 350 INJECTION, SOLUTION INTRAVENOUS at 14:15

## 2020-01-01 RX ADMIN — GADOTERIDOL 15 ML: 279.3 INJECTION, SOLUTION INTRAVENOUS at 23:53

## 2020-01-01 RX ADMIN — LEVOTHYROXINE SODIUM 75 MCG: 0.07 TABLET ORAL at 04:50

## 2020-01-01 RX ADMIN — ENOXAPARIN SODIUM 40 MG: 40 INJECTION SUBCUTANEOUS at 05:36

## 2020-01-01 RX ADMIN — FLUTICASONE PROPIONATE 50 MCG: 50 SPRAY, METERED NASAL at 06:27

## 2020-01-01 RX ADMIN — ACETAMINOPHEN 650 MG: 325 TABLET, FILM COATED ORAL at 05:32

## 2020-01-01 RX ADMIN — ENOXAPARIN SODIUM 80 MG: 100 INJECTION SUBCUTANEOUS at 17:11

## 2020-01-01 RX ADMIN — FLUTICASONE PROPIONATE 50 MCG: 50 SPRAY, METERED NASAL at 06:26

## 2020-01-01 RX ADMIN — NITROGLYCERIN 0.4 MG: 0.4 TABLET, ORALLY DISINTEGRATING SUBLINGUAL at 18:25

## 2020-01-01 RX ADMIN — MIDAZOLAM HYDROCHLORIDE 1 MG: 1 INJECTION, SOLUTION INTRAMUSCULAR; INTRAVENOUS at 16:54

## 2020-01-01 RX ADMIN — FLUTICASONE PROPIONATE 50 MCG: 50 SPRAY, METERED NASAL at 18:57

## 2020-01-01 RX ADMIN — FAMOTIDINE 20 MG: 20 TABLET, FILM COATED ORAL at 05:05

## 2020-01-01 RX ADMIN — RIVAROXABAN 20 MG: 20 TABLET, FILM COATED ORAL at 17:53

## 2020-01-01 RX ADMIN — POTASSIUM CHLORIDE 40 MEQ: 1500 TABLET, EXTENDED RELEASE ORAL at 16:48

## 2020-01-01 RX ADMIN — DONEPEZIL HYDROCHLORIDE 5 MG: 5 TABLET, FILM COATED ORAL at 20:33

## 2020-01-01 RX ADMIN — BENZOCAINE AND MENTHOL, UNSPECIFIED FORM 1 LOZENGE: 15; 3.6 LOZENGE ORAL at 05:09

## 2020-01-01 RX ADMIN — LEVOTHYROXINE SODIUM 75 MCG: 0.07 TABLET ORAL at 06:15

## 2020-01-01 RX ADMIN — HUMAN ALBUMIN MICROSPHERES AND PERFLUTREN 3 ML: 10; .22 INJECTION, SOLUTION INTRAVENOUS at 10:20

## 2020-01-01 RX ADMIN — METOPROLOL SUCCINATE 12.5 MG: 25 TABLET, EXTENDED RELEASE ORAL at 05:13

## 2020-01-01 RX ADMIN — FLUTICASONE PROPIONATE 50 MCG: 50 SPRAY, METERED NASAL at 05:44

## 2020-01-01 RX ADMIN — LIDOCAINE 1 PATCH: 50 PATCH CUTANEOUS at 16:31

## 2020-01-01 RX ADMIN — DONEPEZIL HYDROCHLORIDE 5 MG: 5 TABLET, FILM COATED ORAL at 21:46

## 2020-01-01 RX ADMIN — MORPHINE SULFATE 2 MG: 4 INJECTION INTRAVENOUS at 02:54

## 2020-01-01 RX ADMIN — FUROSEMIDE 20 MG: 10 INJECTION, SOLUTION INTRAMUSCULAR; INTRAVENOUS at 16:16

## 2020-01-01 RX ADMIN — OXYCODONE HYDROCHLORIDE 5 MG: 5 TABLET ORAL at 05:16

## 2020-01-01 RX ADMIN — BENZOCAINE AND MENTHOL, UNSPECIFIED FORM 1 LOZENGE: 15; 3.6 LOZENGE ORAL at 00:01

## 2020-01-01 RX ADMIN — DONEPEZIL HYDROCHLORIDE 5 MG: 5 TABLET, FILM COATED ORAL at 21:41

## 2020-01-01 RX ADMIN — DOCUSATE SODIUM - SENNOSIDES 2 TABLET: 50; 8.6 TABLET, FILM COATED ORAL at 05:24

## 2020-01-01 RX ADMIN — HEPARIN SODIUM 5000 UNITS: 5000 INJECTION, SOLUTION INTRAVENOUS; SUBCUTANEOUS at 08:43

## 2020-01-01 RX ADMIN — IOHEXOL 100 ML: 350 INJECTION, SOLUTION INTRAVENOUS at 18:43

## 2020-01-01 RX ADMIN — LEVOTHYROXINE SODIUM 75 MCG: 0.07 TABLET ORAL at 05:59

## 2020-01-01 RX ADMIN — ASPIRIN 81 MG CHEWABLE TABLET 81 MG: 81 TABLET CHEWABLE at 11:16

## 2020-01-01 RX ADMIN — METOPROLOL SUCCINATE 12.5 MG: 25 TABLET, EXTENDED RELEASE ORAL at 06:00

## 2020-01-01 RX ADMIN — ACETAMINOPHEN 650 MG: 325 TABLET, FILM COATED ORAL at 20:41

## 2020-01-01 RX ADMIN — SENNOSIDES-DOCUSATE SODIUM TAB 8.6-50 MG 2 TABLET: 8.6-5 TAB at 05:37

## 2020-01-01 RX ADMIN — METAXALONE 800 MG: 800 TABLET ORAL at 09:15

## 2020-01-01 RX ADMIN — SENNOSIDES-DOCUSATE SODIUM TAB 8.6-50 MG 2 TABLET: 8.6-5 TAB at 17:13

## 2020-01-01 RX ADMIN — LIDOCAINE 1 PATCH: 50 PATCH TOPICAL at 17:11

## 2020-01-01 RX ADMIN — PRAVASTATIN SODIUM 20 MG: 10 TABLET ORAL at 21:51

## 2020-01-01 RX ADMIN — LEVOTHYROXINE SODIUM 75 MCG: 75 TABLET ORAL at 05:33

## 2020-01-01 RX ADMIN — MECLIZINE HYDROCHLORIDE 25 MG: 25 TABLET ORAL at 16:23

## 2020-01-01 RX ADMIN — LEVOTHYROXINE SODIUM 75 MCG: 0.07 TABLET ORAL at 05:46

## 2020-01-01 RX ADMIN — DOCUSATE SODIUM - SENNOSIDES 2 TABLET: 50; 8.6 TABLET, FILM COATED ORAL at 16:34

## 2020-01-01 RX ADMIN — FENTANYL CITRATE 25 MCG: 50 INJECTION INTRAMUSCULAR; INTRAVENOUS at 16:34

## 2020-01-01 RX ADMIN — OXYCODONE HYDROCHLORIDE AND ACETAMINOPHEN 1 TABLET: 5; 325 TABLET ORAL at 10:40

## 2020-01-01 RX ADMIN — ENOXAPARIN SODIUM 40 MG: 40 INJECTION SUBCUTANEOUS at 05:33

## 2020-01-01 RX ADMIN — ENOXAPARIN SODIUM 80 MG: 100 INJECTION SUBCUTANEOUS at 04:50

## 2020-01-01 RX ADMIN — PRAVASTATIN SODIUM 20 MG: 20 TABLET ORAL at 21:19

## 2020-01-01 RX ADMIN — LIDOCAINE 1 PATCH: 50 PATCH TOPICAL at 17:01

## 2020-01-01 RX ADMIN — FLUTICASONE PROPIONATE 50 MCG: 50 SPRAY, METERED NASAL at 06:03

## 2020-01-01 RX ADMIN — FUROSEMIDE 20 MG: 10 INJECTION, SOLUTION INTRAMUSCULAR; INTRAVENOUS at 16:37

## 2020-01-01 RX ADMIN — OXYCODONE HYDROCHLORIDE 5 MG: 5 TABLET ORAL at 05:09

## 2020-01-01 RX ADMIN — LEVOTHYROXINE SODIUM 75 MCG: 75 TABLET ORAL at 05:32

## 2020-01-01 RX ADMIN — FENTANYL CITRATE 25 MCG: 50 INJECTION, SOLUTION INTRAMUSCULAR; INTRAVENOUS at 16:43

## 2020-01-01 RX ADMIN — DOCUSATE SODIUM - SENNOSIDES 2 TABLET: 50; 8.6 TABLET, FILM COATED ORAL at 05:17

## 2020-01-01 RX ADMIN — OXYCODONE HYDROCHLORIDE 5 MG: 5 TABLET ORAL at 21:51

## 2020-01-01 RX ADMIN — LIDOCAINE 1 PATCH: 50 PATCH TOPICAL at 15:46

## 2020-01-01 RX ADMIN — ENOXAPARIN SODIUM 40 MG: 40 INJECTION SUBCUTANEOUS at 18:57

## 2020-01-01 RX ADMIN — ENOXAPARIN SODIUM 80 MG: 100 INJECTION SUBCUTANEOUS at 16:48

## 2020-01-01 RX ADMIN — METOPROLOL SUCCINATE 25 MG: 25 TABLET, EXTENDED RELEASE ORAL at 05:59

## 2020-01-01 RX ADMIN — METOPROLOL SUCCINATE 12.5 MG: 25 TABLET, EXTENDED RELEASE ORAL at 05:15

## 2020-01-01 RX ADMIN — RIVAROXABAN 15 MG: 15 TABLET, FILM COATED ORAL at 18:39

## 2020-01-01 RX ADMIN — RIVAROXABAN 15 MG: 15 TABLET, FILM COATED ORAL at 07:46

## 2020-01-01 RX ADMIN — FUROSEMIDE 20 MG: 20 TABLET ORAL at 17:53

## 2020-01-01 RX ADMIN — ASPIRIN 325 MG ORAL TABLET 325 MG: 325 PILL ORAL at 15:26

## 2020-01-01 RX ADMIN — LEVOTHYROXINE SODIUM 75 MCG: 50 TABLET ORAL at 05:29

## 2020-01-01 RX ADMIN — MIDAZOLAM HYDROCHLORIDE 1 MG: 1 INJECTION, SOLUTION INTRAMUSCULAR; INTRAVENOUS at 16:51

## 2020-01-01 RX ADMIN — POTASSIUM CHLORIDE 40 MEQ: 1500 TABLET, EXTENDED RELEASE ORAL at 09:58

## 2020-01-01 RX ADMIN — DOCUSATE SODIUM - SENNOSIDES 2 TABLET: 50; 8.6 TABLET, FILM COATED ORAL at 05:52

## 2020-01-01 RX ADMIN — CEFAZOLIN 2 G: 330 INJECTION, POWDER, FOR SOLUTION INTRAMUSCULAR; INTRAVENOUS at 16:31

## 2020-01-01 RX ADMIN — LEVOTHYROXINE SODIUM 75 MCG: 75 TABLET ORAL at 05:22

## 2020-01-01 RX ADMIN — FENTANYL CITRATE 25 MCG: 50 INJECTION, SOLUTION INTRAMUSCULAR; INTRAVENOUS at 16:53

## 2020-01-01 RX ADMIN — ACETAMINOPHEN 650 MG: 325 TABLET, FILM COATED ORAL at 20:09

## 2020-01-01 RX ADMIN — LIOTHYRONINE SODIUM 5 MCG: 5 TABLET ORAL at 05:32

## 2020-01-01 RX ADMIN — HEPARIN SODIUM 5000 UNITS: 5000 INJECTION, SOLUTION INTRAVENOUS; SUBCUTANEOUS at 05:51

## 2020-01-01 RX ADMIN — DONEPEZIL HYDROCHLORIDE 5 MG: 5 TABLET, FILM COATED ORAL at 20:29

## 2020-01-01 RX ADMIN — FLUTICASONE PROPIONATE 50 MCG: 50 SPRAY, METERED NASAL at 05:05

## 2020-01-01 RX ADMIN — LEVOTHYROXINE SODIUM 75 MCG: 50 TABLET ORAL at 05:33

## 2020-01-01 RX ADMIN — OXYCODONE HYDROCHLORIDE 5 MG: 5 TABLET ORAL at 05:46

## 2020-01-01 RX ADMIN — HEPARIN SODIUM 5000 UNITS: 5000 INJECTION, SOLUTION INTRAVENOUS; SUBCUTANEOUS at 05:22

## 2020-01-01 RX ADMIN — LIDOCAINE 1 PATCH: 50 PATCH CUTANEOUS at 17:12

## 2020-01-01 RX ADMIN — DOCUSATE SODIUM 50 MG AND SENNOSIDES 8.6 MG 2 TABLET: 8.6; 5 TABLET, FILM COATED ORAL at 16:48

## 2020-01-01 RX ADMIN — PRAVASTATIN SODIUM 20 MG: 20 TABLET ORAL at 21:42

## 2020-01-01 RX ADMIN — IOHEXOL 100 ML: 350 INJECTION, SOLUTION INTRAVENOUS at 08:54

## 2020-01-01 RX ADMIN — AMPICILLIN SODIUM AND SULBACTAM SODIUM 3 G: 2; 1 INJECTION, POWDER, FOR SOLUTION INTRAMUSCULAR; INTRAVENOUS at 11:48

## 2020-01-01 RX ADMIN — MORPHINE SULFATE 2 MG: 4 INJECTION INTRAVENOUS at 20:12

## 2020-01-01 RX ADMIN — PRAVASTATIN SODIUM 20 MG: 20 TABLET ORAL at 20:17

## 2020-01-01 RX ADMIN — PRAVASTATIN SODIUM 20 MG: 20 TABLET ORAL at 22:10

## 2020-01-01 RX ADMIN — FENTANYL CITRATE 100 MCG: 50 INJECTION INTRAMUSCULAR; INTRAVENOUS at 14:42

## 2020-01-01 RX ADMIN — PRAVASTATIN SODIUM 20 MG: 20 TABLET ORAL at 20:51

## 2020-01-01 RX ADMIN — DOCUSATE SODIUM 50 MG AND SENNOSIDES 8.6 MG 2 TABLET: 8.6; 5 TABLET, FILM COATED ORAL at 05:00

## 2020-01-01 RX ADMIN — LEVOTHYROXINE SODIUM 75 MCG: 0.07 TABLET ORAL at 06:03

## 2020-01-01 RX ADMIN — LEVOTHYROXINE SODIUM 75 MCG: 50 TABLET ORAL at 18:57

## 2020-01-01 RX ADMIN — ASPIRIN 325 MG ORAL TABLET 325 MG: 325 PILL ORAL at 05:29

## 2020-01-01 RX ADMIN — ACETAMINOPHEN 650 MG: 325 TABLET, FILM COATED ORAL at 16:15

## 2020-01-01 RX ADMIN — METOPROLOL SUCCINATE 25 MG: 25 TABLET, EXTENDED RELEASE ORAL at 05:04

## 2020-01-01 RX ADMIN — FUROSEMIDE 20 MG: 10 INJECTION, SOLUTION INTRAMUSCULAR; INTRAVENOUS at 05:39

## 2020-01-01 RX ADMIN — LEVOTHYROXINE SODIUM 75 MCG: 0.07 TABLET ORAL at 05:16

## 2020-01-01 RX ADMIN — POTASSIUM CHLORIDE 20 MEQ: 1500 TABLET, EXTENDED RELEASE ORAL at 17:13

## 2020-01-01 RX ADMIN — POTASSIUM CHLORIDE 40 MEQ: 1500 TABLET, EXTENDED RELEASE ORAL at 09:56

## 2020-01-01 RX ADMIN — METOPROLOL SUCCINATE 25 MG: 25 TABLET, EXTENDED RELEASE ORAL at 18:38

## 2020-01-01 RX ADMIN — LEVOTHYROXINE SODIUM 75 MCG: 75 TABLET ORAL at 05:45

## 2020-01-01 RX ADMIN — FUROSEMIDE 40 MG: 40 TABLET ORAL at 16:17

## 2020-01-01 RX ADMIN — FLUTICASONE PROPIONATE 50 MCG: 50 SPRAY, METERED NASAL at 06:00

## 2020-01-01 RX ADMIN — DONEPEZIL HYDROCHLORIDE 5 MG: 5 TABLET, FILM COATED ORAL at 20:15

## 2020-01-01 RX ADMIN — DONEPEZIL HYDROCHLORIDE 5 MG: 5 TABLET, FILM COATED ORAL at 20:09

## 2020-01-01 RX ADMIN — FAMOTIDINE 20 MG: 20 TABLET, FILM COATED ORAL at 18:12

## 2020-01-01 RX ADMIN — POTASSIUM CHLORIDE 20 MEQ: 1500 TABLET, EXTENDED RELEASE ORAL at 08:27

## 2020-01-01 RX ADMIN — ENOXAPARIN SODIUM 40 MG: 40 INJECTION SUBCUTANEOUS at 06:01

## 2020-01-01 RX ADMIN — LEVOTHYROXINE SODIUM 75 MCG: 0.07 TABLET ORAL at 04:19

## 2020-01-01 RX ADMIN — LIOTHYRONINE SODIUM 5 MCG: 5 TABLET ORAL at 05:22

## 2020-01-01 RX ADMIN — LEVOTHYROXINE SODIUM 75 MCG: 0.07 TABLET ORAL at 05:28

## 2020-01-01 RX ADMIN — FUROSEMIDE 40 MG: 40 TABLET ORAL at 17:12

## 2020-01-01 RX ADMIN — PRAVASTATIN SODIUM 20 MG: 20 TABLET ORAL at 21:41

## 2020-01-01 RX ADMIN — FUROSEMIDE 20 MG: 20 TABLET ORAL at 06:15

## 2020-01-01 RX ADMIN — FUROSEMIDE 20 MG: 10 INJECTION, SOLUTION INTRAMUSCULAR; INTRAVENOUS at 05:14

## 2020-01-01 RX ADMIN — FAMOTIDINE 20 MG: 20 TABLET, FILM COATED ORAL at 17:15

## 2020-01-01 RX ADMIN — LEVOTHYROXINE SODIUM 75 MCG: 75 TABLET ORAL at 05:52

## 2020-01-01 RX ADMIN — HEPARIN SODIUM 5000 UNITS: 5000 INJECTION, SOLUTION INTRAVENOUS; SUBCUTANEOUS at 21:18

## 2020-01-01 RX ADMIN — OXYCODONE HYDROCHLORIDE 5 MG: 5 TABLET ORAL at 18:38

## 2020-01-01 RX ADMIN — ENOXAPARIN SODIUM 80 MG: 100 INJECTION SUBCUTANEOUS at 04:23

## 2020-01-01 RX ADMIN — METAXALONE 800 MG: 800 TABLET ORAL at 22:38

## 2020-01-01 RX ADMIN — LIDOCAINE 1 PATCH: 50 PATCH TOPICAL at 15:56

## 2020-01-01 RX ADMIN — DONEPEZIL HYDROCHLORIDE 5 MG: 5 TABLET, FILM COATED ORAL at 20:17

## 2020-01-01 RX ADMIN — LIOTHYRONINE SODIUM 5 MCG: 5 TABLET ORAL at 05:45

## 2020-01-01 RX ADMIN — DONEPEZIL HYDROCHLORIDE 5 MG: 5 TABLET, FILM COATED ORAL at 21:45

## 2020-01-01 RX ADMIN — FAMOTIDINE 20 MG: 20 TABLET, FILM COATED ORAL at 05:59

## 2020-01-01 RX ADMIN — HEPARIN SODIUM 5000 UNITS: 5000 INJECTION, SOLUTION INTRAVENOUS; SUBCUTANEOUS at 21:42

## 2020-01-01 RX ADMIN — HEPARIN SODIUM 18 UNITS/KG/HR: 5000 INJECTION, SOLUTION INTRAVENOUS at 18:26

## 2020-01-01 RX ADMIN — DOCUSATE SODIUM 50 MG AND SENNOSIDES 8.6 MG 2 TABLET: 8.6; 5 TABLET, FILM COATED ORAL at 17:53

## 2020-01-01 RX ADMIN — DONEPEZIL HYDROCHLORIDE 5 MG: 5 TABLET, FILM COATED ORAL at 21:42

## 2020-01-01 RX ADMIN — FLUTICASONE PROPIONATE 50 MCG: 50 SPRAY, METERED NASAL at 05:59

## 2020-01-01 RX ADMIN — POTASSIUM CHLORIDE 20 MEQ: 1500 TABLET, EXTENDED RELEASE ORAL at 17:49

## 2020-01-01 RX ADMIN — LIOTHYRONINE SODIUM 5 MCG: 5 TABLET ORAL at 05:13

## 2020-01-01 RX ADMIN — DONEPEZIL HYDROCHLORIDE 5 MG: 5 TABLET, FILM COATED ORAL at 20:25

## 2020-01-01 RX ADMIN — OXYCODONE HYDROCHLORIDE 5 MG: 5 TABLET ORAL at 01:41

## 2020-01-01 RX ADMIN — HEPARIN SODIUM 5000 UNITS: 5000 INJECTION, SOLUTION INTRAVENOUS; SUBCUTANEOUS at 14:39

## 2020-01-01 ASSESSMENT — ENCOUNTER SYMPTOMS
VOMITING: 0
MYALGIAS: 1
WHEEZING: 0
EYE DISCHARGE: 0
HEADACHES: 0
ABDOMINAL PAIN: 0
FEVER: 0
COUGH: 0
COUGH: 0
TINGLING: 0
POLYDIPSIA: 0
SHORTNESS OF BREATH: 1
HEADACHES: 0
ORTHOPNEA: 0
VOMITING: 0
SORE THROAT: 0
FALLS: 1
PHOTOPHOBIA: 0
DEPRESSION: 0
SHORTNESS OF BREATH: 1
NECK PAIN: 0
STRIDOR: 0
WEAKNESS: 0
NECK PAIN: 0
SHORTNESS OF BREATH: 0
SHORTNESS OF BREATH: 1
EYE PAIN: 0
LOSS OF CONSCIOUSNESS: 0
HEMOPTYSIS: 0
MEMORY LOSS: 1
SPUTUM PRODUCTION: 0
FEVER: 0
PALPITATIONS: 0
MEMORY LOSS: 1
STRIDOR: 0
WEAKNESS: 0
PALPITATIONS: 0
BLOOD IN STOOL: 0
WHEEZING: 0
EYE DISCHARGE: 0
FALLS: 1
ABDOMINAL PAIN: 0
CLAUDICATION: 0
NERVOUS/ANXIOUS: 0
NERVOUS/ANXIOUS: 0
VOMITING: 0
CHILLS: 0
EYE PAIN: 0
SHORTNESS OF BREATH: 1
NAUSEA: 0
COUGH: 0
COUGH: 0
EYE PAIN: 0
SINUS PAIN: 0
BACK PAIN: 0
WHEEZING: 0
HEADACHES: 0
MYALGIAS: 0
CARDIOVASCULAR NEGATIVE: 1
CHILLS: 0
MEMORY LOSS: 1
TINGLING: 0
DIARRHEA: 0
SORE THROAT: 0
SHORTNESS OF BREATH: 0
ABDOMINAL PAIN: 0
SPEECH CHANGE: 0
HEADACHES: 0
ORTHOPNEA: 0
WHEEZING: 0
PSYCHIATRIC NEGATIVE: 1
VOMITING: 0
SORE THROAT: 0
SPUTUM PRODUCTION: 0
BACK PAIN: 0
SPUTUM PRODUCTION: 0
CHILLS: 0
HEARTBURN: 0
LOSS OF CONSCIOUSNESS: 0
NERVOUS/ANXIOUS: 0
HEADACHES: 0
FOCAL WEAKNESS: 0
COUGH: 1
NECK PAIN: 0
STRIDOR: 0
ORTHOPNEA: 0
CHILLS: 0
SORE THROAT: 0
PSYCHIATRIC NEGATIVE: 1
DOUBLE VISION: 0
CLAUDICATION: 0
ORTHOPNEA: 0
HALLUCINATIONS: 0
SORE THROAT: 0
PALPITATIONS: 0
DIAPHORESIS: 0
SHORTNESS OF BREATH: 0
HEMOPTYSIS: 0
HEADACHES: 0
SEIZURES: 0
FOCAL WEAKNESS: 0
SORE THROAT: 0
BACK PAIN: 0
EYE REDNESS: 0
HEARTBURN: 0
FALLS: 0
BLOOD IN STOOL: 0
HEMOPTYSIS: 0
HEADACHES: 0
BACK PAIN: 0
WEAKNESS: 0
SORE THROAT: 0
ABDOMINAL PAIN: 0
VOMITING: 0
NAUSEA: 0
DIAPHORESIS: 0
FEVER: 0
LOSS OF CONSCIOUSNESS: 0
MYALGIAS: 1
STRIDOR: 0
TREMORS: 0
DEPRESSION: 0
WEIGHT LOSS: 0
MYALGIAS: 1
EYE DISCHARGE: 0
SHORTNESS OF BREATH: 0
SENSORY CHANGE: 0
DIZZINESS: 0
SORE THROAT: 0
EYE PAIN: 0
HEARTBURN: 0
COUGH: 0
MEMORY LOSS: 1
FOCAL WEAKNESS: 0
CHILLS: 0
VOMITING: 1
CHILLS: 0
EYE PAIN: 0
BLURRED VISION: 0
DEPRESSION: 0
ABDOMINAL PAIN: 0
BLURRED VISION: 0
DIZZINESS: 1
SHORTNESS OF BREATH: 1
CLAUDICATION: 0
FEVER: 0
DEPRESSION: 0
BACK PAIN: 0
COUGH: 0
VOMITING: 0
SENSORY CHANGE: 0
VOMITING: 0
ORTHOPNEA: 0
FALLS: 1
VOMITING: 0
FOCAL WEAKNESS: 0
ORTHOPNEA: 0
BRUISES/BLEEDS EASILY: 0
DIARRHEA: 0
TINGLING: 0
SHORTNESS OF BREATH: 0
BLOOD IN STOOL: 0
MYALGIAS: 0
SPUTUM PRODUCTION: 0
CLAUDICATION: 0
DEPRESSION: 0
SINUS PAIN: 0
VOMITING: 0
PSYCHIATRIC NEGATIVE: 1
DOUBLE VISION: 0
BACK PAIN: 0
PHOTOPHOBIA: 0
PALPITATIONS: 0
EYE PAIN: 0
HEARTBURN: 0
WEAKNESS: 1
DOUBLE VISION: 0
LOSS OF CONSCIOUSNESS: 0
BACK PAIN: 0
ORTHOPNEA: 0
HALLUCINATIONS: 0
EYE PAIN: 0
DIZZINESS: 0
FOCAL WEAKNESS: 0
DIZZINESS: 0
EYE DISCHARGE: 0
SPUTUM PRODUCTION: 0
LOSS OF CONSCIOUSNESS: 0
PND: 0
WHEEZING: 0
CHILLS: 0
SINUS PAIN: 0
EYE PAIN: 0
DIZZINESS: 0
BLURRED VISION: 0
FEVER: 0
COUGH: 0
SHORTNESS OF BREATH: 1
COUGH: 0
ABDOMINAL PAIN: 0
PALPITATIONS: 0
CHILLS: 0
SPUTUM PRODUCTION: 0
FEVER: 0
COUGH: 0
EYE DISCHARGE: 0
SINUS PAIN: 0
TREMORS: 0
BLURRED VISION: 0
SHORTNESS OF BREATH: 0
PALPITATIONS: 0
STRIDOR: 0
WEIGHT LOSS: 0
NAUSEA: 0
DIARRHEA: 0
NAUSEA: 0
COUGH: 0
WHEEZING: 0
CONSTIPATION: 0
HALLUCINATIONS: 0
STRIDOR: 0
HEARTBURN: 0
ABDOMINAL PAIN: 0
VOMITING: 0
WEAKNESS: 1
NAUSEA: 0
DIAPHORESIS: 0
MYALGIAS: 0
SORE THROAT: 0
NEUROLOGICAL NEGATIVE: 1
PALPITATIONS: 0
PHOTOPHOBIA: 0
SHORTNESS OF BREATH: 0
HEMOPTYSIS: 0
NAUSEA: 0
HEADACHES: 0
DIARRHEA: 0
SPUTUM PRODUCTION: 0
NAUSEA: 0
FEVER: 0
BLURRED VISION: 0
TINGLING: 0
WEIGHT LOSS: 1
WEIGHT LOSS: 1
SINUS PAIN: 0
TINGLING: 0
HEARTBURN: 0
COUGH: 0
DEPRESSION: 0
FOCAL WEAKNESS: 0
NAUSEA: 0
SORE THROAT: 0
HEADACHES: 0
SENSORY CHANGE: 0
STRIDOR: 0
HEADACHES: 0
CHILLS: 0
PALPITATIONS: 0
SPUTUM PRODUCTION: 0
TREMORS: 0
BACK PAIN: 1
DIZZINESS: 1
DIZZINESS: 0
DOUBLE VISION: 0
HEADACHES: 0
BACK PAIN: 1
FEVER: 0
NECK PAIN: 0
SENSORY CHANGE: 0
CONSTIPATION: 0
SPUTUM PRODUCTION: 0
TINGLING: 0
LOSS OF CONSCIOUSNESS: 0
NAUSEA: 0
PSYCHIATRIC NEGATIVE: 1
MEMORY LOSS: 1
SEIZURES: 0
WEIGHT LOSS: 1
SHORTNESS OF BREATH: 1
SHORTNESS OF BREATH: 0
BACK PAIN: 0
PALPITATIONS: 0
MEMORY LOSS: 1
WEIGHT LOSS: 0
MEMORY LOSS: 1
PHOTOPHOBIA: 0
DIARRHEA: 0
WEIGHT LOSS: 0
WEIGHT LOSS: 1
NAUSEA: 1
DIAPHORESIS: 0
HEADACHES: 0
MYALGIAS: 0
COUGH: 0
ORTHOPNEA: 0
MYALGIAS: 0
HEADACHES: 0
ABDOMINAL PAIN: 0
FEVER: 0
LOSS OF CONSCIOUSNESS: 0
WEIGHT LOSS: 0
WHEEZING: 0
SPEECH CHANGE: 0
PALPITATIONS: 0
ABDOMINAL PAIN: 0
HEARTBURN: 0
TINGLING: 0
SPUTUM PRODUCTION: 0
ABDOMINAL PAIN: 0
DIARRHEA: 0
CHILLS: 0
ABDOMINAL PAIN: 0
EYE DISCHARGE: 0
SORE THROAT: 0
FOCAL WEAKNESS: 0
WHEEZING: 0
COUGH: 1
SINUS PAIN: 0
PSYCHIATRIC NEGATIVE: 1
COUGH: 0
DIZZINESS: 0
FEVER: 0
FEVER: 0
HEADACHES: 0
SORE THROAT: 0
TREMORS: 0
HEARTBURN: 0
COUGH: 0
CHILLS: 0
VOMITING: 0
HEADACHES: 0
DIZZINESS: 0
CONSTIPATION: 1
MYALGIAS: 0
DOUBLE VISION: 0
SHORTNESS OF BREATH: 0
HEADACHES: 0
FOCAL WEAKNESS: 0
HEARTBURN: 0
CHILLS: 0
DEPRESSION: 0
COUGH: 0
HEARTBURN: 0
DIARRHEA: 0
SENSORY CHANGE: 0
HEADACHES: 0
INSOMNIA: 0
WEAKNESS: 0
FOCAL WEAKNESS: 0
DIZZINESS: 0
WEIGHT LOSS: 0
SHORTNESS OF BREATH: 1
WEAKNESS: 0
CHILLS: 0
BLOOD IN STOOL: 0
EYE DISCHARGE: 0
CHILLS: 0
CHILLS: 0
ORTHOPNEA: 0
ORTHOPNEA: 0
NECK PAIN: 0
HEARTBURN: 0
SHORTNESS OF BREATH: 1
HEMOPTYSIS: 0
COUGH: 0
BACK PAIN: 1
COUGH: 0
EYE PAIN: 0
NERVOUS/ANXIOUS: 0
SORE THROAT: 0
FEVER: 0
CONSTIPATION: 0
STRIDOR: 0
NECK PAIN: 0
PALPITATIONS: 0
HEADACHES: 0
FOCAL WEAKNESS: 0
VOMITING: 0
FEVER: 0
WEIGHT LOSS: 0
BLOOD IN STOOL: 0
DEPRESSION: 0
STRIDOR: 0
COUGH: 0
NECK PAIN: 0
DOUBLE VISION: 0
SENSORY CHANGE: 0
HEARTBURN: 0
CHILLS: 0
ABDOMINAL PAIN: 0
ORTHOPNEA: 0
PALPITATIONS: 0
ORTHOPNEA: 0
FOCAL WEAKNESS: 0
HEADACHES: 0
FOCAL WEAKNESS: 0
MYALGIAS: 0
DEPRESSION: 0
COUGH: 0
NECK PAIN: 0
NAUSEA: 0
INSOMNIA: 0
CHILLS: 0
VOMITING: 0
SENSORY CHANGE: 0
DIARRHEA: 0
NAUSEA: 0
VOMITING: 0
MYALGIAS: 0
MYALGIAS: 0
NAUSEA: 0
CLAUDICATION: 0
FALLS: 1
FOCAL WEAKNESS: 0
SENSORY CHANGE: 0
HEARTBURN: 0
SENSORY CHANGE: 0
COUGH: 0
HEMOPTYSIS: 0
SHORTNESS OF BREATH: 1
WEAKNESS: 1
SHORTNESS OF BREATH: 0
MEMORY LOSS: 1
WEAKNESS: 1
NAUSEA: 0
FEVER: 0
PSYCHIATRIC NEGATIVE: 1
SPUTUM PRODUCTION: 0
FEVER: 0
FEVER: 0
BLURRED VISION: 0
SENSORY CHANGE: 0
ABDOMINAL PAIN: 0
WHEEZING: 0
DEPRESSION: 0
BACK PAIN: 0
HEMOPTYSIS: 0
MYALGIAS: 0
CHILLS: 0
STRIDOR: 0
FOCAL WEAKNESS: 0
HALLUCINATIONS: 0
SINUS PAIN: 0
DIZZINESS: 1
MYALGIAS: 0
FALLS: 0
TINGLING: 0
VOMITING: 0
FEVER: 0
SPUTUM PRODUCTION: 0
NECK PAIN: 0
NECK PAIN: 0
DIZZINESS: 0
SHORTNESS OF BREATH: 1
VOMITING: 0
EYE PAIN: 0
CHILLS: 0
NAUSEA: 0
DOUBLE VISION: 0
DIZZINESS: 0
DIARRHEA: 0
DOUBLE VISION: 0
MEMORY LOSS: 0
ABDOMINAL PAIN: 0
DIZZINESS: 0
VOMITING: 0
LOSS OF CONSCIOUSNESS: 0
NERVOUS/ANXIOUS: 0
SHORTNESS OF BREATH: 0
HEMOPTYSIS: 0
NAUSEA: 0
TREMORS: 0
FEVER: 0
BACK PAIN: 0
ORTHOPNEA: 0
FEVER: 0
CLAUDICATION: 0
BACK PAIN: 0
SORE THROAT: 0
CHILLS: 0
ORTHOPNEA: 0
DOUBLE VISION: 0
STRIDOR: 0
NAUSEA: 0
MUSCULOSKELETAL NEGATIVE: 1
VOMITING: 0
HALLUCINATIONS: 0
SHORTNESS OF BREATH: 1
NAUSEA: 0
HEADACHES: 0
WEAKNESS: 0
FEVER: 0
LOSS OF CONSCIOUSNESS: 0
BACK PAIN: 0
CONSTIPATION: 1
MYALGIAS: 0
DIARRHEA: 0
ABDOMINAL PAIN: 0
WEIGHT LOSS: 1
EYE PAIN: 0
PALPITATIONS: 0
SHORTNESS OF BREATH: 0
LOSS OF CONSCIOUSNESS: 0
NAUSEA: 0
CHILLS: 0
MYALGIAS: 0
PSYCHIATRIC NEGATIVE: 1
PALPITATIONS: 0
SHORTNESS OF BREATH: 0
DIZZINESS: 0
NECK PAIN: 0
CLAUDICATION: 0
MYALGIAS: 0
NAUSEA: 0
DIZZINESS: 0
PSYCHIATRIC NEGATIVE: 1
PALPITATIONS: 0
VOMITING: 0
COUGH: 0
DIARRHEA: 0
ABDOMINAL PAIN: 0
HEADACHES: 0
EYES NEGATIVE: 1
PSYCHIATRIC NEGATIVE: 1
INSOMNIA: 0
SPUTUM PRODUCTION: 0
BACK PAIN: 0
VOMITING: 0
HEADACHES: 0
FEVER: 0
SHORTNESS OF BREATH: 1
EYE PAIN: 0
SHORTNESS OF BREATH: 1
WHEEZING: 0
NAUSEA: 0
WEIGHT LOSS: 1
SPEECH CHANGE: 0
PSYCHIATRIC NEGATIVE: 1
TINGLING: 0
ABDOMINAL PAIN: 0
EYE PAIN: 0
HEADACHES: 0
CLAUDICATION: 0
DEPRESSION: 0
SINUS PAIN: 0
HEADACHES: 0
FOCAL WEAKNESS: 0
DIZZINESS: 0
HEADACHES: 0
COUGH: 0
FEVER: 0
PALPITATIONS: 0
LOSS OF CONSCIOUSNESS: 0
SENSORY CHANGE: 0
HEADACHES: 0
STRIDOR: 0
FOCAL WEAKNESS: 0
SPUTUM PRODUCTION: 0
WHEEZING: 0
PHOTOPHOBIA: 0
COUGH: 1
FEVER: 0
ABDOMINAL PAIN: 0
CONSTIPATION: 0
FEVER: 0
BACK PAIN: 0
VOMITING: 0
BRUISES/BLEEDS EASILY: 0
COUGH: 1
EYE DISCHARGE: 0
EYE DISCHARGE: 0
BLOOD IN STOOL: 0
DIARRHEA: 0
SPEECH CHANGE: 0
SENSORY CHANGE: 0
ABDOMINAL PAIN: 0
PALPITATIONS: 0
CHILLS: 0
DIZZINESS: 1
MYALGIAS: 0
SPUTUM PRODUCTION: 0
DIARRHEA: 0
ABDOMINAL PAIN: 0
SPUTUM PRODUCTION: 0
ORTHOPNEA: 0
NECK PAIN: 0
DIARRHEA: 0
FOCAL WEAKNESS: 0
SPUTUM PRODUCTION: 0
TINGLING: 0
MYALGIAS: 0
NERVOUS/ANXIOUS: 0
SENSORY CHANGE: 0
SENSORY CHANGE: 0
MYALGIAS: 0
DIARRHEA: 0
NAUSEA: 0
PHOTOPHOBIA: 0
TREMORS: 0
NECK PAIN: 0
WEIGHT LOSS: 1
SPEECH CHANGE: 0
SENSORY CHANGE: 0
WEAKNESS: 1
DIZZINESS: 1
SPUTUM PRODUCTION: 0
HEADACHES: 0
LOSS OF CONSCIOUSNESS: 0
VOMITING: 0
SORE THROAT: 1
MYALGIAS: 0
NAUSEA: 0
HEADACHES: 0
WEAKNESS: 1
SINUS PAIN: 0
DEPRESSION: 0
NAUSEA: 0
FEVER: 0
SORE THROAT: 0
DIARRHEA: 0
EYE PAIN: 0
STRIDOR: 0
CONSTIPATION: 0
ABDOMINAL PAIN: 0
DIZZINESS: 0
FOCAL WEAKNESS: 0
TINGLING: 0
COUGH: 0
PALPITATIONS: 0
LOSS OF CONSCIOUSNESS: 0
DIARRHEA: 0
VOMITING: 0
HEADACHES: 0
WEIGHT LOSS: 0
EYE DISCHARGE: 0
SORE THROAT: 0
COUGH: 0
FEVER: 0
COUGH: 0
FOCAL WEAKNESS: 0
BACK PAIN: 1
BACK PAIN: 0
WEIGHT LOSS: 1
HEMOPTYSIS: 0
CHILLS: 0
BLURRED VISION: 0
FOCAL WEAKNESS: 0
CONSTIPATION: 0
WHEEZING: 0
NAUSEA: 0
NECK PAIN: 0
EYE DISCHARGE: 0
SHORTNESS OF BREATH: 0
PALPITATIONS: 0
BLURRED VISION: 0
SHORTNESS OF BREATH: 0
DIZZINESS: 0
SPEECH CHANGE: 0
MEMORY LOSS: 1
SPEECH CHANGE: 0
DIARRHEA: 0
BLURRED VISION: 0
BACK PAIN: 1
SENSORY CHANGE: 0
HEADACHES: 0
CHILLS: 0
DEPRESSION: 0
MYALGIAS: 0
CHILLS: 0
SENSORY CHANGE: 0
DEPRESSION: 0
HALLUCINATIONS: 0
PALPITATIONS: 0
VOMITING: 0
PALPITATIONS: 0
FEVER: 0
WEIGHT LOSS: 0
NERVOUS/ANXIOUS: 0
NAUSEA: 0
MYALGIAS: 0
TINGLING: 0
SORE THROAT: 1
DOUBLE VISION: 0
BLURRED VISION: 0
BACK PAIN: 0
SINUS PAIN: 0
EYE PAIN: 0
CHILLS: 0
EYE DISCHARGE: 0
WEIGHT LOSS: 0
SPEECH CHANGE: 0
SPUTUM PRODUCTION: 0
MYALGIAS: 0
WEAKNESS: 0
BLOOD IN STOOL: 0
VOMITING: 0
VOMITING: 0
HEMOPTYSIS: 0
DIAPHORESIS: 0
LOSS OF CONSCIOUSNESS: 0
ORTHOPNEA: 0
DIARRHEA: 0
ORTHOPNEA: 0
SHORTNESS OF BREATH: 1
MYALGIAS: 0
PALPITATIONS: 0
MYALGIAS: 1
FEVER: 0
CHILLS: 0
FOCAL WEAKNESS: 0
ABDOMINAL PAIN: 0
WHEEZING: 0
PALPITATIONS: 0
EYE DISCHARGE: 0
BLOOD IN STOOL: 0
LOSS OF CONSCIOUSNESS: 0
DEPRESSION: 0
NAUSEA: 0
MYALGIAS: 1
BLURRED VISION: 0
FEVER: 0
DEPRESSION: 0
DIZZINESS: 1
DEPRESSION: 0
DIZZINESS: 0
VOMITING: 0
HEADACHES: 0
WHEEZING: 0
PALPITATIONS: 0
SEIZURES: 0
BLOOD IN STOOL: 0
DOUBLE VISION: 0
SPUTUM PRODUCTION: 0
WHEEZING: 0
LOSS OF CONSCIOUSNESS: 0
SPUTUM PRODUCTION: 0
DIZZINESS: 0
BACK PAIN: 0
ABDOMINAL PAIN: 1
FEVER: 0
PALPITATIONS: 0
PALPITATIONS: 0
FEVER: 0
MYALGIAS: 0
DIZZINESS: 0
GASTROINTESTINAL NEGATIVE: 1
LOSS OF CONSCIOUSNESS: 0
HEARTBURN: 0
DIARRHEA: 0
DIZZINESS: 0
CLAUDICATION: 0
ORTHOPNEA: 0
STRIDOR: 0
WHEEZING: 0
ABDOMINAL PAIN: 0
CHILLS: 0
ABDOMINAL PAIN: 0
WEIGHT LOSS: 1
VOMITING: 0
LOSS OF CONSCIOUSNESS: 0
CONSTIPATION: 0
PALPITATIONS: 0
FOCAL WEAKNESS: 0
DOUBLE VISION: 0
CHILLS: 0
NAUSEA: 0
NERVOUS/ANXIOUS: 0
HALLUCINATIONS: 0
TREMORS: 0
WHEEZING: 0
LOSS OF CONSCIOUSNESS: 0
HEARTBURN: 0
COUGH: 0
PALPITATIONS: 0
SINUS PAIN: 0
SORE THROAT: 0
DIARRHEA: 0
SHORTNESS OF BREATH: 0
PALPITATIONS: 0
NAUSEA: 0
TREMORS: 0
DIAPHORESIS: 0
SENSORY CHANGE: 0
MYALGIAS: 0
SPUTUM PRODUCTION: 0
WEAKNESS: 0
NAUSEA: 0
FEVER: 0
SHORTNESS OF BREATH: 0
NAUSEA: 0
VOMITING: 0
BACK PAIN: 1
DOUBLE VISION: 0
HEADACHES: 0
DIZZINESS: 0
SEIZURES: 0
SPEECH CHANGE: 0
VOMITING: 0
TINGLING: 0
HEMOPTYSIS: 0
HEADACHES: 0
NAUSEA: 0
PALPITATIONS: 0
HEADACHES: 0
ABDOMINAL PAIN: 0
WEAKNESS: 1
CHILLS: 0
SORE THROAT: 0
STRIDOR: 0
SPUTUM PRODUCTION: 0
BACK PAIN: 1
ORTHOPNEA: 0
FEVER: 0
FOCAL WEAKNESS: 0
FOCAL WEAKNESS: 0
WEAKNESS: 0
POLYDIPSIA: 0
HEMOPTYSIS: 0
DIAPHORESIS: 0
SHORTNESS OF BREATH: 1
FOCAL WEAKNESS: 0
COUGH: 0
FEVER: 0
HEADACHES: 0
CLAUDICATION: 0
TINGLING: 0
BACK PAIN: 0
DIZZINESS: 0
VOMITING: 0
DIARRHEA: 0
DEPRESSION: 0
WEAKNESS: 1
WEIGHT LOSS: 1
DOUBLE VISION: 0
DIZZINESS: 0
SHORTNESS OF BREATH: 0
VOMITING: 0
LOSS OF CONSCIOUSNESS: 0
HALLUCINATIONS: 0
STRIDOR: 0
EYE DISCHARGE: 0
WEIGHT LOSS: 0
STRIDOR: 0
HEARTBURN: 0
SPUTUM PRODUCTION: 0
SENSORY CHANGE: 0
VOMITING: 0
BACK PAIN: 0
ABDOMINAL PAIN: 0
ORTHOPNEA: 0
CHILLS: 0
CLAUDICATION: 0
NAUSEA: 0
NAUSEA: 0
SORE THROAT: 0
CONSTIPATION: 0
NAUSEA: 0
CHILLS: 0
DIAPHORESIS: 0
DIZZINESS: 0
NERVOUS/ANXIOUS: 0
SINUS PAIN: 0
BLURRED VISION: 0
PSYCHIATRIC NEGATIVE: 1
EYE PAIN: 0
SORE THROAT: 0
CHILLS: 0
ABDOMINAL PAIN: 1
TINGLING: 0
BLOOD IN STOOL: 0
COUGH: 1
EYE DISCHARGE: 0
PND: 0
PHOTOPHOBIA: 0
SORE THROAT: 0
HEARTBURN: 0
CONSTIPATION: 0
WEAKNESS: 1
COUGH: 1
BLURRED VISION: 0
DIZZINESS: 0
NERVOUS/ANXIOUS: 0
NAUSEA: 0
SHORTNESS OF BREATH: 1
CONSTIPATION: 0
PALPITATIONS: 0
WHEEZING: 0
MEMORY LOSS: 1
SORE THROAT: 1
WEIGHT LOSS: 1
DIAPHORESIS: 0
CHILLS: 0
FOCAL WEAKNESS: 0
NAUSEA: 0
TREMORS: 0
BRUISES/BLEEDS EASILY: 0
NECK PAIN: 0
BLOOD IN STOOL: 0
CHILLS: 0
HEARTBURN: 0
CLAUDICATION: 0
CHILLS: 0
NERVOUS/ANXIOUS: 0
SEIZURES: 0
ABDOMINAL PAIN: 0
FEVER: 0
FOCAL WEAKNESS: 0
PALPITATIONS: 0
ABDOMINAL PAIN: 0
INSOMNIA: 0
PHOTOPHOBIA: 0
NAUSEA: 0
CHILLS: 0
BLURRED VISION: 0
HEADACHES: 0
CHILLS: 0
TREMORS: 0
HEADACHES: 0
SHORTNESS OF BREATH: 1
NECK PAIN: 0
NERVOUS/ANXIOUS: 0
VOMITING: 0
PALPITATIONS: 0
MYALGIAS: 1
BACK PAIN: 1
FEVER: 0
WHEEZING: 0
SHORTNESS OF BREATH: 1
PSYCHIATRIC NEGATIVE: 1
PHOTOPHOBIA: 0
FEVER: 0
BACK PAIN: 0
NECK PAIN: 0
NECK PAIN: 0
FOCAL WEAKNESS: 0
PHOTOPHOBIA: 0
HEMOPTYSIS: 0
FALLS: 1
WEAKNESS: 1
PSYCHIATRIC NEGATIVE: 1
SHORTNESS OF BREATH: 0
EYE PAIN: 0
ABDOMINAL PAIN: 0
NAUSEA: 0
SENSORY CHANGE: 0
DIZZINESS: 0
MYALGIAS: 0
INSOMNIA: 0
VOMITING: 0
ABDOMINAL PAIN: 0
WHEEZING: 0
STRIDOR: 0
HEADACHES: 0
BRUISES/BLEEDS EASILY: 0
SENSORY CHANGE: 0
SINUS PAIN: 0
CHILLS: 0
COUGH: 0
MEMORY LOSS: 1
SHORTNESS OF BREATH: 1
COUGH: 0
NECK PAIN: 0
HEARTBURN: 0
SENSORY CHANGE: 0
DIARRHEA: 0
DIZZINESS: 0
BACK PAIN: 0
EYE PAIN: 0
LOSS OF CONSCIOUSNESS: 0
SHORTNESS OF BREATH: 1
HEMOPTYSIS: 0

## 2020-01-01 ASSESSMENT — COGNITIVE AND FUNCTIONAL STATUS - GENERAL
MOBILITY SCORE: 19
CLIMB 3 TO 5 STEPS WITH RAILING: A LOT
SUGGESTED CMS G CODE MODIFIER MOBILITY: CK
MOVING FROM LYING ON BACK TO SITTING ON SIDE OF FLAT BED: A LITTLE
WALKING IN HOSPITAL ROOM: A LITTLE
SUGGESTED CMS G CODE MODIFIER DAILY ACTIVITY: CK
TOILETING: A LITTLE
HELP NEEDED FOR BATHING: A LOT
CLIMB 3 TO 5 STEPS WITH RAILING: A LITTLE
TOILETING: A LOT
SUGGESTED CMS G CODE MODIFIER DAILY ACTIVITY: CJ
CLIMB 3 TO 5 STEPS WITH RAILING: A LITTLE
DAILY ACTIVITIY SCORE: 21
DAILY ACTIVITIY SCORE: 21
EATING MEALS: A LITTLE
HELP NEEDED FOR BATHING: A LITTLE
MOVING FROM LYING ON BACK TO SITTING ON SIDE OF FLAT BED: A LOT
CLIMB 3 TO 5 STEPS WITH RAILING: A LITTLE
SUGGESTED CMS G CODE MODIFIER DAILY ACTIVITY: CK
DAILY ACTIVITIY SCORE: 17
WALKING IN HOSPITAL ROOM: A LITTLE
TOILETING: A LITTLE
HELP NEEDED FOR BATHING: A LITTLE
SUGGESTED CMS G CODE MODIFIER MOBILITY: CL
STANDING UP FROM CHAIR USING ARMS: A LITTLE
CLIMB 3 TO 5 STEPS WITH RAILING: TOTAL
TOILETING: A LOT
CLIMB 3 TO 5 STEPS WITH RAILING: A LOT
DRESSING REGULAR UPPER BODY CLOTHING: A LITTLE
EATING MEALS: A LOT
DAILY ACTIVITIY SCORE: 19
STANDING UP FROM CHAIR USING ARMS: A LITTLE
WALKING IN HOSPITAL ROOM: A LITTLE
PERSONAL GROOMING: A LITTLE
SUGGESTED CMS G CODE MODIFIER DAILY ACTIVITY: CJ
EATING MEALS: A LITTLE
DRESSING REGULAR UPPER BODY CLOTHING: A LITTLE
CLIMB 3 TO 5 STEPS WITH RAILING: A LOT
STANDING UP FROM CHAIR USING ARMS: A LITTLE
TURNING FROM BACK TO SIDE WHILE IN FLAT BAD: A LITTLE
MOBILITY SCORE: 18
DRESSING REGULAR UPPER BODY CLOTHING: A LOT
CLIMB 3 TO 5 STEPS WITH RAILING: A LITTLE
MOBILITY SCORE: 24
PERSONAL GROOMING: A LITTLE
TOILETING: A LITTLE
SUGGESTED CMS G CODE MODIFIER MOBILITY: CK
DRESSING REGULAR UPPER BODY CLOTHING: A LITTLE
MOVING TO AND FROM BED TO CHAIR: A LITTLE
SUGGESTED CMS G CODE MODIFIER DAILY ACTIVITY: CK
DAILY ACTIVITIY SCORE: 20
CLIMB 3 TO 5 STEPS WITH RAILING: A LOT
SUGGESTED CMS G CODE MODIFIER DAILY ACTIVITY: CJ
TURNING FROM BACK TO SIDE WHILE IN FLAT BAD: A LITTLE
HELP NEEDED FOR BATHING: A LITTLE
SUGGESTED CMS G CODE MODIFIER MOBILITY: CK
SUGGESTED CMS G CODE MODIFIER MOBILITY: CJ
MOBILITY SCORE: 21
MOVING FROM LYING ON BACK TO SITTING ON SIDE OF FLAT BED: A LITTLE
MOVING TO AND FROM BED TO CHAIR: A LOT
DAILY ACTIVITIY SCORE: 24
DAILY ACTIVITIY SCORE: 18
SUGGESTED CMS G CODE MODIFIER MOBILITY: CK
HELP NEEDED FOR BATHING: A LITTLE
MOBILITY SCORE: 14
CLIMB 3 TO 5 STEPS WITH RAILING: A LITTLE
SUGGESTED CMS G CODE MODIFIER MOBILITY: CK
SUGGESTED CMS G CODE MODIFIER DAILY ACTIVITY: CH
MOVING FROM LYING ON BACK TO SITTING ON SIDE OF FLAT BED: A LITTLE
PERSONAL GROOMING: A LITTLE
STANDING UP FROM CHAIR USING ARMS: A LITTLE
DRESSING REGULAR LOWER BODY CLOTHING: A LOT
SUGGESTED CMS G CODE MODIFIER DAILY ACTIVITY: CJ
HELP NEEDED FOR BATHING: A LITTLE
HELP NEEDED FOR BATHING: A LITTLE
TURNING FROM BACK TO SIDE WHILE IN FLAT BAD: A LITTLE
MOVING FROM LYING ON BACK TO SITTING ON SIDE OF FLAT BED: A LITTLE
SUGGESTED CMS G CODE MODIFIER DAILY ACTIVITY: CK
DRESSING REGULAR LOWER BODY CLOTHING: A LOT
DRESSING REGULAR UPPER BODY CLOTHING: A LOT
MOVING TO AND FROM BED TO CHAIR: A LITTLE
MOBILITY SCORE: 15
DRESSING REGULAR LOWER BODY CLOTHING: A LITTLE
DRESSING REGULAR LOWER BODY CLOTHING: A LITTLE
MOBILITY SCORE: 18
WALKING IN HOSPITAL ROOM: A LITTLE
MOVING TO AND FROM BED TO CHAIR: A LITTLE
MOVING FROM LYING ON BACK TO SITTING ON SIDE OF FLAT BED: A LITTLE
MOBILITY SCORE: 18
CLIMB 3 TO 5 STEPS WITH RAILING: A LOT
SUGGESTED CMS G CODE MODIFIER MOBILITY: CK
DRESSING REGULAR LOWER BODY CLOTHING: A LITTLE
SUGGESTED CMS G CODE MODIFIER DAILY ACTIVITY: CJ
DRESSING REGULAR LOWER BODY CLOTHING: A LITTLE
DRESSING REGULAR LOWER BODY CLOTHING: A LOT
WALKING IN HOSPITAL ROOM: A LITTLE
PERSONAL GROOMING: A LITTLE
DAILY ACTIVITIY SCORE: 20
MOBILITY SCORE: 17
MOBILITY SCORE: 17
DAILY ACTIVITIY SCORE: 21
TURNING FROM BACK TO SIDE WHILE IN FLAT BAD: A LOT
TOILETING: A LITTLE
WALKING IN HOSPITAL ROOM: A LITTLE
WALKING IN HOSPITAL ROOM: A LITTLE
STANDING UP FROM CHAIR USING ARMS: A LOT
MOVING FROM LYING ON BACK TO SITTING ON SIDE OF FLAT BED: A LITTLE
DRESSING REGULAR UPPER BODY CLOTHING: A LITTLE
SUGGESTED CMS G CODE MODIFIER MOBILITY: CJ
TURNING FROM BACK TO SIDE WHILE IN FLAT BAD: A LITTLE
SUGGESTED CMS G CODE MODIFIER DAILY ACTIVITY: CJ
STANDING UP FROM CHAIR USING ARMS: A LITTLE
WALKING IN HOSPITAL ROOM: A LOT
DAILY ACTIVITIY SCORE: 21
STANDING UP FROM CHAIR USING ARMS: A LITTLE
WALKING IN HOSPITAL ROOM: A LITTLE
HELP NEEDED FOR BATHING: A LITTLE
STANDING UP FROM CHAIR USING ARMS: A LITTLE
TURNING FROM BACK TO SIDE WHILE IN FLAT BAD: A LITTLE
MOVING TO AND FROM BED TO CHAIR: A LITTLE
HELP NEEDED FOR BATHING: A LITTLE
EATING MEALS: A LITTLE
CLIMB 3 TO 5 STEPS WITH RAILING: A LOT
SUGGESTED CMS G CODE MODIFIER MOBILITY: CK
WALKING IN HOSPITAL ROOM: A LITTLE
MOBILITY SCORE: 17
TURNING FROM BACK TO SIDE WHILE IN FLAT BAD: A LITTLE
CLIMB 3 TO 5 STEPS WITH RAILING: A LOT
TOILETING: A LITTLE
MOVING FROM LYING ON BACK TO SITTING ON SIDE OF FLAT BED: A LITTLE
SUGGESTED CMS G CODE MODIFIER MOBILITY: CL
SUGGESTED CMS G CODE MODIFIER MOBILITY: CK
SUGGESTED CMS G CODE MODIFIER DAILY ACTIVITY: CL
STANDING UP FROM CHAIR USING ARMS: A LITTLE
DRESSING REGULAR LOWER BODY CLOTHING: A LITTLE
SUGGESTED CMS G CODE MODIFIER MOBILITY: CK
MOVING TO AND FROM BED TO CHAIR: A LOT
CLIMB 3 TO 5 STEPS WITH RAILING: A LOT
TOILETING: A LITTLE
DRESSING REGULAR UPPER BODY CLOTHING: A LITTLE
WALKING IN HOSPITAL ROOM: A LOT
SUGGESTED CMS G CODE MODIFIER MOBILITY: CK
WALKING IN HOSPITAL ROOM: A LITTLE
MOVING FROM LYING ON BACK TO SITTING ON SIDE OF FLAT BED: A LITTLE
HELP NEEDED FOR BATHING: A LITTLE
STANDING UP FROM CHAIR USING ARMS: A LITTLE
WALKING IN HOSPITAL ROOM: A LOT
MOVING TO AND FROM BED TO CHAIR: A LOT
MOBILITY SCORE: 15
TOILETING: A LITTLE
SUGGESTED CMS G CODE MODIFIER MOBILITY: CK
DAILY ACTIVITIY SCORE: 15
MOVING TO AND FROM BED TO CHAIR: A LITTLE
TURNING FROM BACK TO SIDE WHILE IN FLAT BAD: A LITTLE
MOBILITY SCORE: 18
SUGGESTED CMS G CODE MODIFIER MOBILITY: CH
TURNING FROM BACK TO SIDE WHILE IN FLAT BAD: A LOT
MOVING FROM LYING ON BACK TO SITTING ON SIDE OF FLAT BED: A LITTLE
STANDING UP FROM CHAIR USING ARMS: A LITTLE
MOVING TO AND FROM BED TO CHAIR: A LITTLE
MOVING FROM LYING ON BACK TO SITTING ON SIDE OF FLAT BED: A LITTLE
MOVING FROM LYING ON BACK TO SITTING ON SIDE OF FLAT BED: A LOT
MOVING TO AND FROM BED TO CHAIR: A LITTLE
MOBILITY SCORE: 22
DAILY ACTIVITIY SCORE: 12
MOBILITY SCORE: 12
MOVING TO AND FROM BED TO CHAIR: A LITTLE
DRESSING REGULAR UPPER BODY CLOTHING: A LITTLE
DRESSING REGULAR LOWER BODY CLOTHING: A LITTLE
TOILETING: A LITTLE
STANDING UP FROM CHAIR USING ARMS: A LITTLE
CLIMB 3 TO 5 STEPS WITH RAILING: A LITTLE
PERSONAL GROOMING: A LITTLE
DRESSING REGULAR LOWER BODY CLOTHING: A LITTLE
MOVING FROM LYING ON BACK TO SITTING ON SIDE OF FLAT BED: A LITTLE
DRESSING REGULAR LOWER BODY CLOTHING: A LITTLE
TOILETING: A LITTLE
SUGGESTED CMS G CODE MODIFIER DAILY ACTIVITY: CJ
TURNING FROM BACK TO SIDE WHILE IN FLAT BAD: A LITTLE
WALKING IN HOSPITAL ROOM: A LITTLE
MOVING TO AND FROM BED TO CHAIR: A LITTLE
MOVING TO AND FROM BED TO CHAIR: A LITTLE
WALKING IN HOSPITAL ROOM: A LITTLE
TOILETING: A LITTLE
SUGGESTED CMS G CODE MODIFIER MOBILITY: CK
EATING MEALS: A LITTLE
STANDING UP FROM CHAIR USING ARMS: A LITTLE
TOILETING: A LITTLE
PERSONAL GROOMING: A LOT
STANDING UP FROM CHAIR USING ARMS: A LOT
DRESSING REGULAR LOWER BODY CLOTHING: A LITTLE
MOBILITY SCORE: 19
CLIMB 3 TO 5 STEPS WITH RAILING: A LOT
DAILY ACTIVITIY SCORE: 20
DRESSING REGULAR UPPER BODY CLOTHING: A LITTLE
MOVING FROM LYING ON BACK TO SITTING ON SIDE OF FLAT BED: A LOT
DRESSING REGULAR LOWER BODY CLOTHING: A LOT
STANDING UP FROM CHAIR USING ARMS: A LITTLE
WALKING IN HOSPITAL ROOM: A LOT
MOBILITY SCORE: 15
HELP NEEDED FOR BATHING: A LOT

## 2020-01-01 ASSESSMENT — PATIENT HEALTH QUESTIONNAIRE - PHQ9
2. FEELING DOWN, DEPRESSED, IRRITABLE, OR HOPELESS: NOT AT ALL
SUM OF ALL RESPONSES TO PHQ9 QUESTIONS 1 AND 2: 0
1. LITTLE INTEREST OR PLEASURE IN DOING THINGS: NOT AT ALL
1. LITTLE INTEREST OR PLEASURE IN DOING THINGS: NOT AT ALL
2. FEELING DOWN, DEPRESSED, IRRITABLE, OR HOPELESS: SEVERAL DAYS
8. MOVING OR SPEAKING SO SLOWLY THAT OTHER PEOPLE COULD HAVE NOTICED. OR THE OPPOSITE, BEING SO FIGETY OR RESTLESS THAT YOU HAVE BEEN MOVING AROUND A LOT MORE THAN USUAL: NOT AT ALL
3. TROUBLE FALLING OR STAYING ASLEEP OR SLEEPING TOO MUCH: NOT AT ALL
SUM OF ALL RESPONSES TO PHQ9 QUESTIONS 1 AND 2: 0
1. LITTLE INTEREST OR PLEASURE IN DOING THINGS: NOT AT ALL
SUM OF ALL RESPONSES TO PHQ QUESTIONS 1-9: 5
2. FEELING DOWN, DEPRESSED, IRRITABLE, OR HOPELESS: NOT AT ALL
2. FEELING DOWN, DEPRESSED, IRRITABLE, OR HOPELESS: NOT AT ALL
3. TROUBLE FALLING OR STAYING ASLEEP OR SLEEPING TOO MUCH: NOT AT ALL
9. THOUGHTS THAT YOU WOULD BE BETTER OFF DEAD, OR OF HURTING YOURSELF: NOT AT ALL
2. FEELING DOWN, DEPRESSED, IRRITABLE, OR HOPELESS: NOT AT ALL
1. LITTLE INTEREST OR PLEASURE IN DOING THINGS: NOT AT ALL
SUM OF ALL RESPONSES TO PHQ9 QUESTIONS 1 AND 2: 1
2. FEELING DOWN, DEPRESSED, IRRITABLE, OR HOPELESS: NOT AT ALL
3. TROUBLE FALLING OR STAYING ASLEEP OR SLEEPING TOO MUCH: NOT AT ALL
SUM OF ALL RESPONSES TO PHQ9 QUESTIONS 1 AND 2: 0
7. TROUBLE CONCENTRATING ON THINGS, SUCH AS READING THE NEWSPAPER OR WATCHING TELEVISION: NOT AT ALL
5. POOR APPETITE OR OVEREATING: SEVERAL DAYS
8. MOVING OR SPEAKING SO SLOWLY THAT OTHER PEOPLE COULD HAVE NOTICED. OR THE OPPOSITE, BEING SO FIGETY OR RESTLESS THAT YOU HAVE BEEN MOVING AROUND A LOT MORE THAN USUAL: NOT AT ALL
SUM OF ALL RESPONSES TO PHQ9 QUESTIONS 1 AND 2: 0
1. LITTLE INTEREST OR PLEASURE IN DOING THINGS: NOT AT ALL
2. FEELING DOWN, DEPRESSED, IRRITABLE, OR HOPELESS: NOT AT ALL
1. LITTLE INTEREST OR PLEASURE IN DOING THINGS: NOT AT ALL
SUM OF ALL RESPONSES TO PHQ9 QUESTIONS 1 AND 2: 0
1. LITTLE INTEREST OR PLEASURE IN DOING THINGS: NOT AT ALL
4. FEELING TIRED OR HAVING LITTLE ENERGY: SEVERAL DAYS
5. POOR APPETITE OR OVEREATING: NOT AT ALL
1. LITTLE INTEREST OR PLEASURE IN DOING THINGS: NOT AT ALL
1. LITTLE INTEREST OR PLEASURE IN DOING THINGS: NOT AT ALL
5. POOR APPETITE OR OVEREATING: NOT AT ALL
1. LITTLE INTEREST OR PLEASURE IN DOING THINGS: NOT AT ALL
2. FEELING DOWN, DEPRESSED, IRRITABLE, OR HOPELESS: NOT AT ALL
SUM OF ALL RESPONSES TO PHQ9 QUESTIONS 1 AND 2: 0
2. FEELING DOWN, DEPRESSED, IRRITABLE, OR HOPELESS: NOT AT ALL
2. FEELING DOWN, DEPRESSED, IRRITABLE, OR HOPELESS: NOT AT ALL
1. LITTLE INTEREST OR PLEASURE IN DOING THINGS: NOT AT ALL
2. FEELING DOWN, DEPRESSED, IRRITABLE, OR HOPELESS: NOT AT ALL
SUM OF ALL RESPONSES TO PHQ9 QUESTIONS 1 AND 2: 0
2. FEELING DOWN, DEPRESSED, IRRITABLE, OR HOPELESS: NOT AT ALL
SUM OF ALL RESPONSES TO PHQ9 QUESTIONS 1 AND 2: 0
6. FEELING BAD ABOUT YOURSELF - OR THAT YOU ARE A FAILURE OR HAVE LET YOURSELF OR YOUR FAMILY DOWN: SEVERAL DAYS
SUM OF ALL RESPONSES TO PHQ9 QUESTIONS 1 AND 2: 0
1. LITTLE INTEREST OR PLEASURE IN DOING THINGS: NOT AT ALL
2. FEELING DOWN, DEPRESSED, IRRITABLE, OR HOPELESS: NOT AT ALL
1. LITTLE INTEREST OR PLEASURE IN DOING THINGS: NOT AT ALL
1. LITTLE INTEREST OR PLEASURE IN DOING THINGS: NOT AT ALL
7. TROUBLE CONCENTRATING ON THINGS, SUCH AS READING THE NEWSPAPER OR WATCHING TELEVISION: NOT AT ALL
9. THOUGHTS THAT YOU WOULD BE BETTER OFF DEAD, OR OF HURTING YOURSELF: NOT AT ALL
SUM OF ALL RESPONSES TO PHQ9 QUESTIONS 1 AND 2: 0
9. THOUGHTS THAT YOU WOULD BE BETTER OFF DEAD, OR OF HURTING YOURSELF: NOT AT ALL
SUM OF ALL RESPONSES TO PHQ9 QUESTIONS 1 AND 2: 0
SUM OF ALL RESPONSES TO PHQ9 QUESTIONS 1 AND 2: 1
SUM OF ALL RESPONSES TO PHQ9 QUESTIONS 1 AND 2: 0
1. LITTLE INTEREST OR PLEASURE IN DOING THINGS: NOT AT ALL
4. FEELING TIRED OR HAVING LITTLE ENERGY: SEVERAL DAYS
8. MOVING OR SPEAKING SO SLOWLY THAT OTHER PEOPLE COULD HAVE NOTICED. OR THE OPPOSITE, BEING SO FIGETY OR RESTLESS THAT YOU HAVE BEEN MOVING AROUND A LOT MORE THAN USUAL: NOT AT ALL
2. FEELING DOWN, DEPRESSED, IRRITABLE, OR HOPELESS: SEVERAL DAYS
SUM OF ALL RESPONSES TO PHQ9 QUESTIONS 1 AND 2: 0
1. LITTLE INTEREST OR PLEASURE IN DOING THINGS: NOT AT ALL
SUM OF ALL RESPONSES TO PHQ9 QUESTIONS 1 AND 2: 0
7. TROUBLE CONCENTRATING ON THINGS, SUCH AS READING THE NEWSPAPER OR WATCHING TELEVISION: SEVERAL DAYS
3. TROUBLE FALLING OR STAYING ASLEEP OR SLEEPING TOO MUCH: NOT AT ALL
4. FEELING TIRED OR HAVING LITTLE ENERGY: NOT AT ALL

## 2020-01-01 ASSESSMENT — LIFESTYLE VARIABLES
CONSUMPTION TOTAL: NEGATIVE
TOTAL SCORE: 0
EVER HAD A DRINK FIRST THING IN THE MORNING TO STEADY YOUR NERVES TO GET RID OF A HANGOVER: NO
TOTAL SCORE: 0
EVER FELT BAD OR GUILTY ABOUT YOUR DRINKING: NO
TOTAL SCORE: 0
HAVE YOU EVER FELT YOU SHOULD CUT DOWN ON YOUR DRINKING: NO
TOTAL SCORE: 0
EVER HAD A DRINK FIRST THING IN THE MORNING TO STEADY YOUR NERVES TO GET RID OF A HANGOVER: NO
CONSUMPTION TOTAL: NEGATIVE
HAVE YOU EVER FELT YOU SHOULD CUT DOWN ON YOUR DRINKING: NO
TOTAL SCORE: 0
SUBSTANCE_ABUSE: 0
ALCOHOL_USE: NO
EVER FELT BAD OR GUILTY ABOUT YOUR DRINKING: NO
EVER FELT BAD OR GUILTY ABOUT YOUR DRINKING: NO
ON A TYPICAL DAY WHEN YOU DRINK ALCOHOL HOW MANY DRINKS DO YOU HAVE: 0
CONSUMPTION TOTAL: NEGATIVE
TOTAL SCORE: 0
EVER_SMOKED: NEVER
EVER HAD A DRINK FIRST THING IN THE MORNING TO STEADY YOUR NERVES TO GET RID OF A HANGOVER: NO
TOTAL SCORE: 0
HAVE PEOPLE ANNOYED YOU BY CRITICIZING YOUR DRINKING: NO
AVERAGE NUMBER OF DAYS PER WEEK YOU HAVE A DRINK CONTAINING ALCOHOL: 0
EVER FELT BAD OR GUILTY ABOUT YOUR DRINKING: NO
EVER HAD A DRINK FIRST THING IN THE MORNING TO STEADY YOUR NERVES TO GET RID OF A HANGOVER: NO
HAVE PEOPLE ANNOYED YOU BY CRITICIZING YOUR DRINKING: NO
TOTAL SCORE: 0
TOTAL SCORE: 0
CONSUMPTION TOTAL: NEGATIVE
HOW MANY TIMES IN THE PAST YEAR HAVE YOU HAD 5 OR MORE DRINKS IN A DAY: 0
DOES PATIENT WANT TO STOP DRINKING: NO
TOTAL SCORE: 0
DOES PATIENT WANT TO STOP DRINKING: NO
HAVE PEOPLE ANNOYED YOU BY CRITICIZING YOUR DRINKING: NO
HOW MANY TIMES IN THE PAST YEAR HAVE YOU HAD 5 OR MORE DRINKS IN A DAY: 0
HAVE YOU EVER FELT YOU SHOULD CUT DOWN ON YOUR DRINKING: NO
ON A TYPICAL DAY WHEN YOU DRINK ALCOHOL HOW MANY DRINKS DO YOU HAVE: 0
EVER_SMOKED: NEVER
AVERAGE NUMBER OF DAYS PER WEEK YOU HAVE A DRINK CONTAINING ALCOHOL: 0
TOTAL SCORE: 0
TOTAL SCORE: 0
HAVE PEOPLE ANNOYED YOU BY CRITICIZING YOUR DRINKING: NO
EVER HAD A DRINK FIRST THING IN THE MORNING TO STEADY YOUR NERVES TO GET RID OF A HANGOVER: NO
EVER_SMOKED: NEVER
ALCOHOL_USE: NO
ON A TYPICAL DAY WHEN YOU DRINK ALCOHOL HOW MANY DRINKS DO YOU HAVE: 0
HOW MANY TIMES IN THE PAST YEAR HAVE YOU HAD 5 OR MORE DRINKS IN A DAY: 0
ALCOHOL_USE: NO
EVER FELT BAD OR GUILTY ABOUT YOUR DRINKING: NO
CONSUMPTION TOTAL: NEGATIVE
EVER HAD A DRINK FIRST THING IN THE MORNING TO STEADY YOUR NERVES TO GET RID OF A HANGOVER: NO
HAVE YOU EVER FELT YOU SHOULD CUT DOWN ON YOUR DRINKING: NO
HOW MANY TIMES IN THE PAST YEAR HAVE YOU HAD 5 OR MORE DRINKS IN A DAY: 0
ALCOHOL_USE: NO
AVERAGE NUMBER OF DAYS PER WEEK YOU HAVE A DRINK CONTAINING ALCOHOL: 0
SUBSTANCE_ABUSE: 0
ON A TYPICAL DAY WHEN YOU DRINK ALCOHOL HOW MANY DRINKS DO YOU HAVE: 0
HAVE PEOPLE ANNOYED YOU BY CRITICIZING YOUR DRINKING: NO
TOTAL SCORE: 0
CONSUMPTION TOTAL: NEGATIVE
TOTAL SCORE: 0
EVER HAD A DRINK FIRST THING IN THE MORNING TO STEADY YOUR NERVES TO GET RID OF A HANGOVER: NO
TOTAL SCORE: 0
TOTAL SCORE: 0
SUBSTANCE_ABUSE: 0
HOW MANY TIMES IN THE PAST YEAR HAVE YOU HAD 5 OR MORE DRINKS IN A DAY: 0
AVERAGE NUMBER OF DAYS PER WEEK YOU HAVE A DRINK CONTAINING ALCOHOL: 0
AVERAGE NUMBER OF DAYS PER WEEK YOU HAVE A DRINK CONTAINING ALCOHOL: 0
SUBSTANCE_ABUSE: 0
CONSUMPTION TOTAL: INCOMPLETE
EVER FELT BAD OR GUILTY ABOUT YOUR DRINKING: NO
ALCOHOL_USE: NO
ON A TYPICAL DAY WHEN YOU DRINK ALCOHOL HOW MANY DRINKS DO YOU HAVE: 0
DO YOU DRINK ALCOHOL: NO
AVERAGE NUMBER OF DAYS PER WEEK YOU HAVE A DRINK CONTAINING ALCOHOL: 0
SUBSTANCE_ABUSE: 0
SUBSTANCE_ABUSE: 0
TOTAL SCORE: 0
HAVE PEOPLE ANNOYED YOU BY CRITICIZING YOUR DRINKING: NO
EVER FELT BAD OR GUILTY ABOUT YOUR DRINKING: NO
TOTAL SCORE: 0
TOTAL SCORE: 0
HAVE PEOPLE ANNOYED YOU BY CRITICIZING YOUR DRINKING: NO
HOW MANY TIMES IN THE PAST YEAR HAVE YOU HAD 5 OR MORE DRINKS IN A DAY: 0
ON A TYPICAL DAY WHEN YOU DRINK ALCOHOL HOW MANY DRINKS DO YOU HAVE: 0
ALCOHOL_USE: NO
HAVE YOU EVER FELT YOU SHOULD CUT DOWN ON YOUR DRINKING: NO

## 2020-01-01 ASSESSMENT — GAIT ASSESSMENTS
GAIT LEVEL OF ASSIST: MINIMAL ASSIST
DEVIATION: STEP TO;DECREASED BASE OF SUPPORT;BRADYKINETIC;SHUFFLED GAIT;DECREASED HEEL STRIKE
DISTANCE (FEET): 175
GAIT LEVEL OF ASSIST: MINIMAL ASSIST
DISTANCE (FEET): 200
GAIT LEVEL OF ASSIST: SUPERVISED
DISTANCE (FEET): 15
ASSISTIVE DEVICE: FRONT WHEEL WALKER
DISTANCE (FEET): 100
DEVIATION: STEP TO;DECREASED BASE OF SUPPORT;BRADYKINETIC;SHUFFLED GAIT;DECREASED HEEL STRIKE
DISTANCE (FEET): 120
ASSISTIVE DEVICE: FRONT WHEEL WALKER
DEVIATION: SHUFFLED GAIT
DEVIATION: STEP TO
DEVIATION: SHUFFLED GAIT;INCREASED BASE OF SUPPORT
ASSISTIVE DEVICE: FRONT WHEEL WALKER
DEVIATION: DECREASED BASE OF SUPPORT;DECREASED HEEL STRIKE;DECREASED TOE OFF;OTHER (COMMENT)
ASSISTIVE DEVICE: FRONT WHEEL WALKER
GAIT LEVEL OF ASSIST: MINIMAL ASSIST
ASSISTIVE DEVICE: FRONT WHEEL WALKER
DISTANCE (FEET): 60
GAIT LEVEL OF ASSIST: SUPERVISED
DISTANCE (FEET): 90
DEVIATION: SHUFFLED GAIT;OTHER (COMMENT)
GAIT LEVEL OF ASSIST: SUPERVISED
ASSISTIVE DEVICE: FRONT WHEEL WALKER
GAIT LEVEL OF ASSIST: MINIMAL ASSIST
ASSISTIVE DEVICE: FRONT WHEEL WALKER
DISTANCE (FEET): 10
GAIT LEVEL OF ASSIST: MODERATE ASSIST
DISTANCE (FEET): 100

## 2020-01-01 ASSESSMENT — FIBROSIS 4 INDEX
FIB4 SCORE: 2.19
FIB4 SCORE: 0.81
FIB4 SCORE: 0.74
FIB4 SCORE: 1.11
FIB4 SCORE: 0.81
FIB4 SCORE: 2.2
FIB4 SCORE: 0.74
FIB4 SCORE: 1.46
FIB4 SCORE: 1.11
FIB4 SCORE: 1.47
FIB4 SCORE: 0.81
FIB4 SCORE: 1.12
FIB4 SCORE: 0.81
FIB4 SCORE: 1.98
FIB4 SCORE: 0.81
FIB4 SCORE: 1.349931127719772546
FIB4 SCORE: 1.94
FIB4 SCORE: 0.81
FIB4 SCORE: 1.87
FIB4 SCORE: 1.8
FIB4 SCORE: 0.82
FIB4 SCORE: 1.12

## 2020-01-01 ASSESSMENT — PAIN DESCRIPTION - PAIN TYPE
TYPE: ACUTE PAIN
TYPE: ACUTE PAIN;SURGICAL PAIN
TYPE: ACUTE PAIN
TYPE: ACUTE PAIN
TYPE: SURGICAL PAIN;ACUTE PAIN
TYPE: ACUTE PAIN

## 2020-01-01 ASSESSMENT — ACTIVITIES OF DAILY LIVING (ADL)
TOILETING: INDEPENDENT
TOILETING: REQUIRES ASSIST
TOILETING: INDEPENDENT

## 2020-01-01 ASSESSMENT — COPD QUESTIONNAIRES
DO YOU EVER COUGH UP ANY MUCUS OR PHLEGM?: NO/ONLY WITH OCCASIONAL COLDS OR INFECTIONS
COPD SCREENING SCORE: 3
DURING THE PAST 4 WEEKS HOW MUCH DID YOU FEEL SHORT OF BREATH: SOME OF THE TIME
HAVE YOU SMOKED AT LEAST 100 CIGARETTES IN YOUR ENTIRE LIFE: NO/DON'T KNOW
IN THE PAST 12 MONTHS DO YOU DO LESS THAN YOU USED TO BECAUSE OF YOUR BREATHING PROBLEMS: DISAGREE/UNSURE

## 2020-01-01 ASSESSMENT — PAIN DESCRIPTION - DESCRIPTORS
DESCRIPTORS: SHOOTING
DESCRIPTORS: ACHING

## 2020-02-27 PROBLEM — F03.90 DEMENTIA (HCC): Status: ACTIVE | Noted: 2020-01-01

## 2020-02-27 PROBLEM — E78.5 HLD (HYPERLIPIDEMIA): Status: ACTIVE | Noted: 2020-01-01

## 2020-02-27 PROBLEM — K21.9 GERD (GASTROESOPHAGEAL REFLUX DISEASE): Status: ACTIVE | Noted: 2020-01-01

## 2020-02-27 PROBLEM — E03.9 HYPOTHYROIDISM: Status: ACTIVE | Noted: 2020-01-01

## 2020-02-27 PROBLEM — R07.9 PAIN IN THE CHEST: Status: ACTIVE | Noted: 2020-01-01

## 2020-02-27 NOTE — ED NOTES
Med Rec completed per patient with medication list (returned)  Allergies reviewed  No ORAL antibiotics in last 14 days      Patient stated he takes most everything at night, takes 2 vitamins in the AM but doesn't know which ones

## 2020-02-27 NOTE — H&P
Hospital Medicine History & Physical Note    Date of Service  2/27/2020    Primary Care Physician  Gibson Quintana M.D.    Consultants  None    Code Status  Full Code    Chief Complaint  Chief Complaint   Patient presents with   • Chest Pain     pt stopped taking all his home meds 3 weeks ago because he got tired of taking pill. pt c/o chest pain intermmittently with SOB.        History of Presenting Illness   is a very pleasant 84 y.o. male with a past medical history of Hypothyroidism, HTN,dementia presented to the emergency room on 2/27/2020 for evaluation of chest pain which started several weeks ago apparently after his physician stopped his multivitamins.  Patient says that he has this dull aching pain at times, 4 out of 10 in severity and its maximum, no exacerbating relieving factors.  When I asked him if his discomfort worsened with exertion he says that I really exert myself.  But denies a walking causing any change to that discomfort.  He denies any shortness of breath or accompanying diaphoresis.  Currently says his discomfort is resolved.    I discussed the presenting symptoms, physical examination, lab and radiological study results with the emergency department physician.      Review of Systems  Review of Systems   Constitutional: Negative for chills, fever and malaise/fatigue.   HENT: Negative for congestion, hearing loss, sore throat and tinnitus.    Eyes: Negative for blurred vision, double vision, photophobia and pain.   Respiratory: Negative for cough, hemoptysis, sputum production, shortness of breath and stridor.    Cardiovascular: Positive for chest pain (resolved). Negative for palpitations, orthopnea, claudication and PND.   Gastrointestinal: Negative for blood in stool, constipation, heartburn, melena, nausea and vomiting.   Genitourinary: Negative for dysuria, frequency and urgency.   Musculoskeletal: Negative for back pain, myalgias and neck pain.   Neurological: Negative for  dizziness, tingling, tremors, sensory change, speech change, weakness and headaches.   Psychiatric/Behavioral: Negative for depression, memory loss and suicidal ideas. The patient is not nervous/anxious.    All other systems reviewed and are negative.      Past Medical History  Past Medical History:   Diagnosis Date   • CHEST PAIN 6/1/2011       Surgical History  Tonsillectomy  Hernia operations  Nasal operations   Toe operations     Family History  No family history of cardiac diease.      Social History   reports that he has never smoked. He does not have any smokeless tobacco history on file. He reports that he does not drink alcohol or use drugs.    Allergies  No Known Allergies    Medications  Prior to Admission medications    Medication Sig Start Date End Date Taking? Authorizing Provider   levothyroxine (SYNTHROID) 75 MCG Tab Take 75 mcg by mouth Every morning on an empty stomach.   Yes Physician Outpatient   pravastatin (PRAVACHOL) 20 MG Tab Take 20 mg by mouth every evening.   Yes Physician Outpatient   donepezil (ARICEPT) 5 MG Tab Take 5 mg by mouth every evening.   Yes Physician Outpatient   famotidine (PEPCID) 20 MG Tab Take 20 mg by mouth every evening.   Yes Physician Outpatient   liothyronine (CYTOMEL) 5 MCG Tab Take 5 mcg by mouth every day.   Yes Physician Outpatient   raNITidine (ZANTAC) 150 MG Tab Take 150 mg by mouth every evening.   Yes Physician Outpatient   triamcinolone (NASACORT ALLERGY 24HR) 55 MCG/ACT nasal inhaler Spray 1 Spray in nose every day.   Yes Physician Outpatient   multivitamin (THERAGRAN) Tab Take 1 Tab by mouth every day.   Yes Physician Outpatient   Ascorbic Acid (VITAMIN C) 1000 MG Tab Take 1 Tab by mouth every day.   Yes Physician Outpatient   Calcium-Magnesium-Zinc (PATRIA-MAG-ZINC PO) Take 1 Tab by mouth every day.   Yes Physician Outpatient   5-Methyltetrahydrofolate (METHYL FOLATE) Powder Take 1 Dose by mouth every day.   Yes Physician Outpatient   Cholecalciferol (VITAMIN  D3) 125 MCG (5000 UT) Tab Take 1 Tab by mouth every day.   Yes Physician Outpatient   ASTRAGALUS PO Take 1 Tab by mouth every day.   Yes Physician Outpatient   Omega-3 Fatty Acids (ULTRA OMEGA 3 PO) Take 1 Tab by mouth every day.   Yes Physician Outpatient   Carboxymethylcellul-Glycerin (CLEAR EYES FOR DRY EYES) 1-0.25 % Solution 1 Drop by Ophthalmic route as needed.   Yes Physician Outpatient   testosterone cypionate (DEPO-TESTOSTERONE) 200 MG/ML Solution injection 50 mg by Intramuscular route every 14 days.   Yes Physician Outpatient       Physical Exam  Temp:  [36.8 °C (98.3 °F)] 36.8 °C (98.3 °F)  Pulse:  [52-66] 52  Resp:  [19-51] 19  BP: (103-111)/(61-74) 106/64  SpO2:  [92 %-94 %] 94 %  Physical Exam   Constitutional: He is oriented to person, place, and time. He appears well-developed and well-nourished. No distress.   HENT:   Head: Normocephalic and atraumatic.   Mouth/Throat: No oropharyngeal exudate.   Eyes: Pupils are equal, round, and reactive to light. Conjunctivae are normal. Right eye exhibits no discharge. No scleral icterus.   Neck: Neck supple. No JVD present. No thyromegaly present.   Cardiovascular: Normal rate and intact distal pulses.   No murmur heard.  Pulses:       Dorsalis pedis pulses are 2+ on the right side and 2+ on the left side.   Cap refill < 3 s   Pulmonary/Chest: Effort normal and breath sounds normal. No stridor. No respiratory distress. He has no wheezes. He has no rales.   Abdominal: Soft. Bowel sounds are normal. He exhibits no distension. There is no abdominal tenderness. There is no rebound.   Musculoskeletal: Normal range of motion.         General: No edema.   Neurological: He is alert and oriented to person, place, and time. No cranial nerve deficit.   Skin: Skin is warm and dry. He is not diaphoretic. No erythema.   Psychiatric: He has a normal mood and affect. His behavior is normal. Thought content normal.   Nursing note and vitals reviewed.      Laboratory:  Recent  Labs     02/27/20  1055   WBC 7.3   RBC 5.44   HEMOGLOBIN 16.8   HEMATOCRIT 51.0   MCV 93.8   MCH 30.9   MCHC 32.9*   RDW 47.9   PLATELETCT 246   MPV 9.5     Recent Labs     02/27/20  1055   SODIUM 142   POTASSIUM 3.9   CHLORIDE 107   CO2 23   GLUCOSE 110*   BUN 12   CREATININE 0.91   CALCIUM 9.0     Recent Labs     02/27/20  1055   ALTSGPT 13   ASTSGOT 22   ALKPHOSPHAT 65   TBILIRUBIN 0.4   GLUCOSE 110*               Urinalysis:          Imaging:  DX-CHEST-PORTABLE (1 VIEW)   Final Result      Stable chest with left subpulmonic effusion, basilar consolidation favored over just atelectasis/scarring. Follow up to resolution is recommended      Possible small nodule right midlung zone. Attention in follow-up is also recommended      NM-CARDIAC STRESS TEST    (Results Pending)       Assessment/Plan:  I anticipate this patient is appropriate for observation status at this time.    * Pain in the chest  Assessment & Plan  Rule out cardiac cause.  Initial EKG and troponin's are negative, we will continue to trend serial troponins, If negative we will proceed with a NM Stress test for risk stratification.  We will monitor patient on telemetry,  Aspirin has been ordered.  TSH Pending  Lipid panel ordered and pending   CTA PE ordered to rule out PE and further clarify noted pulmonary nodules on xray      Dementia (HCC)  Assessment & Plan  Resume home dose of aricept    HLD (hyperlipidemia)  Assessment & Plan  Resume pravastatin home dose    GERD (gastroesophageal reflux disease)  Assessment & Plan  Resume pepcid.    Hypothyroidism  Assessment & Plan  Resume home dose of synthroid  Check TSH, Free T4      VTE prophylaxis: Prophylaxis: SCDs

## 2020-02-27 NOTE — ASSESSMENT & PLAN NOTE
Rule out cardiac cause.  Initial EKG and troponin's are negative, we will continue to trend serial troponins, If negative we will proceed with a NM Stress test for risk stratification.  We will monitor patient on telemetry,  Aspirin has been ordered.  TSH Pending  Lipid panel ordered and pending   CTA PE ordered to rule out PE and further clarify noted pulmonary nodules on xray

## 2020-02-27 NOTE — ED PROVIDER NOTES
ED Provider Note    CHIEF COMPLAINT  Chief Complaint   Patient presents with   • Chest Pain     pt stopped taking all his home meds 3 weeks ago because he got tired of taking pill. pt c/o chest pain intermmittently with SOB.        HPI  Chucho Marin is a 84 y.o. male who presents complaint of chest pain off and on for several weeks.  He is not really sure what makes it come or go.  Does note that it increases with his activity through the day.  Seems fine in the morning time.  He describes a dull substernal chest discomfort which radiates up towards his neck.  He stopped his medicine sometime ago and this is when his symptoms began.  They got better when he restarted in 3 weeks ago.  He saw his doctor yesterday who recommended come to the hospital.  He denies any leg pain or swelling.  No fever or chills.  No cough or cold symptoms.  No recent illness.  No recent trauma.  There is no other complaint.    PAST MEDICAL HISTORY  Past Medical History:   Diagnosis Date   • CHEST PAIN 6/1/2011       FAMILY HISTORY  History reviewed. No pertinent family history.    SOCIAL HISTORY  Social History     Tobacco Use   • Smoking status: Never Smoker   Substance Use Topics   • Alcohol use: No   • Drug use: No         SURGICAL HISTORY  History reviewed. No pertinent surgical history.    CURRENT MEDICATIONS  Home Medications     Reviewed by Shar Christian (Pharmacy Tech) on 02/27/20 at 1145  Med List Status: Complete   Medication Last Dose Status   5-Methyltetrahydrofolate (METHYL FOLATE) Powder 2/26/2020 Active   Ascorbic Acid (VITAMIN C) 1000 MG Tab 2/26/2020 Active   ASTRAGALUS PO 2/26/2020 Active   Calcium-Magnesium-Zinc (PATRIA-MAG-ZINC PO) 2/26/2020 Active   Carboxymethylcellul-Glycerin (CLEAR EYES FOR DRY EYES) 1-0.25 % Solution 2/26/2020 Active   Cholecalciferol (VITAMIN D3) 125 MCG (5000 UT) Tab 2/26/2020 Active   donepezil (ARICEPT) 5 MG Tab 2/26/2020 Active   famotidine (PEPCID) 20 MG Tab 2/26/2020 Active  "  levothyroxine (SYNTHROID) 75 MCG Tab 2/26/2020 Active   liothyronine (CYTOMEL) 5 MCG Tab 2/26/2020 Active   multivitamin (THERAGRAN) Tab 2/26/2020 Active   Omega-3 Fatty Acids (ULTRA OMEGA 3 PO) 2/26/2020 Active   pravastatin (PRAVACHOL) 20 MG Tab 2/26/2020 Active   raNITidine (ZANTAC) 150 MG Tab 2/26/2020 Active   testosterone cypionate (DEPO-TESTOSTERONE) 200 MG/ML Solution injection 1 WEEK Active   triamcinolone (NASACORT ALLERGY 24HR) 55 MCG/ACT nasal inhaler 2/26/2020 Active                I have reviewed the nurses notes and/or the list brought with the patient.    ALLERGIES  No Known Allergies    REVIEW OF SYSTEMS  See HPI for further details. Review of systems as above, otherwise all other systems are negative.     PHYSICAL EXAM  VITAL SIGNS: /64   Pulse (!) 52   Temp 36.8 °C (98.3 °F) (Temporal)   Resp 19   Ht 1.803 m (5' 11\")   Wt 72 kg (158 lb 11.7 oz)   SpO2 94%   BMI 22.14 kg/m²     Constitutional: Well appearing patient in no acute distress.  Not toxic, nor ill in appearance.  HENT: Mucus membranes moist.  Oropharynx is clear.  Eyes: Pupils equally round.  No scleral icterus.   Neck: Full nontender range of motion.  Lymphatic: No cervical lymphadenopathy noted.   Cardiovascular: Regular heart rate and rhythm.  No murmurs, rubs, nor gallop appreciated.   Thorax & Lungs: Chest is nontender.  Lungs are clear to auscultation with good air movement bilaterally.  No wheeze, rhonchi, nor rales.   Abdomen: Soft, with no tenderness, rebound nor guarding.  No mass, pulsatile mass, nor hepatosplenomegaly appreciated.  Skin: No purpura nor petechia noted.  Extremities/Musculoskeletal: No sign of trauma.  Calves are nontender with no cords nor edema.  No Frank's sign.  Pulses are intact all around.   Neurologic: Alert & oriented.  Strength and sensation is intact all around.  Gait is normal.  Psychiatric: Normal affect appropriate for the clinical situation.    EKG  I interpreted this EKG myself.  " This is a 12-lead study.  The rhythm is sinus with a rate of 66.  There is a PVC.  There are no ST segment nor T wave abnormalities.  Interpretation: No ST segment elevation myocardial infarction.    LABS  Labs Reviewed   CBC WITH DIFFERENTIAL - Abnormal; Notable for the following components:       Result Value    MCHC 32.9 (*)     Lymphocytes 19.00 (*)     All other components within normal limits   COMP METABOLIC PANEL - Abnormal; Notable for the following components:    Glucose 110 (*)     All other components within normal limits   TROPONIN   MAGNESIUM   ESTIMATED GFR   TROPONIN   TSH   FREE THYROXINE         RADIOLOGY/PROCEDURES  I have reviewed the patient's film interpretations myself, and they are read out by the radiologist as:   DX-CHEST-PORTABLE (1 VIEW)   Final Result      Stable chest with left subpulmonic effusion, basilar consolidation favored over just atelectasis/scarring. Follow up to resolution is recommended      Possible small nodule right midlung zone. Attention in follow-up is also recommended      NM-CARDIAC STRESS TEST    (Results Pending)   CT-CTA CHEST PULMONARY ARTERY W/ RECONS    (Results Pending)     .    MEDICAL RECORD  I have reviewed patient's medical record and pertinent results are listed above.    COURSE & MEDICAL DECISION MAKING  I have reviewed any medical record information, laboratory studies and radiographic results as noted above.  This is a patient who presents with some chest pain, a pressure type sensation.  However upon further questioning he does state that it hurts a little bit when he takes of breath as well.  This combined with his pulmonary nodules, I have ordered a CT scan looking for pulmonary embolism as well as to further characterize that chest.  The hospitalist service will be following up with this.  I order the patient an aspirin for now.  His for troponin is negative.  His EKG is nondiagnostic.  At this point we will put him in the hospital for further  observation and work-up.  The patient's case was discussed with Dr. Lao, who has seen and admitted him.      FINAL IMPRESSION  1. Acute chest pain    2. Pulmonary nodule           This dictation was created using voice recognition software.    Electronically signed by: Jose Manuel Medina M.D., 2/27/2020 12:24 PM

## 2020-02-27 NOTE — ED TRIAGE NOTES
"Chief Complaint   Patient presents with   • Chest Pain     pt stopped taking all his home meds 3 weeks ago because he got tired of taking pill. pt c/o chest pain intermmittently with SOB.      /61   Pulse 61   Temp 36.8 °C (98.3 °F) (Temporal)   Resp 20   Ht 1.803 m (5' 11\")   Wt 72 kg (158 lb 11.7 oz)   SpO2 92%   BMI 22.14 kg/m²     "

## 2020-02-27 NOTE — PROGRESS NOTES
Received report from Samantha HERNÁNDEZ from ED. Pt in bed, comfortable, and understands today's plan. Call bell in reach, bed in low locked position

## 2020-02-28 NOTE — PROGRESS NOTES
Tele strip at 1904 shows SR 55  Measurements:   CA: 0.20  QRS: 0.08  QT: 0.42    Shift summary:    Rhythm: SB to SR  Rate: 40s - 80s  Ectopy: Per CCT, rare PVCs

## 2020-02-28 NOTE — DISCHARGE SUMMARY
Discharge Summary    CHIEF COMPLAINT ON ADMISSION  Chief Complaint   Patient presents with   • Chest Pain     pt stopped taking all his home meds 3 weeks ago because he got tired of taking pill. pt c/o chest pain intermmittently with SOB.        Reason for Admission  Chest Pain     Admission Date  2/27/2020    CODE STATUS  Full Code    HPI & HOSPITAL COURSE  This is a 84 y.o. male with a history of mild dementia, hypothyroidism, hyperlipidemia here with shortness of breath and pleuritic discomfort in his left flank.  Due to his pleuritic pain and elderly status, there was concern for PE therefore a CTA was performed however this was negative.  It did show very mild pulmonary vascular congestion.  He also had what appeared to be atelectasis.  He was given 1 dose of IV Lasix.  Initially, he was requiring oxygen however after Lasix, this was discontinued and his saturations were 97%.  A BNP was checked and was found to be normal therefore he was not continued on Lasix therapy and an echocardiogram was not performed.  He underwent a cardiac stress test that showed no evidence of reversible abnormality.  He improved significantly by the following day.  All of his laboratory evaluations were normal including troponin.  He had no ongoing complaints therefore he was discharged in stable condition.  He should follow-up with his outpatient primary care physician to discuss getting screening echocardiogram if needed however it was not indicated at this time.       Therefore, he is discharged in good and stable condition to home with close outpatient follow-up.    The patient recovered much more quickly than anticipated on admission.    Discharge Date  2/28/20    FOLLOW UP ITEMS POST DISCHARGE  Follow-up with PCP within 1 week    DISCHARGE DIAGNOSES  Principal Problem:    Pain in the chest POA: Unknown  Active Problems:    Hypothyroidism POA: Unknown    GERD (gastroesophageal reflux disease) POA: Unknown    HLD (hyperlipidemia)  POA: Unknown    Dementia (HCC) POA: Unknown  Resolved Problems:    * No resolved hospital problems. *      FOLLOW UP  No future appointments.  Christopher Lord            MEDICATIONS ON DISCHARGE     Medication List      CONTINUE taking these medications      Instructions   aspirin EC 81 MG Tbec  Commonly known as:  ECOTRIN   Take 1 Tab by mouth every day.  Dose:  81 mg     ASTRAGALUS PO   Take 1 Tab by mouth every day.  Dose:  1 Tab     PATRIA-MAG-ZINC PO   Take 1 Tab by mouth every day.  Dose:  1 Tab     Clear Eyes for Dry Eyes 1-0.25 % Soln  Generic drug:  Carboxymethylcellul-Glycerin   1 Drop by Ophthalmic route as needed.  Dose:  1 Drop     donepezil 5 MG Tabs  Commonly known as:  ARICEPT   Take 5 mg by mouth every evening.  Dose:  5 mg     famotidine 20 MG Tabs  Commonly known as:  PEPCID   Take 20 mg by mouth every evening.  Dose:  20 mg     levothyroxine 75 MCG Tabs  Commonly known as:  SYNTHROID   Take 75 mcg by mouth Every morning on an empty stomach.  Dose:  75 mcg     liothyronine 5 MCG Tabs  Commonly known as:  CYTOMEL   Take 5 mcg by mouth every day.  Dose:  5 mcg     Methyl Folate Powd   Take 1 Dose by mouth every day.  Dose:  1 Dose     multivitamin Tabs   Take 1 Tab by mouth every day.  Dose:  1 Tab     Nasacort Allergy 24HR 55 MCG/ACT nasal inhaler  Generic drug:  triamcinolone   Spray 1 Spray in nose every day.  Dose:  1 Spray     pravastatin 20 MG Tabs  Commonly known as:  PRAVACHOL   Take 20 mg by mouth every evening.  Dose:  20 mg     raNITidine 150 MG Tabs  Commonly known as:  ZANTAC   Take 150 mg by mouth every evening.  Dose:  150 mg     testosterone cypionate 200 MG/ML Soln injection  Commonly known as:  DEPO-TESTOSTERONE   50 mg by Intramuscular route every 14 days.  Dose:  50 mg     ULTRA OMEGA 3 PO   Take 1 Tab by mouth every day.  Dose:  1 Tab     Vitamin C 1000 MG Tabs   Take 1 Tab by mouth every day.  Dose:  1 Tab     Vitamin D3 125 MCG (5000 UT) Tabs   Take 1 Tab by mouth every day.  Dose:   1 Tab            Allergies  No Known Allergies    DIET  Orders Placed This Encounter   Procedures   • Diet Order Cardiac, 2 Gram Sodium     Standing Status:   Standing     Number of Occurrences:   1     Order Specific Question:   Diet:     Answer:   Cardiac [6]     Order Specific Question:   Diet:     Answer:   2 Gram Sodium [7]       ACTIVITY  As tolerated.  Weight bearing as tolerated    CONSULTATIONS  None    PROCEDURES  None    LABORATORY  Lab Results   Component Value Date    SODIUM 141 02/28/2020    POTASSIUM 3.8 02/28/2020    CHLORIDE 108 02/28/2020    CO2 25 02/28/2020    GLUCOSE 92 02/28/2020    BUN 19 02/28/2020    CREATININE 1.05 02/28/2020    CREATININE 0.9 10/30/2007        Lab Results   Component Value Date    WBC 8.1 02/28/2020    HEMOGLOBIN 15.5 02/28/2020    HEMATOCRIT 47.6 02/28/2020    PLATELETCT 213 02/28/2020        Total time of the discharge process exceeds 37 minutes.

## 2020-02-28 NOTE — PROGRESS NOTES
Received report from Jonnathan HERNÁNDEZ. Discussed POC. Pt in bed, NPO for stress test since midnight. NPO sign in place.    Breakfast brought to pt in spite of NPO sign and pt ate, but did not have any caffeine. New time for stress test now 10:00am

## 2020-02-28 NOTE — DISCHARGE INSTRUCTIONS
Discharge Instructions    Discharged to home by car with relative. Discharged via wheelchair, hospital escort: Yes.  Special equipment needed: Not Applicable    Be sure to schedule a follow-up appointment with your primary care doctor or any specialists as instructed.     Discharge Plan:   Influenza Vaccine Indication: Not indicated: Previously immunized this influenza season and > 8 years of age    I understand that a diet low in cholesterol, fat, and sodium is recommended for good health. Unless I have been given specific instructions below for another diet, I accept this instruction as my diet prescription.   Other diet: Regular    Special Instructions: None    · Is patient discharged on Warfarin / Coumadin?   No     Depression / Suicide Risk    As you are discharged from this [facility] facility, it is important to learn how to keep safe from harming yourself.    Recognize the warning signs:  · Abrupt changes in personality, positive or negative- including increase in energy   · Giving away possessions  · Change in eating patterns- significant weight changes-  positive or negative  · Change in sleeping patterns- unable to sleep or sleeping all the time   · Unwillingness or inability to communicate  · Depression  · Unusual sadness, discouragement and loneliness  · Talk of wanting to die  · Neglect of personal appearance   · Rebelliousness- reckless behavior  · Withdrawal from people/activities they love  · Confusion- inability to concentrate     If you or a loved one observes any of these behaviors or has concerns about self-harm, here's what you can do:  · Talk about it- your feelings and reasons for harming yourself  · Remove any means that you might use to hurt yourself (examples: pills, rope, extension cords, firearm)  · Get professional help from the community (Mental Health, Substance Abuse, psychological counseling)  · Do not be alone:Call your Safe Contact- someone whom you trust who will be there for  you.  · Call your local CRISIS HOTLINE 968-2527 or 007-583-1531  · Call your local Children's Mobile Crisis Response Team [place] (785) 470-7785 or www.smartclip  · Call the toll free National Suicide Prevention Hotlines   · National Suicide Prevention Lifeline 847-271-JPDT (5165)  · National Hope Line Network 800-SUICIDE (283-4306)      Chest Wall Pain  Introduction  Chest wall pain is pain in or around the bones and muscles of your chest. Sometimes, an injury causes this pain. Sometimes, the cause may not be known. This pain may take several weeks or longer to get better.  Follow these instructions at home:  Pay attention to any changes in your symptoms. Take these actions to help with your pain:  · Rest as told by your doctor.  · Avoid activities that cause pain. Try not to use your chest, belly (abdominal), or side muscles to lift heavy things.  · If directed, apply ice to the painful area:  ¨ Put ice in a plastic bag.  ¨ Place a towel between your skin and the bag.  ¨ Leave the ice on for 20 minutes, 2-3 times per day.  · Take over-the-counter and prescription medicines only as told by your doctor.  · Do not use tobacco products, including cigarettes, chewing tobacco, and e-cigarettes. If you need help quitting, ask your doctor.  · Keep all follow-up visits as told by your doctor. This is important.  Contact a doctor if:  · You have a fever.  · Your chest pain gets worse.  · You have new symptoms.  Get help right away if:  · You feel sick to your stomach (nauseous) or you throw up (vomit).  · You feel sweaty or light-headed.  · You have a cough with phlegm (sputum) or you cough up blood.  · You are short of breath.  This information is not intended to replace advice given to you by your health care provider. Make sure you discuss any questions you have with your health care provider.  Document Released: 06/05/2009 Document Revised: 05/25/2017 Document Reviewed: 03/14/2016  © 2017  [employer]      Shortness of Breath, Adult  Shortness of breath is when a person has trouble breathing enough air, or when a person feels like she or he is having trouble breathing in enough air. Shortness of breath could be a sign of medical problem.  Follow these instructions at home:  Pay attention to any changes in your symptoms. Take these actions to help with your condition:  · Do not smoke. Smoking is a common cause of shortness of breath. If you smoke and you need help quitting, ask your health care provider.  · Avoid things that can irritate your airways, such as:  ¨ Mold.  ¨ Dust.  ¨ Air pollution.  ¨ Chemical fumes.  ¨ Things that can cause allergy symptoms (allergens), if you have allergies.  · Keep your living space clean and free of mold and dust.  · Rest as needed. Slowly return to your usual activities.  · Take over-the-counter and prescription medicines, including oxygen and inhaled medicines, only as told by your health care provider.  · Keep all follow-up visits as told by your health care provider. This is important.  Contact a health care provider if:  · Your condition does not improve as soon as expected.  · You have a hard time doing your normal activities, even after you rest.  · You have new symptoms.  Get help right away if:  · Your shortness of breath gets worse.  · You have shortness of breath when you are resting.  · You feel light-headed or you faint.  · You have a cough that is not controlled with medicines.  · You cough up blood.  · You have pain with breathing.  · You have pain in your chest, arms, shoulders, or abdomen.  · You have a fever.  · You cannot walk up stairs or exercise the way that you normally do.  This information is not intended to replace advice given to you by your health care provider. Make sure you discuss any questions you have with your health care provider.  Document Released: 09/12/2002 Document Revised: 07/08/2017 Document Reviewed: 05/25/2017  [employer]  Interactive Patient Education © 2017 [employer]

## 2020-02-28 NOTE — PROGRESS NOTES
Received report from Jeremiah Connolly. Pt resting in bed. No complaints of chest pain at this time. Discussed POC. Will continue to monitor.

## 2020-02-28 NOTE — PROGRESS NOTES
Patient presents to NM suite for cardiac stress test with MPI. Nursing goals identified: knowledge deficit, potential for anxiety r/t stress test, potential for compromised cardiac output. Care plan includes educating patient, reassurance and access to ACLS cart/team. Labs and ECG reviewed. No caffeine and NPO confirmed. Resting images attained and patient prepped for pharmacological stress study. Lexiscan given while patient SAT IN CHAIR AND MOVED FEET X2 MINUTES DUE TO STATING SOME DIZZINESS UPON STANDING. Patient reported these symptoms: MILD INCREASE IN WOB. TRGIMENY NOTED AT PEAK EXERCISE WITH OCCASIONAL PVC IN RECOVERY. REFUSED CAFFEINE AND WATER. Symptoms resolved.

## 2020-02-28 NOTE — PROGRESS NOTES
Tele strip at 1458 shows SR 61.      Measurements from am strip were as follows:  AR=0.20  QRS=0.10  QT=0.40    Tele Shift Summary:    Rhythm : SB to SR  Rate : 50-60s  Ectopy : Per CCT Charlene, pt had occasional PVCs.     Telemetry monitoring strips placed in pt's chart.

## 2020-02-29 NOTE — PROGRESS NOTES
Discharging Patient home per physician order.  Discharged with self to home at 1459.  Demonstrated understanding of discharge instructions, follow up appointments, home medications, prescriptions sent to na, and nursing care instructions for CP.  Ambulating without assistance, voiding without difficulty, pain well controlled, tolerating oral medications, oxygen saturation greater than 90% on RA , tolerating diet.   Educational handouts given and discussed.  Verbalized understanding of discharge instructions and educational handouts.  All questions answered.  Belongings with patient at time of discharge. Pt was sent home with paperwork, refused Home health.

## 2020-05-26 PROBLEM — J90 PLEURAL EFFUSION ON LEFT: Status: ACTIVE | Noted: 2020-01-01

## 2020-05-26 PROBLEM — R73.9 HYPERGLYCEMIA: Status: ACTIVE | Noted: 2020-01-01

## 2020-05-27 NOTE — ASSESSMENT & PLAN NOTE
Transient, Secondary to pleural effusion  Mild trop elevation, but stable  Pain control  ASA  Lidocaine patch

## 2020-05-27 NOTE — CARE PLAN
Problem: Communication  Goal: The ability to communicate needs accurately and effectively will improve  Outcome: PROGRESSING AS EXPECTED   Pt verbalizes needs      Problem: Safety  Goal: Will remain free from injury  Outcome: PROGRESSING AS EXPECTED  Goal: Will remain free from falls  Outcome: PROGRESSING AS EXPECTED   Safety measures in place      Problem: Venous Thromboembolism (VTW)/Deep Vein Thrombosis (DVT) Prevention:  Goal: Patient will participate in Venous Thrombosis (VTE)/Deep Vein Thrombosis (DVT)Prevention Measures  Outcome: PROGRESSING AS EXPECTED   SCDs on      Problem: Bowel/Gastric:  Goal: Normal bowel function is maintained or improved  Outcome: PROGRESSING AS EXPECTED  Goal: Will not experience complications related to bowel motility  Outcome: PROGRESSING AS EXPECTED   BM this am      Problem: Respiratory:  Goal: Respiratory status will improve  Outcome: PROGRESSING SLOWER THAN EXPECTED   Requiring 2LNC, RA at BL      Problem: Pain Management  Goal: Pain level will decrease to patient's comfort goal  Outcome: PROGRESSING AS EXPECTED   C/o pain in left lung area, declines intervention at this time

## 2020-05-27 NOTE — ED NOTES
Reports SOB x approx 2 weeks. Denies sore throat, fever. Reports he was sent from PCP- note from PCP stating pt. With pleural effusion.

## 2020-05-27 NOTE — ASSESSMENT & PLAN NOTE
Etiology has not been confirmed but is suspicious for malignancy because exudative, recurrent in the absence of volume overload, with abnormal mesothelial cells seen on cytology   Did not improve with diuresis  S/P Thoracentesis (Left) with 2020ml drained and on 6/3 with 1800mL drained  CT chest abdomen pelvis post-thora shows no evidence of malignancy  Negative CA-19-9, , RF, mildly elevated PSA  He will be discharged with supplemental oxygen to SNF  He will follow-up with pulmonology in ~3weeksfor repeat imaging  He may need serial outpatient thoracentesis  Echo showed global hypokinesis and EF of 45

## 2020-05-27 NOTE — PROGRESS NOTES
2 RN skin check complete.   Devices in place Nasocanula.  Skin assessed under devices Yes.  Confirmed pressure ulcers found on n/a.  New potential pressure ulcers noted on n/a. Wound consult placed n/a.  The following interventions in place Encouraged frequent repositioning and ambulation*    2 RN skin assessment done with Mg HERNÁNDEZ; skin intact and WDL.

## 2020-05-27 NOTE — PROGRESS NOTES
Pt resting with eyes closed. Easily to arousal. Regular and unlabored breathing. No signs of acute distress noticed. Safety measures in place.

## 2020-05-27 NOTE — H&P
Hospital Medicine History & Physical Note    Date of Service  5/26/2020    Primary Care Physician  Gibson Quintana M.D.    Code Status  Full Code    Chief Complaint  Chief Complaint   Patient presents with   • Shortness of Breath     pt c/o SOB x 1-2weeks   • Cough     infrequent dry cough per pt       History of Presenting Illness  84 y.o. male who presented 5/26/2020 with chest pain and shortness of breath.  Patient was here in February with similar symptoms, he did have a CTA that showed a small to moderate pleural effusion.  Patient states it became worse over the last 2 weeks, did have an outpatient CT scan today which showed a large left pleural effusion, because of this patient was told to present to the emergency department.  He states his chest pain is located on the left side, varies in nature, occasionally dull other times sharp, 8/10 at its worse.  He also complains of shortness of breath that is always present over the last couple of weeks, does seem to be worsening.  He denied any cough, fever or chills.  He has not traveled or had sick contacts.  While in the ER, he did have a negative COVID test.  I did discuss the case including labs and imaging with the ER physician.    Review of Systems  Review of Systems   Constitutional: Negative for chills, fever and malaise/fatigue.   HENT: Negative for congestion.    Respiratory: Positive for shortness of breath. Negative for cough, sputum production and stridor.    Cardiovascular: Positive for chest pain. Negative for palpitations and leg swelling.   Gastrointestinal: Negative for abdominal pain, constipation, diarrhea, nausea and vomiting.   Genitourinary: Negative for dysuria and urgency.   Musculoskeletal: Negative for falls and myalgias.   Neurological: Negative for dizziness, tingling, loss of consciousness, weakness and headaches.   Psychiatric/Behavioral: Negative for depression and suicidal ideas.   All other systems reviewed and are  negative.      Past Medical History   has a past medical history of hypothyroidism, dementia, dyslipidemia    Surgical History  None    Family History  Cancer    Social History   reports that he has never smoked. He has never used smokeless tobacco. He reports that he does not drink alcohol or use drugs.    Allergies  No Known Allergies    Medications  Prior to Admission Medications   Prescriptions Last Dose Informant Patient Reported? Taking?   5-Methyltetrahydrofolate (METHYL FOLATE) Powder  Patient Yes No   Sig: Take 1 Dose by mouth every day.   ASTRAGALUS PO 2020 at 0800 Patient Yes No   Sig: Take 1 Tab by mouth every day.   Ascorbic Acid (VITAMIN C) 1000 MG Tab 2020 at 0800 Patient Yes No   Sig: Take 1 Tab by mouth every day.   Calcium-Magnesium-Zinc (PATRIA-MAG-ZINC PO) 2020 at 0800 Patient Yes No   Sig: Take 1 Tab by mouth every day.   Carboxymethylcellul-Glycerin (CLEAR EYES FOR DRY EYES) 1-0.25 % Solution 2020 at 0800 Patient Yes No   Si Drop by Ophthalmic route as needed.   Cholecalciferol (VITAMIN D3) 125 MCG (5000 UT) Tab 2020 at 0800 Patient Yes No   Sig: Take 1 Tab by mouth every day.   Omega-3 Fatty Acids (ULTRA OMEGA 3 PO) 2020 at 0800 Patient Yes No   Sig: Take 1 Tab by mouth every day.   donepezil (ARICEPT) 5 MG Tab 2020 at 2200 Patient Yes No   Sig: Take 5 mg by mouth every evening.   levothyroxine (SYNTHROID) 75 MCG Tab 2020 at 2200 Patient Yes No   Sig: Take 75 mcg by mouth Every morning on an empty stomach.   liothyronine (CYTOMEL) 5 MCG Tab 2020 at 0800 Patient Yes No   Sig: Take 5 mcg by mouth every day.   multivitamin (THERAGRAN) Tab 2020 at 0800 Patient Yes No   Sig: Take 1 Tab by mouth every day.   pravastatin (PRAVACHOL) 20 MG Tab 2020 at 2200 Patient Yes No   Sig: Take 20 mg by mouth every evening.   triamcinolone (NASACORT ALLERGY 24HR) 55 MCG/ACT nasal inhaler 2020 at 0800 Patient Yes No   Sig: Spray 1 Spray in nose every  day.      Facility-Administered Medications: None       Physical Exam  Temp:  [36.8 °C (98.3 °F)] 36.8 °C (98.3 °F)  Pulse:  [67-83] 70  Resp:  [19-27] 20  BP: (127-149)/(78-83) 149/80  SpO2:  [91 %-96 %] 95 %    Physical Exam  Vitals signs and nursing note reviewed.   Constitutional:       General: He is not in acute distress.     Appearance: He is well-developed. He is not toxic-appearing or diaphoretic.   HENT:      Head: Normocephalic and atraumatic.      Right Ear: External ear normal.      Left Ear: External ear normal.      Nose: Nose normal. No congestion or rhinorrhea.      Mouth/Throat:      Mouth: Mucous membranes are moist.      Pharynx: No oropharyngeal exudate.   Eyes:      General:         Right eye: No discharge.         Left eye: No discharge.      Extraocular Movements: Extraocular movements intact.   Neck:      Musculoskeletal: Normal range of motion and neck supple. No edema or erythema.      Trachea: No tracheal deviation.   Cardiovascular:      Rate and Rhythm: Normal rate and regular rhythm.      Heart sounds: No murmur. No friction rub. No gallop.    Pulmonary:      Effort: Pulmonary effort is normal. No respiratory distress.      Breath sounds: No stridor. Examination of the left-upper field reveals decreased breath sounds. Examination of the left-middle field reveals decreased breath sounds. Examination of the left-lower field reveals decreased breath sounds. Decreased breath sounds present. No wheezing or rales.   Chest:      Chest wall: No tenderness.   Abdominal:      General: Bowel sounds are normal. There is no distension.      Palpations: Abdomen is soft.      Tenderness: There is no abdominal tenderness.   Musculoskeletal: Normal range of motion.         General: No tenderness.      Right lower leg: No edema.      Left lower leg: No edema.   Lymphadenopathy:      Cervical: No cervical adenopathy.   Skin:     General: Skin is warm and dry.      Findings: No erythema or rash.    Neurological:      General: No focal deficit present.      Mental Status: He is alert and oriented to person, place, and time.      Cranial Nerves: No cranial nerve deficit.   Psychiatric:         Mood and Affect: Mood normal.         Behavior: Behavior normal.         Thought Content: Thought content normal.         Judgment: Judgment normal.         Laboratory:  Recent Labs     05/26/20 2005   WBC 11.5*   RBC 5.38   HEMOGLOBIN 16.6   HEMATOCRIT 50.1   MCV 93.1   MCH 30.9   MCHC 33.1*   RDW 47.5   PLATELETCT 266   MPV 9.5     Recent Labs     05/26/20 2005   SODIUM 143   POTASSIUM 3.7   CHLORIDE 107   CO2 24   GLUCOSE 118*   BUN 20   CREATININE 0.88   CALCIUM 8.5     Recent Labs     05/26/20 2005   ALTSGPT 13   ASTSGOT 25   ALKPHOSPHAT 68   TBILIRUBIN 0.3   GLUCOSE 118*     Recent Labs     05/26/20 2005   APTT 28.7   INR 1.03     No results for input(s): NTPROBNP in the last 72 hours.      Recent Labs     05/26/20 2005   TROPONINT 14       Imaging:  US-THORACENTESIS PUNCTURE LEFT    (Results Pending)         Assessment/Plan:    Pleural effusion on left- (present on admission)  Assessment & Plan  -As well as a small pericardial effusion  -I did personally review his CT of the chest from earlier today, noted a large left-sided effusion, I also compared it to his CTA from February, effusion was present at that time but is markedly worse now  -Certainly unilateral, no changes noted on the right  -Normal TSH/free T4, continue home Synthroid  -Obtain thoracentesis  -Patient does have a mild leukocytosis but nothing profound, he does not have other symptoms to suggest sepsis, however due to the worsening of his effusion and the length it is been present I will start Unasyn and azithromycin  -Depending on the results of the fluid analysis from a thoracentesis, may be able to DC antibiotics  -Possibly malignancy, await fluid analysis and postthoracentesis chest x-ray  -I do not see signs of loculation but since it has  been present since at least February, high risk of it being loculated    Chest pain- (present on admission)  Assessment & Plan  -Mild, located on the left, I think this is likely due to the pleural effusion  -Trend troponins  -He did have negative stress test in February, no need to repeat  -The effusion is unilateral, I do not think an echocardiogram is warranted at this time    Hyperglycemia- (present on admission)  Assessment & Plan  -Mild, no need for coverage at this time    Dementia (HCC)- (present on admission)  Assessment & Plan  -Patient is alert and oriented currently, continue home dose of Aricept    HLD (hyperlipidemia)- (present on admission)  Assessment & Plan  -Continue home statin    Hypothyroidism- (present on admission)  Assessment & Plan  -Patient did have a TSH and a free T4 done earlier this month that were normal  -Hypothyroidism is not causing his effusion

## 2020-05-27 NOTE — PROGRESS NOTES
[facility] Medicine Daily Progress Note    Date of Service  5/27/2020    Chief Complaint  84 y.o. male admitted 5/26/2020 with dyspnea    Hospital Course    84 y.o. male who presented 5/26/2020 with chest pain and shortness of breath.  Patient was here in February with similar symptoms, he did have a CTA that showed a small to moderate pleural effusion.  Patient states it became worse over the last 2 weeks, did have an outpatient CT scan today which showed a large left pleural effusion, because of this patient was told to present to the emergency department.  He states his chest pain is located on the left side, varies in nature, occasionally dull other times sharp, 8/10 at its worse.  He also complains of shortness of breath that is always present over the last couple of weeks, does seem to be worsening.  He denied any cough, fever or chills.  He has not traveled or had sick contacts.    Interval Problem Update  Thoracentesis is pending    Consultants/Specialty  none    Code Status  FULL    Disposition  pending    Review of Systems  Review of Systems   Constitutional: Positive for malaise/fatigue. Negative for chills and fever.   HENT: Negative for ear discharge, ear pain and tinnitus.    Eyes: Negative for blurred vision, double vision and photophobia.   Respiratory: Positive for shortness of breath. Negative for cough and hemoptysis.    Cardiovascular: Negative for chest pain, palpitations and claudication.   Gastrointestinal: Negative for heartburn, nausea and vomiting.   Genitourinary: Negative for dysuria, frequency and urgency.   Musculoskeletal: Negative for back pain, myalgias and neck pain.   Skin: Negative for itching and rash.   Neurological: Negative for dizziness, tingling and headaches.        Physical Exam  Temp:  [36.3 °C (97.3 °F)-36.8 °C (98.3 °F)] 36.3 °C (97.3 °F)  Pulse:  [67-83] 69  Resp:  [18-27] 18  BP: (118-149)/(74-91) 133/81  SpO2:  [91 %-97 %] 91 %    Physical Exam  Constitutional:        General: He is in acute distress.   HENT:      Head: Normocephalic and atraumatic.      Nose: Nose normal.      Mouth/Throat:      Mouth: Mucous membranes are dry.   Eyes:      Extraocular Movements: Extraocular movements intact.      Pupils: Pupils are equal, round, and reactive to light.   Neck:      Musculoskeletal: Normal range of motion and neck supple.   Cardiovascular:      Rate and Rhythm: Normal rate and regular rhythm.      Pulses: Normal pulses.      Heart sounds: Normal heart sounds.   Pulmonary:      Comments: Diminished breath sounds  Abdominal:      General: Abdomen is flat.      Palpations: Abdomen is soft.   Musculoskeletal: Normal range of motion.   Skin:     General: Skin is warm and dry.   Neurological:      General: No focal deficit present.      Mental Status: He is alert. Mental status is at baseline.         Fluids    Intake/Output Summary (Last 24 hours) at 5/27/2020 1038  Last data filed at 5/27/2020 0000  Gross per 24 hour   Intake 100 ml   Output --   Net 100 ml       Laboratory  Recent Labs     05/26/20 2005 05/27/20  0020   WBC 11.5* 9.8   RBC 5.38 5.11   HEMOGLOBIN 16.6 15.8   HEMATOCRIT 50.1 49.0   MCV 93.1 95.9   MCH 30.9 30.9   MCHC 33.1* 32.2*   RDW 47.5 48.9   PLATELETCT 266 265   MPV 9.5 9.9     Recent Labs     05/26/20 2005 05/27/20  0020   SODIUM 143 143   POTASSIUM 3.7 3.6   CHLORIDE 107 107   CO2 24 26   GLUCOSE 118* 119*   BUN 20 17   CREATININE 0.88 0.81   CALCIUM 8.5 8.1*     Recent Labs     05/26/20 2005 05/27/20  0020   APTT 28.7 29.9   INR 1.03 1.06               Imaging       Assessment/Plan  Pleural effusion on left- (present on admission)  Assessment & Plan  cta chest result is noted  Pt is still short of breath  Thoracentesis is pending  Will get cardiac echo  ?of pneumonia and will continue with unasyn and zitromax  And will dc them if procalcitonin is negative    Chest pain- (present on admission)  Assessment & Plan  -Mild, located on the left, I think this is  likely due to the pleural effusion  trops are negative  Cardiac echo is pending    Hyperglycemia- (present on admission)  Assessment & Plan  -Mild, no need for coverage at this time    Dementia (HCC)- (present on admission)  Assessment & Plan  -Patient is alert and oriented currently, continue home dose of Aricept    HLD (hyperlipidemia)- (present on admission)  Assessment & Plan  -Continue home statin    Hypothyroidism- (present on admission)  Assessment & Plan  -Patient did have a TSH and a free T4 done earlier this month that were normal  -Hypothyroidism is not causing his effusion  levothyroxine       VTE prophylaxis: SCD

## 2020-05-27 NOTE — PROGRESS NOTES
Med rec updated and complete  Allergies reviewed  Called pt in his room, went over his vitamins and pt was not sure the names of all his medications.  Pt thinks that he took his medications 2 days ago.  Pt reports that he only goes to [facility] for his medications.   Called [facility] @ [phone] to verify all prescription medications.  Pt and pts pharmacy reports no antibiotics in the last 2 weeks

## 2020-05-27 NOTE — PROGRESS NOTES
Telemetry summary:  Rhythm: SR     HR Range: 70s      Measurements from strip printed at 02:29:40   ND: 0.16   QRS 0.08   QT 0.36     Ectopies: Occasional PVC, rare bigeminy and couplets.

## 2020-05-27 NOTE — ED TRIAGE NOTES
"Chief Complaint   Patient presents with   • Shortness of Breath     pt c/o SOB x 1-2weeks   • Cough     infrequent dry cough per pt     /82   Pulse 83   Temp 36.8 °C (98.3 °F) (Temporal)   Resp (!) 24   Ht 1.803 m (5' 11\")   Wt 75 kg (165 lb 5.5 oz)   SpO2 91%   BMI 23.06 kg/m²     Pt ambulates to triage with stable gait, alert and oriented, regular unlabored respirations. Face mask provided to pt. Pt sent from PCP pleural effusion to be drained. Charge RN made aware of pt complaints. Pt roomed from Triage and primary RN made aware of need for isolation precautions.  "

## 2020-05-27 NOTE — ED PROVIDER NOTES
ED Provider Note    CHIEF COMPLAINT  Chief Complaint   Patient presents with   • Shortness of Breath     pt c/o SOB x 1-2weeks   • Cough     infrequent dry cough per pt         HPI  Chucho Marin is a 84 y.o. male who presents to the ED with chest pain.  The patient states he started belching chest pains in February, was admitted to the hospital, was noted to have a small to moderate pleural effusion, patient cardiac work-up was unremarkable.  Patient states he is feeling well until about 2 weeks ago, he started having increasing shortness of breath and increasing chest pain.  Sharp, pleuritic.  Followed up with his primary care doctor who got a CT PE study today that showed a large pleural effusion.  The patient was sent here for thoracentesis.  Patient denies any fevers, does state he feels short of breath, no abdominal pains, nausea vomiting, leg pain or swelling.    REVIEW OF SYSTEMS  See HPI for further details. All other systems are negative.     PAST MEDICAL HISTORY  Past Medical History:   Diagnosis Date   • CHEST PAIN 6/1/2011       FAMILY HISTORY  History reviewed. No pertinent family history.    SOCIAL HISTORY  Social History     Socioeconomic History   • Marital status:      Spouse name: Not on file   • Number of children: Not on file   • Years of education: Not on file   • Highest education level: Not on file   Occupational History   • Not on file   Social Needs   • Financial resource strain: Not on file   • Food insecurity     Worry: Not on file     Inability: Not on file   • Transportation needs     Medical: Not on file     Non-medical: Not on file   Tobacco Use   • Smoking status: Never Smoker   • Smokeless tobacco: Never Used   Substance and Sexual Activity   • Alcohol use: No   • Drug use: No   • Sexual activity: Not on file     Comment: na   Lifestyle   • Physical activity     Days per week: Not on file     Minutes per session: Not on file   • Stress: Not on file   Relationships   • Social  "connections     Talks on phone: Not on file     Gets together: Not on file     Attends Taoism service: Not on file     Active member of club or organization: Not on file     Attends meetings of clubs or organizations: Not on file     Relationship status: Not on file   • Intimate partner violence     Fear of current or ex partner: Not on file     Emotionally abused: Not on file     Physically abused: Not on file     Forced sexual activity: Not on file   Other Topics Concern   • Not on file   Social History Narrative   • Not on file       SURGICAL HISTORY  History reviewed. No pertinent surgical history.    CURRENT MEDICATIONS  Home Medications     Reviewed by Ny Mallory R.N. (Registered Nurse) on 05/26/20 at [email or web address]  Med List Status: Partial   Medication Last Dose Status   5-Methyltetrahydrofolate (METHYL FOLATE) Powder  Active   Ascorbic Acid (VITAMIN C) 1000 MG Tab 5/26/2020 Active   ASTRAGALUS PO 5/26/2020 Active   Calcium-Magnesium-Zinc (PATRIA-MAG-ZINC PO) 5/26/2020 Active   Carboxymethylcellul-Glycerin (CLEAR EYES FOR DRY EYES) 1-0.25 % Solution 5/26/2020 Active   Cholecalciferol (VITAMIN D3) 125 MCG (5000 UT) Tab 5/26/2020 Active   donepezil (ARICEPT) 5 MG Tab 5/25/2020 Active   levothyroxine (SYNTHROID) 75 MCG Tab 5/25/2020 Active   liothyronine (CYTOMEL) 5 MCG Tab 5/26/2020 Active   multivitamin (THERAGRAN) Tab 5/26/2020 Active   Omega-3 Fatty Acids (ULTRA OMEGA 3 PO) 5/26/2020 Active   pravastatin (PRAVACHOL) 20 MG Tab 5/25/2020 Active   triamcinolone (NASACORT ALLERGY 24HR) 55 MCG/ACT nasal inhaler 5/26/2020 Active                ALLERGIES  No Known Allergies    PHYSICAL EXAM  VITAL SIGNS: /82   Pulse 83   Temp 36.8 °C (98.3 °F) (Temporal)   Resp (!) 24   Ht 1.803 m (5' 11\")   Wt 75 kg (165 lb 5.5 oz)   SpO2 91%   BMI 23.06 kg/m²   Constitutional: Well developed, Well nourished, moderate to severe distress, Non-toxic appearance.   HENT: Normocephalic, Atraumatic, Bilateral external ears " normal, Oropharynx moist, No oral exudates, Nose normal.   Eyes: PERRLA, EOMI, Conjunctiva normal, No discharge.   Neck: Normal range of motion, No tenderness, Supple, No stridor.   Cardiovascular: Regular rhythm, no murmurs  Thorax & Lungs: Decreased breath sounds throughout throughout the left lung field, tachypnea, retractions  Abdomen: Bowel sounds normal, Soft, No tenderness, No masses, No pulsatile masses.   Skin: Warm, Dry, No erythema, No rash.   Back: No tenderness, No CVA tenderness.   Extremities: Intact distal pulses, No tenderness, No cyanosis, No clubbing.  No edema  Neurologic: Alert & oriented x 3, Normal motor function, Normal sensory function, No focal deficits noted.     EKG  Results for orders placed or performed during the hospital encounter of 05/26/20   CBC WITH DIFFERENTIAL   Result Value Ref Range    WBC 11.5 (H) 4.8 - 10.8 K/uL    RBC 5.38 4.70 - 6.10 M/uL    Hemoglobin 16.6 14.0 - 18.0 g/dL    Hematocrit 50.1 42.0 - 52.0 %    MCV 93.1 81.4 - 97.8 fL    MCH 30.9 27.0 - 33.0 pg    MCHC 33.1 (L) 33.7 - 35.3 g/dL    RDW 47.5 35.9 - 50.0 fL    Platelet Count 266 164 - 446 K/uL    MPV 9.5 9.0 - 12.9 fL    Neutrophils-Polys 72.50 (H) 44.00 - 72.00 %    Lymphocytes 15.00 (L) 22.00 - 41.00 %    Monocytes 9.80 0.00 - 13.40 %    Eosinophils 1.70 0.00 - 6.90 %    Basophils 0.40 0.00 - 1.80 %    Immature Granulocytes 0.60 0.00 - 0.90 %    Nucleated RBC 0.00 /100 WBC    Neutrophils (Absolute) 8.33 (H) 1.82 - 7.42 K/uL    Lymphs (Absolute) 1.72 1.00 - 4.80 K/uL    Monos (Absolute) 1.13 (H) 0.00 - 0.85 K/uL    Eos (Absolute) 0.20 0.00 - 0.51 K/uL    Baso (Absolute) 0.05 0.00 - 0.12 K/uL    Immature Granulocytes (abs) 0.07 0.00 - 0.11 K/uL    NRBC (Absolute) 0.00 K/uL   COMP METABOLIC PANEL   Result Value Ref Range    Sodium 143 135 - 145 mmol/L    Potassium 3.7 3.6 - 5.5 mmol/L    Chloride 107 96 - 112 mmol/L    Co2 24 20 - 33 mmol/L    Anion Gap 12.0 7.0 - 16.0    Glucose 118 (H) 65 - 99 mg/dL    Bun 20  8 - 22 mg/dL    Creatinine 0.88 0.50 - 1.40 mg/dL    Calcium 8.5 8.4 - 10.2 mg/dL    AST(SGOT) 25 12 - 45 U/L    ALT(SGPT) 13 2 - 50 U/L    Alkaline Phosphatase 68 30 - 99 U/L    Total Bilirubin 0.3 0.1 - 1.5 mg/dL    Albumin 3.4 3.2 - 4.9 g/dL    Total Protein 6.3 6.0 - 8.2 g/dL    Globulin 2.9 1.9 - 3.5 g/dL    A-G Ratio 1.2 g/dL   APTT   Result Value Ref Range    APTT 28.7 24.7 - 36.0 sec   PROTHROMBIN TIME (INR)   Result Value Ref Range    PT 13.6 12.0 - 14.6 sec    INR 1.03 0.87 - 1.13   ESTIMATED GFR   Result Value Ref Range    GFR If African American >60 >60 mL/min/1.73 m 2    GFR If Non African American >60 >60 mL/min/1.73 m 2   TROPONIN   Result Value Ref Range    Troponin T 14 6 - 19 ng/L   EKG   Result Value Ref Range    Report       [facility] Emergency Dept.    Test Date:  2020  Pt Name:    MONTSERRAT ARAUJO               Department: [facility]  MRN:        [id number]                      Room:       -ROOM 2  Gender:     Male                         Technician:   :        [id number]                   Requested By:ER TRIAGE PROTOCOL  Order #:    [id number]                    Reading MD: MARGARITA STEVE MD    Measurements  Intervals                                Axis  Rate:       75                           P:          55  ND:         161                          QRS:        30  QRSD:       94                           T:          48  QT:         381  QTc:        426    Interpretive Statements  Sinus rhythm  Compared to ECG 2020 10:29:55  Ventricular premature complex(es) no longer present  Electronically Signed On 2020 20:50:49 PDT by MARGARITA STEVE MD              COURSE & MEDICAL DECISION MAKING  Pertinent Labs & Imaging studies reviewed. (See chart for details)  Patient with large pleural effusion on CT PE study done today, physical laboratory tests are unremarkable, discussed the case with the hospitalist for hospitalization, the patient will likely need  a thoracentesis tomorrow, I believe the patient is fairly stable enough to wait until tomorrow.  The patient will be hospitalized by Dr. Danielle        FINAL IMPRESSION  1. Pleural effusion    2. Pleuritic chest pain        Patient referred to primary care provider for blood pressure management     This dictation was created using voice recognition software. The accuracy of the dictation is limited to the abilities of the software. I expect there may be some errors of grammar and possibly content. The nursing notes were reviewed and certain aspects of this information were incorporated into this note.    Electronically signed by: Fortino Qureshi M.D., 5/26/2020 8:45 PM

## 2020-05-28 NOTE — THERAPY
"Missed Therapy     Patient Name: Chucho Marin  Age:  84 y.o., Sex:  male  Medical Record #: [id number]  Today's Date: 5/28/2020    OT eval attempted, pt refused stating he is too tired from walking with PT recently.  He is anxious about results from tests.  He is SOB in bed just talking to therapist.  He requested a \"raincheck\" on the OT eval for today.  Pt needs OT eval prior to d/c home, and based on conversation and current presentation, if he is still limited in his activity tolerance at time of d/c, may benefit from HH to monitor since spouse is disabled and unable to assist with his care.  OT will follow.      Discussed eval attempt with RN       05/28/20 1502   Prior Living Situation   Prior Services None   Housing / Facility 1 Story House   Steps Into Home 3   Bathroom Set up Bathtub / Shower Combination   Equipment Owned None   Lives with - Patient's Self Care Capacity Spouse   Comments Pt lives with spouse but states \"she's not in good shape.  Both of her knees are really bad, she has to use a walker full time, and she can't do any of the chores around the house anymore. \"   Prior Level of ADL Function   Self Feeding Independent   Grooming / Hygiene Independent   Bathing Independent   Dressing Independent   Toileting Independent   Prior Level of IADL Function   Medication Management Independent   Laundry Independent   Kitchen Mobility Independent   Finances Independent   Home Management Independent   Shopping Independent   Prior Level Of Mobility Independent Without Device in Community   Driving / Transportation Driving Independent   Occupation (Pre-Hospital Vocational) Retired Due To Age   Leisure Interests Unable To Determine At This Time   Comments Pt reports he is the one that does all the cooking, cleaning, shopping, laundry at home because wife is unable to due to poor health, severe knee pain   Vitals   Vitals Comments Pt SOB just lying in bed talking with OT, refused OOB at this time   Cognition  "   Cognition / Consciousness WDL   Comments Pt appears intact, does have documented h/o dementia.  Asked several times about test results but other than that appears appropriately conversational

## 2020-05-28 NOTE — PROGRESS NOTES
[facility] Medicine Daily Progress Note    Date of Service  5/28/2020    Chief Complaint  84 y.o. male admitted 5/26/2020 with dyspnea    Hospital Course    84 y.o. male who presented 5/26/2020 with chest pain and shortness of breath.  Patient was here in February with similar symptoms, he did have a CTA that showed a small to moderate pleural effusion.  Patient states it became worse over the last 2 weeks, did have an outpatient CT scan today which showed a large left pleural effusion, because of this patient was told to present to the emergency department.  He states his chest pain is located on the left side, varies in nature, occasionally dull other times sharp, 8/10 at its worse.  He also complains of shortness of breath that is always present over the last couple of weeks, does seem to be worsening.  He denied any cough, fever or chills.  He has not traveled or had sick contacts.    Interval Problem Update  S/p Thoracentesis  With 2020 ml drained    Consultants/Specialty  none    Code Status  FULL    Disposition  pending    Review of Systems  Review of Systems   Constitutional: Positive for malaise/fatigue. Negative for chills and diaphoresis.   HENT: Negative for ear discharge, ear pain and nosebleeds.    Eyes: Negative for double vision, photophobia and pain.   Respiratory: Positive for shortness of breath. Negative for hemoptysis and sputum production.    Cardiovascular: Negative for palpitations, claudication and leg swelling.   Gastrointestinal: Negative for abdominal pain, nausea and vomiting.   Genitourinary: Negative for frequency, hematuria and urgency.   Musculoskeletal: Negative for back pain, joint pain and neck pain.   Skin: Negative for itching and rash.   Neurological: Negative for tingling, tremors and headaches.        Physical Exam  Temp:  [36.2 °C (97.2 °F)-36.9 °C (98.5 °F)] 36.9 °C (98.5 °F)  Pulse:  [70-80] 73  Resp:  [16-18] 16  BP: (114-123)/(62-75) 121/66  SpO2:  [90 %-93 %] 92  %    Physical Exam  Constitutional:       General: He is not in acute distress.     Appearance: He is not ill-appearing.   HENT:      Head: Normocephalic and atraumatic.      Nose: No congestion or rhinorrhea.      Mouth/Throat:      Mouth: Mucous membranes are dry.   Eyes:      Conjunctiva/sclera: Conjunctivae normal.   Neck:      Musculoskeletal: No neck rigidity or muscular tenderness.   Cardiovascular:      Rate and Rhythm: Normal rate and regular rhythm.      Heart sounds: No murmur. No friction rub.   Pulmonary:      Comments: Diminished breath sounds  Abdominal:      General: Bowel sounds are normal.      Palpations: Abdomen is soft.   Musculoskeletal:         General: No swelling or tenderness.   Skin:     Coloration: Skin is not jaundiced or pale.   Neurological:      General: No focal deficit present.      Mental Status: Mental status is at baseline.         Fluids  No intake or output data in the 24 hours ending 05/28/20 1104    Laboratory  Recent Labs     05/26/20 2005 05/27/20  0020 05/28/20  0420   WBC 11.5* 9.8 11.8*   RBC 5.38 5.11 5.38   HEMOGLOBIN 16.6 15.8 16.5   HEMATOCRIT 50.1 49.0 51.2   MCV 93.1 95.9 95.2   MCH 30.9 30.9 30.7   MCHC 33.1* 32.2* 32.2*   RDW 47.5 48.9 48.6   PLATELETCT 266 265 263   MPV 9.5 9.9 9.9     Recent Labs     05/26/20 2005 05/27/20  0020 05/28/20  0420   SODIUM 143 143 142   POTASSIUM 3.7 3.6 3.6   CHLORIDE 107 107 104   CO2 24 26 24   GLUCOSE 118* 119* 122*   BUN 20 17 13   CREATININE 0.88 0.81 0.79   CALCIUM 8.5 8.1* 8.1*     Recent Labs     05/26/20 2005 05/27/20  0020   APTT 28.7 29.9   INR 1.03 1.06               Imaging       Assessment/Plan  Pleural effusion on left- (present on admission)  Assessment & Plan  cta chest result is noted  Pt is still short of breath  S/p Thoracentesis  With 2020 ml drained  cardiac echo is  pending  Pneumonia is ruled out with negative procalcitonin  dced antibiotics  Will start iv lasix  With hypoxia and acute on chronic resp  failiure    Chest pain- (present on admission)  Assessment & Plan  -Mild, located on the left, I think this is likely due to the pleural effusion  trops are negative  Cardiac echo is pending    Hyperglycemia- (present on admission)  Assessment & Plan  -Mild, no need for coverage at this time    Dementia (HCC)- (present on admission)  Assessment & Plan  -Patient is alert and oriented currently, continue home dose of Aricept    HLD (hyperlipidemia)- (present on admission)  Assessment & Plan  -Continue home statin    Hypothyroidism- (present on admission)  Assessment & Plan  -Patient did have a TSH and a free T4 done earlier this month that were normal  -Hypothyroidism is not causing his effusion  levothyroxine       VTE prophylaxis: SCD

## 2020-05-28 NOTE — PROGRESS NOTES
Report received from Ann RN. Pt resting comfortably in bed. Declines any needs at this time. Call light within reach, bed low/locked, bed alarm on. Will continue to monitor.

## 2020-05-28 NOTE — PROGRESS NOTES
Telemetry Shift Summary    Rhythm SR  HR Range 76-99  Ectopy r-oPVC, rBig, rCoup  Measurements 0.18/0.08/0.36    Per [facility]    Normal Values  Rhythm SR  HR Range    Measurements 0.12-0.20 / 0.06-0.10  / 0.30-0.52

## 2020-05-28 NOTE — CARE PLAN
Problem: Infection  Goal: Will remain free from infection  Outcome: PROGRESSING AS EXPECTED  Intervention: Implement standard precautions and perform hand washing before and after patient contact  Note: Hand washing before and after patient contact, and scrubbed the IV hub with alcohol for >15 seconds before accessing.     Problem: Pain Management  Goal: Pain level will decrease to patient's comfort goal  Outcome: PROGRESSING AS EXPECTED  Intervention: Follow pain managment plan developed in collaboration with patient and Interdisciplinary Team  Note: Patient states 5/10 pain in back from procedure but declines medication intervention at this time.

## 2020-05-28 NOTE — PROGRESS NOTES
Telemetry Shift Summary     Rhythm Sr  HR Range 70s  Ectopy RPVCs, RPACs, RCoup  Measurements 0.14/0.10/0.38           Normal Values  Rhythm SR  HR Range    Measurements 0.12-0.20 / 0.06-0.10  / 0.30-0.52

## 2020-05-28 NOTE — DOCUMENTATION QUERY
[facility]                                                                       Query Response Note      PATIENT:               MONTSERRAT ARAUJO  ACCT #:                  [id number]  MRN:                     [id number]  :                      1935  ADMIT DATE:       2020 7:51 PM  DISCH DATE:          RESPONDING  PROVIDER #:        [id number]           QUERY TEXT:    Abnormal respiratory findings are documented in the Medical Record. Can a diagnosis be provided to support these findings  (includes suspected or probable)    NOTE:  If an appropriate response is not listed below, please respond with a new note.    The patient's Clinical Indicators include:  - Findings:  Decreased breath sounds throughout the left lung field, tachypnea, retractions per ED provider note. Moderate to severe distress.  RR 18 - 27. 02 saturation 91 - 97% on 2L NC   - Treatments:  RT protocol, 02, chest x ray, thoracentesis, procalcitonin   - Risk factors:  pleural effusion, pericardial effusion, tachypnea, retractions  Options provided:   -- Acute respiratory failure with hypoxia   -- Acute on chronic respiratory failure with hypoxia   -- Chronic respiratory failure with hypoxia   -- Respiratory Distress   -- Hypoxia   -- Findings of no clinical significance   -- Unable to determine      Query created by: Kelly Alfred on 2020 1:34 PM    RESPONSE TEXT:    Acute respiratory failure with hypoxia          Electronically signed by:  ZEUS ESCOBAR MD 2020 6:02 PM

## 2020-05-28 NOTE — CARE PLAN
Problem: Communication  Goal: The ability to communicate needs accurately and effectively will improve  Outcome: PROGRESSING AS EXPECTED   Pt verbalizes needs      Problem: Safety  Goal: Will remain free from injury  Outcome: PROGRESSING AS EXPECTED  Goal: Will remain free from falls  Outcome: PROGRESSING AS EXPECTED   Safety measures in place      Problem: Pain Management  Goal: Pain level will decrease to patient's comfort goal  Outcome: PROGRESSING SLOWER THAN EXPECTED   Pt c/o more pain this am, declines intervention at this time      Problem: Respiratory:  Goal: Respiratory status will improve  Outcome: PROGRESSING SLOWER THAN EXPECTED   Pt back on 2LNC to maintain sat >92%, stat cxr completed

## 2020-05-28 NOTE — PROGRESS NOTES
Telemetry Shift Summary     Rhythm Sr  HR Range 70s  Ectopy FPVCs, RCoup, RBig  Measurements 0.12/0.08/0.34           Normal Values  Rhythm SR  HR Range    Measurements 0.12-0.20 / 0.06-0.10  / 0.30-0.52

## 2020-05-28 NOTE — OR SURGEON
Immediate Post- Operative Note    PostOp Diagnosis: Effusion    Procedure(s): Lt thoracentesis with sonographic guidance, at bedside    Estimated Blood Loss: Less than 2 ml    Complications: None    5/27/2020     5:16 PM     Elliot Perales M.D.

## 2020-05-28 NOTE — PROGRESS NOTES
1900: Report received by Eden HERNÁNDEZ. Plan of care discussed. Patient laying in bed. Safety measures in place. Call light in reach. Patient expressed concerns about lab results from recent thoracentesis. Informed patient about timeframe of typical culture results. Will monitor and update patient when results are complete.    2000: Patient states 5/10 pain in chest/back area from procedure, declines medication intervention. Respiratory pattern shallow and mild increase to work of breathing. Lung sounds clear in right upper lobe, diminished in all other fields.    0250: Patient transferred to [facility], 311-A.

## 2020-05-28 NOTE — THERAPY
Physical Therapy   Initial Evaluation     Patient Name: Chucho Marin  Age:  84 y.o., Sex:  male  Medical Record #: [id number]  Today's Date: 5/28/2020      Assessment  Patient is 84 y.o. male with a diagnosis of chest pain and SOB  Pt lives at home with wife and is active.Pt is safe with transfers and ambulation ,no equipment needs,no acute PT needs at this time,Pt appears safe for home     05/28/20 1300   Prior Living Situation   Prior Services None   Housing / Facility 1 Story House   Steps Into Home 2   Steps In Home 0   Equipment Owned None   Lives with - Patient's Self Care Capacity Spouse   Prior Level of Functional Mobility   Bed Mobility Independent   Transfer Status Independent   Ambulation Independent   Assistive Devices Used None   Stairs Independent   History of Falls   History of Falls No   Balance Assessment   Sitting Balance (Static) Good   Sitting Balance (Dynamic) Good   Standing Balance (Static) Fair +   Standing Balance (Dynamic) Fair +   Weight Shift Sitting Good   Weight Shift Standing Good   Gait Analysis   Gait Level Of Assist Supervised   Assistive Device None   Distance (Feet) 200   # of Times Distance was Traveled 1   Deviation Step To   # of Stairs Climbed 0   Weight Bearing Status full   Bed Mobility    Supine to Sit Independent   Sit to Supine Independent   Scooting Independent   Functional Mobility   Sit to Stand Supervised   Bed, Chair, Wheelchair Transfer Supervised   Anticipated Discharge Equipment   DC Equipment None         Plan    Recommend Physical Therapy for Evaluation only    Discharge recommendations:  DC home

## 2020-05-29 PROBLEM — I50.9 CHF (CONGESTIVE HEART FAILURE) (HCC): Status: ACTIVE | Noted: 2020-01-01

## 2020-05-29 NOTE — PROGRESS NOTES
Report given to Nathan HERNÁNDEZ. Plan of care discussed. Patient resting comfortably in bed. Safety precautions in place.

## 2020-05-29 NOTE — PROGRESS NOTES
Report received from BETTY Evans. Pt sitting up in bed, eating breakfast. POC discussed. Pt states no needs at this time and is instructed to notify RN with any. Will continue with care.

## 2020-05-29 NOTE — FACE TO FACE
Face to Face Supporting Documentation - Home Health    The encounter with this patient was in whole or in part the primary reason for home health admission.    Date of encounter:   Patient:                    MRN:                       YOB: 2020  Chucho Marin  [id number]  1935     Home health to see patient for:  Skilled Nursing care for assessment, interventions & education    Skilled need for:  New Onset Medical Diagnosis chf    Skilled nursing interventions to include:  Comment: new onset chf    Homebound status evidenced by:  Needs the assistance of another person in order to leave the home. Leaving home requires a considerable and taxing effort. There is a normal inability to leave the home.    Community Physician to provide follow up care: Gibson Quintana M.D.     Optional Interventions? No      I certify the face to face encounter for this home health care referral meets the CMS requirements and the encounter/clinical assessment with the patient was, in whole, or in part, for the medical condition(s) listed above, which is the primary reason for home health care. Based on my clinical findings: the service(s) are medically necessary, support the need for home health care, and the homebound criteria are met.  I certify that this patient has had a face to face encounter by myself.  FABRICE Manning M.D. - NPI: [id number]

## 2020-05-29 NOTE — PROGRESS NOTES
Telemetry Shift Summary     Rhythm SR   HR Range 70  Ectopy Occasional PVCs, rare couplet/bigem  Measurements 0.18/0.08/0.36           Normal Values  Rhythm SR  HR Range    Measurements 0.12-0.20 / 0.06-0.10  / 0.30-0.52

## 2020-05-29 NOTE — THERAPY
Occupational Therapy   Initial Evaluation     Patient Name: Chucho Marin  Age:  84 y.o., Sex:  male  Medical Record #: [id number]  Today's Date: 5/29/2020     Assessment  Patient is 84 y.o. male with SOB, and increasing weakness. Pt presents with c/o pain and SOB with activity, had 1 LOB and is unsteady with functional mobility. Pt requires CGA to Min A for safety. Pt will benefit from home health and assist as he is the primary caregiver for his wife. Will continue to follow while he is in house.     Plan    Recommend Occupational Therapy 3 times per week until therapy goals are met for the following treatments:  Self Care/Activities of Daily Living, functional mobility , safety, d/c planning assist.    Discharge recommendations:  Recommend home health for continued occupational therapy services.        05/29/20 1108   Prior Living Situation   Prior Services None   Housing / Facility 1 Story House   Steps Into Home 3   Steps In Home 0   Bathroom Set up Bathtub / Shower Combination   Equipment Owned None   Lives with - Patient's Self Care Capacity Spouse   Comments Pt is the caregiver for his wife   Prior Level of ADL Function   Self Feeding Independent   Grooming / Hygiene Independent   Bathing Independent   Dressing Independent   Toileting Independent   Prior Level of IADL Function   Medication Management Independent   Laundry Independent   Kitchen Mobility Independent   Finances Independent   Home Management Independent   Shopping Independent   Prior Level Of Mobility Independent Without Device in Community   Driving / Transportation Driving Independent   Occupation (Pre-Hospital Vocational) Retired Due To Age   Leisure Interests Television;Reading;Family   Comments Wife is unable to assist   Balance Assessment   Sitting Balance (Static) Good   Sitting Balance (Dynamic) Good   Standing Balance (Static) Fair   Standing Balance (Dynamic) Fair -   Weight Shift Sitting Good   Weight Shift Standing Fair   Comments LOB  with initial standing   Bed Mobility    Supine to Sit Modified Independent   Scooting Modified Independent   Skilled Intervention Verbal Cuing   ADL Assessment   Eating Independent   Grooming Standing;Minimal Assist   Upper Body Dressing Supervision   Lower Body Dressing Minimal Assist   Comments Pt required CGA standing self care- not steady    Functional Mobility   Sit to Stand Minimal Assist   Bed, Chair, Wheelchair Transfer Minimal Assist   Transfer Method Stand Step   Mobility No device   Activity Tolerance   Sitting in Chair >30 min   Sitting Edge of Bed 10 min   Standing 10 min   Comments c/o SOB with activity   Patient / Family Goals   Patient / Family Goal #1 Home   Short Term Goals   Short Term Goal # 1 Pt will be able to complete FB dressing with Supervision   Short Term Goal # 2 Pt will be able to complete functional transfers with Supervision and with no LOB   Short Term Goal # 3 Pt will be able to complete 10 min standing sinside ADLS with Supervision, good activity tolerance and safety   Problem List   Problem List Decreased Active Daily Living Skills;Decreased Homemaking Skills;Decreased Activity Tolerance;Decreased Functional Mobility;Impaired Postural Control / Balance

## 2020-05-29 NOTE — ASSESSMENT & PLAN NOTE
Has a recurrent left pleural effusion, but he has been diuresed down to his dry weight with no improvement  EF 45%, global hypokinesis  DC diuretics  Continue pravastatin and metoprolol

## 2020-05-29 NOTE — PROGRESS NOTES
Received report from Aby. Plan of care discussed. Patient resting comfortably in bed. Safety precautions in place.

## 2020-05-29 NOTE — CARE PLAN
Problem: Communication  Goal: The ability to communicate needs accurately and effectively will improve  Outcome: PROGRESSING AS EXPECTED     Problem: Safety  Goal: Will remain free from falls  Outcome: PROGRESSING AS EXPECTED     Problem: Infection  Goal: Will remain free from infection  Outcome: PROGRESSING AS EXPECTED     Problem: Venous Thromboembolism (VTW)/Deep Vein Thrombosis (DVT) Prevention:  Goal: Patient will participate in Venous Thrombosis (VTE)/Deep Vein Thrombosis (DVT)Prevention Measures  Outcome: PROGRESSING AS EXPECTED

## 2020-05-29 NOTE — DISCHARGE PLANNING
Received Choice form at 2814  Agency/Facility Name: [facility]   Referral sent per Choice form @ 0389

## 2020-05-29 NOTE — DISCHARGE PLANNING
ATTN: Case Management  RE: Referral for Home Health    As of 05/29/2020, we have accepted the Home Health referral for the patient listed above.    A [facility] clinician will be out to see the patient within 48 hours. If you have any questions or concerns regarding the patient's transition to Home Health, please do not hesitate to contact us at [phone].      We look forward to collaborating with you,  [facility] Home Health Team

## 2020-05-29 NOTE — PROGRESS NOTES
[facility] Medicine Daily Progress Note    Date of Service  5/29/2020    Chief Complaint  84 y.o. male admitted 5/26/2020 with dyspnea    Hospital Course    84 y.o. male who presented 5/26/2020 with chest pain and shortness of breath.  Patient was here in February with similar symptoms, he did have a CTA that showed a small to moderate pleural effusion.  Patient states it became worse over the last 2 weeks, did have an outpatient CT scan today which showed a large left pleural effusion, because of this patient was told to present to the emergency department.  He states his chest pain is located on the left side, varies in nature, occasionally dull other times sharp, 8/10 at its worse.  He also complains of shortness of breath that is always present over the last couple of weeks, does seem to be worsening.  He denied any cough, fever or chills.  He has not traveled or had sick contacts.    Interval Problem Update  S/p Thoracentesis  With 2020 ml drained  chf     Consultants/Specialty  none    Code Status  FULL    Disposition  pending    Review of Systems  Review of Systems   Constitutional: Negative for chills, fever and malaise/fatigue.   HENT: Negative for ear discharge, ear pain and tinnitus.    Eyes: Negative for blurred vision, double vision and photophobia.   Respiratory: Positive for shortness of breath. Negative for hemoptysis and sputum production.    Cardiovascular: Negative for chest pain, palpitations and claudication.   Gastrointestinal: Negative for heartburn, nausea and vomiting.   Genitourinary: Negative for dysuria, frequency and urgency.   Musculoskeletal: Negative for back pain, myalgias and neck pain.   Skin: Negative for itching and rash.   Neurological: Negative for dizziness, tingling and headaches.        Physical Exam  Temp:  [36.3 °C (97.3 °F)-36.8 °C (98.3 °F)] 36.4 °C (97.5 °F)  Pulse:  [70-84] 84  Resp:  [15-17] 17  BP: (106-126)/(67-77) 114/72  SpO2:  [90 %-94 %] 92 %    Physical  Exam  Constitutional:       General: He is not in acute distress.     Appearance: He is not toxic-appearing.   HENT:      Head: Normocephalic and atraumatic.      Nose: Nose normal.      Mouth/Throat:      Mouth: Mucous membranes are dry.   Eyes:      Pupils: Pupils are equal, round, and reactive to light.   Neck:      Musculoskeletal: Normal range of motion and neck supple.   Cardiovascular:      Rate and Rhythm: Normal rate and regular rhythm.      Pulses: Normal pulses.      Heart sounds: Normal heart sounds.   Pulmonary:      Comments: Diminished breath sounds  Abdominal:      General: Abdomen is flat.      Palpations: Abdomen is soft.   Musculoskeletal: Normal range of motion.   Skin:     Coloration: Skin is not jaundiced or pale.   Neurological:      General: No focal deficit present.      Mental Status: He is alert. Mental status is at baseline.         Fluids    Intake/Output Summary (Last 24 hours) at 5/29/2020 0754  Last data filed at 5/29/2020 0600  Gross per 24 hour   Intake --   Output 1100 ml   Net -1100 ml       Laboratory  Recent Labs     05/27/20  0020 05/28/20 0420 05/29/20  0301   WBC 9.8 11.8* 12.5*   RBC 5.11 5.38 5.96   HEMOGLOBIN 15.8 16.5 18.1*   HEMATOCRIT 49.0 51.2 57.1*   MCV 95.9 95.2 95.8   MCH 30.9 30.7 30.4   MCHC 32.2* 32.2* 31.7*   RDW 48.9 48.6 48.3   PLATELETCT 265 263 281   MPV 9.9 9.9 9.8     Recent Labs     05/27/20  0020 05/28/20 0420 05/29/20  0301   SODIUM 143 142 142   POTASSIUM 3.6 3.6 3.6   CHLORIDE 107 104 100   CO2 26 24 28   GLUCOSE 119* 122* 125*   BUN 17 13 17   CREATININE 0.81 0.79 0.85   CALCIUM 8.1* 8.1* 8.9     Recent Labs     05/26/20 2005 05/27/20  0020   APTT 28.7 29.9   INR 1.03 1.06               Imaging       Assessment/Plan  Pleural effusion on left- (present on admission)  Assessment & Plan  2nd to acute on chronic chf exacerbation  On lasix iv bid'  Started on toprol xl  No ACE 2nd to low bp  With systolic dysfunction  2020 ml was drained by IR from  left pleural space    Cardiac echo showed:Mildly reduced left ventricular systolic function.  Left ventricular ejection fraction is visually estimated to be 45%.  Small left ventricular size.  Global hypokinesis.  Grade I diastolic dysfunction.  Normal right ventricular systolic function.  Mildly dilated right ventricle.  No evidence of valvular abnormality based on Doppler evaluation.   Right heart pressures are normal.   Pleural effusion noted.    Chest pain- (present on admission)  Assessment & Plan  -Mild, located on the left, I think this is likely due to the pleural effusion  trops are negative  Has resolved    CHF (congestive heart failure) (HCC)  Assessment & Plan  As per pleural effusion    Hyperglycemia- (present on admission)  Assessment & Plan  -Mild, no need for coverage at this time    Dementia (HCC)- (present on admission)  Assessment & Plan  -Patient is alert and oriented currently, continue home dose of Aricept    HLD (hyperlipidemia)- (present on admission)  Assessment & Plan  -Continue home statin    Hypothyroidism- (present on admission)  Assessment & Plan  -Patient did have a TSH and a free T4 done earlier this month that were normal  -Hypothyroidism is not causing his effusion  levothyroxine       VTE prophylaxis: SCD

## 2020-05-30 NOTE — PROGRESS NOTES
[facility] Medicine Daily Progress Note    Date of Service  5/30/2020    Chief Complaint  84 y.o. male admitted 5/26/2020 with dyspnea    Hospital Course    84 y.o. male who presented 5/26/2020 with chest pain and shortness of breath.  Patient was here in February with similar symptoms, he did have a CTA that showed a small to moderate pleural effusion.  Patient states it became worse over the last 2 weeks, did have an outpatient CT scan today which showed a large left pleural effusion, because of this patient was told to present to the emergency department.  He states his chest pain is located on the left side, varies in nature, occasionally dull other times sharp, 8/10 at its worse.  He also complains of shortness of breath that is always present over the last couple of weeks, does seem to be worsening.  He denied any cough, fever or chills.  He has not traveled or had sick contacts.    Interval Problem Update  S/p Thoracentesis  With 2020 ml drained  chf     Consultants/Specialty  pulmonary    Code Status  FULL    Disposition  pending    Review of Systems  Review of Systems   Constitutional: Negative for chills, diaphoresis and malaise/fatigue.   HENT: Negative for ear discharge, ear pain and nosebleeds.    Eyes: Negative for double vision, photophobia and pain.   Respiratory: Positive for shortness of breath. Negative for hemoptysis and sputum production.    Cardiovascular: Negative for palpitations, claudication and leg swelling.   Gastrointestinal: Negative for abdominal pain, nausea and vomiting.   Genitourinary: Negative for frequency, hematuria and urgency.   Musculoskeletal: Negative for back pain, joint pain and neck pain.   Skin: Negative for itching and rash.   Neurological: Negative for tingling, tremors and headaches.        Physical Exam  Temp:  [36.3 °C (97.4 °F)-36.5 °C (97.7 °F)] 36.4 °C (97.5 °F)  Pulse:  [78-89] 84  Resp:  [18-22] 22  BP: (103-127)/(62-91) 127/75  SpO2:  [87 %-93 %] 92  %    Physical Exam  Constitutional:       Appearance: He is not ill-appearing or diaphoretic.   HENT:      Head: Normocephalic and atraumatic.      Nose: No congestion or rhinorrhea.      Mouth/Throat:      Mouth: Mucous membranes are moist.   Eyes:      Conjunctiva/sclera: Conjunctivae normal.   Neck:      Musculoskeletal: No neck rigidity or muscular tenderness.   Cardiovascular:      Rate and Rhythm: Normal rate and regular rhythm.      Heart sounds: No murmur. No friction rub.   Pulmonary:      Comments: Diminished breath sounds especially on the left side  Abdominal:      General: Bowel sounds are normal.      Palpations: Abdomen is soft.   Musculoskeletal:         General: No swelling or tenderness.   Skin:     General: Skin is warm and dry.   Neurological:      General: No focal deficit present.      Mental Status: He is alert. Mental status is at baseline.         Fluids    Intake/Output Summary (Last 24 hours) at 5/30/2020 0841  Last data filed at 5/30/2020 0750  Gross per 24 hour   Intake 360 ml   Output 1775 ml   Net -1415 ml       Laboratory  Recent Labs     05/28/20  0420 05/29/20  0301 05/30/20  0315   WBC 11.8* 12.5* 12.0*   RBC 5.38 5.96 5.82   HEMOGLOBIN 16.5 18.1* 17.7   HEMATOCRIT 51.2 57.1* 54.1*   MCV 95.2 95.8 93.0   MCH 30.7 30.4 30.4   MCHC 32.2* 31.7* 32.7*   RDW 48.6 48.3 45.5   PLATELETCT 263 281 278   MPV 9.9 9.8 9.6     Recent Labs     05/28/20  0420 05/29/20  0301 05/30/20  0315   SODIUM 142 142 139   POTASSIUM 3.6 3.6 3.4*   CHLORIDE 104 100 103   CO2 24 28 25   GLUCOSE 122* 125* 116*   BUN 13 17 19   CREATININE 0.79 0.85 0.83   CALCIUM 8.1* 8.9 8.4                   Imaging       Assessment/Plan  Pleural effusion on left- (present on admission)  Assessment & Plan  2nd to acute on chronic chf exacerbation  On lasix iv bid'  Started on toprol xl  No ACE 2nd to low bp  With systolic dysfunction  2020 ml was drained by IR from left pleural space  Consulted pulmonary  Repeat cxray on  5/29/20 showed:No significant interval change.    Cardiac echo showed:Mildly reduced left ventricular systolic function.  Left ventricular ejection fraction is visually estimated to be 45%.  Small left ventricular size.  Global hypokinesis.  Grade I diastolic dysfunction.  Normal right ventricular systolic function.  Mildly dilated right ventricle.  No evidence of valvular abnormality based on Doppler evaluation.   Right heart pressures are normal.   Pleural effusion noted.    Chest pain- (present on admission)  Assessment & Plan  -Mild, located on the left, I think this is likely due to the pleural effusion  trops are negative  Has resolved    CHF (congestive heart failure) (HCC)  Assessment & Plan  As per pleural effusion    Hyperglycemia- (present on admission)  Assessment & Plan  -Mild, no need for coverage at this time    Dementia (HCC)- (present on admission)  Assessment & Plan  -Patient is alert and oriented currently, continue home dose of Aricept    HLD (hyperlipidemia)- (present on admission)  Assessment & Plan  -Continue home statin    Hypothyroidism- (present on admission)  Assessment & Plan  -Patient did have a TSH and a free T4 done earlier this month that were normal  -Hypothyroidism is not causing his effusion  levothyroxine       VTE prophylaxis: SCD

## 2020-05-30 NOTE — PROGRESS NOTES
Report received from BETTY Ruiz. Plan of care discussed. Patient resting comfortably in bed. Safety precautions in place.

## 2020-05-30 NOTE — PROGRESS NOTES
Telemetry Shift Summary     Rhythm SR  HR Range 74 - 85  Ectopy O PVCs; R COUP; R BIG  Measurements 0.16 / 0.10 / 0.36           Normal Values  Rhythm SR  HR Range    Measurements 0.12-0.20 / 0.06-0.10  / 0.30-0.52

## 2020-05-30 NOTE — PROGRESS NOTES
Telemetry Summary  Sinus rhythm rate 80s   CO: 0.20  QRS: 0.08  QT: 0.34   Ectopy: frequent PVCs, couplets, trigemeny

## 2020-05-30 NOTE — CARE PLAN
Problem: Knowledge Deficit  Goal: Knowledge of disease process/condition, treatment plan, diagnostic tests, and medications will improve  Outcome: PROGRESSING AS EXPECTED  Intervention: Assess knowledge level of disease process/condition, treatment plan, diagnostic tests, and medications  Note: Pt and wife updated on POC, all questions answered

## 2020-05-30 NOTE — CARE PLAN
Problem: Communication  Goal: The ability to communicate needs accurately and effectively will improve  Outcome: PROGRESSING AS EXPECTED  Note: Patient updated on the plan of care. Encouraged to voice feelings and to ask questions. Answered all questions and concerns. Agrees with the plan of care.      Problem: Safety  Goal: Will remain free from injury  Outcome: PROGRESSING AS EXPECTED  Note: Safety precautions in place. Bed alarm ON. Will continue to monitor patient.   Goal: Will remain free from falls  Outcome: PROGRESSING AS EXPECTED  Note: Safety precautions in place. Bed alarm ON. Will continue to monitor patient.      Problem: Infection  Goal: Will remain free from infection  Outcome: PROGRESSING AS EXPECTED  Note: Discussed the importance of infection prevention and hand hygiene.

## 2020-05-30 NOTE — CONSULTS
Pulmonary Consultation    Date of consult: 5/30/2020    Referring Physician  FABRICE Manning M.D.    Reason for Consultation  Recurrent pleural effusion    History of Presenting Illness  84 y.o. male who presented 5/26/2020 with chest pain and SOB. Found to have a large left pleural effusion  Underwent a thoracentesis on 5/27 - brown, cloudy wbc 484, rbc 5000. TP 4, glucose 138, fluid  and ph 8. Cx negative. Path suggestive of reactive mesothelial cells  Fluid meets exudative criteria by LDH alone.    5/28/20 cardiac ECHO very abnormal with both systolic EF 45% and diastolic dysfunction (Grade I), small left ventricle sign and global hypokinesis  I/0: -2295 since admission  No cough fever or chills  COVID ruled out 5/26  Receiving lasix bid    Past medical hx: CHF, dementia hypothyroid and also   TSH 0.455 on 5/19 which is wnl    Images personally  reviewed from 2/2020 to now  No patient either has fluid in the left fissure and the pleural effusion appears to be reaccumulating since removal from 5/27  Ct scan on 5/26 unable to comment on the fissure as fluid filled till the fissure and not pass    Code Status  Full Code    Review of Systems  Review of Systems   Constitutional: Positive for malaise/fatigue.   HENT: Negative.    Eyes: Negative.    Respiratory: Positive for shortness of breath.    Cardiovascular: Negative.    Gastrointestinal: Negative.    Genitourinary: Negative.    Musculoskeletal: Negative.    Skin: Negative.    Neurological: Negative.    Endo/Heme/Allergies: Negative.    Psychiatric/Behavioral: Negative.        Past Medical History   has a past medical history of CHEST PAIN (6/1/2011) and CHF (congestive heart failure) (HCC) (5/29/2020). He also has no past medical history of AAA (abdominal aortic aneurysm) (HCC), Anemia, Atrial fibrillation (HCC), Blood transfusion, Cancer (HCC), Carotid artery disease (HCC), COPD, Diabetes, Heart attack (HCC), Hypertension, Kidney disease, or PVD  (peripheral vascular disease) (HCC).    Surgical History   has no past surgical history on file.    Family History  family history is not on file.    Social History   reports that he has never smoked. He has never used smokeless tobacco. He reports that he does not drink alcohol or use drugs.    Medications  Home Medications     Reviewed by Shar Aceves (Pharmacy Tech) on 05/27/20 at 1507  Med List Status: Complete   Medication Last Dose Status   Ascorbic Acid (VITAMIN C) 1000 MG Tab > 2 days Active   Calcium-Magnesium-Zinc (PATRIA-MAG-ZINC PO) > 2 days Active   Carboxymethylcellul-Glycerin (CLEAR EYES FOR DRY EYES) 1-0.25 % Solution > 2 days Active   Cholecalciferol (VITAMIN D3) 125 MCG (5000 UT) Tab > 2 days Active   donepezil (ARICEPT) 5 MG Tab > 2 nights Active   fluticasone (FLONASE) 50 MCG/ACT nasal spray > 2 days Active   levothyroxine (SYNTHROID) 75 MCG Tab > 2 days Active   multivitamin (THERAGRAN) Tab > 2 days Active   Omega-3 Fatty Acids (ULTRA OMEGA 3 PO) > 2 days Active   pravastatin (PRAVACHOL) 20 MG Tab > 2 nights Active   testosterone cypionate (DEPO-TESTOSTERONE) 200 MG/ML Solution injection 5/26/2020 Active   triamcinolone (NASACORT ALLERGY 24HR) 55 MCG/ACT nasal inhaler > 2 nights Active              Current Facility-Administered Medications   Medication Dose Route Frequency Provider Last Rate Last Dose   • heparin injection 5,000 Units  5,000 Units Subcutaneous Q8HRS H Peter Manning M.D.   5,000 Units at 05/31/20 0843   • metOLazone (ZAROXOLYN) tablet 5 mg  5 mg Oral Q DAY H Peter Manning M.D.       • metoprolol SR (TOPROL XL) tablet 12.5 mg  12.5 mg Oral Q DAY H Peter Manning M.D.   12.5 mg at 05/31/20 0513   • benzocaine-menthol (CEPACOL) lozenge 1 Lozenge  1 Lozenge Mouth/Throat Q2HRS PRN Haresh Danielle D.O.   1 Lozenge at 05/30/20 0509   • furosemide (LASIX) injection 20 mg  20 mg Intravenous BID DIURETIC H Peter Manning M.D.   20 mg at 05/31/20 0514   • donepezil  (ARICEPT) tablet 5 mg  5 mg Oral Nightly FORTUNATO VasquezOWing   5 mg at 05/30/20 2002   • levothyroxine (SYNTHROID) tablet 75 mcg  75 mcg Oral AM ES Haresh Danielle D.O.   75 mcg at 05/31/20 0514   • liothyronine (CYTOMEL) tablet 5 mcg  5 mcg Oral DAILY Haresh Danielle D.O.   5 mcg at 05/31/20 0513   • pravastatin (PRAVACHOL) tablet 20 mg  20 mg Oral Nightly FORTUNATO VasquezOWing   20 mg at 05/30/20 2002   • fluticasone (FLONASE) nasal spray 50 mcg  1 Spray Nasal QDAY PRN Haresh Danielle D.O.       • senna-docusate (PERICOLACE or SENOKOT S) 8.6-50 MG per tablet 2 Tab  2 Tab Oral BID Haresh Danielle D.O.   2 Tab at 05/31/20 0513    And   • polyethylene glycol/lytes (MIRALAX) PACKET 1 Packet  1 Packet Oral QDAY PRN Haresh Danielle D.O.        And   • magnesium hydroxide (MILK OF MAGNESIA) suspension 30 mL  30 mL Oral QDAY PRN Haresh Danielle D.O.        And   • bisacodyl (DULCOLAX) suppository 10 mg  10 mg Rectal QDAY PRN Haresh Danielle D.O.       • Respiratory Therapy Consult   Nebulization Continuous RT Haresh Danielle D.O.       • acetaminophen (TYLENOL) tablet 650 mg  650 mg Oral Q6HRS PRN Haresh Danielle D.O.   650 mg at 05/29/20 0532   • enalaprilat (VASOTEC) injection 1.25 mg  1.25 mg Intravenous Q6HRS PRN Haresh Danielle D.O.       • ondansetron (ZOFRAN) syringe/vial injection 4 mg  4 mg Intravenous Q4HRS PRN Haresh Danielle D.O.       • ondansetron (ZOFRAN ODT) dispertab 4 mg  4 mg Oral Q4HRS PRN Haresh Danielle D.O.           Allergies  No Known Allergies    Vital Signs last 24 hours  Temp:  [36.3 °C (97.4 °F)-36.9 °C (98.4 °F)] 36.5 °C (97.7 °F)  Pulse:  [77-85] 77  Resp:  [18-20] 18  BP: (110-124)/(66-81) 110/71  SpO2:  [90 %-95 %] 90 %    Physical Exam  Physical Exam  Constitutional:       Appearance: He is ill-appearing.   HENT:      Head: Normocephalic and atraumatic.      Mouth/Throat:      Mouth: Mucous membranes are dry.   Eyes:      Pupils: Pupils are equal, round, and reactive to  light.   Neck:      Musculoskeletal: Normal range of motion.   Cardiovascular:      Rate and Rhythm: Normal rate.      Heart sounds: No murmur.   Pulmonary:      Comments: Diminished BS post left  Abdominal:      General: Bowel sounds are normal.      Palpations: Abdomen is soft.   Musculoskeletal:      Right lower leg: No edema.      Left lower leg: No edema.   Skin:     General: Skin is warm.   Neurological:      General: No focal deficit present.      Mental Status: He is oriented to person, place, and time.   Psychiatric:         Mood and Affect: Mood normal.         Behavior: Behavior normal.         Thought Content: Thought content normal.         Judgment: Judgment normal.         Fluids    Intake/Output Summary (Last 24 hours) at 5/31/2020 1140  Last data filed at 5/31/2020 1000  Gross per 24 hour   Intake 120 ml   Output 450 ml   Net -330 ml       Laboratory  Recent Results (from the past 48 hour(s))   CBC WITH DIFFERENTIAL    Collection Time: 05/30/20  3:15 AM   Result Value Ref Range    WBC 12.0 (H) 4.8 - 10.8 K/uL    RBC 5.82 4.70 - 6.10 M/uL    Hemoglobin 17.7 14.0 - 18.0 g/dL    Hematocrit 54.1 (H) 42.0 - 52.0 %    MCV 93.0 81.4 - 97.8 fL    MCH 30.4 27.0 - 33.0 pg    MCHC 32.7 (L) 33.7 - 35.3 g/dL    RDW 45.5 35.9 - 50.0 fL    Platelet Count 278 164 - 446 K/uL    MPV 9.6 9.0 - 12.9 fL    Neutrophils-Polys 75.90 (H) 44.00 - 72.00 %    Lymphocytes 12.60 (L) 22.00 - 41.00 %    Monocytes 8.50 0.00 - 13.40 %    Eosinophils 1.90 0.00 - 6.90 %    Basophils 0.40 0.00 - 1.80 %    Immature Granulocytes 0.70 0.00 - 0.90 %    Nucleated RBC 0.00 /100 WBC    Neutrophils (Absolute) 9.09 (H) 1.82 - 7.42 K/uL    Lymphs (Absolute) 1.51 1.00 - 4.80 K/uL    Monos (Absolute) 1.02 (H) 0.00 - 0.85 K/uL    Eos (Absolute) 0.23 0.00 - 0.51 K/uL    Baso (Absolute) 0.05 0.00 - 0.12 K/uL    Immature Granulocytes (abs) 0.08 0.00 - 0.11 K/uL    NRBC (Absolute) 0.00 K/uL   Comp Metabolic Panel    Collection Time: 05/30/20  3:15 AM    Result Value Ref Range    Sodium 139 135 - 145 mmol/L    Potassium 3.4 (L) 3.6 - 5.5 mmol/L    Chloride 103 96 - 112 mmol/L    Co2 25 20 - 33 mmol/L    Anion Gap 11.0 7.0 - 16.0    Glucose 116 (H) 65 - 99 mg/dL    Bun 19 8 - 22 mg/dL    Creatinine 0.83 0.50 - 1.40 mg/dL    Calcium 8.4 8.4 - 10.2 mg/dL    AST(SGOT) 24 12 - 45 U/L    ALT(SGPT) 15 2 - 50 U/L    Alkaline Phosphatase 66 30 - 99 U/L    Total Bilirubin 0.4 0.1 - 1.5 mg/dL    Albumin 2.8 (L) 3.2 - 4.9 g/dL    Total Protein 5.8 (L) 6.0 - 8.2 g/dL    Globulin 3.0 1.9 - 3.5 g/dL    A-G Ratio 0.9 g/dL   ESTIMATED GFR    Collection Time: 05/30/20  3:15 AM   Result Value Ref Range    GFR If African American >60 >60 mL/min/1.73 m 2    GFR If Non African American >60 >60 mL/min/1.73 m 2   RAPID STREP,CULT IF INDICATED    Collection Time: 05/31/20 10:36 AM    Specimen: Throat   Result Value Ref Range    Significant Indicator NEG     Source THRT     Site THROAT     Rapid Strep Screen       Negative for Group A streptococcus.  A negative result may be obtained if the specimen is  inadequate or antigen concentration is below the  sensitivity of the test. This negative test will be followed  up with a culture as requested.  Negative for Group A  streptococcus.         Imaging  See above    Assessment/Plan  Pleural effusion on left- (present on admission)  Assessment & Plan  The pleural effusion does appear to be reaccumulating  Exudative by LDH criteria but could still could  be due to chf as he does have both systolic and diastolic heart failure and this is a post diuresis thoracentesis  No signs of infection and no evidence of pna  Concern if pt has a trapped lung as the fissure on the left appears thickened   Would recommend diuresis adding zaroxylyn  CXR in 48-72 hrs and reassess  D/w Dr. Manning        Discussed patient condition with Dr. Manning on 5/31

## 2020-05-30 NOTE — PROGRESS NOTES
Called wife Erendira per her request. Updated Erendira on patient status and POC. Erendira was very anxious and concerned about pt. Provided therapeutic listening and reassurance.

## 2020-05-30 NOTE — PROGRESS NOTES
Received bedside patient report from BETTY Crawford. Patient resting comfortably in bed, no complaints at this time. Safety precautions in place. Will continue to monitor.

## 2020-05-31 PROBLEM — J02.9 PHARYNGITIS: Status: ACTIVE | Noted: 2020-01-01

## 2020-05-31 NOTE — PROGRESS NOTES
Bedside report received from Jason HERNÁNDEZ. Assumed care. POC discussed. Pt resting comfortably in bed. Safety precautions in place.

## 2020-05-31 NOTE — PROGRESS NOTES
Bedside report given to Mroeno HERNÁNDEZ. POC discussed. Pt resting comfortably in bed. Safety precautions in place.

## 2020-05-31 NOTE — PROGRESS NOTES
Massiel, pt daughter-in-law updated on POC, questions answered. Massiel to update pt wife Erendira.

## 2020-05-31 NOTE — PROGRESS NOTES
[facility] Medicine Daily Progress Note    Date of Service  5/31/2020    Chief Complaint  84 y.o. male admitted 5/26/2020 with dyspnea    Hospital Course    84 y.o. male who presented 5/26/2020 with chest pain and shortness of breath.  Patient was here in February with similar symptoms, he did have a CTA that showed a small to moderate pleural effusion.  Patient states it became worse over the last 2 weeks, did have an outpatient CT scan today which showed a large left pleural effusion, because of this patient was told to present to the emergency department.  He states his chest pain is located on the left side, varies in nature, occasionally dull other times sharp, 8/10 at its worse.  He also complains of shortness of breath that is always present over the last couple of weeks, does seem to be worsening.  He denied any cough, fever or chills.  He has not traveled or had sick contacts.    Interval Problem Update  S/p Thoracentesis  With 2020 ml drained  chf     Consultants/Specialty  pulmonary    Code Status  FULL    Disposition  pending    Review of Systems  Review of Systems   Constitutional: Negative for chills, fever and malaise/fatigue.   HENT: Negative for ear discharge, ear pain and tinnitus.    Eyes: Negative for blurred vision, double vision and photophobia.   Respiratory: Positive for shortness of breath. Negative for hemoptysis and sputum production.    Cardiovascular: Negative for chest pain, palpitations and claudication.   Gastrointestinal: Negative for heartburn, nausea and vomiting.   Genitourinary: Negative for dysuria, frequency and urgency.   Musculoskeletal: Negative for back pain, myalgias and neck pain.   Skin: Negative for itching and rash.   Neurological: Negative for dizziness, tingling and headaches.        Physical Exam  Temp:  [36.3 °C (97.4 °F)-36.9 °C (98.4 °F)] 36.3 °C (97.4 °F)  Pulse:  [78-85] 78  Resp:  [18-22] 20  BP: (112-124)/(66-83) 112/78  SpO2:  [91 %-95 %] 91 %    Physical  Exam  Constitutional:       General: He is not in acute distress.     Appearance: He is not toxic-appearing.   HENT:      Head: Normocephalic and atraumatic.      Nose: Nose normal.      Mouth/Throat:      Mouth: Mucous membranes are dry.      Comments: Erythema with small amount of white patches  In the throat  Eyes:      Conjunctiva/sclera: Conjunctivae normal.   Neck:      Musculoskeletal: No neck rigidity or muscular tenderness.   Cardiovascular:      Rate and Rhythm: Normal rate and regular rhythm.      Heart sounds: No murmur. No friction rub.   Pulmonary:      Comments: Diminished breath sounds especially on the left side  Abdominal:      General: Bowel sounds are normal.      Palpations: Abdomen is soft.   Musculoskeletal:         General: No swelling or tenderness.   Skin:     Coloration: Skin is not jaundiced.      Findings: No bruising or lesion.   Neurological:      General: No focal deficit present.      Mental Status: Mental status is at baseline.         Fluids  No intake or output data in the 24 hours ending 05/31/20 0810    Laboratory  Recent Labs     05/29/20  0301 05/30/20  0315   WBC 12.5* 12.0*   RBC 5.96 5.82   HEMOGLOBIN 18.1* 17.7   HEMATOCRIT 57.1* 54.1*   MCV 95.8 93.0   MCH 30.4 30.4   MCHC 31.7* 32.7*   RDW 48.3 45.5   PLATELETCT 281 278   MPV 9.8 9.6     Recent Labs     05/29/20  0301 05/30/20  0315   SODIUM 142 139   POTASSIUM 3.6 3.4*   CHLORIDE 100 103   CO2 28 25   GLUCOSE 125* 116*   BUN 17 19   CREATININE 0.85 0.83   CALCIUM 8.9 8.4                   Imaging       Assessment/Plan  Pleural effusion on left- (present on admission)  Assessment & Plan  2nd to acute on chronic chf exacerbation  On lasix iv bid'  Started on toprol xl  No ACE 2nd to low bp  With systolic dysfunction  2020 ml was drained by IR from left pleural space  Will d/w  Pulmonary today  Called pt's wife and answered all of her questions yesterday  Repeat cxray on 5/29/20 showed:No significant interval  change.    Cardiac echo showed:Mildly reduced left ventricular systolic function.  Left ventricular ejection fraction is visually estimated to be 45%.  Small left ventricular size.  Global hypokinesis.  Grade I diastolic dysfunction.  Normal right ventricular systolic function.  Mildly dilated right ventricle.  No evidence of valvular abnormality based on Doppler evaluation.   Right heart pressures are normal.   Pleural effusion noted.    Chest pain- (present on admission)  Assessment & Plan  -Mild, located on the left, I think this is likely due to the pleural effusion  trops are negative  Has resolved    Pharyngitis  Assessment & Plan  Probably viral  Will send strep culture  cepacol prn    CHF (congestive heart failure) (HCC)  Assessment & Plan  As per pleural effusion    Hyperglycemia- (present on admission)  Assessment & Plan  -Mild, no need for coverage at this time    Dementia (HCC)- (present on admission)  Assessment & Plan  -Patient is alert and oriented currently, continue home dose of Aricept    HLD (hyperlipidemia)- (present on admission)  Assessment & Plan  -Continue home statin    Hypothyroidism- (present on admission)  Assessment & Plan  -Patient did have a TSH and a free T4 done earlier this month that were normal  -Hypothyroidism is not causing his effusion  levothyroxine       VTE prophylaxis: heparin

## 2020-05-31 NOTE — PROGRESS NOTES
Telemetry Shift Summary    Rhythm SR  HR Range 75-88  Ectopy r/oPVC, rCoup, rTrigem, rBigem  Measurements 0.18/0.06/0.36        Normal Values  Rhythm SR  HR Range    Measurements 0.12-0.20 / 0.06-0.10  / 0.30-0.52

## 2020-05-31 NOTE — PROGRESS NOTES
Assessment completed, patient A&Ox4, no c/o pain. All medications given as ordered. Patient helped up to the restroom and back to bed. No other needs at this time.

## 2020-05-31 NOTE — PROGRESS NOTES
Hourly rounding completed. Patient has no c/o pain, VSS. safety precautions in place. No other needs at this time.

## 2020-05-31 NOTE — CARE PLAN
Problem: Safety  Goal: Will remain free from injury  Outcome: PROGRESSING AS EXPECTED  Note: Pt call light and belongings with in reach, bed in locked and low position, treaded socks on, bed rails up x2, bed alarm in place.        Problem: Venous Thromboembolism (VTW)/Deep Vein Thrombosis (DVT) Prevention:  Goal: Patient will participate in Venous Thrombosis (VTE)/Deep Vein Thrombosis (DVT)Prevention Measures  Outcome: PROGRESSING AS EXPECTED  Flowsheets (Taken 5/30/2020 2015)  Mechanical Prophylaxis: SCDs, Sequential Compression Device

## 2020-05-31 NOTE — CARE PLAN
Problem: Communication  Goal: The ability to communicate needs accurately and effectively will improve  Outcome: PROGRESSING AS EXPECTED  Pt uses call light to alert staff to his needs.      Problem: Safety  Goal: Will remain free from injury  Outcome: PROGRESSING AS EXPECTED  Goal: Will remain free from falls  Outcome: PROGRESSING AS EXPECTED   Pt verbalized understanding of safety education provided. Safety precautions in place and call light within reach.

## 2020-05-31 NOTE — PROGRESS NOTES
Pt' son, Ace, at bedside and updated on plan of care and how future updates would be conducted over the phone.

## 2020-05-31 NOTE — PROGRESS NOTES
Report received by Heidi HERNÁNDEZ. Pt asleep at the time of report. Call light within reach ad safety precautions in place.

## 2020-05-31 NOTE — ASSESSMENT & PLAN NOTE
Thoracentesis x 2, last on 6/3, reaccumulated despite diuresis, -3 L since admission now with JEET  Exudative by all 3 lights criteria  Glucose, pH, triglycerides, amylase, cholesterol WNL  Relatively low cell count however lymphocytic predominant 51%  CT chest: perihilar atelectasis minimal, do not think this is an underlying primary lung malignancy, although cannot rule out mesothelioma, pleural disease.  Other extrathoracic abnormalities (prostate, liver) warrant outpatient monitoring but low suspicion for metastatic disease with no lymphadenopathy.  -- Pleural pathology showed atypical cells- while not diagnostic, in the absence of alternative diagnoses at this time is concerning for malignancy.    -- Attempted to call and inform family but was unable to get a hold of them.  As the plan is for outpatient follow-up, will need to review pathology results in the clinic with Dr. Castañeda, discuss risks/options re: thoracoscopy, pt has appt 6/23  -- f/u IGRA, RA, CCP, JEANIE ordered.  Of note, pleural glucose normal.  -- dc all diuretics setting of ongoing JEET

## 2020-06-01 PROBLEM — J96.01 ACUTE HYPOXEMIC RESPIRATORY FAILURE (HCC): Status: ACTIVE | Noted: 2020-01-01

## 2020-06-01 NOTE — CARE PLAN
Problem: Safety  Goal: Will remain free from injury  Outcome: PROGRESSING AS EXPECTED  Check on Pt hourly. Pt encouraged to call for assistance as needed. Bed in low position, upper side rails up, anti slippery socks on, call light within reach, bed alarm on.         Problem: Respiratory  Goal: Respiratory status will improve  Outcome: PROGRESSING AS EXPECTED  Assess respiratory status. Encouraged deep breathing and coughing. Encouraged and assisted with use of Incentive Spirometer. Monitor O2 Sat and administer supplemental O2 as needed to keep sats >90%

## 2020-06-01 NOTE — THERAPY
"Occupational Therapy  Daily Treatment     Patient Name: Chucho Marin  Age:  84 y.o., Sex:  male  Medical Record #: [id number]  Today's Date: 6/1/2020     Precautions  Precautions: Fall Risk    Assessment    Pt required increased encouragement to participate in therapy.  Significant LOB upon standing requiring therapist assist to right him and pt grabbing for curtain at end of bed.  Pt appears more disoriented today with decreased insight into current limitations.  He needs to be able to manage IADL's at home due to spouse not able to assist, and currently he is only tolerating a short walk down the lopez with close CGA for balance and then is significantly SOB.  Pt may need more assist in the home than just HH therapy.  PT signed off on pt the other day as he had a good day- appears to have declined since then. Rec. Re-order PT while in house for re-eval.  Pt does not appear safe for d/c home at this time, OT will follow.    Plan    Continue current treatment plan.    Discharge recommendations:  Recommend post-acute placement for additional occupational therapy services prior to discharge home VS Recommend home health for continued occupational therapy services WITH family or other assist for IADL's.        06/01/20 1218   Cognition    Cognition / Consciousness X   Level of Consciousness Lethargic   Comments Pt woken from sleep, but has been sleeping all morning per RN.  Appears disoriented.  Tried to refuse therapy stating \"I don't think its a good idea to exercise right after I eat.\"  Pt also asked \"Do I really need to wear this oxygen?\"  Later pt stating \"You're asking a lot from a alejandra who only has 1 lung.\"  Overall pt appears to have decreased insight into current functional level and what he needs to be able to do to return home and do all IADL's for the household.   Balance   Sitting Balance (Static) Good   Sitting Balance (Dynamic) Good   Standing Balance (Static) Fair -   Standing Balance (Dynamic) Fair - " "  Weight Shift Sitting Good   Weight Shift Standing Fair   Skilled Intervention Tactile Cuing   Comments Pt had significant LOB upon standing, needed assist to correct.  Pt denied using walker in previous sessions, was very stiff and robotic in his walking down the lopez.   Activities of Daily Living   Eating Independent   Lower Body Dressing Minimal Assist  (wanted shoes on, needed cues to tie the laces)   How much help from another person does the patient currently need...   Putting on and taking off regular lower body clothing? 3   Bathing (including washing, rinsing, and drying)? 3   Toileting, which includes using a toilet, bedpan, or urinal? 3   Putting on and taking off regular upper body clothing? 3   Taking care of personal grooming such as brushing teeth? 3   Eating meals? 4   6 Clicks Daily Activity Score 19   Functional Mobility   Sit to Stand Minimal Assist   Bed, Chair, Wheelchair Transfer Minimal Assist   Transfer Method Stand Pivot   Mobility Haider (close CGA) without device, LOB upon standing from bed, grabbed onto curtain to try and right self   Distance Wheelchair (Feet or Distance) walked from room to end of lopez and back   Activity Tolerance   Sitting Edge of Bed 10 min   Standing 8 min   Comments SOB after walk, needed cues to keep eyes open for short walk.  Pt states \"I don't think I should exercise after I eat.\"  pt educated that this is less exertion than it would take for him to get up and clean the kitchen after a meal but he did not have insight to that   Short Term Goals   Short Term Goal # 1 Pt will be able to complete FB dressing with Supervision   Goal Outcome # 1 Progressing slower than expected   Short Term Goal # 2 Pt will be able to complete functional transfers with Supervision and with no LOB   Goal Outcome # 2 Progressing slower than expected   Short Term Goal # 3 Pt will be able to complete 10 min standing sinside ADLS with Supervision, good activity tolerance and safety   Goal " Outcome # 3 Goal not met   Anticipated Discharge Equipment   DC Equipment Unable To Determine At This Time  (may need FWW)   Interdisciplinary Plan of Care Collaboration   Collaboration Comments RN aware of OT session, BUFFY, Asked RN to request new PT orders as pt status appears worse than when pt initially seen by OT

## 2020-06-01 NOTE — PROGRESS NOTES
[facility] Medicine Daily Progress Note    Date of Service  6/1/2020    Chief Complaint  84 y.o. male admitted 5/26/2020 with dyspnea    Hospital Course    84 y.o. male who presented 5/26/2020 with chest pain and shortness of breath.  Patient was here in February with similar symptoms, he did have a CTA that showed a small to moderate pleural effusion.  Patient states it became worse over the last 2 weeks, did have an outpatient CT scan today which showed a large left pleural effusion, because of this patient was told to present to the emergency department.  He states his chest pain is located on the left side, varies in nature, occasionally dull other times sharp, 8/10 at its worse.  He also complains of shortness of breath that is always present over the last couple of weeks, does seem to be worsening.  He denied any cough, fever or chills.  He has not traveled or had sick contacts.    Interval Problem Update  S/p Thoracentesis  With 2020 ml drained  chf     Consultants/Specialty  pulmonary    Code Status  FULL    Disposition  pending    Review of Systems  Review of Systems   Constitutional: Negative for chills, diaphoresis and malaise/fatigue.   HENT: Negative for ear discharge, ear pain and nosebleeds.    Eyes: Negative for double vision, photophobia and pain.   Respiratory: Positive for shortness of breath. Negative for cough and hemoptysis.    Cardiovascular: Negative for chest pain, palpitations and claudication.   Gastrointestinal: Negative for abdominal pain, nausea and vomiting.   Genitourinary: Negative for frequency, hematuria and urgency.   Musculoskeletal: Negative for back pain, joint pain and neck pain.   Skin: Negative for itching and rash.   Neurological: Negative for tingling, tremors and headaches.        Physical Exam  Temp:  [36.2 °C (97.2 °F)-36.7 °C (98.1 °F)] 36.4 °C (97.5 °F)  Pulse:  [72-86] 84  Resp:  [18-19] 18  BP: (101-134)/(74-85) 115/78  SpO2:  [90 %-94 %] 92 %    Physical  Exam  Constitutional:       Appearance: He is not toxic-appearing or diaphoretic.   HENT:      Head: Normocephalic and atraumatic.      Nose: No congestion or rhinorrhea.      Mouth/Throat:      Mouth: Mucous membranes are dry.      Comments: Erythema with small amount of white patches  In the throat  Eyes:      Extraocular Movements: Extraocular movements intact.      Pupils: Pupils are equal, round, and reactive to light.   Neck:      Musculoskeletal: Normal range of motion and neck supple.   Cardiovascular:      Rate and Rhythm: Normal rate and regular rhythm.      Pulses: Normal pulses.      Heart sounds: Normal heart sounds.   Pulmonary:      Comments: Diminished breath sounds especially on the left side  Abdominal:      General: Abdomen is flat.      Palpations: Abdomen is soft.   Musculoskeletal: Normal range of motion.   Skin:     General: Skin is warm and dry.   Neurological:      General: No focal deficit present.      Mental Status: He is alert. Mental status is at baseline.         Fluids    Intake/Output Summary (Last 24 hours) at 6/1/2020 1149  Last data filed at 6/1/2020 1123  Gross per 24 hour   Intake 720 ml   Output 1400 ml   Net -680 ml       Laboratory  Recent Labs     05/30/20  0315 06/01/20  0406   WBC 12.0* 12.3*   RBC 5.82 6.14*   HEMOGLOBIN 17.7 18.7*   HEMATOCRIT 54.1* 56.9*   MCV 93.0 92.7   MCH 30.4 30.5   MCHC 32.7* 32.9*   RDW 45.5 45.3   PLATELETCT 278 328   MPV 9.6 9.8     Recent Labs     05/30/20  0315 06/01/20  0406   SODIUM 139 138   POTASSIUM 3.4* 3.9   CHLORIDE 103 99   CO2 25 26   GLUCOSE 116* 119*   BUN 19 24*   CREATININE 0.83 0.92   CALCIUM 8.4 8.9                   Imaging       Assessment/Plan  Pleural effusion on left- (present on admission)  Assessment & Plan  2nd to acute on chronic chf exacerbation  On lasix iv bid'  on toprol xl  On zaroxlyn  No ACE 2nd to low bp  With systolic dysfunction  2020 ml was drained by IR from left pleural space  Will d/w   Dr sparks  Called  pt's wife and answered all of her questions yesterday  Repeat cxray on 5/29/20 showed:No significant interval change.    Cardiac echo showed:Mildly reduced left ventricular systolic function.  Left ventricular ejection fraction is visually estimated to be 45%.  Small left ventricular size.  Global hypokinesis.  Grade I diastolic dysfunction.  Normal right ventricular systolic function.  Mildly dilated right ventricle.  No evidence of valvular abnormality based on Doppler evaluation.   Right heart pressures are normal.   Pleural effusion noted.    Chest pain- (present on admission)  Assessment & Plan  -Mild, located on the left, I think this is likely due to the pleural effusion  trops are negative  Has resolved    Pharyngitis  Assessment & Plan  Probably viral  Rapid strep is negative  cepacol prn    CHF (congestive heart failure) (HCC)  Assessment & Plan  As per pleural effusion    Hyperglycemia- (present on admission)  Assessment & Plan  -Mild, no need for coverage at this time    Dementia (HCC)- (present on admission)  Assessment & Plan  -Patient is alert and oriented currently, continue home dose of Aricept    HLD (hyperlipidemia)- (present on admission)  Assessment & Plan  -Continue home statin    Hypothyroidism- (present on admission)  Assessment & Plan  -Patient did have a TSH and a free T4 done earlier this month that were normal  -Hypothyroidism is not causing his effusion  levothyroxine       VTE prophylaxis: heparin

## 2020-06-01 NOTE — PROGRESS NOTES
Received report from day shift nurse. Assumed patient's cares Pt is up in bed.Pt resting with eyes closed. Easily to arousal. Regular and unlabored breathing. No signs of acute distress noticed. Safety measures in place. Bed alarm is on.

## 2020-06-01 NOTE — CARE PLAN
Problem: Knowledge Deficit  Goal: Knowledge of disease process/condition, treatment plan, diagnostic tests, and medications will improve  Outcome: PROGRESSING AS EXPECTED  Intervention: Assess knowledge level of disease process/condition, treatment plan, diagnostic tests, and medications  Note: Pt updated on POC, all questions answered

## 2020-06-01 NOTE — PROGRESS NOTES
Received patient report from BETTY Gaytan. Patient resting comfortably in bed, no complaints at this time. Safety precautions in place. Bed alarm ON. Will continue to monitor.

## 2020-06-01 NOTE — PROGRESS NOTES
Telemetry Shift Summary     Rhythm SR  HR Range 79 - 84  Ectopy O BIGEM; R TRIPLET; R COUP; F PVC  Measurements 0.16 / 0.08 / 0.32           Normal Values  Rhythm SR  HR Range    Measurements 0.12-0.20 / 0.06-0.10  / 0.30-0.52

## 2020-06-01 NOTE — THERAPY
"Physical Therapy   Re-Evaluation     Patient Name: Chucho Marin  Age:  84 y.o., Sex:  male  Medical Record #: [id number]  Today's Date: 6/1/2020     Precautions: Fall Risk    Subjective    Pt states does not feel he needs to use a SPC or FWW, thinks \"It'll just get in my way\" and states \"once I start using one I will never get off of it\".  Educated pt on benefits and asking if he would be willing to try using SPC at least however pt is refusing either.        Assessment  Patient is 84 y.o. male with a diagnosis of pleural effusion. Pt is presenting with deconditioning and mildly impaired balance that most likely would be aided by use of a SPC or FWW however pt is refusing either and thinks he will be fine once DC home. Pt also presenting with impaired endurance with mild SOB with activity, and now new need for O2. Pt stated does not want to use O2 at home. Since pt is refusing PT recomendations at this time, will DC PT. Patient will not be actively followed for physical therapy services at this time, however may be seen if requested by physician for 1 more visit within 30 days to address any discharge or equipment needs    Plan    Recommend Physical Therapy for Evaluation only   Discharge recommendations:  Recommend home health transitional care for continued physical therapy services. Recommend HHPT safety eval given pts impaired insight and judgement, question pts ability to care for self let alone his spouse.        06/01/20 1604   Cognition    Comments Pt agreeable for PT, is refusing to use or try an AD at this time, states will not wear O2 at home.    Gait Analysis   Gait Level Of Assist Supervised  (close guarding for safety)   Assistive Device None   Distance (Feet) 175   # of Times Distance was Traveled 1   Deviation Shuffled Gait;Increased Base Of Support   Comments Pt reports R LE is shorter than L, utilizes shoe lift on R LE. Pt appears to have decreased foot clearance R LE, no LOB noted but pt appears " lory and would most likely benefit from SPC vs FWW. Pt is refusing both.   Bed Mobility    Supine to Sit Modified Independent   Sit to Supine Modified Independent   Functional Mobility   Sit to Stand Supervised   Bed, Chair, Wheelchair Transfer Refused   Anticipated Discharge Equipment   DC Equipment Single Point Cane;Front-Wheel Walker  (however pt is refusing)

## 2020-06-01 NOTE — PROGRESS NOTES
MS    SR 72-83 with wandering atrial pacemaker    F PVC, R coup, O bigem, O trigem    0.16/0.08/0.34

## 2020-06-02 PROBLEM — I50.23 ACUTE ON CHRONIC SYSTOLIC (CONGESTIVE) HEART FAILURE (HCC): Status: ACTIVE | Noted: 2020-01-01

## 2020-06-02 NOTE — PROGRESS NOTES
Pulmonary Progress Note    Date of admission  5/26/2020    Chief Complaint  84 y.o. male admitted 5/26/2020 with recurrent pleural effusion, left    Hospital Course    84 y.o. male who presented 5/26/2020 with chest pain and SOB. Found to have a large left pleural effusion  Underwent a thoracentesis on 5/27 - brown, cloudy wbc 484, rbc 5000. TP 4, glucose 138, fluid  and ph 8. Cx negative. Path suggestive of reactive mesothelial cells  Fluid meets exudative criteria by LDH alone.     5/28/20 cardiac ECHO very abnormal with both systolic EF 45% and diastolic dysfunction (Grade I), small left ventricle sign and global hypokinesis  I/0: -2295 since admission  No cough fever or chills  COVID ruled out 5/26  Receiving lasix bid     Past medical hx: CHF, dementia hypothyroid and also   TSH 0.455 on 5/19 which is wnl     Images personally  reviewed from 2/2020 to now  No patient either has fluid in the left fissure and the pleural effusion appears to be reaccumulating since removal from 5/27  Ct scan on 5/26 unable to comment on the fissure as fluid filled till the fissure and not pass      Interval Problem Update  Reviewed last 24 hour events:  No malignant cells on pleural path  -3L since admission  Feeling symptomatically improved, denying cough or SOB but still requiring low flow O2    Review of Systems  Review of Systems   Constitutional: Negative for chills, fever and weight loss.   HENT: Negative for congestion, sinus pain and sore throat.    Eyes: Negative for pain and discharge.   Respiratory: Negative for cough, sputum production, shortness of breath, wheezing and stridor.    Cardiovascular: Negative for chest pain, orthopnea and leg swelling.   Gastrointestinal: Negative for abdominal pain, diarrhea, nausea and vomiting.   Genitourinary: Negative for dysuria, frequency and urgency.   Musculoskeletal: Negative for myalgias.   Skin: Negative for rash.   Neurological: Negative for dizziness, sensory change,  focal weakness, loss of consciousness and headaches.   Psychiatric/Behavioral: Negative.    All other systems reviewed and are negative.       Vital Signs for last 24 hours   Temp:  [36.2 °C (97.2 °F)-36.7 °C (98 °F)] 36.7 °C (98 °F)  Pulse:  [78-86] 78  Resp:  [18-19] 18  BP: (101-134)/(76-85) 121/83  SpO2:  [90 %-94 %] 91 %    Physical Exam   Physical Exam  Constitutional:       Appearance: He is not ill-appearing or diaphoretic.   HENT:      Head: Normocephalic and atraumatic.      Mouth/Throat:      Mouth: Mucous membranes are dry.   Eyes:      Pupils: Pupils are equal, round, and reactive to light.   Neck:      Musculoskeletal: Normal range of motion.   Cardiovascular:      Rate and Rhythm: Normal rate.      Heart sounds: No murmur.   Pulmonary:      Comments: Diminished BS post left  Abdominal:      General: Bowel sounds are normal.      Palpations: Abdomen is soft.   Musculoskeletal:      Right lower leg: No edema.      Left lower leg: No edema.   Skin:     General: Skin is warm.   Neurological:      General: No focal deficit present.      Mental Status: He is oriented to person, place, and time.   Psychiatric:         Mood and Affect: Mood normal.         Behavior: Behavior normal.         Thought Content: Thought content normal.         Judgment: Judgment normal.       Medications  Current Facility-Administered Medications   Medication Dose Route Frequency Provider Last Rate Last Dose   • heparin injection 5,000 Units  5,000 Units Subcutaneous Q8HRS H Peter Manning M.D.   5,000 Units at 06/01/20 1602   • metOLazone (ZAROXOLYN) tablet 5 mg  5 mg Oral Q DAY H Peter Manning M.D.   5 mg at 06/01/20 0509   • metoprolol SR (TOPROL XL) tablet 12.5 mg  12.5 mg Oral Q DAY H Peter Manning M.D.   12.5 mg at 06/01/20 0509   • benzocaine-menthol (CEPACOL) lozenge 1 Lozenge  1 Lozenge Mouth/Throat Q2HRS PRN Haresh Danielle D.O.   1 Lozenge at 06/01/20 0841   • furosemide (LASIX) injection 20 mg  20 mg  Intravenous BID DIURETIC H Peter Manning M.D.   20 mg at 06/01/20 1644   • donepezil (ARICEPT) tablet 5 mg  5 mg Oral Nightly Haresh Danielle D.O.   5 mg at 05/31/20 2119   • levothyroxine (SYNTHROID) tablet 75 mcg  75 mcg Oral AM ES Haresh Danielle D.O.   75 mcg at 06/01/20 0509   • liothyronine (CYTOMEL) tablet 5 mcg  5 mcg Oral DAILY Haresh Danielle D.O.   5 mcg at 06/01/20 0509   • pravastatin (PRAVACHOL) tablet 20 mg  20 mg Oral Nightly Haresh Danielle D.O.   20 mg at 05/31/20 2119   • fluticasone (FLONASE) nasal spray 50 mcg  1 Spray Nasal QDAY PRN Haresh Danielle D.O.       • senna-docusate (PERICOLACE or SENOKOT S) 8.6-50 MG per tablet 2 Tab  2 Tab Oral BID Haresh Danielle D.O.   2 Tab at 05/31/20 0513    And   • polyethylene glycol/lytes (MIRALAX) PACKET 1 Packet  1 Packet Oral QDAY PRN Haresh Danielle D.O.        And   • magnesium hydroxide (MILK OF MAGNESIA) suspension 30 mL  30 mL Oral QDAY PRN Haresh Danielle D.O.        And   • bisacodyl (DULCOLAX) suppository 10 mg  10 mg Rectal QDAY PRN Haresh Danielle D.O.       • Respiratory Therapy Consult   Nebulization Continuous RT Haresh Danielle D.O.       • acetaminophen (TYLENOL) tablet 650 mg  650 mg Oral Q6HRS PRN Haresh Danielle D.O.   650 mg at 05/29/20 0532   • enalaprilat (VASOTEC) injection 1.25 mg  1.25 mg Intravenous Q6HRS PRN Haresh Danielle D.O.       • ondansetron (ZOFRAN) syringe/vial injection 4 mg  4 mg Intravenous Q4HRS PRN Haresh Danielle D.O.       • ondansetron (ZOFRAN ODT) dispertab 4 mg  4 mg Oral Q4HRS PRN Haresh Danielle D.O.           Fluids    Intake/Output Summary (Last 24 hours) at 6/1/2020 1819  Last data filed at 6/1/2020 1123  Gross per 24 hour   Intake 480 ml   Output 1400 ml   Net -920 ml       Laboratory          Recent Labs     05/30/20  0315 06/01/20  0406   SODIUM 139 138   POTASSIUM 3.4* 3.9   CHLORIDE 103 99   CO2 25 26   BUN 19 24*   CREATININE 0.83 0.92   CALCIUM 8.4 8.9     Recent Labs      05/30/20  0315 06/01/20  0406   ALTSGPT 15 26   ASTSGOT 24 29   ALKPHOSPHAT 66 74   TBILIRUBIN 0.4 0.4   GLUCOSE 116* 119*     Recent Labs     05/30/20  0315 06/01/20  0406   WBC 12.0* 12.3*   NEUTSPOLYS 75.90* 74.90*   LYMPHOCYTES 12.60* 13.50*   MONOCYTES 8.50 8.90   EOSINOPHILS 1.90 1.70   BASOPHILS 0.40 0.40   ASTSGOT 24 29   ALTSGPT 15 26   ALKPHOSPHAT 66 74   TBILIRUBIN 0.4 0.4     Recent Labs     05/30/20  0315 06/01/20  0406   RBC 5.82 6.14*   HEMOGLOBIN 17.7 18.7*   HEMATOCRIT 54.1* 56.9*   PLATELETCT 278 328       Imaging  X-Ray:  No film today    Assessment/Plan  Pleural effusion on left- (present on admission)  Assessment & Plan  Exudative by LDH criteria but potentially pseudoexudate due to diuresis in setting of known combined HFpEF/HFrEF  Cultures negative and path showing no maliginancy  Diuresed past 2 days, -3L since admit  Concern if pt has a trapped lung as the fissure on the left appears thickened   Repeat CXR    Acute hypoxemic respiratory failure (HCC)  Assessment & Plan  Due to L effusion, CHF  Cont to titrate FiO2 to maintain saturations 92%    VTE:  Heparin    I have performed a physical exam and reviewed and updated ROS and Plan today (6/1/2020). In review of yesterday's note (5/31/2020), there are no changes except as documented above.     Discussed patient condition and risk of morbidity and/or mortality with RN and Patient     This note was generated using voice recognition software which has a chance of producing errors of grammar and content.  I have made every reasonable attempt to find and correct any errors, but it should be expected that some may not be found prior to finalization of this note.  __________  Michel Martinez MD  Pulmonary and Critical Care Medicine  [facility]

## 2020-06-02 NOTE — RESPIRATORY CARE
COPD EDUCATION by COPD CLINICAL EDUCATOR  6/2/2020 at 8:28 AM by Ivanna Faria RRT     Patient reviewed by COPD education team. Patient does not have a history or diagnosis of COPD and is a non-smoker, therefore does not qualify for the COPD program.

## 2020-06-02 NOTE — PROGRESS NOTES
Assumed care of pt w/ bedside report from BETTY Townsend. Pt resting comfortably in bed, no complaints offered. Fall precautions/safety maintained. Hourly rounding. Will continue to monitor.

## 2020-06-02 NOTE — PROGRESS NOTES
Telemetry Shift Summary    Rhythm SR  HR Range 79-84  Ectopy fPVC, rTrig, o-rBig, rCoup,  Measurements 0.14/0.08/0.34    Per [facility]  Normal Values  Rhythm SR  HR Range    Measurements 0.12-0.20 / 0.06-0.10  / 0.30-0.52

## 2020-06-02 NOTE — CARE PLAN
Problem: Safety  Goal: Will remain free from falls  Outcome: PROGRESSING AS EXPECTED  Intervention: Implement fall precautions  Flowsheets  Taken 6/2/2020 0909  Bed Alarm: Yes - Alarm On  Taken 6/2/2020 0800  Environmental Precautions:   Treaded Slipper Socks on Patient   Personal Belongings, Wastebasket, Call Bell etc. in Easy Reach   Transferred to Stronger Side   Report Given to Other Health Care Providers Regarding Fall Risk   Bed in Low Position   Communication Sign for Patients & Families   Mobility Assessed & Appropriate Sign Placed  Note: OOB with FWW Ax1. Bed alarm in use. Bed in lowest position, wheels locked. Call bell within reach, calls appropriately for assistance. Fall prevention education provided, verbalized understanding. Hourly rounding. Fall/safety precautions in place. Pt remains free from fall/injury at this time.       Problem: Respiratory:  Goal: Respiratory status will improve  Outcome: PROGRESSING AS EXPECTED  Intervention: Assess and monitor pulmonary status  Flowsheets  Taken 6/2/2020 0713 by Falguni Telles C.N.A.  Resp: 18  SpO2: 91 %  O2 (LPM): 2  Taken 6/2/2020 0800 by Lana Dillard RCRIS  Respiratory Pattern: Dyspnea with Exertion  Work Of Breathing / Effort: Mild  RUL Breath Sounds: Clear  RML Breath Sounds:   Clear   Diminished  RLL Breath Sounds:   Clear   Diminished  MARY Breath Sounds: Clear  LLL Breath Sounds: Diminished  Note: +CAMPBELL remains on supplemental O2. Diuresing as ordered. I/Os maintained.

## 2020-06-02 NOTE — PROGRESS NOTES
1915 Received report from BETTY Gomez. Pt is alert and oriented but has occasional confusion (basline). Admitted for left sided pleural effusion, POC is to repeat CXR tomorrow. Pulmonology has been consulted. Safety measures in place, care assumed.    2101 Pt assessment complete. Wallet and keys on bedside table, safekeeping offered, pt denied. Ambulated to the sink, pt unsteady but additional problems noted. Evening meds administered.    2150 Wife updated regarding CXR in the am. Answered questions.     2340 VSS, pt removed O2. Education provided regarding its importance.    0445 Pt sleeping. No needs.

## 2020-06-02 NOTE — PROGRESS NOTES
Hospital Medicine Daily Progress Note    Date of Service  6/2/2020    Chief Complaint  84 y.o. male admitted 5/26/2020 with Left chest pain    Hospital Course    History of hypertension, dementia, hyperlipidemia, hypothyroidism admitted for left-sided chest pain found to have left pleural effusion.  He underwent a left thoracentesis where approximately 2 L of fluid were drained.  LDH level was consistent with an exudate however, pathology did not reveal evidence of malignancy or infection.  Echo did reveal EF of 45% therefore etiology is thought related to fluid overload.      Interval Problem Update  6/2: Patient is doing well on 2 L, unable to wean to room air.  Still with left chest pain.  Recent x-ray shows slight accumulation of fluid.  Appreciate pulmonology recommendations.     Consultants/Specialty  Dr. Yamini shelby    Code Status  Full    Disposition  TBD? Home health and possible O2    Review of Systems  Review of Systems   Constitutional: Positive for malaise/fatigue. Negative for chills and fever.   HENT: Negative for sore throat.    Respiratory: Positive for shortness of breath. Negative for cough.    Cardiovascular: Negative for chest pain (+ with inspiration), palpitations and leg swelling.   Gastrointestinal: Negative for abdominal pain, blood in stool, diarrhea, heartburn, nausea and vomiting.   Genitourinary: Negative for dysuria and frequency.   Musculoskeletal: Negative for back pain and myalgias.   Neurological: Positive for weakness. Negative for dizziness, focal weakness and headaches.   Psychiatric/Behavioral: Positive for memory loss. Negative for depression and hallucinations.   All other systems reviewed and are negative.       Physical Exam  Temp:  [36.3 °C (97.4 °F)-36.7 °C (98 °F)] 36.4 °C (97.5 °F)  Pulse:  [78-88] 78  Resp:  [18] 18  BP: (109-121)/(68-83) 109/68  SpO2:  [91 %-93 %] 91 %    Physical Exam  Constitutional:       Appearance: Normal appearance.      Comments: Pale, elderly    HENT:      Head: Normocephalic and atraumatic.      Nose: Nose normal.      Mouth/Throat:      Mouth: Mucous membranes are moist.   Eyes:      Extraocular Movements: Extraocular movements intact.      Pupils: Pupils are equal, round, and reactive to light.   Neck:      Musculoskeletal: Normal range of motion and neck supple.   Cardiovascular:      Rate and Rhythm: Normal rate and regular rhythm.      Heart sounds: No murmur.   Pulmonary:      Effort: Pulmonary effort is normal. No respiratory distress.      Breath sounds: No stridor.      Comments: Decreased breath sounds left base  Abdominal:      General: Abdomen is flat. Bowel sounds are normal. There is no distension.      Palpations: Abdomen is soft.      Tenderness: There is no abdominal tenderness.   Musculoskeletal:         General: No swelling, tenderness or deformity.   Skin:     General: Skin is warm and dry.      Coloration: Skin is pale.   Neurological:      General: No focal deficit present.      Mental Status: He is alert and oriented to person, place, and time. Mental status is at baseline.   Psychiatric:         Mood and Affect: Mood normal.         Behavior: Behavior normal.      Comments: Forgetful, pleasant         Fluids    Intake/Output Summary (Last 24 hours) at 6/2/2020 1230  Last data filed at 6/2/2020 0800  Gross per 24 hour   Intake 600 ml   Output 250 ml   Net 350 ml       Laboratory  Recent Labs     06/01/20  0406   WBC 12.3*   RBC 6.14*   HEMOGLOBIN 18.7*   HEMATOCRIT 56.9*   MCV 92.7   MCH 30.5   MCHC 32.9*   RDW 45.3   PLATELETCT 328   MPV 9.8     Recent Labs     06/01/20  0406   SODIUM 138   POTASSIUM 3.9   CHLORIDE 99   CO2 26   GLUCOSE 119*   BUN 24*   CREATININE 0.92   CALCIUM 8.9                   Imaging  DX-CHEST-PORTABLE (1 VIEW)   Final Result         1.  Left pulmonary edema and/or infiltrates with layering left pleural effusion, similar to prior study.      DX-CHEST-PORTABLE (1 VIEW)   Final Result      No significant  interval change.      EC-ECHOCARDIOGRAM COMPLETE W/ CONT   Final Result      DX-CHEST-PORTABLE (1 VIEW)   Final Result         No significant interval change. Moderate left pleural effusion with left basilar opacities.      DX-CHEST-PORTABLE (1 VIEW)   Final Result      No pneumothorax identified following left thoracentesis      Slight interval increase in size from May 13 of moderate left subpulmonic and fissure pleural effusion      US-THORACENTESIS PUNCTURE LEFT   Final Result      1. Ultrasound guided left sided diagnostic and therapeutic thoracentesis.      2. 2020 mL of fluid withdrawn.           Assessment/Plan  * Pleural effusion on left- (present on admission)  Assessment & Plan  2nd to acute on chronic chf exacerbation  On Lasix twice daily and Zaroxolyn  S/P Thoracentesis (Left) with 2020ml drained  Pleural fluid studies are exudative however negative for malignancy  Pulmonology is following  Ago showed global hypokinesis and EF of 45    Chest pain- (present on admission)  Assessment & Plan  Secondary to pleural effusion  Pain control  Lidocaine patch    Acute hypoxemic respiratory failure (HCC)  Assessment & Plan  Due to CHF /pleural effusion  Continue diuresis  Low salt diet  Wean O2  IS  Pulmonology is following.    Pharyngitis  Assessment & Plan  Probably viral  Rapid strep is negative  cepacol prn    Acute on chronic systolic (congestive) heart failure (HCC)  Assessment & Plan  Has a recurrent left pleural effusion, EF 45%, global hypokinesis  Continue metolazone 5 mg p.o. daily  Continue Lasix 20 mg p.o. twice daily  Pulmonology is following    Hyperglycemia- (present on admission)  Assessment & Plan  -Mild, no need for coverage at this time    Dementia (HCC)- (present on admission)  Assessment & Plan  -Patient is alert and oriented currently, continue home dose of Aricept    HLD (hyperlipidemia)- (present on admission)  Assessment & Plan  -Continue home statin    Hypothyroidism- (present on  admission)  Assessment & Plan  TSH is well controlled  Continue Synthroid         VTE prophylaxis: Heparin

## 2020-06-02 NOTE — ASSESSMENT & PLAN NOTE
Due to pleural effusion  Status post thoracentesis x2 with almost 4 L total drained  We will continue to follow this up with outpatient pulmonology, may need serial thoracentesis outpatient  Low salt diet  Wean O2  IS  Pulmonology is following.

## 2020-06-02 NOTE — CARE PLAN
Problem: Communication  Goal: The ability to communicate needs accurately and effectively will improve  Outcome: PROGRESSING AS EXPECTED  Note: Pt will feel comfortable communicating questions and concerns with treatment team. Will continue to foster this relationship.      Problem: Safety  Goal: Will remain free from injury  Outcome: PROGRESSING AS EXPECTED  Note: Bed alarm on, personal belongings within reach.

## 2020-06-03 NOTE — PROGRESS NOTES
Bedside report given to BETTY Townsend. POC discussed, all questions answered. Safety precautions in place. Care released.

## 2020-06-03 NOTE — PROGRESS NOTES
Telemetry Shift Summary    Rhythm SR  HR Range 80-87  Ectopy rTrip, rTrig, rPVC, oCoup, oBig  Measurements 0.18/0.10/0.34    Per [place], [place]    Normal Values  Rhythm SR  HR Range    Measurements 0.12-0.20 / 0.06-0.10  / 0.30-0.52

## 2020-06-03 NOTE — PROGRESS NOTES
Pulmonary Progress Note    Date of admission  5/26/2020    Chief Complaint  84 y.o. male admitted 5/26/2020 with recurrent pleural effusion, left    Hospital Course    84 y.o. male who presented 5/26/2020 with chest pain and SOB. Found to have a large left pleural effusion  Underwent a thoracentesis on 5/27 - brown, cloudy wbc 484, rbc 5000. TP 4, glucose 138, fluid  and ph 8. Cx negative. Path suggestive of reactive mesothelial cells  Fluid meets exudative criteria by LDH alone.     5/28/20 cardiac ECHO very abnormal with both systolic EF 45% and diastolic dysfunction (Grade I), small left ventricle sign and global hypokinesis  I/0: -2295 since admission  No cough fever or chills  COVID ruled out 5/26  Receiving lasix bid     Past medical hx: CHF, dementia hypothyroid and also   TSH 0.455 on 5/19 which is wnl     Images personally  reviewed from 2/2020 to now  No patient either has fluid in the left fissure and the pleural effusion appears to be reaccumulating since removal from 5/27  Ct scan on 5/26 unable to comment on the fissure as fluid filled till the fissure and not pass      Interval Problem Update  Reviewed last 24 hour events:  No malignant cells on pleural cytology  -3.5L since admission  Feeling symptomatically improved, denying cough or SOB but still requiring low flow O2  CXR more layering effusion on the left despite diuresis    Review of Systems  Review of Systems   Constitutional: Negative for chills, fever and weight loss.   HENT: Negative for congestion, sinus pain and sore throat.    Eyes: Negative for pain and discharge.   Respiratory: Negative for cough, sputum production, shortness of breath, wheezing and stridor.    Cardiovascular: Negative for chest pain, orthopnea and leg swelling.   Gastrointestinal: Negative for abdominal pain, diarrhea, nausea and vomiting.   Genitourinary: Negative for dysuria, frequency and urgency.   Musculoskeletal: Negative for myalgias.   Skin: Negative for  rash.   Neurological: Negative for dizziness, sensory change, focal weakness, loss of consciousness and headaches.   Psychiatric/Behavioral: Negative.    All other systems reviewed and are negative.       Vital Signs for last 24 hours   Temp:  [36.3 °C (97.3 °F)-36.4 °C (97.5 °F)] 36.3 °C (97.3 °F)  Pulse:  [78-90] 84  Resp:  [18] 18  BP: (109-132)/(68-83) 124/83  SpO2:  [90 %-93 %] 93 %    Physical Exam   Physical Exam  Constitutional:       Appearance: He is not ill-appearing or diaphoretic.   HENT:      Head: Normocephalic and atraumatic.      Mouth/Throat:      Mouth: Mucous membranes are dry.   Eyes:      Pupils: Pupils are equal, round, and reactive to light.   Neck:      Musculoskeletal: Normal range of motion.   Cardiovascular:      Rate and Rhythm: Normal rate.      Heart sounds: No murmur.   Pulmonary:      Comments: Diminished BS post left  Abdominal:      General: Bowel sounds are normal.      Palpations: Abdomen is soft.   Musculoskeletal:      Right lower leg: No edema.      Left lower leg: No edema.   Skin:     General: Skin is warm.   Neurological:      General: No focal deficit present.      Mental Status: He is oriented to person, place, and time.   Psychiatric:         Mood and Affect: Mood normal.         Behavior: Behavior normal.         Thought Content: Thought content normal.         Judgment: Judgment normal.       Medications  Current Facility-Administered Medications   Medication Dose Route Frequency Provider Last Rate Last Dose   • heparin injection 5,000 Units  5,000 Units Subcutaneous Q8HRS H Peter Manning M.D.   5,000 Units at 06/02/20 1418   • metOLazone (ZAROXOLYN) tablet 5 mg  5 mg Oral Q DAY H Peter Manning M.D.   5 mg at 06/02/20 0515   • metoprolol SR (TOPROL XL) tablet 12.5 mg  12.5 mg Oral Q DAY H Peter Manning M.D.   12.5 mg at 06/02/20 0515   • benzocaine-menthol (CEPACOL) lozenge 1 Lozenge  1 Lozenge Mouth/Throat Q2HRS PRN Haresh Danielle D.O.   1 Lozenge at  06/01/20 0841   • furosemide (LASIX) injection 20 mg  20 mg Intravenous BID DIURETIC H Peter Manning M.D.   20 mg at 06/02/20 1620   • donepezil (ARICEPT) tablet 5 mg  5 mg Oral Nightly FORTUNATO VasquezOWing   5 mg at 06/01/20 2111   • levothyroxine (SYNTHROID) tablet 75 mcg  75 mcg Oral AM ES Haresh Danielle D.O.   75 mcg at 06/02/20 0515   • liothyronine (CYTOMEL) tablet 5 mcg  5 mcg Oral DAILY FORTUNATO VasquezOWing   5 mcg at 06/02/20 0515   • pravastatin (PRAVACHOL) tablet 20 mg  20 mg Oral Nightly FORTUNATO VasquezOWing   20 mg at 06/01/20 2111   • fluticasone (FLONASE) nasal spray 50 mcg  1 Spray Nasal QDAY PRN Haresh Danielle D.O.       • senna-docusate (PERICOLACE or SENOKOT S) 8.6-50 MG per tablet 2 Tab  2 Tab Oral BID FORTUNATO VasquezO.   2 Tab at 06/02/20 0514    And   • polyethylene glycol/lytes (MIRALAX) PACKET 1 Packet  1 Packet Oral QDAY PRN Haresh Danielle D.O.        And   • magnesium hydroxide (MILK OF MAGNESIA) suspension 30 mL  30 mL Oral QDAY PRN Haresh Danielle D.O.        And   • bisacodyl (DULCOLAX) suppository 10 mg  10 mg Rectal QDAY PRN Haresh Danielle D.O.       • Respiratory Therapy Consult   Nebulization Continuous RT Haresh Danielle D.O.       • acetaminophen (TYLENOL) tablet 650 mg  650 mg Oral Q6HRS PRN Haresh Danielle D.O.   650 mg at 05/29/20 0532   • enalaprilat (VASOTEC) injection 1.25 mg  1.25 mg Intravenous Q6HRS PRN Haresh Danielle D.O.       • ondansetron (ZOFRAN) syringe/vial injection 4 mg  4 mg Intravenous Q4HRS PRN Haresh Danielle D.O.       • ondansetron (ZOFRAN ODT) dispertab 4 mg  4 mg Oral Q4HRS PRN Haresh R Danielle, D.O.           Fluids    Intake/Output Summary (Last 24 hours) at 6/2/2020 2007  Last data filed at 6/2/2020 1500  Gross per 24 hour   Intake 120 ml   Output 650 ml   Net -530 ml       Laboratory          Recent Labs     06/01/20  0406   SODIUM 138   POTASSIUM 3.9   CHLORIDE 99   CO2 26   BUN 24*   CREATININE 0.92   CALCIUM 8.9     Recent  Labs     06/01/20  0406   ALTSGPT 26   ASTSGOT 29   ALKPHOSPHAT 74   TBILIRUBIN 0.4   GLUCOSE 119*     Recent Labs     06/01/20  0406   WBC 12.3*   NEUTSPOLYS 74.90*   LYMPHOCYTES 13.50*   MONOCYTES 8.90   EOSINOPHILS 1.70   BASOPHILS 0.40   ASTSGOT 29   ALTSGPT 26   ALKPHOSPHAT 74   TBILIRUBIN 0.4     Recent Labs     06/01/20  0406   RBC 6.14*   HEMOGLOBIN 18.7*   HEMATOCRIT 56.9*   PLATELETCT 328       Imaging  X-Ray:  I have personally reviewed the images and compared with prior images.  more layering effusion on the left despite diuresis    Assessment/Plan    * Pleural effusion on left- (present on admission)  Assessment & Plan  Exudative by LDH criteria but potentially pseudoexudate due to diuresis in setting of known combined HFpEF/HFrEF  Cultures negative and path showing no maliginancy  Diuresed past 2 days, -3L since admit  Concern if pt has a trapped lung as the fissure on the left appears thickened   Repeat CXR showing more layering effusion on the left despite diuresis  Check BNP  Bedside US tomorrow, if recurrent effusion will re-tap and resend for standard studies and BNP  Plan to repeat CT chest post-thoracentesis to ensure no underlying pulmonary parenchymal abnormalities    Acute hypoxemic respiratory failure (HCC)  Assessment & Plan  Due to L effusion, CHF  Cont to titrate FiO2 to maintain saturations 92%    VTE:  Heparin    I have performed a physical exam and reviewed and updated ROS and Plan today (6/2/2020). In review of yesterday's note (6/1/2020), there are no changes except as documented above.     Discussed patient condition and risk of morbidity and/or mortality with RN and Patient     This note was generated using voice recognition software which has a chance of producing errors of grammar and content.  I have made every reasonable attempt to find and correct any errors, but it should be expected that some may not be found prior to finalization of this note.  __________  Michel Martinez,  MD  Pulmonary and Critical Care Medicine  [facility]

## 2020-06-03 NOTE — PROCEDURES
Thoracentesis    Date/Time: 6/3/2020 12:45 PM  Performed by: Michel Martinez M.D.  Authorized by: Michel Martinez M.D.     Consent:     Consent obtained:  Verbal and written    Consent given by:  Patient    Risks discussed:  Bleeding, incomplete drainage, nerve damage, infection, pain and pneumothorax    Alternatives discussed:  No treatment, alternative treatment and observation  Anesthesia (see MAR for exact dosages):     Anesthesia method:  Local infiltration    Local anesthetic:  Lidocaine 1% w/o epi  Procedure details:     Preparation: Patient was prepped and draped in usual sterile fashion      Patient position:  Sitting    Location:  L midscapular line    Intercostal space:  7th    Puncture method:  Over-the-needle catheter    Ultrasound guidance: yes      Indwelling catheter placed: no      Needle gauge:  18    Catheter size:  16 G    Number of attempts:  1    Drainage characteristics:  Clear (1800 cc)  Post-procedure details:     Chest x-ray performed: no      Patient tolerance of procedure:  Tolerated well, no immediate complications    __________  Michel Martinez MD  Pulmonary and Critical Care Medicine  [facility]

## 2020-06-03 NOTE — PROGRESS NOTES
Hospital Medicine Daily Progress Note    Date of Service  6/3/2020    Chief Complaint  84 y.o. male admitted 5/26/2020 with Left chest pain    Hospital Course    History of hypertension, dementia, hyperlipidemia, hypothyroidism admitted for left-sided chest pain found to have left pleural effusion.  He underwent a left thoracentesis where approximately 2 L of fluid were drained.  LDH level was consistent with an exudate however, pathology did not reveal evidence of malignancy or infection.  Echo did reveal EF of 45% therefore etiology is thought related to fluid overload.      Interval Problem Update  6/2: Patient is doing well on 2 L, unable to wean to room air.  Still with left chest pain.  Recent x-ray shows slight accumulation of fluid.  Appreciate pulmonology recommendations.   6/3: Weaned to 1L, fatigued today.  Bedside US and thoracentesis planned followed by CT Ch/Ab/Pelv    Consultants/Specialty  Dr. Yamini shelby    Code Status  Full    Disposition  TBD? Home health and possible O2    Review of Systems  Review of Systems   Constitutional: Negative for chills, fever and malaise/fatigue.        Tired and fatigued today   HENT: Positive for sore throat. Sinus pain: Comes and goes.    Respiratory: Positive for cough (Dry). Negative for shortness of breath.    Cardiovascular: Positive for chest pain (+ with inspiration). Negative for palpitations and leg swelling.   Gastrointestinal: Negative for abdominal pain, blood in stool, diarrhea, heartburn, nausea and vomiting.   Genitourinary: Negative for dysuria and frequency.   Musculoskeletal: Negative for back pain and myalgias.   Neurological: Positive for weakness. Negative for dizziness, focal weakness and headaches.   Psychiatric/Behavioral: Positive for memory loss. Negative for depression and hallucinations.   All other systems reviewed and are negative.       Physical Exam  Temp:  [36.2 °C (97.2 °F)-36.6 °C (97.9 °F)] 36.2 °C (97.2 °F)  Pulse:  [79-90]  79  Resp:  [18] 18  BP: ()/(69-87) 98/69  SpO2:  [90 %-93 %] 92 %    Physical Exam  Constitutional:       Appearance: Normal appearance.      Comments: Pale, elderly   HENT:      Head: Normocephalic and atraumatic.      Nose: Nose normal.      Mouth/Throat:      Mouth: Mucous membranes are moist.   Eyes:      Extraocular Movements: Extraocular movements intact.      Pupils: Pupils are equal, round, and reactive to light.   Neck:      Musculoskeletal: Normal range of motion and neck supple.   Cardiovascular:      Rate and Rhythm: Normal rate and regular rhythm.      Heart sounds: No murmur.   Pulmonary:      Effort: Pulmonary effort is normal. No respiratory distress.      Breath sounds: No stridor.      Comments: Decreased breath sounds left base, faint crackles  Abdominal:      General: Abdomen is flat. Bowel sounds are normal. There is no distension.      Palpations: Abdomen is soft.      Tenderness: There is no abdominal tenderness.   Musculoskeletal:         General: No swelling, tenderness or deformity.   Skin:     General: Skin is warm and dry.      Coloration: Skin is pale.   Neurological:      General: No focal deficit present.      Mental Status: He is alert and oriented to person, place, and time. Mental status is at baseline.   Psychiatric:         Mood and Affect: Mood normal.         Behavior: Behavior normal.      Comments: Forgetful, pleasant         Fluids    Intake/Output Summary (Last 24 hours) at 6/3/2020 1207  Last data filed at 6/2/2020 2135  Gross per 24 hour   Intake 60 ml   Output 650 ml   Net -590 ml       Laboratory  Recent Labs     06/01/20  0406 06/03/20  0429   WBC 12.3* 12.6*   RBC 6.14* 6.47*   HEMOGLOBIN 18.7* 19.7*   HEMATOCRIT 56.9* 59.0*   MCV 92.7 91.2   MCH 30.5 30.4   MCHC 32.9* 33.4*   RDW 45.3 43.8   PLATELETCT 328 325   MPV 9.8 10.5     Recent Labs     06/01/20  0406 06/03/20  0429   SODIUM 138 135   POTASSIUM 3.9 3.6   CHLORIDE 99 89*   CO2 26 31   GLUCOSE 119* 118*    BUN 24* 34*   CREATININE 0.92 1.17   CALCIUM 8.9 9.3                   Imaging  DX-CHEST-PORTABLE (1 VIEW)   Final Result         1.  Left pulmonary edema and/or infiltrates with layering left pleural effusion, similar to prior study.      DX-CHEST-PORTABLE (1 VIEW)   Final Result      No significant interval change.      EC-ECHOCARDIOGRAM COMPLETE W/ CONT   Final Result      DX-CHEST-PORTABLE (1 VIEW)   Final Result         No significant interval change. Moderate left pleural effusion with left basilar opacities.      DX-CHEST-PORTABLE (1 VIEW)   Final Result      No pneumothorax identified following left thoracentesis      Slight interval increase in size from May 13 of moderate left subpulmonic and fissure pleural effusion      US-THORACENTESIS PUNCTURE LEFT   Final Result      1. Ultrasound guided left sided diagnostic and therapeutic thoracentesis.      2. 2020 mL of fluid withdrawn.           Assessment/Plan  * Pleural effusion on left- (present on admission)  Assessment & Plan  Etiology is not entirely clear, exudative and there were some abnormal mesothelial cells seen on cytology however no evidence of confirmed malignancy.  Rapid recurrence raises suspicion for occult malignancy, this is further supported by normal BNP which argues against fluid overload as the cause  DC metolazone and decrease Lasix due to slightly rising creatinine and low blood pressure  S/P Thoracentesis (Left) with 2020ml drained  Bedside ultrasound today followed by repeat thoracentesis if significant fluid is visualized  Immediately following thoracentesis, CT chest abdomen pelvis will be performed to evaluate for occult malignancy  Ago showed global hypokinesis and EF of 45  Continue supplemental oxygen 1 L    Chest pain- (present on admission)  Assessment & Plan  Secondary to pleural effusion  Pain control  Lidocaine patch    Acute hypoxemic respiratory failure (HCC)  Assessment & Plan  Due to CHF /pleural effusion  Continue  diuresis  Low salt diet  Wean O2  IS  Pulmonology is following.    Pharyngitis  Assessment & Plan  Probably viral  Rapid strep is negative  cepacol prn    Acute on chronic systolic (congestive) heart failure (HCC)  Assessment & Plan  Has a recurrent left pleural effusion, EF 45%, global hypokinesis  Has been diuresed to his dry weight  Continue Lasix however decrease dose to 20 mg daily  DC metolazone  Pulmonology is following    Hyperglycemia- (present on admission)  Assessment & Plan  -Mild, no need for coverage at this time    Dementia (HCC)- (present on admission)  Assessment & Plan  -Patient is alert and oriented currently, continue home dose of Aricept    HLD (hyperlipidemia)- (present on admission)  Assessment & Plan  -Continue home statin    Hypothyroidism- (present on admission)  Assessment & Plan  TSH is well controlled  Continue Synthroid       VTE prophylaxis: Heparin

## 2020-06-03 NOTE — CARE PLAN
Problem: Infection  Goal: Will remain free from infection  Outcome: PROGRESSING AS EXPECTED  Intervention: Implement standard precautions and perform hand washing before and after patient contact  Note: Standard precautions and hand hygiene practices implemented before and after patient room entry. Gloves worn during times of patient contact.       Problem: Respiratory:  Goal: Respiratory status will improve  Outcome: PROGRESSING AS EXPECTED  Intervention: Administer and titrate oxygen therapy  Flowsheets (Taken 6/3/2020 0900)  O2 (LPM): 1  Note: Will titrate oxygen as able and complete ambulating oxygen saturation test again for d/c planning

## 2020-06-03 NOTE — PROGRESS NOTES
Telemetry Shift Summary    Rhythm SR  HR Range 70s-80s  Ectopy F-PVC, R-trip, R-trigem, R-bigem,  Measurements 0.18/0.08/0.36        Normal Values  Rhythm SR  HR Range    Measurements 0.12-0.20 / 0.06-0.10  / 0.30-0.52

## 2020-06-03 NOTE — PROGRESS NOTES
1910 Received report from BETTY Schwartz. Pt is alert and oriented with occasional confusion. CXR completed today. Safety measures in place, care assumed.      2135 VSS, Assessment complete.   .  0005 VSS. No needs at this time.     0342 No needs at this time.

## 2020-06-03 NOTE — CARE PLAN
Problem: Communication  Goal: The ability to communicate needs accurately and effectively will improve  Outcome: PROGRESSING AS EXPECTED  Note: Pt will feel comfortable communicating questions and concerns with treatment team. Will continue to foster this relationship       Problem: Safety  Goal: Will remain free from injury  Outcome: PROGRESSING AS EXPECTED  Note: Reinforced fall risk precautions. Bed alarm on, non skid socks on feet, call light and personal belongings within reach. x1 assist to the bathroom.

## 2020-06-04 PROBLEM — I50.22 CHRONIC SYSTOLIC HEART FAILURE (HCC): Status: ACTIVE | Noted: 2020-01-01

## 2020-06-04 NOTE — PROGRESS NOTES
"Pt Up to BR. Still SOB on exertion, c/o CP with deep breathing, Pt states \"is not that bad\",and refuses pain medication. Will continue monitoring.  "

## 2020-06-04 NOTE — PROGRESS NOTES
Telemetry Shift Summary    Rhythm SR/ST  HR Range 80s-100s  Ectopy frequent PVC, occasional bigeminy/couplets, rare triplets/trigeminy  Measurements 0.14/0.10/0.36        Normal Values  Rhythm SR  HR Range    Measurements 0.12-0.20 / 0.06-0.10  / 0.30-0.52

## 2020-06-04 NOTE — PROGRESS NOTES
Pt satting 87% on Room air.  O2 placed back on patient, requiring 3L at this time to remain 92%.  Will continue to monitor and attempt to titrate down.

## 2020-06-04 NOTE — FACE TO FACE
Face to Face Supporting Documentation - Home Health    The encounter with this patient was in whole or in part the primary reason for home health admission.    Date of encounter:   Patient:                    MRN:                       YOB: 2020  Chucho Marin  [id number]  1935     Home health to see patient for:  Skilled Nursing care for assessment, interventions & education, Medical social work consult, Home health aide, Physical Therapy evaluation and treatment and Occupational therapy evaluation and treatment    Skilled need for:  Exacerbation of Chronic Disease State Left pleural effusion, recurrent    Skilled nursing interventions to include:  Comment: None    Homebound status evidenced by:  Needs the assistance of another person in order to leave the home. Leaving home requires a considerable and taxing effort. There is a normal inability to leave the home.    Community Physician to provide follow up care: Gibson Quintana M.D.     Optional Interventions? No      I certify the face to face encounter for this home health care referral meets the CMS requirements and the encounter/clinical assessment with the patient was, in whole, or in part, for the medical condition(s) listed above, which is the primary reason for home health care. Based on my clinical findings: the service(s) are medically necessary, support the need for home health care, and the homebound criteria are met.  I certify that this patient has had a face to face encounter by myself.  Cielo De La O D.O. - NPI: [id number]

## 2020-06-04 NOTE — PROGRESS NOTES
Pulmonary Progress Note    Date of admission  5/26/2020    Chief Complaint  84 y.o. male admitted 5/26/2020 with recurrent pleural effusion, left    Hospital Course    84 y.o. male who presented 5/26/2020 with chest pain and SOB. Found to have a large left pleural effusion  Underwent a thoracentesis on 5/27 - brown, cloudy wbc 484, rbc 5000. TP 4, glucose 138, fluid  and ph 8. Cx negative. Path suggestive of reactive mesothelial cells  Fluid meets exudative criteria by LDH alone.     5/28/20 cardiac ECHO very abnormal with both systolic EF 45% and diastolic dysfunction (Grade I), small left ventricle sign and global hypokinesis  I/0: -2295 since admission  No cough fever or chills  COVID ruled out 5/26  Receiving lasix bid     Past medical hx: CHF, dementia hypothyroid and also   TSH 0.455 on 5/19 which is wnl     Images personally  reviewed from 2/2020 to now  No patient either has fluid in the left fissure and the pleural effusion appears to be reaccumulating since removal from 5/27  Ct scan on 5/26 unable to comment on the fissure as fluid filled till the fissure and not pass      Interval Problem Update  Reviewed last 24 hour events:    Repeat thoracentesis 6/3, 1800 cc, exudative  Weaned to 1 L, feeling somewhat improved  Discussed with daughter Arnaud Garcia DC'd due to worsening JEET    Review of Systems  Review of Systems   Constitutional: Positive for malaise/fatigue ( slightly improved). Negative for chills, fever and weight loss.   HENT: Negative for congestion, sinus pain and sore throat.    Eyes: Negative for pain and discharge.   Respiratory: Positive for cough and shortness of breath. Negative for sputum production, wheezing and stridor.    Cardiovascular: Positive for chest pain ( resolved). Negative for orthopnea and leg swelling.   Gastrointestinal: Negative for abdominal pain, diarrhea, nausea and vomiting.   Genitourinary: Negative for dysuria, frequency and urgency.   Musculoskeletal:  Negative for myalgias.   Skin: Negative for rash.   Neurological: Negative for dizziness, sensory change, focal weakness, loss of consciousness and headaches.   Psychiatric/Behavioral: Negative.    All other systems reviewed and are negative.     Vital Signs for last 24 hours   Temp:  [36.3 °C (97.3 °F)-37.3 °C (99.2 °F)] 36.4 °C (97.6 °F)  Pulse:  [] 99  Resp:  [16-20] 18  BP: (110-121)/(75-94) 116/77  SpO2:  [87 %-93 %] 92 %    Physical Exam   Physical Exam  Constitutional:       Appearance: He is not ill-appearing or diaphoretic.   HENT:      Head: Normocephalic and atraumatic.      Mouth/Throat:      Mouth: Mucous membranes are dry.   Eyes:      Pupils: Pupils are equal, round, and reactive to light.   Neck:      Musculoskeletal: Normal range of motion.   Cardiovascular:      Rate and Rhythm: Normal rate.      Heart sounds: No murmur.   Pulmonary:      Comments: Improved BS post left  Abdominal:      General: Bowel sounds are normal.      Palpations: Abdomen is soft.   Musculoskeletal:      Right lower leg: No edema.      Left lower leg: No edema.   Skin:     General: Skin is warm.   Neurological:      General: No focal deficit present.      Mental Status: He is oriented to person, place, and time.   Psychiatric:         Mood and Affect: Mood normal.         Behavior: Behavior normal.         Thought Content: Thought content normal.         Judgment: Judgment normal.       Medications  Current Facility-Administered Medications   Medication Dose Route Frequency Provider Last Rate Last Dose   • heparin injection 5,000 Units  5,000 Units Subcutaneous Q8HRS H Peter Manning M.D.   5,000 Units at 06/04/20 1525   • metoprolol SR (TOPROL XL) tablet 12.5 mg  12.5 mg Oral Q DAY H Peetr Manning M.D.   12.5 mg at 06/04/20 0600   • benzocaine-menthol (CEPACOL) lozenge 1 Lozenge  1 Lozenge Mouth/Throat Q2HRS PRN Haresh Danielle D.O.   1 Lozenge at 06/01/20 0841   • donepezil (ARICEPT) tablet 5 mg  5 mg Oral  Nightly Haresh Danielle D.O.   5 mg at 06/03/20 2115   • levothyroxine (SYNTHROID) tablet 75 mcg  75 mcg Oral AM ES Haresh Danielle D.O.   75 mcg at 06/04/20 0545   • liothyronine (CYTOMEL) tablet 5 mcg  5 mcg Oral DAILY Haresh Danielle D.O.   5 mcg at 06/04/20 0545   • pravastatin (PRAVACHOL) tablet 20 mg  20 mg Oral Nightly Haresh Danielle D.O.   20 mg at 06/03/20 2115   • fluticasone (FLONASE) nasal spray 50 mcg  1 Spray Nasal QDAY PRN Haresh Danielle D.O.       • senna-docusate (PERICOLACE or SENOKOT S) 8.6-50 MG per tablet 2 Tab  2 Tab Oral BID Haresh Danielle D.O.   2 Tab at 06/04/20 0545    And   • polyethylene glycol/lytes (MIRALAX) PACKET 1 Packet  1 Packet Oral QDAY PRN Haresh Danielle D.O.        And   • magnesium hydroxide (MILK OF MAGNESIA) suspension 30 mL  30 mL Oral QDAY PRN Haresh Danielle D.O.        And   • bisacodyl (DULCOLAX) suppository 10 mg  10 mg Rectal QDAY PRN Haresh Danielle D.O.       • Respiratory Therapy Consult   Nebulization Continuous RT Haresh Danielle D.O.       • acetaminophen (TYLENOL) tablet 650 mg  650 mg Oral Q6HRS PRN Haresh Danielle D.O.   650 mg at 05/29/20 0532   • enalaprilat (VASOTEC) injection 1.25 mg  1.25 mg Intravenous Q6HRS PRN Haresh Danielle D.O.       • ondansetron (ZOFRAN) syringe/vial injection 4 mg  4 mg Intravenous Q4HRS PRN Haresh Danielle D.O.       • ondansetron (ZOFRAN ODT) dispertab 4 mg  4 mg Oral Q4HRS PRN Haresh Danielle D.O.           Fluids    Intake/Output Summary (Last 24 hours) at 6/4/2020 1652  Last data filed at 6/4/2020 1200  Gross per 24 hour   Intake 440 ml   Output 350 ml   Net 90 ml       Laboratory          Recent Labs     06/03/20  0429 06/04/20  0442   SODIUM 135 135   POTASSIUM 3.6 3.5*   CHLORIDE 89* 90*   CO2 31 30   BUN 34* 44*   CREATININE 1.17 1.24   MAGNESIUM  --  2.2   PHOSPHORUS 3.6 3.7   CALCIUM 9.3 8.8     Recent Labs     06/03/20  0429 06/04/20 0442   ALTSGPT 27 22   ASTSGOT 28 25   ALKPHOSPHAT 84 85    TBILIRUBIN 0.5 0.6   GLUCOSE 118* 150*     Recent Labs     06/03/20  0429 06/03/20  1240 06/04/20  0442   WBC 12.6*  --  15.9*   NEUTSPOLYS 73.10*  --  80.90*   LYMPHOCYTES 15.00*  --  9.40*   MONOCYTES 9.40  --  8.30   EOSINOPHILS 1.10 2 0.20   BASOPHILS 0.60  --  0.30   ASTSGOT 28  --  25   ALTSGPT 27  --  22   ALKPHOSPHAT 84  --  85   TBILIRUBIN 0.5  --  0.6     Recent Labs     06/03/20  0429 06/04/20  0442   RBC 6.47* 6.40*   HEMOGLOBIN 19.7* 19.6*   HEMATOCRIT 59.0* 59.1*   PLATELETCT 325 395       Imaging  X-Ray:  I have personally reviewed the images and compared with prior images.  more layering effusion on the left despite diuresis    CT chest abdomen pelvis showed a residual left perihilar atelectasis/consolidation, enlarged prostate with heterogeneous density, left lobe, no adenopathy in the chest abdomen or pelvis.    Assessment/Plan    * Pleural effusion on left- (present on admission)  Assessment & Plan  Thoracentesis x 2, last on 6/3, reaccumulated despite diuresis, -3 L since admission now with JEET  Exudative by all 3 lights criteria  Glucose, pH, triglycerides, amylase, cholesterol WNL  Relatively low cell count however lymphocytic predominant 51%  CT chest: perihilar atelectasis minimal, do not think this is an underlying primary lung malignancy, although cannot rule out mesothelioma, pleural disease.  Other extrathoracic abnormalities (prostate, liver) warrant outpatient monitoring but low suspicion for metastatic disease with no lymphadenopathy.  -- Discussed at length with daughter Arnaud, options of continued inpatient management, thoracoscopy, or repeat CXR in the near future.  She is informed of possible underlying occult malignancy, specifically pleural-based disease/mesothelioma.  Recommended close outpatient follow-up and she is in agreement, has an appointment with Dr. Castañeda next month.    -- IGRA, RA, CCP, JEANIE ordered.  Of note, pleural glucose normal.  -- f/u pleural cytology  -- dc  all diuretics setting of ongoing JEET    Acute hypoxemic respiratory failure (HCC)  Assessment & Plan  Due to L effusion, CHF  Cont to titrate FiO2 to maintain saturations 92%, currently 1LPM    I have performed a physical exam and reviewed and updated ROS and Plan today (6/4/2020). In review of yesterday's note (6/3/2020), there are no changes except as documented above.     Discussed patient condition and risk of morbidity and/or mortality with Hospitalist, Family, RN and Patient     This note was generated using voice recognition software which has a chance of producing errors of grammar and content.  I have made every reasonable attempt to find and correct any errors, but it should be expected that some may not be found prior to finalization of this note.  __________  Michel Martinez MD  Pulmonary and Critical Care Medicine  [facility]

## 2020-06-04 NOTE — PROGRESS NOTES
Received report from day shift nurse. Assumed patient's cares Pt is up  . Pt states his sore throat is better, denies any discomfort, or any needs at this time. Encouraged Pt to call for assistance if needed. Call light within reach. Bed alarm is on.

## 2020-06-04 NOTE — PROGRESS NOTES
Telemetry Shift Summary    Rhythm SR/ST  HR Range   Ectopy fPVC, rPAC, rTrig, rCoup,   Measurements 0.14/0.08/0.36        Normal Values  Rhythm SR  HR Range    Measurements 0.12-0.20 / 0.06-0.10  / 0.30-0.52

## 2020-06-04 NOTE — CARE PLAN
Problem: Communication  Goal: The ability to communicate needs accurately and effectively will improve  Outcome: PROGRESSING AS EXPECTED   Pt with hx of dementia, frequent checks made. Pt encouraged to express needs and questions.    Problem: Pain Management  Goal: Pain level will decrease to patient's comfort goal  Outcome: PROGRESSING AS EXPECTED   Pt denies any pain, will continue to monitor

## 2020-06-04 NOTE — FACE TO FACE
"Face to Face Note  -  Durable Medical Equipment    Cielo De La O D.O. - NPI: [id number]  I certify that this patient is under my care and that they had a durable medical equipment(DME)face to face encounter by myself that meets the physician DME face-to-face encounter requirements with this patient on:    Date of encounter:   Patient:                    MRN:                       YOB: 2020  Chucho Marin  [id number]  1935     The encounter with the patient was in whole, or in part, for the following medical condition, which is the primary reason for durable medical equipment:  CHF    I certify that, based on my findings, the following durable medical equipment is medically necessary:  Oxygen.    HOME O2 Saturation Measurements:(Values must be present for Home Oxygen orders)  Room air sat at rest: 85  Room air sat with amb: 90  With liters of O2: 3, O2 sat at rest with O2: 90  With Liters of O2: 3, O2 sat with amb with O2 : 91  Is the patient mobile?: Yes    My Clinical findings support the need for the above equipment due to:  Hypoxia    Supporting Symptoms: The patient requires supplemental oxygen, as the following interventions have been tried with limited or no improvement: \"Ambulation with oximetry    "

## 2020-06-04 NOTE — PROGRESS NOTES
Telemetry summary:  Rhythm: SR, ST     HR Range: 80s to 110s      Measurements from strip printed at 03:00:13   OK: 0.18   QRS 0.10   QT 0.34     Ectopies: Frequent PVC, occasional PAC, couplets, bigeminy, triplets.

## 2020-06-04 NOTE — PROGRESS NOTES
Hospital Medicine Daily Progress Note    Date of Service  6/4/2020    Chief Complaint  84 y.o. male admitted 5/26/2020 with Left chest pain    Hospital Course    History of hypertension, dementia, hyperlipidemia, hypothyroidism admitted for left-sided chest pain found to have left pleural effusion.  He underwent a left thoracentesis where approximately 2 L of fluid were drained.  LDH level was consistent with an exudate however, pathology did not reveal evidence of malignancy or infection.  Echo did reveal EF of 45% therefore etiology is thought related to fluid overload.      Interval Problem Update  6/2: Patient is doing well on 2 L, unable to wean to room air.  Still with left chest pain.  Recent x-ray shows slight accumulation of fluid.  Appreciate pulmonology recommendations.   6/3: Weaned to 1L, fatigued today.  Bedside US and thoracentesis planned followed by CT Ch/Ab/Pelv  6/4: Somnolent.  I reviewed his CT and it does not show any significant findings, tumor markers ordered.  He remains on 1 to 3 L, home O2 ordered.  I discussed with pulm    Consultants/Specialty  Dr. Martinez- afsaneh    Code Status  Full    Disposition   Home health and O2  Outpatient pulmonology follow up    Review of Systems  Review of Systems   Constitutional: Negative for chills, fever and malaise/fatigue.        Tired and fatigued today   HENT: Sinus pain: Comes and goes.    Respiratory: Positive for shortness of breath (Minimal at rest). Negative for cough, sputum production and wheezing.    Cardiovascular: Positive for chest pain (Controlled). Negative for palpitations and leg swelling.   Gastrointestinal: Negative for abdominal pain, blood in stool, diarrhea, heartburn, nausea and vomiting.   Genitourinary: Negative for dysuria and frequency.   Musculoskeletal: Negative for back pain and myalgias.   Neurological: Positive for weakness. Negative for dizziness, focal weakness and headaches.   Psychiatric/Behavioral: Positive for memory  loss. Negative for depression and hallucinations.        Feels more forgetful and tired today   All other systems reviewed and are negative.       Physical Exam  Temp:  [36.2 °C (97.2 °F)-37.3 °C (99.2 °F)] 36.3 °C (97.3 °F)  Pulse:  [] 95  Resp:  [16-20] 18  BP: (110-121)/(75-94) 121/76  SpO2:  [90 %-94 %] 90 %    Physical Exam  Constitutional:       Appearance: Normal appearance.      Comments: Pale, elderly, listless, pale   HENT:      Head: Normocephalic and atraumatic.      Nose: Nose normal.      Mouth/Throat:      Mouth: Mucous membranes are moist.   Eyes:      Extraocular Movements: Extraocular movements intact.      Pupils: Pupils are equal, round, and reactive to light.   Neck:      Musculoskeletal: Normal range of motion and neck supple.   Cardiovascular:      Rate and Rhythm: Normal rate and regular rhythm.      Heart sounds: No murmur.   Pulmonary:      Effort: Pulmonary effort is normal. No respiratory distress.      Breath sounds: No stridor.      Comments: Faint crackles L base  Abdominal:      General: Abdomen is flat. Bowel sounds are normal. There is no distension.      Palpations: Abdomen is soft.      Tenderness: There is no abdominal tenderness.   Musculoskeletal:         General: No swelling, tenderness or deformity.   Skin:     General: Skin is warm and dry.      Coloration: Skin is pale.   Neurological:      General: No focal deficit present.      Mental Status: He is alert and oriented to person, place, and time. Mental status is at baseline.   Psychiatric:         Mood and Affect: Mood normal.         Speech: Speech is delayed.         Behavior: Behavior is slowed.         Cognition and Memory: Cognition is impaired. Memory is impaired.      Comments: Forgetful, pleasant         Fluids    Intake/Output Summary (Last 24 hours) at 6/4/2020 1349  Last data filed at 6/4/2020 1200  Gross per 24 hour   Intake 440 ml   Output 350 ml   Net 90 ml       Laboratory  Recent Labs      06/03/20 0429 06/04/20 0442   WBC 12.6* 15.9*   RBC 6.47* 6.40*   HEMOGLOBIN 19.7* 19.6*   HEMATOCRIT 59.0* 59.1*   MCV 91.2 92.3   MCH 30.4 30.6   MCHC 33.4* 33.2*   RDW 43.8 44.1   PLATELETCT 325 395   MPV 10.5 10.1     Recent Labs     06/03/20 0429 06/04/20 0442   SODIUM 135 135   POTASSIUM 3.6 3.5*   CHLORIDE 89* 90*   CO2 31 30   GLUCOSE 118* 150*   BUN 34* 44*   CREATININE 1.17 1.24   CALCIUM 9.3 8.8                   Imaging  CT-CHEST,ABDOMEN,PELVIS W/O   Final Result         1.  Small left pleural effusion, significantly decreased in size from prior CT.   2.  Improved left lung atelectasis with residual left perihilar atelectasis/consolidative opacity. Continued attention on follow-up.   3.  Prostatomegaly with heterogeneous density, nonspecific. Cannot exclude malignancy.   4.  Punctate nonobstructing renal stones.   5.  Nonspecific homogeneous hypodensity of the lateral left lobe of the liver. This could be further evaluated with liver protocol MRI if clinically indicated.   6.  Atherosclerosis.   7.  No evidence of adenopathy in the chest, abdomen or pelvis.               DX-CHEST-PORTABLE (1 VIEW)   Final Result         1.  Left pulmonary edema and/or infiltrates with layering left pleural effusion, similar to prior study.      DX-CHEST-PORTABLE (1 VIEW)   Final Result      No significant interval change.      EC-ECHOCARDIOGRAM COMPLETE W/ CONT   Final Result      DX-CHEST-PORTABLE (1 VIEW)   Final Result         No significant interval change. Moderate left pleural effusion with left basilar opacities.      DX-CHEST-PORTABLE (1 VIEW)   Final Result      No pneumothorax identified following left thoracentesis      Slight interval increase in size from May 13 of moderate left subpulmonic and fissure pleural effusion      US-THORACENTESIS PUNCTURE LEFT   Final Result      1. Ultrasound guided left sided diagnostic and therapeutic thoracentesis.      2. 2020 mL of fluid withdrawn.            Assessment/Plan  * Pleural effusion on left- (present on admission)  Assessment & Plan  Etiology is not entirely clear, suspicious for malignancy because exudative, recurrent in the absence of systemic overload, with abnormal mesothelial cells seen on cytology   DC metolazone and Lasix Cr rising - looks dry  S/P Thoracentesis (Left) with 2020ml drained and 6/3 with 1800mL drained  I reviewed his CT chest abdomen pelvis post-thora, and it shows no significant evidence of malignancy  Check CA-19-9,  and PSA  He will be discharged with supplemental oxygen  He will follow-up with pulmonology in approximately 1 month for repeat imaging  He may need serial outpatient thoracentesis  Echo showed global hypokinesis and EF of 45    Chest pain- (present on admission)  Assessment & Plan  Secondary to pleural effusion  Pain control  Lidocaine patch    Acute hypoxemic respiratory failure (HCC)  Assessment & Plan  Due to pleural effusion  Status post thoracentesis x2 with almost 4 L total drained  We will continue to follow this up with outpatient pulmonology, may need serial thoracentesis outpatient  Low salt diet  Wean O2  IS  Pulmonology is following.    Pharyngitis  Assessment & Plan  Probably viral  Rapid strep is negative  cepacol prn    Chronic systolic heart failure (HCC)  Assessment & Plan  Has a recurrent left pleural effusion, but he has been diuresed down to his dry weight   EF 45%, global hypokinesis  DC diuretics  Continue pravastatin and metoprolol  Pulmonology is following    Hyperglycemia- (present on admission)  Assessment & Plan  -Mild, no need for coverage at this time    Dementia (HCC)- (present on admission)  Assessment & Plan  He has been somnolent and confused last 48h  continue home dose of Aricept    HLD (hyperlipidemia)- (present on admission)  Assessment & Plan  -Continue home statin    Hypothyroidism- (present on admission)  Assessment & Plan  TSH is well controlled  Continue Synthroid        VTE prophylaxis: Heparin

## 2020-06-04 NOTE — PROGRESS NOTES
Report received from BETTY Gaytan. Pt denies needs at this time. Safety precautions in place. Call light within reach.

## 2020-06-04 NOTE — PROGRESS NOTES
Pulmonary Progress Note    Date of admission  5/26/2020    Chief Complaint  84 y.o. male admitted 5/26/2020 with recurrent pleural effusion, left    Hospital Course    84 y.o. male who presented 5/26/2020 with chest pain and SOB. Found to have a large left pleural effusion  Underwent a thoracentesis on 5/27 - brown, cloudy wbc 484, rbc 5000. TP 4, glucose 138, fluid  and ph 8. Cx negative. Path suggestive of reactive mesothelial cells  Fluid meets exudative criteria by LDH alone.     5/28/20 cardiac ECHO very abnormal with both systolic EF 45% and diastolic dysfunction (Grade I), small left ventricle sign and global hypokinesis  I/0: -2295 since admission  No cough fever or chills  COVID ruled out 5/26  Receiving lasix bid     Past medical hx: CHF, dementia hypothyroid and also   TSH 0.455 on 5/19 which is wnl     Images personally  reviewed from 2/2020 to now  No patient either has fluid in the left fissure and the pleural effusion appears to be reaccumulating since removal from 5/27  Ct scan on 5/26 unable to comment on the fissure as fluid filled till the fissure and not pass      Interval Problem Update  Reviewed last 24 hour events:    Repeat thoracentesis today, 1800 cc  Weaned to 1 L, feeling fatigued  Pending CT chest abdomen pelvis to evaluate for occult malignancy  Zaroxolyn DC'd due to worsening JEET    Review of Systems  Review of Systems   Constitutional: Positive for malaise/fatigue. Negative for chills, fever and weight loss.   HENT: Negative for congestion, sinus pain and sore throat.    Eyes: Negative for pain and discharge.   Respiratory: Positive for cough and shortness of breath. Negative for sputum production, wheezing and stridor.    Cardiovascular: Positive for chest pain. Negative for orthopnea and leg swelling.   Gastrointestinal: Negative for abdominal pain, diarrhea, nausea and vomiting.   Genitourinary: Negative for dysuria, frequency and urgency.   Musculoskeletal: Negative for  myalgias.   Skin: Negative for rash.   Neurological: Negative for dizziness, sensory change, focal weakness, loss of consciousness and headaches.   Psychiatric/Behavioral: Negative.    All other systems reviewed and are negative.     Vital Signs for last 24 hours   Temp:  [36.2 °C (97.2 °F)-36.6 °C (97.9 °F)] 36.2 °C (97.2 °F)  Pulse:  [] 87  Resp:  [18] 18  BP: ()/(69-98) 110/78  SpO2:  [90 %-94 %] 94 %    Physical Exam   Physical Exam  Constitutional:       Appearance: He is not ill-appearing or diaphoretic.   HENT:      Head: Normocephalic and atraumatic.      Mouth/Throat:      Mouth: Mucous membranes are dry.   Eyes:      Pupils: Pupils are equal, round, and reactive to light.   Neck:      Musculoskeletal: Normal range of motion.   Cardiovascular:      Rate and Rhythm: Normal rate.      Heart sounds: No murmur.   Pulmonary:      Comments: Diminished BS post left  Abdominal:      General: Bowel sounds are normal.      Palpations: Abdomen is soft.   Musculoskeletal:      Right lower leg: No edema.      Left lower leg: No edema.   Skin:     General: Skin is warm.   Neurological:      General: No focal deficit present.      Mental Status: He is oriented to person, place, and time.   Psychiatric:         Mood and Affect: Mood normal.         Behavior: Behavior normal.         Thought Content: Thought content normal.         Judgment: Judgment normal.       Medications  Current Facility-Administered Medications   Medication Dose Route Frequency Provider Last Rate Last Dose   • [START ON 6/4/2020] furosemide (LASIX) injection 20 mg  20 mg Intravenous Q DAY Cielo De La O D.O.       • heparin injection 5,000 Units  5,000 Units Subcutaneous Q8HRS H Peter Manning M.D.   5,000 Units at 06/03/20 1432   • metoprolol SR (TOPROL XL) tablet 12.5 mg  12.5 mg Oral Q DAY H Peter Manning M.D.   12.5 mg at 06/03/20 0553   • benzocaine-menthol (CEPACOL) lozenge 1 Lozenge  1 Lozenge Mouth/Throat Q2HRS PRN Haresh  MONSERRAT Danielle D.O.   1 Lozenge at 06/01/20 0841   • donepezil (ARICEPT) tablet 5 mg  5 mg Oral Nightly Haresh Danielle D.O.   5 mg at 06/02/20 2145   • levothyroxine (SYNTHROID) tablet 75 mcg  75 mcg Oral AM ES Haresh Danielle D.O.   75 mcg at 06/03/20 0552   • liothyronine (CYTOMEL) tablet 5 mcg  5 mcg Oral DAILY Haresh Danielle D.O.   5 mcg at 06/03/20 0552   • pravastatin (PRAVACHOL) tablet 20 mg  20 mg Oral Nightly FORTUNATO VasquezOWing   20 mg at 06/02/20 2143   • fluticasone (FLONASE) nasal spray 50 mcg  1 Spray Nasal QDAY PRN Haresh Danielle D.O.       • senna-docusate (PERICOLACE or SENOKOT S) 8.6-50 MG per tablet 2 Tab  2 Tab Oral BID Haresh Danielle D.O.   2 Tab at 06/03/20 0552    And   • polyethylene glycol/lytes (MIRALAX) PACKET 1 Packet  1 Packet Oral QDAY PRN Haresh Danielle D.O.        And   • magnesium hydroxide (MILK OF MAGNESIA) suspension 30 mL  30 mL Oral QDAY PRN Haresh Danielle D.O.        And   • bisacodyl (DULCOLAX) suppository 10 mg  10 mg Rectal QDAY PRN Haresh Danielle D.O.       • Respiratory Therapy Consult   Nebulization Continuous RT Haresh Danielle D.O.       • acetaminophen (TYLENOL) tablet 650 mg  650 mg Oral Q6HRS PRN Haresh Danielle D.O.   650 mg at 05/29/20 0532   • enalaprilat (VASOTEC) injection 1.25 mg  1.25 mg Intravenous Q6HRS PRN Haresh Danielle D.O.       • ondansetron (ZOFRAN) syringe/vial injection 4 mg  4 mg Intravenous Q4HRS PRN Haresh Danielle D.O.       • ondansetron (ZOFRAN ODT) dispertab 4 mg  4 mg Oral Q4HRS PRN Haresh Danielle D.O.           Fluids    Intake/Output Summary (Last 24 hours) at 6/3/2020 1917  Last data filed at 6/2/2020 2135  Gross per 24 hour   Intake 60 ml   Output 250 ml   Net -190 ml       Laboratory          Recent Labs     06/01/20 0406 06/03/20  0429   SODIUM 138 135   POTASSIUM 3.9 3.6   CHLORIDE 99 89*   CO2 26 31   BUN 24* 34*   CREATININE 0.92 1.17   PHOSPHORUS  --  3.6   CALCIUM 8.9 9.3     Recent Labs     06/01/20 0406  06/03/20 0429   ALTSGPT 26 27   ASTSGOT 29 28   ALKPHOSPHAT 74 84   TBILIRUBIN 0.4 0.5   GLUCOSE 119* 118*     Recent Labs     06/01/20  0406 06/03/20  0429 06/03/20  1240   WBC 12.3* 12.6*  --    NEUTSPOLYS 74.90* 73.10*  --    LYMPHOCYTES 13.50* 15.00*  --    MONOCYTES 8.90 9.40  --    EOSINOPHILS 1.70 1.10 2   BASOPHILS 0.40 0.60  --    ASTSGOT 29 28  --    ALTSGPT 26 27  --    ALKPHOSPHAT 74 84  --    TBILIRUBIN 0.4 0.5  --      Recent Labs     06/01/20  0406 06/03/20 0429   RBC 6.14* 6.47*   HEMOGLOBIN 18.7* 19.7*   HEMATOCRIT 56.9* 59.0*   PLATELETCT 328 325       Imaging  X-Ray:  I have personally reviewed the images and compared with prior images.  more layering effusion on the left despite diuresis    CT chest abdomen pelvis showed a residual left perihilar atelectasis/consolidation, enlarged prostate with heterogeneous density, left lobe, no adenopathy in the chest abdomen or pelvis.    Assessment/Plan    * Pleural effusion on left- (present on admission)  Assessment & Plan  Exudative by LDH criteria but potentially pseudoexudate due to diuresis in setting of known combined HFpEF/HFrEF  Cultures negative and path showing no maliginancy  Diuresed past 2 days, -3L since admit  Concern if pt has a trapped lung as the fissure on the left appears thickened   Repeat CXR showing more layering effusion on the left despite diuresis  Bedside thoracentesis today- clear yellow. Fluid studies show low cell count although lymphocyte predominant. Glucose, pH wnl   CT reviewed- perihilar atelectasis minimal do not think this is an underlying malignancy, other extrathoracic abnormalities (prostate, liver) warrant outpatient monitoring but low suspicion for metastatic disease with no lymphadenopathy  -- f/u pleural amylase, LDH, protein, cholesterol, BNP, cytology  -- dc zaroxyln and reduce lasix in setting of JEET  -- pending pleural fluid studies can plan for outpatient f/u with repeat imaging in 4-6 weeks    Acute  hypoxemic respiratory failure (HCC)  Assessment & Plan  Due to L effusion, CHF  Cont to titrate FiO2 to maintain saturations 92%, currently 1LPM    I have performed a physical exam and reviewed and updated ROS and Plan today (6/3/2020). In review of yesterday's note (6/2/2020), there are no changes except as documented above.     Discussed patient condition and risk of morbidity and/or mortality with Hospitalist, RN and Patient     This note was generated using voice recognition software which has a chance of producing errors of grammar and content.  I have made every reasonable attempt to find and correct any errors, but it should be expected that some may not be found prior to finalization of this note.  __________  Michel Martinez MD  Pulmonary and Critical Care Medicine  [facility]

## 2020-06-05 NOTE — PROGRESS NOTES
Report received from BETTY Vaughan. Plan of care discussed. Patient resting comfortably in bed, declines any further needs at this time. Safety precautions in place.

## 2020-06-05 NOTE — PROGRESS NOTES
Pt's troponin up 10 points with AM labs, from 38 to 48.  Pt has no complaints of CP at this time.  MD notified, troponin ordered for 0800 to trend.

## 2020-06-05 NOTE — PROGRESS NOTES
[facility] Medicine Daily Progress Note    Date of Service  6/5/2020    Chief Complaint  84 y.o. male admitted 5/26/2020 with Left chest pain    Hospital Course    History of hypertension, dementia, hyperlipidemia, hypothyroidism admitted for left-sided chest pain found to have left pleural effusion.  He underwent a left thoracentesis where approximately 2 L of fluid were drained.  LDH level was consistent with an exudate however, pathology did not reveal evidence of malignancy or infection.  Echo did reveal EF of 45% therefore etiology is thought related to fluid overload.      Interval Problem Update  6/2: Patient is doing well on 2 L, unable to wean to room air.  Still with left chest pain.  Recent x-ray shows slight accumulation of fluid.  Appreciate pulmonology recommendations.   6/3: Weaned to 1L, fatigued today.  Bedside US and thoracentesis planned followed by CT Ch/Ab/Pelv  6/4: Somnolent.  I reviewed his CT and it does not show any significant findings, tumor markers ordered.  He remains on 1 to 3 L, home O2 ordered.  I discussed with pulm  6/5: Very weak, re-eval by PT requested as likely not safe for home.  Still on 1-3L    Consultants/Specialty  Dr. Martinez- afsaneh    Code Status  Full    Disposition   vs [facility]  Outpatient pulmonology follow up    Review of Systems  Review of Systems   Constitutional: Negative for chills, fever and malaise/fatigue.   HENT: Negative for hearing loss, sinus pain and tinnitus.    Respiratory: Positive for shortness of breath (Minimal at rest). Negative for cough, sputum production and wheezing.    Cardiovascular: Negative for chest pain (Resolved), palpitations and leg swelling.   Gastrointestinal: Negative for blood in stool, constipation, diarrhea and nausea.   Genitourinary: Negative for frequency and hematuria.   Musculoskeletal: Negative for back pain, falls and joint pain.   Neurological: Positive for weakness. Negative for dizziness, sensory change, speech change, focal  weakness and headaches.   Psychiatric/Behavioral: Positive for memory loss. Negative for depression and hallucinations.   All other systems reviewed and are negative.       Physical Exam  Temp:  [36.2 °C (97.2 °F)-37.1 °C (98.8 °F)] 36.2 °C (97.2 °F)  Pulse:  [] 78  Resp:  [18-22] 18  BP: (110-125)/(74-88) 115/88  SpO2:  [87 %-97 %] 97 %    Physical Exam  Constitutional:       Appearance: Normal appearance.      Comments: Pale, elderly, listless   HENT:      Head: Normocephalic and atraumatic.      Nose: Nose normal.      Mouth/Throat:      Mouth: Mucous membranes are moist.   Eyes:      Extraocular Movements: Extraocular movements intact.      Pupils: Pupils are equal, round, and reactive to light.   Neck:      Musculoskeletal: Normal range of motion and neck supple.   Cardiovascular:      Rate and Rhythm: Normal rate and regular rhythm.      Heart sounds: No murmur.   Pulmonary:      Effort: Pulmonary effort is normal. No respiratory distress.      Breath sounds: No stridor.      Comments: Faint crackles L base  Abdominal:      General: Abdomen is flat. Bowel sounds are normal. There is no distension.      Palpations: Abdomen is soft.      Tenderness: There is no abdominal tenderness.   Musculoskeletal:         General: No swelling, tenderness or deformity.   Skin:     General: Skin is warm and dry.      Coloration: Skin is pale.   Neurological:      General: No focal deficit present.      Mental Status: He is alert and oriented to person, place, and time. Mental status is at baseline.   Psychiatric:         Mood and Affect: Mood normal.         Speech: Speech is delayed.         Behavior: Behavior is slowed.         Cognition and Memory: Cognition is impaired. Memory is impaired.      Comments: Forgetful, pleasant         Fluids    Intake/Output Summary (Last 24 hours) at 6/5/2020 1342  Last data filed at 6/4/2020 1800  Gross per 24 hour   Intake 100 ml   Output --   Net 100 ml       Laboratory  Recent Labs      06/03/20 0429 06/04/20 0442 06/05/20 0443   WBC 12.6* 15.9* 13.7*   RBC 6.47* 6.40* 6.23*   HEMOGLOBIN 19.7* 19.6* 19.0*   HEMATOCRIT 59.0* 59.1* 56.7*   MCV 91.2 92.3 91.0   MCH 30.4 30.6 30.5   MCHC 33.4* 33.2* 33.5*   RDW 43.8 44.1 42.8   PLATELETCT 325 395 387   MPV 10.5 10.1 9.8     Recent Labs     06/03/20 0429 06/04/20 0442 06/05/20 0443   SODIUM 135 135 131*   POTASSIUM 3.6 3.5* 3.4*   CHLORIDE 89* 90* 87*   CO2 31 30 30   GLUCOSE 118* 150* 126*   BUN 34* 44* 49*   CREATININE 1.17 1.24 1.08   CALCIUM 9.3 8.8 8.9                   Imaging  CT-CHEST,ABDOMEN,PELVIS W/O   Final Result         1.  Small left pleural effusion, significantly decreased in size from prior CT.   2.  Improved left lung atelectasis with residual left perihilar atelectasis/consolidative opacity. Continued attention on follow-up.   3.  Prostatomegaly with heterogeneous density, nonspecific. Cannot exclude malignancy.   4.  Punctate nonobstructing renal stones.   5.  Nonspecific homogeneous hypodensity of the lateral left lobe of the liver. This could be further evaluated with liver protocol MRI if clinically indicated.   6.  Atherosclerosis.   7.  No evidence of adenopathy in the chest, abdomen or pelvis.               DX-CHEST-PORTABLE (1 VIEW)   Final Result         1.  Left pulmonary edema and/or infiltrates with layering left pleural effusion, similar to prior study.      DX-CHEST-PORTABLE (1 VIEW)   Final Result      No significant interval change.      EC-ECHOCARDIOGRAM COMPLETE W/ CONT   Final Result      DX-CHEST-PORTABLE (1 VIEW)   Final Result         No significant interval change. Moderate left pleural effusion with left basilar opacities.      DX-CHEST-PORTABLE (1 VIEW)   Final Result      No pneumothorax identified following left thoracentesis      Slight interval increase in size from May 13 of moderate left subpulmonic and fissure pleural effusion      US-THORACENTESIS PUNCTURE LEFT   Final Result      1.  Ultrasound guided left sided diagnostic and therapeutic thoracentesis.      2. 2020 mL of fluid withdrawn.           Assessment/Plan  * Pleural effusion on left- (present on admission)  Assessment & Plan  Etiology is not entirely clear, suspicious for malignancy because exudative, recurrent in the absence of systemic overload, with abnormal mesothelial cells seen on cytology   Did not improve with diuresis  S/P Thoracentesis (Left) with 2020ml drained and 6/3 with 1800mL drained  CT chest abdomen pelvis post-thora shows no evidence of malignancy  Negative CA-19-9, , RF  Pending PSA  He will be discharged with supplemental oxygen- likely SNF  He will follow-up with pulmonology in approximately 1 month for repeat imaging  He may need serial outpatient thoracentesis  Echo showed global hypokinesis and EF of 45    Chest pain- (present on admission)  Assessment & Plan  Transient, Secondary to pleural effusion  Mild trop elevation, but stable  Pain control  ASA  Lidocaine patch    Acute hypoxemic respiratory failure (HCC)  Assessment & Plan  Due to pleural effusion  Status post thoracentesis x2 with almost 4 L total drained  We will continue to follow this up with outpatient pulmonology, may need serial thoracentesis outpatient  Low salt diet  Wean O2  IS  Pulmonology is following.    Pharyngitis  Assessment & Plan  Probably viral  Rapid strep is negative  cepacol prn    Chronic systolic heart failure (HCC)  Assessment & Plan  Has a recurrent left pleural effusion, but he has been diuresed down to his dry weight   EF 45%, global hypokinesis  DC diuretics  Continue pravastatin and metoprolol  Pulmonology is following    Hyperglycemia- (present on admission)  Assessment & Plan  -Mild, no need for coverage at this time    Dementia (HCC)- (present on admission)  Assessment & Plan  Stable  continue home dose of Aricept    HLD (hyperlipidemia)- (present on admission)  Assessment & Plan  -Continue home  statin    Hypothyroidism- (present on admission)  Assessment & Plan  TSH is well controlled  Continue Synthroid       VTE prophylaxis: Heparin

## 2020-06-05 NOTE — DISCHARGE SUMMARY
Discharge Summary    CHIEF COMPLAINT ON ADMISSION  Chief Complaint   Patient presents with   • Shortness of Breath     pt c/o SOB x 1-2weeks   • Cough     infrequent dry cough per pt       Reason for Admission  SOB     Admission Date  5/26/2020    CODE STATUS  Full Code    HPI & HOSPITAL COURSE  This is a 84 y.o. male with a History of hypertension, dementia, hyperlipidemia, hypothyroidism admitted for left-sided chest pain found to have left pleural effusion.  He underwent a left thoracentesis where approximately 2 L of fluid were drained.  LDH level was consistent with an exudate however, final pathology did not reveal evidence of malignancy or infection.  Echo did reveal EF of 45% therefore was started on diuresis for a working diagnosis of CHF related pleural effusion.  Despite diuresis, he had reaccumulation of his pleural fluid.  There was a high suspicion for underlying malignancy as the etiology therefore, pulmonology performed a bedside ultrasound which revealed enough fluid to be re-tapped.  1800 cc of fluid were subsequently drained and sent for repeat studies.  Immediately following the thoracentesis a CT of the chest abdomen pelvis was performed however, this did not show any underlying mass.  It showed a few incidental findings including small liver lesions and an enlarged prostate.  Tumor markers were sent including CA-19-9,  which were both negative.  Rheumatoid factor was negative.  PSA is pending at the time of this dictation.    Although a suspicion for underlying cancer remains, no source could be identified.  He will follow-up with pulmonology in 1 month.  It was discussed with the patient that he may need serial thoracentesis procedures to be scheduled in order to prevent symptomatic recurrence of his left pleural effusion.    He was unable to wean completely from oxygen therefore he will be discharged with oxygen to use both at rest and with ambulation.    He did become dry with  diuresis therefore his Lasix and metolazone were discontinued.    He did develop chest discomfort which, initially was treated with lidocaine however when the location changed to central discomfort, EKG was performed.  It did not show significant ST changes and remained stable.  Troponin was evaluated and was mildly elevated at approximately 48 however this also remained stable.  His chest pain resolved again with lidocaine.  This should be followed up with his PCP.  He will be discharged with a statin, metoprolol and aspirin    Therefore, he is discharged in guarded and stable condition to skilled nursing facility.    The patient met 2-midnight criteria for an inpatient stay at the time of discharge.    Discharge Date  6/6/2020    FOLLOW UP ITEMS POST DISCHARGE  F/U with pulmonology in 3 weeks as scheduled    DISCHARGE DIAGNOSES  Principal Problem:    Pleural effusion on left POA: Yes  Active Problems:    Chest pain POA: Yes    Hypothyroidism POA: Yes    HLD (hyperlipidemia) POA: Yes    Dementia (HCC) POA: Yes    Hyperglycemia POA: Yes    Chronic systolic heart failure (HCC) POA: Unknown    Pharyngitis POA: Unknown    Acute hypoxemic respiratory failure (HCC) POA: Unknown  Resolved Problems:    * No resolved hospital problems. *      FOLLOW UP  Future Appointments   Date Time Provider Department Center   6/23/2020 12:50 PM Melissa Castañeda M.D. PULM None     Gibson Quintana M.D.  [address] [address]  [phone]      Per office request please call to schedule your hospital follow up. Thank you       MEDICATIONS ON DISCHARGE     Medication List      START taking these medications      Instructions   aspirin 81 MG Chew chewable tablet  Commonly known as:  ASA   Take 4 Tabs by mouth every day.  Dose:  324 mg     metoprolol SR 25 MG Tb24  Commonly known as:  TOPROL XL   Take 1 Tab by mouth every day.  Dose:  25 mg        CONTINUE taking these medications      Instructions   PATRIA-MAG-ZINC PO   Take 1  Tab by mouth every day.  Dose:  1 Tab     Clear Eyes for Dry Eyes 1-0.25 % Soln  Generic drug:  Carboxymethylcellul-Glycerin   Place 1 Drop in both eyes every day.  Dose:  1 Drop     donepezil 5 MG Tabs  Commonly known as:  ARICEPT   Take 5 mg by mouth every evening.  Dose:  5 mg     fluticasone 50 MCG/ACT nasal spray  Commonly known as:  FLONASE   Spray 1 Spray in nose every day.  Dose:  1 Spray     levothyroxine 75 MCG Tabs  Commonly known as:  SYNTHROID   Take 75 mcg by mouth Every morning on an empty stomach.  Dose:  75 mcg     multivitamin Tabs   Take 1 Tab by mouth every day.  Dose:  1 Tab     Nasacort Allergy 24HR 55 MCG/ACT nasal inhaler  Generic drug:  triamcinolone   Spray 1 Spray in nose every bedtime.  Dose:  1 Spray     pravastatin 20 MG Tabs  Commonly known as:  PRAVACHOL   Take 20 mg by mouth every evening.  Dose:  20 mg     testosterone cypionate 200 MG/ML Soln injection  Commonly known as:  DEPO-TESTOSTERONE   50 mg by Intramuscular route every 14 days.  Dose:  50 mg     ULTRA OMEGA 3 PO   Take 1 Tab by mouth every day.  Dose:  1 Tab     Vitamin C 1000 MG Tabs   Take 1 Tab by mouth every day.  Dose:  1 Tab     Vitamin D3 125 MCG (5000 UT) Tabs   Take 5,000 Units by mouth every day.  Dose:  5,000 Units            Allergies  No Known Allergies    DIET  Orders Placed This Encounter   Procedures   • Diet Order Regular     Standing Status:   Standing     Number of Occurrences:   1     Order Specific Question:   Diet:     Answer:   Regular [1]       ACTIVITY  As tolerated.  Weight bearing as tolerated    CONSULTATIONS  Dr. Martinez, pulmonology    PROCEDURES  Thoracentesis x2 with a total of 3800 mL's removed from the left thorax    LABORATORY  Lab Results   Component Value Date    SODIUM 133 (L) 06/06/2020    POTASSIUM 3.6 06/06/2020    CHLORIDE 91 (L) 06/06/2020    CO2 28 06/06/2020    GLUCOSE 110 (H) 06/06/2020    BUN 46 (H) 06/06/2020    CREATININE 1.00 06/06/2020    CREATININE 0.9 10/30/2007        Lab  Results   Component Value Date    WBC 13.6 (H) 06/06/2020    HEMOGLOBIN 18.5 (H) 06/06/2020    HEMATOCRIT 55.6 (H) 06/06/2020    PLATELETCT 401 06/06/2020        Total time of the discharge process exceeds 45 minutes.

## 2020-06-05 NOTE — THERAPY
Occupational Therapy  Daily Treatment     Patient Name: Chucho Marin  Age:  84 y.o., Sex:  male  Medical Record #: [id number]  Today's Date: 6/4/2020     Precautions  Precautions: Fall Risk  Comments: on 2L nc, desats on RA  Assessment  Pt was in bed at start of session, requesting to sit in the recliner chair. Limited session due to Pt with c/o SOB and pain in his chest. Unsteady with functional transfers from bed to chair,  chair to bed. Reported c/o pain to RN. Session ended, will continue to follow as Pt is able.   RN with Patient at end of session- running tests and taking vitals.    Plan    Continue current treatment plan.    Discharge recommendations:  Recommend post-acute placement for additional occupational therapy services prior to discharge home.     06/04/20 1727   Precautions   Precautions Fall Risk   Pain 0 - 10 Group   Therapist Pain Assessment Prior to Activity;During Activity;Post Activity Pain Same as Prior to Activity;Nurse Notified  (Chest pain not able to rate)   Strength Upper Body   Upper Body Strength  X   Gross Strength Generalized Weakness, Equal Bilaterally.    Balance   Sitting Balance (Static) Fair +   Sitting Balance (Dynamic) Fair   Standing Balance (Static) Fair -   Standing Balance (Dynamic) Fair -   Weight Shift Sitting Fair   Weight Shift Standing Fair   Skilled Intervention Verbal Cuing;Tactile Cuing   Comments Unsteady with functional transfer   Bed Mobility    Supine to Sit Supervised   Sit to Supine Supervised   Scooting Supervised   Skilled Intervention Verbal Cuing   Functional Mobility   Sit to Stand Minimal Assist   Bed, Chair, Wheelchair Transfer Minimal Assist   Transfer Method Stand Step   Mobility Min A without a device   Skilled Intervention Verbal Cuing;Tactile Cuing   Activity Tolerance   Sitting in Chair 10 min   Sitting Edge of Bed 5 min   Standing 2 min   Comments c/o chest pain with activity   Patient / Family Goals   Patient / Family Goal #1 Home   Short Term  Goals   Short Term Goal # 1 Pt will be able to complete FB dressing with Supervision   Goal Outcome # 1 Progressing slower than expected   Short Term Goal # 2 Pt will be able to complete functional transfers with Supervision and with no LOB   Goal Outcome # 2 Progressing slower than expected   Short Term Goal # 3 Pt will be able to complete 10 min standing sinside ADLS with Supervision, good activity tolerance and safety   Goal Outcome # 3 Progressing slower than expected

## 2020-06-05 NOTE — THERAPY
"Physical Therapy   Re-Evaluation     Patient Name: Chucho Marin  Age:  84 y.o., Sex:  male  Medical Record #: [id number]  Today's Date: 6/5/2020     Precautions: (P) Fall Risk    Assessment    Pt was previously seen by therapy on 6/1 and was requested to be seen again for functional mobility assessment and d/c planning. Since prior evaluation pt has required to have multiple thoracentesis and has demonstrated with increasing weakness and debility. Pt presented to PT with impaired balance, impaired coordination, impaired gait, weakness, and dec activity tolerance. These impairments are affecting the patients ability [facility] Pt reports his spouse requires assist with mobility and cooking. With current functional mobility the patient will not be able to assist his spouse and will be at a high fall risk. Pt demonstrated with 3 patrick LOB during ambulation with FWW use while having his shoes on to assist with leg length discrepancy. Pt demonstrated with inc SOB and fatigue and required frequent resting breaks. Pt is currently not demonstrating safe functional mobility to return home at this time. Pt will continue to benefit from skilled PT while in house, with recommendation for post acute therapy prior to d/c home.     Plan    Pt will be seen for physical therapy txt for 3x/wk     Discharge recommendations:  Recommend post-acute placement for continued physical therapy services prior to discharge home.         Subjective    \" I feel weak and have a hard time talking\"     Objective       06/05/20 1435   Balance   Sitting Balance (Static) Fair   Sitting Balance (Dynamic) Fair -   Standing Balance (Static) Fair -   Standing Balance (Dynamic) Fair -   Weight Shift Sitting Fair   Weight Shift Standing Fair   Skilled Intervention Verbal Cuing;Facilitation   Comments w/fww; pt demonstrates with narrow ARUN and had 3 patrick LOB during ambulation with L sided veering; pt required min A to " maintain upright posture and correction from therapist   Gait Analysis   Gait Level Of Assist Minimal Assist   Assistive Device Front Wheel Walker   Distance (Feet) 120   # of Times Distance was Traveled 1   Deviation Shuffled Gait;Decreased Heel Strike;Decreased Toe Off;Decreased Base Of Support;Step To   Weight Bearing Status full   Skilled Intervention Postural Facilitation;Facilitation;Compensatory Strategies   Comments pt demonstrates with dec foot clearence and frequent LOB during ambulation and during turns with use of FWW; agreeable in using FWW at this time as balance and gait mechanics have appeared to worsen since prior evaluation    Bed Mobility    Supine to Sit Supervised   Scooting Supervised   Skilled Intervention Verbal Cuing   Comments HOB flat and rails up   Functional Mobility   Sit to Stand Minimal Assist   Bed, Chair, Wheelchair Transfer Minimal Assist   Skilled Intervention Verbal Cuing;Tactile Cuing   Patient / Family Goals    Patient / Family Goal #1 home   Goal #1 Outcome Goal not met   Short Term Goals    Short Term Goal # 1 Pt will go sit<>stand w/fww w/spv in 6tx for safe d/c home   Goal Outcome # 1 goal not met   Short Term Goal # 2 pt will transfer bed<>chair w/fww w/spv in 6tx for safe d/c home   Goal Outcome # 2 Goal not met   Short Term Goal # 3 pt will ambulate for 150ft w/fww w/spv in 6tx for safe d/c home    Goal Outcome # 3 Goal not met   Short Term Goal # 4 pt will go up/down 3 steps w/spv in 6tx for safe d/c home    Goal Outcome # 4 Goal not met   Anticipated Discharge Equipment   DC Equipment Unable To Determine At This Time

## 2020-06-05 NOTE — PROGRESS NOTES
Pulmonary Progress Note    Date of admission  5/26/2020    Chief Complaint  84 y.o. male admitted 5/26/2020 with recurrent pleural effusion, left    Hospital Course    84 y.o. male who presented 5/26/2020 with chest pain and SOB. Found to have a large left pleural effusion  Underwent a thoracentesis on 5/27 - brown, cloudy wbc 484, rbc 5000. TP 4, glucose 138, fluid  and ph 8. Cx negative. Path suggestive of reactive mesothelial cells  Fluid meets exudative criteria by LDH alone.     5/28/20 cardiac ECHO very abnormal with both systolic EF 45% and diastolic dysfunction (Grade I), small left ventricle sign and global hypokinesis  I/0: -2295 since admission  No cough fever or chills  COVID ruled out 5/26  Receiving lasix bid     Past medical hx: CHF, dementia hypothyroid and also   TSH 0.455 on 5/19 which is wnl     Images personally  reviewed from 2/2020 to now  No patient either has fluid in the left fissure and the pleural effusion appears to be reaccumulating since removal from 5/27  Ct scan on 5/26 unable to comment on the fissure as fluid filled till the fissure and not pass      Interval Problem Update  Reviewed last 24 hour events:    Continues to require low-flow O2, still very fatigued  Discussed with daughter Arnaud yesterday, coordinating discharge  Diuretics DC'd due to worsening JEET, creatinine plateaued  Plan for DC to SNF today     Review of Systems    Review of Systems   Constitutional: Positive for malaise/fatigue ( slightly improved). Negative for chills, fever and weight loss.   HENT: Negative for congestion, sinus pain and sore throat.    Eyes: Negative for pain and discharge.   Respiratory: Positive for cough and shortness of breath. Negative for sputum production, wheezing and stridor.    Cardiovascular: Positive for chest pain ( resolved). Negative for orthopnea and leg swelling.   Gastrointestinal: Negative for abdominal pain, diarrhea, nausea and vomiting.   Genitourinary: Negative  for dysuria, frequency and urgency.   Musculoskeletal: Negative for myalgias.   Skin: Negative for rash.   Neurological: Negative for dizziness, sensory change, focal weakness, loss of consciousness and headaches.   Psychiatric/Behavioral: Negative.    All other systems reviewed and are negative.     Vital Signs for last 24 hours   Temp:  [36.2 °C (97.2 °F)-37.1 °C (98.8 °F)] 36.3 °C (97.3 °F)  Pulse:  [] 82  Resp:  [18-22] 18  BP: (108-125)/(70-88) 108/70  SpO2:  [93 %-97 %] 93 %    Physical Exam   Physical Exam  Constitutional:       Appearance: He is not ill-appearing or diaphoretic.   HENT:      Head: Normocephalic and atraumatic.      Mouth/Throat:      Mouth: Mucous membranes are dry.   Eyes:      Pupils: Pupils are equal, round, and reactive to light.   Neck:      Musculoskeletal: Normal range of motion.   Cardiovascular:      Rate and Rhythm: Normal rate.      Heart sounds: No murmur.   Pulmonary:      Comments: Improved BS post left  Abdominal:      General: Bowel sounds are normal.      Palpations: Abdomen is soft.   Musculoskeletal:      Right lower leg: No edema.      Left lower leg: No edema.   Skin:     General: Skin is warm.   Neurological:      General: No focal deficit present.      Mental Status: He is oriented to person, place, and time.   Psychiatric:         Mood and Affect: Mood normal.         Behavior: Behavior normal.         Thought Content: Thought content normal.         Judgment: Judgment normal.       Medications  Current Facility-Administered Medications   Medication Dose Route Frequency Provider Last Rate Last Dose   • [START ON 6/6/2020] metoprolol SR (TOPROL XL) tablet 25 mg  25 mg Oral Q DAY Cielo De La O D.O.       • aspirin (ASA) chewable tab 324 mg  324 mg Oral DAILY Cielo De La O D.O.   324 mg at 06/05/20 0511   • lidocaine (LIDODERM) 5 % 1 Patch  1 Patch Transdermal Q24HR Cielo De La O D.O.   1 Patch at 06/04/20 1823   • heparin injection 5,000 Units  5,000  Units Subcutaneous Q8HRS H Peter Manning M.D.   5,000 Units at 06/05/20 1439   • benzocaine-menthol (CEPACOL) lozenge 1 Lozenge  1 Lozenge Mouth/Throat Q2HRS PRN Haresh Danielle D.O.   1 Lozenge at 06/01/20 0841   • donepezil (ARICEPT) tablet 5 mg  5 mg Oral Nightly FORTUNATO VasquezOWing   5 mg at 06/04/20 2146   • levothyroxine (SYNTHROID) tablet 75 mcg  75 mcg Oral AM ES Haresh Danielle D.O.   75 mcg at 06/05/20 0512   • liothyronine (CYTOMEL) tablet 5 mcg  5 mcg Oral DAILY Haresh Danielle D.O.   5 mcg at 06/05/20 0511   • pravastatin (PRAVACHOL) tablet 20 mg  20 mg Oral Nightly FORTUNATO VasquezOWing   20 mg at 06/04/20 2146   • fluticasone (FLONASE) nasal spray 50 mcg  1 Spray Nasal QDAY PRN Haresh Danielle D.O.       • senna-docusate (PERICOLACE or SENOKOT S) 8.6-50 MG per tablet 2 Tab  2 Tab Oral BID Haresh Danielle D.O.   2 Tab at 06/05/20 0517    And   • polyethylene glycol/lytes (MIRALAX) PACKET 1 Packet  1 Packet Oral QDAY PRN Haresh Danielle D.O.        And   • magnesium hydroxide (MILK OF MAGNESIA) suspension 30 mL  30 mL Oral QDAY PRN Haresh Danielle D.O.        And   • bisacodyl (DULCOLAX) suppository 10 mg  10 mg Rectal QDAY PRN Haresh Danielle D.O.       • Respiratory Therapy Consult   Nebulization Continuous RT Haresh Danielle D.O.       • acetaminophen (TYLENOL) tablet 650 mg  650 mg Oral Q6HRS PRN Haresh Danielle D.O.   650 mg at 05/29/20 0532   • enalaprilat (VASOTEC) injection 1.25 mg  1.25 mg Intravenous Q6HRS PRN Haresh Danielle D.O.       • ondansetron (ZOFRAN) syringe/vial injection 4 mg  4 mg Intravenous Q4HRS PRN Haresh Danielle D.O.       • ondansetron (ZOFRAN ODT) dispertab 4 mg  4 mg Oral Q4HRS PRN Haresh Danielle D.O.           Fluids    Intake/Output Summary (Last 24 hours) at 6/5/2020 1629  Last data filed at 6/4/2020 1800  Gross per 24 hour   Intake 100 ml   Output --   Net 100 ml       Laboratory          Recent Labs     06/03/20  0429 06/04/20  0442 06/05/20  0443    SODIUM 135 135 131*   POTASSIUM 3.6 3.5* 3.4*   CHLORIDE 89* 90* 87*   CO2 31 30 30   BUN 34* 44* 49*   CREATININE 1.17 1.24 1.08   MAGNESIUM  --  2.2 2.6*   PHOSPHORUS 3.6 3.7 3.2   CALCIUM 9.3 8.8 8.9     Recent Labs     06/03/20  0429 06/04/20  0442 06/05/20  0443   ALTSGPT 27 22  --    ASTSGOT 28 25  --    ALKPHOSPHAT 84 85  --    TBILIRUBIN 0.5 0.6  --    GLUCOSE 118* 150* 126*     Recent Labs     06/03/20  0429 06/03/20  1240 06/04/20 0442 06/05/20 0443   WBC 12.6*  --  15.9* 13.7*   NEUTSPOLYS 73.10*  --  80.90* 78.10*   LYMPHOCYTES 15.00*  --  9.40* 10.20*   MONOCYTES 9.40  --  8.30 9.90   EOSINOPHILS 1.10 2 0.20 0.70   BASOPHILS 0.60  --  0.30 0.30   ASTSGOT 28  --  25  --    ALTSGPT 27  --  22  --    ALKPHOSPHAT 84  --  85  --    TBILIRUBIN 0.5  --  0.6  --      Recent Labs     06/03/20 0429 06/04/20 0442 06/05/20 0443   RBC 6.47* 6.40* 6.23*   HEMOGLOBIN 19.7* 19.6* 19.0*   HEMATOCRIT 59.0* 59.1* 56.7*   PLATELETCT 325 395 387       Imaging  X-Ray:  I have personally reviewed the images and compared with prior images.  more layering effusion on the left despite diuresis    CT chest abdomen pelvis showed a residual left perihilar atelectasis/consolidation, enlarged prostate with heterogeneous density, left lobe, no adenopathy in the chest abdomen or pelvis.    Assessment/Plan    * Pleural effusion on left- (present on admission)  Assessment & Plan  Thoracentesis x 2, last on 6/3, reaccumulated despite diuresis, -3 L since admission now with JEET  Exudative by all 3 lights criteria  Glucose, pH, triglycerides, amylase, cholesterol WNL  Relatively low cell count however lymphocytic predominant 51%  CT chest: perihilar atelectasis minimal, do not think this is an underlying primary lung malignancy, although cannot rule out mesothelioma, pleural disease.  Other extrathoracic abnormalities (prostate, liver) warrant outpatient monitoring but low suspicion for metastatic disease with no lymphadenopathy.  --  Pleural pathology showed atypical cells- while not diagnostic, in the absence of alternative diagnoses at this time is concerning for malignancy.    -- Attempted to call and inform family but was unable to get a hold of them.  As the plan is for outpatient follow-up, will need to review pathology results in the clinic with Dr. Castañeda, discuss risks/options re: thoracoscopy, pt has appt 6/23  -- f/u IGRA, RA, CCP, JEANIE ordered.  Of note, pleural glucose normal.  -- dc all diuretics setting of ongoing JEET    Acute hypoxemic respiratory failure (HCC)  Assessment & Plan  Due to L effusion, CHF  Cont to titrate FiO2 to maintain saturations 92%, currently 1LPM    I have performed a physical exam and reviewed and updated ROS and Plan today (6/5/2020). In review of yesterday's note (6/4/2020), there are no changes except as documented above.     Discussed patient condition and risk of morbidity and/or mortality with Hospitalist, RN and Patient     This note was generated using voice recognition software which has a chance of producing errors of grammar and content.  I have made every reasonable attempt to find and correct any errors, but it should be expected that some may not be found prior to finalization of this note.  __________  Michel Martinez MD  Pulmonary and Critical Care Medicine  [facility]

## 2020-06-05 NOTE — PROGRESS NOTES
PT complaining of chest pain after OT helped patient up to a chair. PT grimacing. PT very tired, complaining of 7/10 chest pain in the center of his chest. When asked what if felt like, pt reports 'I dont know, it just really hurts'.  Pt back in bed.  EKG Ordered  Dn=194/87  Hr=94  Spo2=93% on   Rr=22     MD De La O paged.  New Orders received

## 2020-06-05 NOTE — CARE PLAN

## 2020-06-05 NOTE — DISCHARGE PLANNING
Agency/Facility Name: [facility]  Spoke To: Charlene  Outcome: O2 out for delivery. ETA about 1 hour.     @1230  Agency/Facility Name: [facility]  Spoke To: Representative  Outcome: O2 still out for delivery.

## 2020-06-05 NOTE — DISCHARGE PLANNING
Anticipated Discharge Disposition: Home with HH and O2    Action: Met with pt at bedside to obtain choice for DME. Choice form completed and faxed to [facility]  SW spoke with spouse and provided update.     Barriers to Discharge: DME acceptance    Plan: F/U with [facility]

## 2020-06-05 NOTE — PROGRESS NOTES
Report received from night shift RN. Assume care. Pt. AAOx4 pt is resting bed,  Assessment completed. VSS. Denies pain, band aid to ULf back DCI. Pt was update for the care for the day. White board updated, All question answered. Pt has call light within reach,  bed is in the lowest position. Pt has no other needs at this time.     0830 Pt up to the bathroom walking with unsteady gait- hand held assisted and was able to grab onto furniture. Pt ADLs performed with no problem.  1513 Called Wife and updates about DC given. Wife would like to be called tomorrow with DC updates.

## 2020-06-05 NOTE — CARE PLAN
Problem: Communication  Goal: The ability to communicate needs accurately and effectively will improve  Outcome: PROGRESSING AS EXPECTED  Intervention: [place] patient and significant other/support system to call light to alert staff of needs  Flowsheets (Taken 6/5/2020 0040)  Oriented to:: All of the Following : Location of Bathroom, Visiting Policy, Unit Routine, Call Light and Bedside Controls, Bedside Rail Policy, Smoking Policy, Rights and Responsibilities, Bedside Report, and Patient Education Notebook     Problem: Safety  Goal: Will remain free from falls  Outcome: PROGRESSING AS EXPECTED  Intervention: Implement fall precautions  Flowsheets (Taken 6/4/2020 2000)  Environmental Precautions:   Treaded Slipper Socks on Patient   Personal Belongings, Wastebasket, Call Bell etc. in Easy Reach   Transferred to Stronger Side   Report Given to Other Health Care Providers Regarding Fall Risk   Bed in Low Position   Communication Sign for Patients & Families   Mobility Assessed & Appropriate Sign Placed

## 2020-06-05 NOTE — DISCHARGE PLANNING
Received Choice form at 5475  Agency/Facility Name: [facility]  Referral sent per Choice form @ 0438

## 2020-06-06 NOTE — CARE PLAN
Problem: Communication  Goal: The ability to communicate needs accurately and effectively will improve  Outcome: PROGRESSING AS EXPECTED  Note: Patient is talkative and pleasant.  Reoriented to new room and call light remains in place.  Hourly rounding continues.     Problem: Pain Management  Goal: Pain level will decrease to patient's comfort goal  Outcome: PROGRESSING AS EXPECTED  Note: Patient denies pain at this time.

## 2020-06-06 NOTE — DOCUMENTATION QUERY
[facility]                                                                       Query Response Note      PATIENT:               MONTSERRAT ARAUJO  ACCT #:                  [id number]  MRN:                     [id number]  :                      1935  ADMIT DATE:       2020 7:51 PM  DISCH DATE:          RESPONDING  PROVIDER #:        [id number]           QUERY TEXT:    Congestive Heart Failure is documented in the Medical Record. Please document the type and acuity (includes probable or suspected).     NOTE:  If an appropriate response is not listed below, please respond with a new note.    The patient's Clinical Indicators include:  - Findings:  1. progress note :  pleural effusion on left 2nd to acute on chronic CHF exacerbation with systolic dysfunction                     2. progress note :  pleural effusion on left 2nd to acute on chronic CHF exacerbation with systolic dysfunction and Grade I diastolic dysfunction                     3. pulmonary consult note :  cardiac ECHO very abnormal with both systolic EF 45% and diastolic dysfunction (Grade I), small left ventricle sign and global hypokinesis     - Treatments:  echocardiogram, Lasix  - Risk factors:  pleural effusion , respiratory failure, CHF, pericardial effusion  Options provided:   -- Acute on Chronic Systolic heart failure   -- Acute on Chronic Diastolic heart failure   -- Acute on Chronic Systolic and diastolic heart failure   -- Unable to determine      Query created by: Kelly Alfred on 2020 10:45 AM    RESPONSE TEXT:    Acute on Chronic Diastolic heart failure          Electronically signed by:  ZEUS ESCOBAR MD 2020 8:18 AM

## 2020-06-06 NOTE — CARE PLAN
Problem: Communication  Goal: The ability to communicate needs accurately and effectively will improve  Outcome: PROGRESSING AS EXPECTED  Intervention: [place] patient and significant other/support system to call light to alert staff of needs  Flowsheets (Taken 6/5/2020 0040)  Oriented to:: All of the Following : Location of Bathroom, Visiting Policy, Unit Routine, Call Light and Bedside Controls, Bedside Rail Policy, Smoking Policy, Rights and Responsibilities, Bedside Report, and Patient Education Notebook     Problem: Infection  Goal: Will remain free from infection  Outcome: PROGRESSING AS EXPECTED  Note: Pt educated on hand and oral hygiene and their roll in infection prevention.  Standard precautions used in pt care.

## 2020-06-06 NOTE — DISCHARGE PLANNING
"PT/OT now recommending SNF placement.     Met with pt at bedside. Pt agreeable to SNF. Pt lives with spouse and was completely independent prior to admission. Due to age and own health problems spouse is unable to proivde assistance. Pt states their two adult sons check in on them frequently.     Choice form completed and faxed to [facility]    Called spouse to provide update. She expressed feeling anxious about pt going to a SNF due to current covid situation but she understands why it's being recommended. She shared that she wasn't aware \"how sick he was\". Spouse states her preference for SNF would be [facility].   "

## 2020-06-06 NOTE — PROGRESS NOTES
Discharge instructions reviewed with pt. Pt signed discharge paperwork. Pt to be picked up at 1300

## 2020-06-06 NOTE — PROGRESS NOTES
Report received from BETTY Castanon. Plan of care discussed. Patient resting comfortably in bed, declines any further needs at this time. Safety precautions in place.

## 2020-06-06 NOTE — DISCHARGE PLANNING
[facility] doesn't have any open beds today.     Received call back from Anne with [facility]. Pt is accepted and they have a bed available today.     Transportation arranged for 1300    Called spouse to notify. Spouse apprehensive about SNF but states she understands recommendation. Anne from [facility] spoke with spouse to address her concerns. Spouse agreeable to transfer and brought pt clean clothing for d/c.     PASRR [id number]     Anne aware pt has O2 concentrator and portable tank with him

## 2020-06-06 NOTE — PROGRESS NOTES
Report received from NOC RN, assumed care of pt.   POC and medications reviewed with pt. Pt verbalized understanding.   AOx4, but forgetful.  Denies pain, SOB, or dizziness at this time.   Safety measures in place.  Hourly rounding in place.

## 2020-06-06 NOTE — DISCHARGE INSTRUCTIONS
Discharge Instructions    Discharged to [facility] by medical transportation with escort. Discharged via wheelchair, hospital escort: Yes.  Special equipment needed: Not Applicable    Be sure to schedule a follow-up appointment with your primary care doctor or any specialists as instructed.     Discharge Plan:   Diet Plan: Discussed  Activity Level: Discussed  Confirmed Follow up Appointment: Appointment Scheduled  Confirmed Symptoms Management: Discussed  Medication Reconciliation Updated: Yes    I understand that a diet low in cholesterol, fat, and sodium is recommended for good health. Unless I have been given specific instructions below for another diet, I accept this instruction as my diet prescription.   Other diet: regular    Special Instructions: None    · Is patient discharged on Warfarin / Coumadin?   No     Depression / Suicide Risk    As you are discharged from this [facility] facility, it is important to learn how to keep safe from harming yourself.    Recognize the warning signs:  · Abrupt changes in personality, positive or negative- including increase in energy   · Giving away possessions  · Change in eating patterns- significant weight changes-  positive or negative  · Change in sleeping patterns- unable to sleep or sleeping all the time   · Unwillingness or inability to communicate  · Depression  · Unusual sadness, discouragement and loneliness  · Talk of wanting to die  · Neglect of personal appearance   · Rebelliousness- reckless behavior  · Withdrawal from people/activities they love  · Confusion- inability to concentrate     If you or a loved one observes any of these behaviors or has concerns about self-harm, here's what you can do:  · Talk about it- your feelings and reasons for harming yourself  · Remove any means that you might use to hurt yourself (examples: pills, rope, extension cords, firearm)  · Get professional help from the community (Mental Health, Substance Abuse, psychological  counseling)  · Do not be alone:Call your Safe Contact- someone whom you trust who will be there for you.  · Call your local CRISIS HOTLINE 394-2132 or 758-009-7053  · Call your local Children's Mobile Crisis Response Team [place] (190) 979-5681 or www.ZAP  · Call the toll free National Suicide Prevention Hotlines   · National Suicide Prevention Lifeline 196-325-CRRA (9514)  · National Hope Line Network 800-SUICIDE (034-7604)      Pleural Effusion  A pleural effusion is an abnormal buildup of fluid in the layers of tissue between your lungs and the inside of your chest (pleural space). These two layers of tissue that line both your lungs and the inside of your chest are called pleura. Usually, there is no air in the space between the pleura, only a thin layer of fluid. If left untreated, a large amount of fluid can build up and cause the lung to collapse. A pleural effusion is usually caused by another disease that requires treatment.  The two main types of pleural effusion are:  · Transudative pleural effusion. This happens when fluid leaks into the pleural space because of a low protein count in your blood or high blood pressure in your vessels. Heart failure often causes this.  · Exudative infusion. This occurs when fluid collects in the pleural space from blocked blood vessels or lymph vessels. Some lung diseases, injuries, and cancers can cause this type of effusion.  What are the causes?  Pleural effusion can be caused by:  · Heart failure.  · A blood clot in the lung (pulmonary embolism).  · Pneumonia.  · Cancer.  · Liver failure (cirrhosis).  · Kidney disease.  · Complications from surgery, such as from open heart surgery.  What are the signs or symptoms?  In some cases, pleural effusion may cause no symptoms. Symptoms can include:  · Shortness of breath, especially when lying down.  · Chest pain, often worse when taking a deep breath.  · Fever.  · Dry cough that is lasting  (chronic).  · Hiccups.  · Rapid breathing.  An underlying condition that is causing the pleural effusion (such as heart failure, pneumonia, blood clots, tuberculosis, or cancer) may also cause additional symptoms.  How is this diagnosed?  Your health care provider may suspect pleural effusion based on your symptoms and medical history. Your health care provider will also do a physical exam and a chest X-ray. If the X-ray shows there is fluid in your chest, you may need to have this fluid removed using a needle (thoracentesis) so it can be tested.  You may also have:  · Imaging studies of the chest, such as:  ¨ Ultrasound.  ¨ CT scan.  · Blood tests for kidney and liver function.  How is this treated?  Treatment depends on the cause of the pleural effusion. Treatment may include:  · Taking antibiotic medicines to clear up an infection that is causing the pleural effusion.  · Placing a tube in the chest to drain the effusion (tube thoracostomy). This procedure is often used when there is an infection in the fluid.  · Surgery to remove the fibrous outer layer of tissue from the pleural space (decortication).  · Thoracentesis, which can improve cough and shortness of breath.  · A procedure to put medicine into the chest cavity to seal the pleural space to prevent fluid buildup (pleurodesis).  · Chemotherapy and radiation therapy. These may be required in the case of cancerous (malignant) pleural effusion.  Follow these instructions at home:  · Take medicines only as directed by your health care provider.  · Keep track of how long you can gently exercise before you get short of breath. Try simply walking at first.  · Do not use any tobacco products, including cigarettes, chewing tobacco, or electronic cigarettes. If you need help quitting, ask your health care provider.  · Keep all follow-up visits as directed by your health care provider. This is important.  Contact a health care provider if:  · The amount of time that  you are able to exercise decreases or does not improve with time.  · You have pain or signs of infection at the puncture site if you had thoracentesis. Watch for:  ¨ Drainage.  ¨ Redness.  ¨ Swelling.  · You have a fever.  Get help right away if:  · You are short of breath.  · You develop chest pain.  · You develop a new cough.  This information is not intended to replace advice given to you by your health care provider. Make sure you discuss any questions you have with your health care provider.  Document Released: 12/18/2006 Document Revised: 05/22/2017 Document Reviewed: 05/13/2015  [employer] Interactive Patient Education © 2017 [employer]    Thoracentesis, Care After  Refer to this sheet in the next few weeks. These instructions provide you with information about caring for yourself after your procedure. Your health care provider may also give you more specific instructions. Your treatment has been planned according to current medical practices, but problems sometimes occur. Call your health care provider if you have any problems or questions after your procedure.  WHAT TO EXPECT AFTER THE PROCEDURE  After your procedure, it is common to have pain at the puncture site.  HOME CARE INSTRUCTIONS  · Take medicines only as directed by your health care provider.  · You may return to your normal diet and normal activities as directed by your health care provider.  · Drink enough fluid to keep your urine clear or pale yellow.  · Do not take baths, swim, or use a hot tub until your health care provider approves.  · Follow your health care provider's instructions about:  ¨ Puncture site care.  ¨ Bandage (dressing) changes and removal.  · Check your puncture site every day for signs of infection. Watch for:  ¨ Redness, swelling, or pain.  ¨ Fluid, blood, or pus.  · Keep all follow-up visits as directed by your health care provider. This is important.  SEEK MEDICAL CARE IF:  · You have redness, swelling, or pain at your  puncture site.  · You have fluid, blood, or pus coming from your puncture site.  · You have a fever.  · You have chills.  · You have nausea or vomiting.  · You have trouble breathing.  · You develop a worsening cough.  SEEK IMMEDIATE MEDICAL CARE IF:  · You have extreme shortness of breath.  · You develop chest pain.  · You faint or feel light-headed.     This information is not intended to replace advice given to you by your health care provider. Make sure you discuss any questions you have with your health care provider.     Document Released: 01/08/2016 Document Reviewed: 01/08/2016  [employer] Interactive Patient Education ©2016 [employer]

## 2020-06-06 NOTE — DISCHARGE PLANNING
VM left for [facility] with [facility] to follow up on referral/bed availability    VM left for [facility] to follow up on bed availability

## 2020-06-06 NOTE — CARE PLAN
Problem: Safety  Goal: Will remain free from injury  Outcome: PROGRESSING AS EXPECTED  Goal: Will remain free from falls  Outcome: PROGRESSING AS EXPECTED     Problem: Psychosocial Needs:  Goal: Level of anxiety will decrease  Outcome: PROGRESSING AS EXPECTED     Problem: Skin Integrity  Goal: Risk for impaired skin integrity will decrease  Outcome: PROGRESSING AS EXPECTED     Problem: Knowledge Deficit  Goal: Knowledge of disease process/condition, treatment plan, diagnostic tests, and medications will improve  Outcome: PROGRESSING AS EXPECTED  Goal: Knowledge of the prescribed therapeutic regimen will improve  Outcome: PROGRESSING AS EXPECTED

## 2020-06-06 NOTE — PROGRESS NOTES
Patient transferred from 311-1 to 223-1 via bed.  Patient is alert and oriented to room and call light.  Oxygen at 3 liters per NC and breathing appears unlabored.  SCDs to bilat lower legs.  PIV saline locked.  Patient denies pain.  Hourly rounding continues.  Bed alarm remains on.

## 2020-06-06 NOTE — PROGRESS NOTES
[facility] Medicine Daily Progress Note    Date of Service  6/6/2020    Chief Complaint  84 y.o. male admitted 5/26/2020 with Left chest pain    Hospital Course    History of hypertension, dementia, hyperlipidemia, hypothyroidism admitted for left-sided chest pain found to have left pleural effusion.  He underwent a left thoracentesis where approximately 2 L of fluid were drained.  LDH level was consistent with an exudate however, pathology did not reveal evidence of malignancy or infection.  Echo did reveal EF of 45% therefore etiology is thought related to fluid overload.      Interval Problem Update  6/2: Patient is doing well on 2 L, unable to wean to room air.  Still with left chest pain.  Recent x-ray shows slight accumulation of fluid.  Appreciate pulmonology recommendations.   6/3: Weaned to 1L, fatigued today.  Bedside US and thoracentesis planned followed by CT Ch/Ab/Pelv  6/4: Somnolent.  I reviewed his CT and it does not show any significant findings, tumor markers ordered.  He remains on 1 to 3 L, home O2 ordered.  I discussed with pulm  6/5: Very weak, re-eval by PT requested as likely not safe for home.  Still on 1-3L  6/6: Pending SNF.  Still on 1 to 3 L at rest.  Somnolent and drowsy for most of the day, pain is controlled    Consultants/Specialty  Dr. Yamini shelby    Code Status  Full    Disposition  SNF  Outpatient pulmonology follow up    Review of Systems  Review of Systems   Constitutional: Positive for malaise/fatigue. Negative for chills and fever.        Drowsy   HENT: Negative for hearing loss, sinus pain and tinnitus.    Respiratory: Negative for cough, sputum production, shortness of breath and wheezing.    Cardiovascular: Positive for chest pain (Left-sided, stable). Negative for palpitations, claudication and leg swelling.   Gastrointestinal: Negative for blood in stool, constipation, diarrhea and nausea.   Genitourinary: Negative for frequency and hematuria.   Musculoskeletal: Negative  for back pain, joint pain and neck pain.   Neurological: Positive for weakness. Negative for dizziness, sensory change, speech change, focal weakness and headaches.   Psychiatric/Behavioral: Positive for memory loss. Negative for hallucinations. The patient is not nervous/anxious.    All other systems reviewed and are negative.       Physical Exam  Temp:  [36.3 °C (97.3 °F)-37.2 °C (98.9 °F)] 36.3 °C (97.4 °F)  Pulse:  [82-88] 88  Resp:  [18] 18  BP: (105-132)/(70-87) 132/87  SpO2:  [90 %-96 %] 96 %    Physical Exam  Constitutional:       General: He is not in acute distress.     Appearance: Normal appearance.      Comments: Pale, elderly, listless   HENT:      Head: Normocephalic and atraumatic.      Nose: Nose normal. No congestion.      Mouth/Throat:      Mouth: Mucous membranes are moist.      Pharynx: No oropharyngeal exudate.   Eyes:      Extraocular Movements: Extraocular movements intact.      Pupils: Pupils are equal, round, and reactive to light.   Neck:      Musculoskeletal: Normal range of motion and neck supple.   Cardiovascular:      Rate and Rhythm: Normal rate and regular rhythm.      Heart sounds: No murmur.   Pulmonary:      Effort: Pulmonary effort is normal. No respiratory distress.      Breath sounds: No stridor.      Comments: Decreased breath sounds left base, no respiratory distress  Abdominal:      General: Abdomen is flat. Bowel sounds are normal. There is no distension.      Palpations: Abdomen is soft.      Tenderness: There is no abdominal tenderness.   Musculoskeletal:         General: No swelling, tenderness or deformity.   Skin:     General: Skin is warm and dry.      Coloration: Skin is pale.   Neurological:      General: No focal deficit present.      Mental Status: He is alert. Mental status is at baseline. He is disoriented.   Psychiatric:         Mood and Affect: Mood normal. Mood is not anxious.         Speech: Speech is delayed.         Cognition and Memory: Cognition is  impaired. Memory is impaired.         Fluids  No intake or output data in the 24 hours ending 06/06/20 1036    Laboratory  Recent Labs     06/04/20 0442 06/05/20 0443 06/06/20  0346   WBC 15.9* 13.7* 13.6*   RBC 6.40* 6.23* 6.12*   HEMOGLOBIN 19.6* 19.0* 18.5*   HEMATOCRIT 59.1* 56.7* 55.6*   MCV 92.3 91.0 90.8   MCH 30.6 30.5 30.2   MCHC 33.2* 33.5* 33.3*   RDW 44.1 42.8 42.0   PLATELETCT 395 387 401   MPV 10.1 9.8 10.3     Recent Labs     06/04/20 0442 06/05/20 0443 06/06/20  0346   SODIUM 135 131* 133*   POTASSIUM 3.5* 3.4* 3.6   CHLORIDE 90* 87* 91*   CO2 30 30 28   GLUCOSE 150* 126* 110*   BUN 44* 49* 46*   CREATININE 1.24 1.08 1.00   CALCIUM 8.8 8.9 8.8                   Imaging  CT-CHEST,ABDOMEN,PELVIS W/O   Final Result         1.  Small left pleural effusion, significantly decreased in size from prior CT.   2.  Improved left lung atelectasis with residual left perihilar atelectasis/consolidative opacity. Continued attention on follow-up.   3.  Prostatomegaly with heterogeneous density, nonspecific. Cannot exclude malignancy.   4.  Punctate nonobstructing renal stones.   5.  Nonspecific homogeneous hypodensity of the lateral left lobe of the liver. This could be further evaluated with liver protocol MRI if clinically indicated.   6.  Atherosclerosis.   7.  No evidence of adenopathy in the chest, abdomen or pelvis.               DX-CHEST-PORTABLE (1 VIEW)   Final Result         1.  Left pulmonary edema and/or infiltrates with layering left pleural effusion, similar to prior study.      DX-CHEST-PORTABLE (1 VIEW)   Final Result      No significant interval change.      EC-ECHOCARDIOGRAM COMPLETE W/ CONT   Final Result      DX-CHEST-PORTABLE (1 VIEW)   Final Result         No significant interval change. Moderate left pleural effusion with left basilar opacities.      DX-CHEST-PORTABLE (1 VIEW)   Final Result      No pneumothorax identified following left thoracentesis      Slight interval increase in size  from May 13 of moderate left subpulmonic and fissure pleural effusion      US-THORACENTESIS PUNCTURE LEFT   Final Result      1. Ultrasound guided left sided diagnostic and therapeutic thoracentesis.      2. 2020 mL of fluid withdrawn.           Assessment/Plan  * Pleural effusion on left- (present on admission)  Assessment & Plan  Etiology has not been confirmed but is suspicious for malignancy because exudative, recurrent in the absence of volume overload, with abnormal mesothelial cells seen on cytology   Did not improve with diuresis  S/P Thoracentesis (Left) with 2020ml drained and on 6/3 with 1800mL drained  CT chest abdomen pelvis post-thora shows no evidence of malignancy  Negative CA-19-9, , RF, mildly elevated PSA  He will be discharged with supplemental oxygen to SNF  He will follow-up with pulmonology in ~3weeksfor repeat imaging  He may need serial outpatient thoracentesis  Echo showed global hypokinesis and EF of 45    Chest pain- (present on admission)  Assessment & Plan  Transient, Secondary to pleural effusion  Mild trop elevation, but stable  Pain control  ASA  Lidocaine patch    Acute hypoxemic respiratory failure (HCC)  Assessment & Plan  Due to pleural effusion  Status post thoracentesis x2 with almost 4 L total drained  We will continue to follow this up with outpatient pulmonology, may need serial thoracentesis outpatient  Low salt diet  Wean O2  IS  Pulmonology is following.    Pharyngitis  Assessment & Plan  Probably viral  Rapid strep is negative  cepacol prn    Chronic systolic heart failure (HCC)  Assessment & Plan  Has a recurrent left pleural effusion, but he has been diuresed down to his dry weight with no improvement  EF 45%, global hypokinesis  DC diuretics  Continue pravastatin and metoprolol    Hyperglycemia- (present on admission)  Assessment & Plan  -Mild, no need for coverage at this time    Dementia (HCC)- (present on admission)  Assessment & Plan  Stable  continue home  dose of Aricept    HLD (hyperlipidemia)- (present on admission)  Assessment & Plan  -Continue home statin    Hypothyroidism- (present on admission)  Assessment & Plan  TSH is well controlled  Continue Synthroid       VTE prophylaxis: Heparin

## 2020-07-22 PROBLEM — J96.11 CHRONIC HYPOXEMIC RESPIRATORY FAILURE (HCC): Status: ACTIVE | Noted: 2020-01-01

## 2020-07-22 NOTE — PROGRESS NOTES
Pulmonary Clinic Note    Chief Complaint:  Chief Complaint   Patient presents with   • Establish Care     Referred by Dr Gibson Quintana for Pleural effusion on left.   • Other     [facility] ED 05/26/20-06/06/20   • Results     Labs 06/2020, CT-TAP 06/03/20, CXR 06/2/20, ECHO 5/28/20, CT-CTA Chest 05/26/20     HPI:     Desmond Marin is a very pleasant 84 y.o. male non smoker who is known to me from his recent hospitalization in 6/2020 for recurrent left sided pleural effusion.     Chucho initially presented to the [facility] ER in 5/2020 with chest pain and shortness of breath and found to have a large left pleural effusion.  Underwent thoracentesis 5/27- brown, cloudy wbc 484, rbc 5000. TP 4, glucose 138, fluid  and ph 8. Cx negative. Path suggestive of reactive mesothelial cells. Fluid meets exudative criteria by LDH alone.  Fluid reaccumulated despite diuresis, repeat thoracentesis 6/3, exudative by all 3 lights criteria. Glucose, pH, triglycerides, amylase, cholesterol WNL.  Relatively low cell count however lymphocytic predominant 51%.  Pathology identified a single cluster of atypical cells that could not be further characterized.  No cough fever or chills, COVID ruled out.    5/28/20 TTE- both systolic EF 45% and diastolic dysfunction (Grade I), small left ventricle sign and global hypokinesis  Treated with lasix, diuretics DC'd due to worsening JEET, creatinine plateaued    CT chest abdomen pelvis 6/3/2020: personally reviewed, perihilar atelectasis minimal, do not think this is an underlying primary lung malignancy, although cannot rule out mesothelioma, pleural disease.    Past medical hx: CHF, dementia and hypothyroidism    Interval events since discharge:  Pt was discharged to SNF, then home with HH  Followed up with his cardiologist last week Dr. Rosenberg at [place], repeat CXR showed reaccumulation of left-sided effusion underwent thoracentesis  Results from the thoracentesis from last week  have been requested    Functionally, the patient reports ongoing left-sided pleuritic-like chest pain, worsening weakness, easily short of breath such as when washing the dishes.    Uses 2 L at rest and 3 L with ambulation    11 pound weight loss since last year, denies any fever/chills/night sweats    Past Medical History:   Diagnosis Date   • Back pain    • Bronchitis    • CHEST PAIN 6/1/2011   • CHF (congestive heart failure) ([facility]) 5/29/2020   • Daytime sleepiness    • Frequent urination    • Pleural effusion    • Ringing in ears    • Shortness of breath    • Sweat, sweating, excessive    • Wears glasses    • Weight loss        Past Surgical History:   Procedure Laterality Date   • PB REMV 2ND CATARACT,CORN-SCLER SECTN     • PROSTATECTOMY ROBOTIC     • TONSILLECTOMY         Social History     Socioeconomic History   • Marital status:      Spouse name: Not on file   • Number of children: Not on file   • Years of education: Not on file   • Highest education level: Not on file   Occupational History   • Not on file   Social Needs   • Financial resource strain: Not on file   • Food insecurity     Worry: Not on file     Inability: Not on file   • Transportation needs     Medical: Not on file     Non-medical: Not on file   Tobacco Use   • Smoking status: Never Smoker   • Smokeless tobacco: Never Used   Substance and Sexual Activity   • Alcohol use: No   • Drug use: No   • Sexual activity: Not on file     Comment: na   Lifestyle   • Physical activity     Days per week: Not on file     Minutes per session: Not on file   • Stress: Not on file   Relationships   • Social connections     Talks on phone: Not on file     Gets together: Not on file     Attends L[employer] service: Not on file     Active member of club or organization: Not on file     Attends meetings of clubs or organizations: Not on file     Relationship status: Not on file   • Intimate partner violence     Fear of current or ex partner: Not on file      Emotionally abused: Not on file     Physically abused: Not on file     Forced sexual activity: Not on file   Other Topics Concern   • Not on file   Social History Narrative   • Not on file          History reviewed. No pertinent family history.    Current Outpatient Medications on File Prior to Visit   Medication Sig Dispense Refill   • testosterone cypionate (DEPO-TESTOSTERONE) 200 MG/ML Solution injection 50 mg by Intramuscular route every 14 days.     • fluticasone (FLONASE) 50 MCG/ACT nasal spray Spray 1 Spray in nose every day.     • pravastatin (PRAVACHOL) 20 MG Tab Take 20 mg by mouth every evening.     • triamcinolone (NASACORT ALLERGY 24HR) 55 MCG/ACT nasal inhaler Spray 1 Spray in nose every bedtime.     • multivitamin (THERAGRAN) Tab Take 1 Tab by mouth every day.     • Ascorbic Acid (VITAMIN C) 1000 MG Tab Take 1 Tab by mouth every day.     • Calcium-Magnesium-Zinc (PATRIA-MAG-ZINC PO) Take 1 Tab by mouth every day.     • Cholecalciferol (VITAMIN D3) 125 MCG (5000 UT) Tab Take 5,000 Units by mouth every day.     • Carboxymethylcellul-Glycerin (CLEAR EYES FOR DRY EYES) 1-0.25 % Solution Place 1 Drop in both eyes every day.     • aspirin (ASA) 81 MG Chew Tab chewable tablet Take 4 Tabs by mouth every day. 100 Tab 1   • metoprolol SR (TOPROL XL) 25 MG TABLET SR 24 HR Take 1 Tab by mouth every day. (Patient not taking: Reported on 7/22/2020) 30 Tab 1   • levothyroxine (SYNTHROID) 75 MCG Tab Take 75 mcg by mouth Every morning on an empty stomach.     • donepezil (ARICEPT) 5 MG Tab Take 5 mg by mouth every evening.     • Omega-3 Fatty Acids (ULTRA OMEGA 3 PO) Take 1 Tab by mouth every day.       No current facility-administered medications on file prior to visit.      Allergies: Patient has no known allergies.    ROS:   Review of Systems   Constitutional: Positive for malaise/fatigue and weight loss. Negative for chills and fever.   HENT: Negative for congestion, sinus pain and sore throat.    Eyes: Negative for  "pain and discharge.   Respiratory: Positive for cough and shortness of breath. Negative for sputum production, wheezing and stridor.    Cardiovascular: Positive for chest pain. Negative for orthopnea and leg swelling.   Gastrointestinal: Negative for abdominal pain, diarrhea, nausea and vomiting.   Genitourinary: Negative for dysuria, frequency and urgency.   Musculoskeletal: Negative for myalgias.   Skin: Negative for rash.   Neurological: Negative for dizziness, sensory change, focal weakness, loss of consciousness and headaches.   Psychiatric/Behavioral: Negative.    All other systems reviewed and are negative.    Vitals:  /70 (BP Location: Left arm, Patient Position: Sitting, BP Cuff Size: Adult)   Pulse 76   Ht 1.803 m (5' 11\")   Wt 70 kg (154 lb 4 oz)   SpO2 94%     Physical Exam:  Physical Exam  Vitals signs and nursing note reviewed.   Constitutional:       Appearance: He is not ill-appearing or diaphoretic.      Comments: Very well-dressed  thin   HENT:      Head: Normocephalic and atraumatic.      Mouth/Throat:      Mouth: Mucous membranes are dry.   Eyes:      Pupils: Pupils are equal, round, and reactive to light.   Neck:      Musculoskeletal: Normal range of motion.   Cardiovascular:      Rate and Rhythm: Normal rate.      Heart sounds: No murmur.   Pulmonary:      Comments: Decreased BS post left  Abdominal:      General: Bowel sounds are normal.      Palpations: Abdomen is soft.   Musculoskeletal:      Right lower leg: No edema.      Left lower leg: No edema.   Skin:     General: Skin is warm.   Neurological:      General: No focal deficit present.      Mental Status: He is oriented to person, place, and time.   Psychiatric:         Mood and Affect: Mood normal.         Behavior: Behavior normal.         Thought Content: Thought content normal.         Judgment: Judgment normal.       Laboratory Data:    PFTs as reviewed by me personally show: None for review at time of " consultation    Imaging as reviewed by me personally show:    CT chest abdomen pelvis 6/3/2020: personally reviewed, perihilar atelectasis minimal, do not think this is an underlying primary lung malignancy, although cannot rule out mesothelioma, pleural disease.     Assessment/Plan:    Problem List Items Addressed This Visit     Chronic hypoxemic respiratory failure (HCC)     Due to pleural effusion, now status post thoracentesis x3, most recently last week at [place] 1L fluid removed  -Minimal symptomatic relief after thoracentesis  -Continues to require 2 L at rest, 3 L with ambulation         Pleural effusion on left     Thoracentesis x 3, most recently last week at [place], has reaccumulated despite diuresis  Exudative by all 3 lights criteria, glucose, pH, triglycerides, amylase, cholesterol WNL. Relatively low cell count however lymphocytic predominant 51%    CT chest 6/2020: perihilar atelectasis minimal, do not think this is an underlying primary lung malignancy, although cannot rule out mesothelioma, pleural disease.  Other extrathoracic abnormalities (prostate, liver) warrant outpatient monitoring but low suspicion for metastatic disease with no lymphadenopathy.    JEANIE 1:160 not of clinical significance, JEANIE subsets negative, IGRA, RA, CCP negative.    -- Pleural pathology at [facility] showed atypical cells- while not diagnostic, the repeated reaccumulation of the effusion, lymphocyte predominance, and weight loss are all concerning for underlying pleural malignancy.  -- Discussed case at length with Dr Rosenberg after pts visit today, no pathology was sent from thoracentesis at [place] last week. We share concern about the pts ability to tolerate a surgical thoracoscopy. We agreed to continue diagnostic/therapeutic thoracentesis PRN and send for cytology and see how things go. Will plan for repeat CT chest in 4-6 weeks (ideally after thoracentesis) to evaluate for underlying pulmonary parenchymal  pathology; if a nodule or target for biopsy is identified, may be a candidate for percutaneous bx.               Return in about 4 weeks (around 8/19/2020).     This note was generated using voice recognition software which has a chance of producing errors of grammar and possibly content.  I have made every reasonable attempt to find and correct any obvious errors, but it should be expected that some may not be found prior to finalization of this note.  __________  Michel Martinez MD  Pulmonary and Critical Care Medicine  [facility]

## 2020-07-22 NOTE — ASSESSMENT & PLAN NOTE
Thoracentesis x 3, most recently last week at [place], has reaccumulated despite diuresis  Exudative by all 3 lights criteria, glucose, pH, triglycerides, amylase, cholesterol WNL. Relatively low cell count however lymphocytic predominant 51%    CT chest 6/2020: perihilar atelectasis minimal, do not think this is an underlying primary lung malignancy, although cannot rule out mesothelioma, pleural disease.  Other extrathoracic abnormalities (prostate, liver) warrant outpatient monitoring but low suspicion for metastatic disease with no lymphadenopathy.    JEANIE 1:160 not of clinical significance, JEANIE subsets negative, IGRA, RA, CCP negative.    -- Pleural pathology at [facility] showed atypical cells- while not diagnostic, the repeated reaccumulation of the effusion, lymphocyte predominance, and weight loss are all concerning for underlying pleural malignancy.  -- Discussed case at length with Dr Rosenberg after pts visit today, no pathology was sent from thoracentesis at [place] last week. We share concern about the pts ability to tolerate a surgical thoracoscopy. We agreed to continue diagnostic/therapeutic thoracentesis PRN and send for cytology and see how things go. Will plan for repeat CT chest in 4-6 weeks (ideally after thoracentesis) to evaluate for underlying pulmonary parenchymal pathology; if a nodule or target for biopsy is identified, may be a candidate for percutaneous bx.

## 2020-08-13 PROBLEM — R93.2 ABNORMAL LIVER CT: Status: ACTIVE | Noted: 2020-01-01

## 2020-08-13 PROBLEM — R41.3 MEMORY DIFFICULTIES: Status: ACTIVE | Noted: 2020-01-01

## 2020-08-13 NOTE — ASSESSMENT & PLAN NOTE
Thoracentesis x 4, most recently last week at [place], has reaccumulated despite diuresis  Exudative by all 3 lights criteria, glucose, pH, triglycerides, amylase, cholesterol WNL. Relatively low cell count however lymphocytic predominant 51%     CT chest 6/2020: perihilar atelectasis minimal, do not think this is an underlying primary lung malignancy, although cannot rule out mesothelioma, pleural disease.  Other extrathoracic abnormalities (prostate, liver) warrant outpatient monitoring but low suspicion for metastatic disease with no lymphadenopathy.     JEANIE 1:160 not of clinical significance, JEANIE subsets negative, IGRA, RA, CCP negative.     Pleural cytology at [facility] in 5/2020 showed atypical cells- while not diagnostic, the repeated reaccumulation of the effusion, lymphocyte predominance, and weight loss are all concerning for underlying pleural malignancy.    -- Discussed case at length with Dr Rosenbreg in 7/2020, share concern about the pts ability to tolerate a surgical thoracoscopy, plan to continue thoracentesis PRN and send for cytology and see how things go. Discussed case with Dr Rosenberg again after today's visit. Plan to repeat CT chest after next thoracentesis to evaluate for underlying pulmonary parenchymal pathology; if a nodule or target for biopsy is identified, may be a candidate for percutaneous bx vs. referral to thoracic surgery for their input on this case

## 2020-08-13 NOTE — ASSESSMENT & PLAN NOTE
Due to pleural effusion, now status post thoracentesis x4, most recently last week at [place], report 650cc fluid removed  -Minimal symptomatic relief after thoracentesis  -Continues to require 2 L at rest, 3 L with ambulation

## 2020-08-13 NOTE — ASSESSMENT & PLAN NOTE
- Nonspecific homogeneous hypodensity of the lateral left lobe of the liver.   - discussed with Dr Quintana, shainat with pt next week, liver protocol MRI planned

## 2020-08-13 NOTE — PROGRESS NOTES
"Pulmonary Clinic Note    Chief Complaint:  Chief Complaint   Patient presents with   • Follow-Up     Chronic Hypoxemic respiratory failure. Last seen 07/22/2020     HPI:     Desmond Marin is a very pleasant 84 y.o. male non smoker who is known to me from his hospitalization in 5/2020 for recurrent left sided pleural effusion.     Chucho initially presented to the [facility] ER in 5/2020 with chest pain and shortness of breath and found to have a large left pleural effusion.  Underwent thoracentesis 5/27- brown, cloudy wbc 484, rbc 5000. TP 4, glucose 138, fluid  and ph 8. Cx negative. Path suggestive of reactive mesothelial cells. Fluid meets exudative criteria by LDH alone.  Fluid reaccumulated despite diuresis, repeat thoracentesis 6/3, exudative by all 3 lights criteria. Glucose, pH, triglycerides, amylase, cholesterol WNL.  Relatively low cell count however lymphocytic predominant 51%.  Pathology identified a single cluster of atypical cells that could not be further characterized.  No cough fever or chills, COVID ruled out.    5/28/20 TTE- both systolic EF 45% and diastolic dysfunction (Grade I), small left ventricle sign and global hypokinesis  Treated with lasix, diuretics DC'd due to worsening JEET, creatinine plateaued    CT chest abdomen pelvis 6/3/2020: personally reviewed, perihilar atelectasis minimal, do not think this is an underlying primary lung malignancy, although cannot rule out mesothelioma, pleural disease.    Past medical hx: CHF, dementia and hypothyroidism    Interval events since last visit 7/2020:  Has undergone 4 thoracentesis at [facility] since his hospitalization 5/2020  Has lost 15lbs since 2019  Results from the thoracentesis from last week have been requested  Today pt is reporting unchanged CAMPBELL such as when washing the dishes. Uses 2 L at rest and 3 L with ambulation, stable    He is accompanied by his wife.   They report they \"arent sure\" why they are here today. Desmond " tells me he does not meeting me in the past, despite our appointment last month and having seen him on several occasions during his hospitalization in May.    Past Medical History:   Diagnosis Date   • Back pain    • Bronchitis    • CHEST PAIN 6/1/2011   • CHF (congestive heart failure) ([facility]) 5/29/2020   • Daytime sleepiness    • Frequent urination    • Pleural effusion    • Ringing in ears    • Shortness of breath    • Sweat, sweating, excessive    • Wears glasses    • Weight loss        Past Surgical History:   Procedure Laterality Date   • PB REMV 2ND CATARACT,CORN-SCLER SECTN     • PROSTATECTOMY ROBOTIC     • TONSILLECTOMY         Social History     Socioeconomic History   • Marital status:      Spouse name: Not on file   • Number of children: Not on file   • Years of education: Not on file   • Highest education level: Not on file   Occupational History   • Not on file   Social Needs   • Financial resource strain: Not on file   • Food insecurity     Worry: Not on file     Inability: Not on file   • Transportation needs     Medical: Not on file     Non-medical: Not on file   Tobacco Use   • Smoking status: Never Smoker   • Smokeless tobacco: Never Used   Substance and Sexual Activity   • Alcohol use: No   • Drug use: No   • Sexual activity: Not on file     Comment: na   Lifestyle   • Physical activity     Days per week: Not on file     Minutes per session: Not on file   • Stress: Not on file   Relationships   • Social connections     Talks on phone: Not on file     Gets together: Not on file     Attends Jainism service: Not on file     Active member of club or organization: Not on file     Attends meetings of clubs or organizations: Not on file     Relationship status: Not on file   • Intimate partner violence     Fear of current or ex partner: Not on file     Emotionally abused: Not on file     Physically abused: Not on file     Forced sexual activity: Not on file   Other Topics Concern   • Not on file    Social History Narrative   • Not on file          History reviewed. No pertinent family history.    Current Outpatient Medications on File Prior to Visit   Medication Sig Dispense Refill   • fluticasone (FLONASE) 50 MCG/ACT nasal spray Spray 1 Spray in nose every day.     • levothyroxine (SYNTHROID) 75 MCG Tab Take 75 mcg by mouth Every morning on an empty stomach.     • pravastatin (PRAVACHOL) 20 MG Tab Take 20 mg by mouth every evening.     • Ascorbic Acid (VITAMIN C) 1000 MG Tab Take 1 Tab by mouth every day.     • Cholecalciferol (VITAMIN D3) 125 MCG (5000 UT) Tab Take 5,000 Units by mouth every day.     • Carboxymethylcellul-Glycerin (CLEAR EYES FOR DRY EYES) 1-0.25 % Solution Place 1 Drop in both eyes every day.     • aspirin (ASA) 81 MG Chew Tab chewable tablet Take 4 Tabs by mouth every day. 100 Tab 1   • metoprolol SR (TOPROL XL) 25 MG TABLET SR 24 HR Take 1 Tab by mouth every day. (Patient not taking: Reported on 7/22/2020) 30 Tab 1   • testosterone cypionate (DEPO-TESTOSTERONE) 200 MG/ML Solution injection 50 mg by Intramuscular route every 14 days.     • donepezil (ARICEPT) 5 MG Tab Take 5 mg by mouth every evening.     • triamcinolone (NASACORT ALLERGY 24HR) 55 MCG/ACT nasal inhaler Spray 1 Spray in nose every bedtime.     • multivitamin (THERAGRAN) Tab Take 1 Tab by mouth every day.     • Calcium-Magnesium-Zinc (PATRIA-MAG-ZINC PO) Take 1 Tab by mouth every day.     • Omega-3 Fatty Acids (ULTRA OMEGA 3 PO) Take 1 Tab by mouth every day.       No current facility-administered medications on file prior to visit.      Allergies: Patient has no known allergies.    ROS:   Review of Systems   Constitutional: Positive for malaise/fatigue and weight loss. Negative for chills and fever.   HENT: Negative for congestion, sinus pain and sore throat.    Eyes: Negative for pain and discharge.   Respiratory: Positive for cough and shortness of breath. Negative for sputum production, wheezing and stridor.   "  Cardiovascular: Positive for chest pain. Negative for orthopnea and leg swelling.   Gastrointestinal: Negative for abdominal pain, diarrhea, nausea and vomiting.   Genitourinary: Negative for dysuria, frequency and urgency.   Musculoskeletal: Negative for myalgias.   Skin: Negative for rash.   Neurological: Negative for dizziness, sensory change, focal weakness, loss of consciousness and headaches.   Psychiatric/Behavioral: Positive for memory loss.   All other systems reviewed and are negative.    Vitals:  /60 (BP Location: Left arm, Patient Position: Sitting, BP Cuff Size: Adult)   Pulse 65   Ht 1.803 m (5' 11\")   Wt 67.8 kg (149 lb 6 oz)   SpO2 97%     Physical Exam:  Physical Exam  Vitals signs and nursing note reviewed.   Constitutional:       Appearance: He is not ill-appearing or diaphoretic.      Comments: Very well-dressed  thin   HENT:      Head: Normocephalic and atraumatic.      Mouth/Throat:      Mouth: Mucous membranes are dry.   Eyes:      Pupils: Pupils are equal, round, and reactive to light.   Neck:      Musculoskeletal: Normal range of motion.   Cardiovascular:      Rate and Rhythm: Normal rate.      Heart sounds: No murmur.   Pulmonary:      Comments: Decreased BS post left  Abdominal:      General: Bowel sounds are normal.      Palpations: Abdomen is soft.   Musculoskeletal:      Right lower leg: No edema.      Left lower leg: No edema.   Skin:     General: Skin is warm.   Neurological:      General: No focal deficit present.      Mental Status: He is oriented to person, place, and time.   Psychiatric:         Mood and Affect: Mood normal.         Behavior: Behavior normal.         Thought Content: Thought content normal.         Judgment: Judgment normal.       Laboratory Data:    PFTs as reviewed by me personally show: None for review    Imaging as reviewed by me personally show:    CT chest abdomen pelvis 6/3/2020: personally reviewed, perihilar atelectasis minimal, do not think " this is an underlying primary lung malignancy, although cannot rule out mesothelioma, pleural disease.     Assessment/Plan:    Problem List Items Addressed This Visit     Pleural effusion on left     Thoracentesis x 4, most recently last week at [place], has reaccumulated despite diuresis  Exudative by all 3 lights criteria, glucose, pH, triglycerides, amylase, cholesterol WNL. Relatively low cell count however lymphocytic predominant 51%     CT chest 6/2020: perihilar atelectasis minimal, do not think this is an underlying primary lung malignancy, although cannot rule out mesothelioma, pleural disease.  Other extrathoracic abnormalities (prostate, liver) warrant outpatient monitoring but low suspicion for metastatic disease with no lymphadenopathy.     JEANIE 1:160 not of clinical significance, JEANIE subsets negative, IGRA, RA, CCP negative.     Pleural cytology at [facility] in 5/2020 showed atypical cells- while not diagnostic, the repeated reaccumulation of the effusion, lymphocyte predominance, and weight loss are all concerning for underlying pleural malignancy.    -- Discussed case at length with Dr Rosenberg in 7/2020, share concern about the pts ability to tolerate a surgical thoracoscopy, plan to continue thoracentesis PRN and send for cytology and see how things go. Discussed case with Dr Rosenberg and Dr Quintana (PCP) again after today's visit to coordinate care.  -- Pleural fluid studies from [facility] have been requested  -- Recommend repeat CT chest after next thoracentesis to evaluate for underlying pulmonary parenchymal pathology; if a nodule or target for biopsy is identified, may be a candidate for percutaneous bx vs. referral to thoracic surgery for their input on this case         Chronic hypoxemic respiratory failure (HCC)     Due to pleural effusion, now status post thoracentesis x4, most recently last week at [place], report 650cc fluid removed  -Minimal symptomatic relief after  "thoracentesis  -Continues to require 2 L at rest, 3 L with ambulation         Abnormal liver CT     - Nonspecific homogeneous hypodensity of the lateral left lobe of the liver.   - discussed with Dr Quintana, appt with pt next week, liver protocol MRI planned         Memory difficulties     He is accompanied by his wife today. They report they \"arent sure\" why they are here today. Desmond tells me he does not meeting me in the past, despite our appointment last month and having seen him on several occasions during his hospitalization in May.  - unclear baseline; in the hospital the patient reported his wife is in a memory care facility; will discuss with PCP to obtain further collateral information                     Return in about 6 weeks (around 9/24/2020).     This note was generated using voice recognition software which has a chance of producing errors of grammar and possibly content.  I have made every reasonable attempt to find and correct any obvious errors, but it should be expected that some may not be found prior to finalization of this note.  __________  Michel Martinez MD  Pulmonary and Critical Care Medicine  [facility]    "

## 2020-08-13 NOTE — ASSESSMENT & PLAN NOTE
"He is accompanied by his wife today. They report they \"arent sure\" why they are here today. Desmond tells me he does not meeting me in the past, despite our appointment last month and having seen him on several occasions during his hospitalization in May.  - unclear baseline; in the hospital the patient reported his wife is in a memory care facility; will discuss with PCP to obtain further collateral information  "

## 2020-08-23 PROBLEM — R42 VERTIGO: Status: ACTIVE | Noted: 2020-01-01

## 2020-08-23 NOTE — ED PROVIDER NOTES
ED Provider Note  CHIEF COMPLAINT  Chief Complaint   Patient presents with   • Dizziness     Biba for dizziness x 2 days  given 700cc in ambulance       HPI  Chucho Marin is a 84 y.o. male who presents dizziness.  Symptoms started about 2 days ago.  Patient describes the dizziness as the room is spinning.  Patient also complains of diffuse weakness.  He has had multiple falls over the last 2 days.  He denies any injuries from these falls.  He states any kind of movement makes his dizziness worse.  He denies any one-sided numbness or weakness.  Denies any trouble speaking or swallowing.  Denies any double vision.  He is complaining of right ear pain with decreased hearing out of that ear.  He denies feeling like he is got a pass out.  He has had multiple episodes of nausea and vomiting.  Patient has a history of a pleural effusion and is chronically on 3 L of O2.  He denies any worsening shortness of breath.  No chest pain.  No fevers.  No cough.  He feels diffusely fatigued.  Ambulance gave him 700 cc of fluid.    REVIEW OF SYSTEMS  See HPI for further details. All other systems are negative.     PAST MEDICAL HISTORY  Past Medical History:   Diagnosis Date   • CHF (congestive heart failure) ([facility]) 5/29/2020   • CHEST PAIN 6/1/2011   • Back pain    • Bronchitis    • Daytime sleepiness    • Frequent urination    • Pleural effusion    • Ringing in ears    • Shortness of breath    • Sweat, sweating, excessive    • Wears glasses    • Weight loss        FAMILY HISTORY  [unfilled]    SOCIAL HISTORY  Social History     Socioeconomic History   • Marital status:      Spouse name: Not on file   • Number of children: Not on file   • Years of education: Not on file   • Highest education level: Not on file   Occupational History   • Not on file   Social Needs   • Financial resource strain: Not on file   • Food insecurity     Worry: Not on file     Inability: Not on file   • Transportation needs     Medical: Not on file      "Non-medical: Not on file   Tobacco Use   • Smoking status: Never Smoker   • Smokeless tobacco: Never Used   Substance and Sexual Activity   • Alcohol use: No   • Drug use: No   • Sexual activity: Not on file     Comment: na   Lifestyle   • Physical activity     Days per week: Not on file     Minutes per session: Not on file   • Stress: Not on file   Relationships   • Social connections     Talks on phone: Not on file     Gets together: Not on file     Attends [employer] service: Not on file     Active member of club or organization: Not on file     Attends meetings of clubs or organizations: Not on file     Relationship status: Not on file   • Intimate partner violence     Fear of current or ex partner: Not on file     Emotionally abused: Not on file     Physically abused: Not on file     Forced sexual activity: Not on file   Other Topics Concern   • Not on file   Social History Narrative   • Not on file       SURGICAL HISTORY  Past Surgical History:   Procedure Laterality Date   • PB REMV 2ND CATARACT,CORN-SCLER SECTN     • PROSTATECTOMY ROBOTIC     • TONSILLECTOMY         CURRENT MEDICATIONS   Home Medications     Reviewed by Shar Aceves (Pharmacy Tech) on 08/23/20 at 1523  Med List Status: Partial   Medication Last Dose Status   Carboxymethylcellul-Glycerin (CLEAR EYES FOR DRY EYES) 1-0.25 % Solution >2 days Active   donepezil (ARICEPT) 5 MG Tab 8/21/2020 Active   fluticasone (FLONASE) 50 MCG/ACT nasal spray >2 days Active   furosemide (LASIX) 40 MG Tab 8/22/2020 Active   levothyroxine (SYNTHROID) 75 MCG Tab 8/22/2020 Active   metoprolol SR (TOPROL XL) 25 MG TABLET SR 24 HR 8/22/2020 Active   potassium Chloride ER (K-TAB) 20 MEQ Tab CR tablet 8/22/2020 Active   pravastatin (PRAVACHOL) 20 MG Tab 8/21/2020 Active                ALLERGIES  No Known Allergies    PHYSICAL EXAM  VITAL SIGNS: /67   Pulse 62   Temp 36.4 °C (97.5 °F) (Temporal)   Resp 20   Ht 1.803 m (5' 11\")   SpO2 97%   BMI 20.83 " kg/m²       Constitutional: Well developed, No acute distress, Non-toxic appearance.   HENT: Normocephalic, Atraumatic, Bilateral external ears normal, Oropharynx moist, No oral exudates, Nose normal.  Right TM with clear effusion but no evidence of an otitis media.  Eyes: PERRL, EOMI, Conjunctiva normal, No discharge.   Neck: Normal range of motion, No tenderness, Supple  Cardiovascular: Normal heart rate, Normal rhythm  Thorax & Lungs: Slightly diminished breath sounds on the left, no respiratory distress, no crackles or rails   abdomen: Benign abdominal exam, no guarding no rebound,  no pulsatile mass, no tenderness, no distention  Skin: Warm, Dry, No erythema, No rash.   Back: No tenderness, No CVA tenderness.   Extremities: Intact distal pulses, No edema, No tenderness   Neurologic: Alert and oriented x3, no facial droop, no slurred speech, intact sensation throughout, no pronator drift, normal finger-to-nose, plus 5 out of 5 strength in upper and lower extremities  Psychiatric: appropriate        Labs  Results for orders placed or performed during the hospital encounter of 08/23/20   CBC with Differential   Result Value Ref Range    WBC 10.2 4.8 - 10.8 K/uL    RBC 4.19 (L) 4.70 - 6.10 M/uL    Hemoglobin 12.6 (L) 14.0 - 18.0 g/dL    Hematocrit 38.8 (L) 42.0 - 52.0 %    MCV 92.6 81.4 - 97.8 fL    MCH 30.1 27.0 - 33.0 pg    MCHC 32.5 (L) 33.7 - 35.3 g/dL    RDW 48.9 35.9 - 50.0 fL    Platelet Count 264 164 - 446 K/uL    MPV 9.6 9.0 - 12.9 fL    Neutrophils-Polys 79.10 (H) 44.00 - 72.00 %    Lymphocytes 12.00 (L) 22.00 - 41.00 %    Monocytes 7.40 0.00 - 13.40 %    Eosinophils 0.40 0.00 - 6.90 %    Basophils 0.30 0.00 - 1.80 %    Immature Granulocytes 0.80 0.00 - 0.90 %    Nucleated RBC 0.00 /100 WBC    Neutrophils (Absolute) 8.04 (H) 1.82 - 7.42 K/uL    Lymphs (Absolute) 1.22 1.00 - 4.80 K/uL    Monos (Absolute) 0.75 0.00 - 0.85 K/uL    Eos (Absolute) 0.04 0.00 - 0.51 K/uL    Baso (Absolute) 0.03 0.00 - 0.12 K/uL     Immature Granulocytes (abs) 0.08 0.00 - 0.11 K/uL    NRBC (Absolute) 0.00 K/uL   Complete Metabolic Panel (CMP)   Result Value Ref Range    Sodium 144 135 - 145 mmol/L    Potassium 3.6 3.6 - 5.5 mmol/L    Chloride 107 96 - 112 mmol/L    Co2 25 20 - 33 mmol/L    Anion Gap 12.0 7.0 - 16.0    Glucose 147 (H) 65 - 99 mg/dL    Bun 13 8 - 22 mg/dL    Creatinine 0.56 0.50 - 1.40 mg/dL    Calcium 8.8 8.4 - 10.2 mg/dL    AST(SGOT) 12 12 - 45 U/L    ALT(SGPT) 8 2 - 50 U/L    Alkaline Phosphatase 81 30 - 99 U/L    Total Bilirubin 0.3 0.1 - 1.5 mg/dL    Albumin 3.0 (L) 3.2 - 4.9 g/dL    Total Protein 6.2 6.0 - 8.2 g/dL    Globulin 3.2 1.9 - 3.5 g/dL    A-G Ratio 0.9 g/dL   Troponin   Result Value Ref Range    Troponin T 7 6 - 19 ng/L   ESTIMATED GFR   Result Value Ref Range    GFR If African American >60 >60 mL/min/1.73 m 2    GFR If Non African American >60 >60 mL/min/1.73 m 2   EKG   Result Value Ref Range    Report       [facility] Emergency Dept.    Test Date:  2020  Pt Name:    MONTSERRAT ARAUJO                     Department: [facility]  MRN:        [id number]                      [facility]  Gender:     Male                         Technician: [id number]  :        [id number]                   Requested By:ER TRIAGE PROTOCOL  Order #:    [id number]                    Reading MD: Isis Cuenca    Measurements  Intervals                                Axis  Rate:       60                           P:          0  HI:         124                          QRS:        5  QRSD:       98                           T:          9  QT:         460  QTc:        460    Interpretive Statements  SINUS RHYTHM  BORDERLINE T ABNORMALITIES, INFERIOR LEADS  Compared to ECG 2020 18:13:39  T-wave abnormality now present  Ectopic atrial rhythm no longer present  ST (T wave) deviation no longer present  Myocardial infarct finding no longer present  Electronically Signed On 2020 15:45:22 P DT by Isis  Bang         RADIOLOGY/PROCEDURES  CT-HEAD W/O   Final Result      No acute intracranial abnormality is identified.      Atrophy      There are periventricular and subcortical white matter changes present.  This finding is nonspecific and could be from previous small vessel ischemia, demyelination, or gliosis.         DX-CHEST-PORTABLE (1 VIEW)   Final Result      Stable chest x-ray findings with persistent left basilar consolidation and pleural fluid.          COURSE & MEDICAL DECISION MAKING  Pertinent Labs & Imaging studies reviewed. (See chart for details)  Patient presents emergency department with dizziness.  Patient's dizziness is worse with head movement.  He has had multiple falls and complains of diffuse weakness and does not feel steady on his feet.  He has a nonfocal neurologic exam.  He also had multiple episodes of nausea and vomiting.  He does have a right ear effusion.  He is not complaining of any difficulty swallowing, speech problems or blurry vision and therefore I think stroke is less likely.  He does not describe his dizziness as a near syncopal episode and therefore I think cardiac etiology is less likely.  He is not hypoxic here any acute respiratory distress.  He does have a history of a left pleural effusion.  Will obtain an x-ray to further evaluate this.  Also rule out anemia or an infectious etiology for his symptoms.  However with his ear effusion and dizziness this sounds like vertigo.  We will also try some meclizine.    Patient is feeling slightly better after the meclizine.  Head CT was unremarkable.  No acute changes in his chest x-ray.  Troponin was normal.  EKG showed no evidence of an acute MI.  There is no evidence of significant electrolyte abnormalities.  I do not feel he stable for outpatient management.  He still unsteady on his feet.  Patient will be hospitalized for further monitoring.    Discussed the case with Dr. Rodriguez who will see the patient.      FINAL  IMPRESSION     1. Dizziness    2. Vertigo    3. Acute effusion of right ear             Electronically signed by: Isis Woods M.D., 8/23/2020 3:39 PM

## 2020-08-23 NOTE — ED NOTES
Med rec updated and complete  Allergies reviewed  Interviewed pt with wife at bedside with permission from pt.  Pt was not sure the strengths of his medication.  Called [facility] @ [phone] to verify all medication.  Per pharmacy has an RX of DONEPEZIL 10MG waiting to be picked up.  Pt is on 5MG.  Pt reports that it's been 2 months since he received his TESTOSTERONE injection.

## 2020-08-24 NOTE — THERAPY
Physical Therapy   Initial Evaluation     Patient Name: Chucho Marin  Age:  84 y.o., Sex:  male  Medical Record #: [id number]  Today's Date: 8/24/2020     Precautions: Fall Risk    Assessment  Patient is 84 y.o. male who was recently admitted for dizziness and possible vertigo. Pt presented to PT with impaired balance, impaired coordination, impaired gait, dizziness, and dec activity tolerance. During neuro testing pt demonstrated with slight L sided nystagmus. During martinez francois pike maneuver pt reported of increased dizziness on L side, however, had dizziness both sides. Difficulty to distinguish if positive for BBPV, however, L sided was treated for re-assessment of dizziness. After Epley's to L side, pt reported continued dizziness and no improvements. Pt reported of R eye fullness and decreased hearing, which is not his baseline. Pt does appear to be consistent with symptoms of BPPV and appears to be related to R ear symptoms. Pt will continue to benefit from skilled PT while in house. If patient dizziness improves pt may be able to d/c home, however, pt is currently not demonstrate safe functional mobility as he is a high fall risk and not safe to d/c home. Will continue to follow and update plan of care.     Plan    Recommend Physical Therapy 4 times per week until therapy goals are met for the following treatments:  Bed Mobility, Community Re-integration, Equipment, Gait Training, Manual Therapy, Neuro Re-Education / Balance, Self Care/Home Evaluation, Sensory Integration Techniques, Stair Training, Therapeutic Activities and Therapeutic Exercises    DC Equipment Recommendations: (P) Front-Wheel Walker  Discharge Recommendations: (P) Recommend post-acute placement for additional physical therapy services prior to discharge home     Objective       08/24/20 1013   Prior Living Situation   Prior Services None   Housing / Facility 1 Story House   Steps Into Home 3   Steps In Home 0   Bathroom Set up Walk In Shower    Equipment Owned None   Lives with - Patient's Self Care Capacity Spouse   Comments spouse uses a spc and FWW for herself, unable to assist upon d/c to home   Prior Level of Functional Mobility   Bed Mobility Independent   Transfer Status Independent   Ambulation Independent   Distance Ambulation (Feet)   (community )   Assistive Devices Used None   Stairs Independent   Comments pt reports of an IPLOF    Gait Analysis   Gait Level Of Assist Moderate Assist   Assistive Device Front Wheel Walker   Distance (Feet) 15   # of Times Distance was Traveled 1   Deviation Step To;Decreased Base Of Support;Bradykinetic;Shuffled Gait;Decreased Heel Strike   # of Stairs Climbed 0   Weight Bearing Status fwb   Skilled Intervention Verbal Cuing;Facilitation   Comments pt presented with multiple LOB during ambulation; primarily due to dizizness    Bed Mobility    Supine to Sit Minimal Assist   Sit to Supine Minimal Assist   Scooting Supervised   Rolling Supervised   Skilled Intervention Verbal Cuing   Functional Mobility   Sit to Stand Minimal Assist   Bed, Chair, Wheelchair Transfer Minimal Assist   Patient / Family Goals    Patient / Family Goal #1 to go home    Short Term Goals    Short Term Goal # 1 pt will go supine<>sit w/hob flat and rails down w/spv in 6tx for safe d/c home    Short Term Goal # 2 pt will go sit<>stand w/fww w/spv in 6tx for safe d/c home    Short Term Goal # 3 pt will ambulate for 150ft w/fww w/spv in 6tx for safe d/c home   Short Term Goal # 4 pt will go up/down 3 steps w/spv in 6tx for safe d/c home

## 2020-08-24 NOTE — PROGRESS NOTES
Report received from NOC RN, assumed care of pt.   POC and medications reviewed with pt. Pt verbalized understanding.   AOx4, resting in bed.   Denies pain, SOB, or dizziness at this time while laying in bed.   Safety measures in place.  Hourly rounding in place.

## 2020-08-24 NOTE — H&P
Hospital Medicine History & Physical Note    Date of Service  8/23/2020    Primary Care Physician  Gibson Quintana M.D.    Consultants  none    Code Status  Full Code    Chief Complaint  Chief Complaint   Patient presents with   • Dizziness     Biba for dizziness x 2 days  given 700cc in ambulance       History of Presenting Illness  84 y.o. male with PMH of HTN who presented 8/23/2020 with lightheadedness for the past 2 days.  He described it as room spinning sensation associated with nausea and vomiting.  He also had difficulty with his balance.  In the ER CT head was done and negative. He will be admitted for observation.    Review of Systems  Review of Systems   Constitutional: Negative for chills, fever and weight loss.   HENT: Negative for congestion and nosebleeds.    Eyes: Negative for blurred vision, pain, discharge and redness.   Respiratory: Negative for cough, sputum production, shortness of breath and stridor.    Cardiovascular: Negative for chest pain, palpitations and orthopnea.   Gastrointestinal: Negative for abdominal pain, diarrhea, heartburn, nausea and vomiting.   Genitourinary: Negative for dysuria, frequency and urgency.   Musculoskeletal: Negative for back pain, myalgias and neck pain.   Skin: Negative for itching and rash.   Neurological: Positive for dizziness. Negative for focal weakness, seizures and headaches.   Psychiatric/Behavioral: Negative for depression. The patient is not nervous/anxious and does not have insomnia.        Past Medical History   has a past medical history of Back pain, Bronchitis, CHEST PAIN (6/1/2011), CHF (congestive heart failure) ([facility]) (5/29/2020), Daytime sleepiness, Frequent urination, Pleural effusion, Ringing in ears, Shortness of breath, Sweat, sweating, excessive, Wears glasses, and Weight loss.    Surgical History   has a past surgical history that includes pr remv 2nd cataract,corn-scler sectn; prostatectomy robotic; and tonsillectomy.     Family  History   reviewed and no pertinent family history    Social History   reports that he has never smoked. He has never used smokeless tobacco. He reports that he does not drink alcohol or use drugs.    Allergies  No Known Allergies    Medications  Prior to Admission Medications   Prescriptions Last Dose Informant Patient Reported? Taking?   Carboxymethylcellul-Glycerin (CLEAR EYES FOR DRY EYES) 1-0.25 % Solution >2 days at Unknown Patient Yes No   Sig: Place 1 Drop in both eyes as needed (For dry eye).   donepezil (ARICEPT) 5 MG Tab 8/21/2020 at PM Patient's Home Pharmacy Yes No   Sig: Take 5 mg by mouth every evening.   fluticasone (FLONASE) 50 MCG/ACT nasal spray >2 days at Unknown Patient Yes No   Sig: Spray 1 Spray in nose every day.   furosemide (LASIX) 40 MG Tab 8/22/2020 at 1100 Patient's Home Pharmacy Yes No   Sig: Take 40 mg by mouth 2 Times a Day.   levothyroxine (SYNTHROID) 75 MCG Tab 8/22/2020 at 1100 Patient's Home Pharmacy Yes No   Sig: Take 75 mcg by mouth Every morning on an empty stomach.   metoprolol SR (TOPROL XL) 25 MG TABLET SR 24 HR 8/22/2020 at 1100 Patient's Home Pharmacy Yes Yes   Sig: Take 25 mg by mouth every day.   potassium Chloride ER (K-TAB) 20 MEQ Tab CR tablet 8/22/2020 at 1100 Patient's Home Pharmacy Yes No   Sig: Take 20 mEq by mouth 2 times a day.   pravastatin (PRAVACHOL) 20 MG Tab 8/21/2020 at PM Patient's Home Pharmacy Yes No   Sig: Take 20 mg by mouth every evening.      Facility-Administered Medications: None       Physical Exam  Temp:  [36.4 °C (97.5 °F)] 36.4 °C (97.5 °F)  Pulse:  [61-68] 61  Resp:  [20-26] 26  BP: (107-113)/(67-68) 113/68  SpO2:  [97 %-98 %] 98 %    Physical Exam  Vitals signs reviewed.   Constitutional:       General: He is not in acute distress.     Appearance: Normal appearance.   HENT:      Head: Normocephalic and atraumatic.      Nose: No congestion or rhinorrhea.   Eyes:      Extraocular Movements: Extraocular movements intact.      Pupils: Pupils are  equal, round, and reactive to light.   Neck:      Musculoskeletal: Normal range of motion and neck supple.   Cardiovascular:      Rate and Rhythm: Normal rate and regular rhythm.      Pulses: Normal pulses.   Pulmonary:      Effort: Pulmonary effort is normal. No respiratory distress.      Breath sounds: Normal breath sounds.   Abdominal:      General: Bowel sounds are normal. There is no distension.      Palpations: Abdomen is soft.      Tenderness: There is no abdominal tenderness.   Musculoskeletal:         General: No swelling or tenderness.   Skin:     General: Skin is warm.      Findings: No erythema.   Neurological:      General: No focal deficit present.      Mental Status: He is alert and oriented to person, place, and time.         Laboratory:  Recent Labs     08/23/20  1441   WBC 10.2   RBC 4.19*   HEMOGLOBIN 12.6*   HEMATOCRIT 38.8*   MCV 92.6   MCH 30.1   MCHC 32.5*   RDW 48.9   PLATELETCT 264   MPV 9.6     Recent Labs     08/23/20  1441   SODIUM 144   POTASSIUM 3.6   CHLORIDE 107   CO2 25   GLUCOSE 147*   BUN 13   CREATININE 0.56   CALCIUM 8.8     Recent Labs     08/23/20  1441   ALTSGPT 8   ASTSGOT 12   ALKPHOSPHAT 81   TBILIRUBIN 0.3   GLUCOSE 147*         No results for input(s): NTPROBNP in the last 72 hours.      Recent Labs     08/23/20  1441   TROPONINT 7       Imaging:  CT-HEAD W/O   Final Result      No acute intracranial abnormality is identified.      Atrophy      There are periventricular and subcortical white matter changes present.  This finding is nonspecific and could be from previous small vessel ischemia, demyelination, or gliosis.         DX-CHEST-PORTABLE (1 VIEW)   Final Result      Stable chest x-ray findings with persistent left basilar consolidation and pleural fluid.            Assessment/Plan:  I anticipate this patient is appropriate for observation status at this time.    Vertigo- (present on admission)  Assessment & Plan  Likely peripheral vertigo  CT head negative  Started  on meclizine  PT for epley's maneuver  MRI brain to rule out central vertigo    Hyperglycemia- (present on admission)  Assessment & Plan  No history of DM

## 2020-08-24 NOTE — PROGRESS NOTES
Received report from day shift RN.  Pt A&O x 4  Pt reports a 0/10 pain level. Pt just feeling dizzy when attempting to sit up.  18 g IV to RFA S.L.  Pt given toothbrush and educated on oral care. Pt refused but requested to brush his teeth in the am.  POC discussed  All need met at this time.  Bed in lowest position with call light within reach.  Will continue to monitor.

## 2020-08-24 NOTE — ASSESSMENT & PLAN NOTE
Associated with right ear hearing loss, but exam is unrevealing other than earwax.  CT head negative.  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  PT OT recommended SNF, referral placed

## 2020-08-24 NOTE — PROGRESS NOTES
[facility] Medicine Daily Progress Note    Date of Service  8/24/2020    Chief Complaint  84 y.o. male admitted 8/23/2020 with dizziness      Interval Problem Update  Resting in bed, c/o right ear fullness, no discharge, no pain, no N/V/D/C.    Consultants/Specialty  none    Code Status  Full Code    Disposition  tbd    Review of Systems  Review of Systems   Constitutional: Negative for chills and fever.   HENT: Positive for hearing loss (right sided). Negative for congestion and nosebleeds.    Eyes: Negative for blurred vision and double vision.   Respiratory: Negative for cough, hemoptysis and wheezing.    Cardiovascular: Negative for chest pain, palpitations, claudication, leg swelling and PND.   Gastrointestinal: Negative for heartburn, nausea and vomiting.   Genitourinary: Negative for hematuria and urgency.   Musculoskeletal: Negative for back pain and myalgias.   Skin: Negative for rash.   Neurological: Negative for dizziness and headaches.   Endo/Heme/Allergies: Does not bruise/bleed easily.   Psychiatric/Behavioral: Negative for depression.        Physical Exam  Temp:  [36.1 °C (97 °F)-36.4 °C (97.5 °F)] 36.2 °C (97.2 °F)  Pulse:  [48-68] 52  Resp:  [16-26] 18  BP: ()/(56-71) 95/63  SpO2:  [97 %-100 %] 97 %    Physical Exam  Vitals signs and nursing note reviewed.   Constitutional:       General: He is not in acute distress.     Appearance: Normal appearance. He is not ill-appearing.   HENT:      Head: Normocephalic and atraumatic.      Right Ear: Tympanic membrane normal.      Ears:      Comments: Right ear canal with some amount of wax      Nose: Nose normal.      Mouth/Throat:      Mouth: Mucous membranes are moist.      Pharynx: Oropharynx is clear.   Eyes:      General:         Right eye: No discharge.         Left eye: No discharge.      Conjunctiva/sclera: Conjunctivae normal.      Pupils: Pupils are equal, round, and reactive to light.   Neck:      Musculoskeletal: Normal range of motion and  neck supple.   Cardiovascular:      Rate and Rhythm: Normal rate and regular rhythm.      Pulses: Normal pulses.      Heart sounds: Normal heart sounds.   Pulmonary:      Effort: Pulmonary effort is normal. No respiratory distress.   Abdominal:      General: Bowel sounds are normal. There is no distension.      Palpations: Abdomen is soft.      Tenderness: There is no abdominal tenderness. There is no guarding.   Musculoskeletal: Normal range of motion.   Skin:     General: Skin is warm.      Capillary Refill: Capillary refill takes less than 2 seconds.   Neurological:      General: No focal deficit present.      Mental Status: He is alert and oriented to person, place, and time.      Cranial Nerves: No cranial nerve deficit.      Motor: No weakness.   Psychiatric:         Mood and Affect: Mood normal.         Fluids    Intake/Output Summary (Last 24 hours) at 8/24/2020 1036  Last data filed at 8/23/2020 2000  Gross per 24 hour   Intake 120 ml   Output --   Net 120 ml       Laboratory  Recent Labs     08/23/20  1441 08/24/20  0358   WBC 10.2 9.4   RBC 4.19* 4.19*   HEMOGLOBIN 12.6* 12.5*   HEMATOCRIT 38.8* 39.6*   MCV 92.6 94.5   MCH 30.1 29.8   MCHC 32.5* 31.6*   RDW 48.9 51.0*   PLATELETCT 264 282   MPV 9.6 9.8     Recent Labs     08/23/20  1441 08/24/20  0358   SODIUM 144 143   POTASSIUM 3.6 3.7   CHLORIDE 107 104   CO2 25 29   GLUCOSE 147* 92   BUN 13 12   CREATININE 0.56 0.60   CALCIUM 8.8 8.9                   Imaging  CT-HEAD W/O   Final Result      No acute intracranial abnormality is identified.      Atrophy      There are periventricular and subcortical white matter changes present.  This finding is nonspecific and could be from previous small vessel ischemia, demyelination, or gliosis.         DX-CHEST-PORTABLE (1 VIEW)   Final Result      Stable chest x-ray findings with persistent left basilar consolidation and pleural fluid.           Assessment/Plan  Vertigo- (present on admission)  Assessment &  Plan  Likely peripheral vertigo  CT head negative  Started on meclizine  PT for epley's maneuver  MRI brain today  Ear  eval neg.     Hyperglycemia- (present on admission)  Assessment & Plan  No history of DM         VTE prophylaxis: lovenox

## 2020-08-24 NOTE — PROGRESS NOTES
Received report from BETTY Galdamez. Pt to floor via magi. Pt. C/o/o dizziness. Denies SOB, pain, chest pain, N/V. A&O x 4. VSS. Fall precautions in place. Bed is locked, in lowest position with call light within reach. Will monitor. Hourly rounding in place.

## 2020-08-24 NOTE — RESPIRATORY CARE
COPD EDUCATION by COPD CLINICAL EDUCATOR  8/24/2020 at 7:42 AM by Ivanna Faria RRT     Patient reviewed by COPD education team. Patient does not have a history or diagnosis of COPD and is a non-smoker, therefore does not qualify for the COPD program.

## 2020-08-25 PROBLEM — R73.9 HYPERGLYCEMIA: Status: RESOLVED | Noted: 2020-01-01 | Resolved: 2020-01-01

## 2020-08-25 NOTE — CARE PLAN
Problem: Communication  Goal: The ability to communicate needs accurately and effectively will improve  Outcome: PROGRESSING AS EXPECTED     Problem: Safety  Goal: Will remain free from falls  Intervention: Implement fall precautions  Flowsheets (Taken 8/25/2020 0353)  Environmental Precautions:   Treaded Slipper Socks on Patient   Personal Belongings, Wastebasket, Call Bell etc. in Easy Reach   Bed in Low Position   Report Given to Other Health Care Providers Regarding Fall Risk   Communication Sign for Patients & Families     Problem: Venous Thromboembolism (VTW)/Deep Vein Thrombosis (DVT) Prevention:  Goal: Patient will participate in Venous Thrombosis (VTE)/Deep Vein Thrombosis (DVT)Prevention Measures  Outcome: PROGRESSING AS EXPECTED  Intervention: Ensure patient wears graduated elastic stockings (JON hose) and/or SCDs, if ordered, when in bed or chair (Remove at least once per shift for skin check)  Flowsheets (Taken 8/25/2020 0353)  Mechanical Prophylaxis: SCDs, Sequential Compression Device  SCDs, Sequential Compression Device: On

## 2020-08-25 NOTE — ASSESSMENT & PLAN NOTE
He has no evidence of overload  Continue metoprolol 25 mg  Continue Lasix 40 p.o. twice daily due to known recurrent L pleural effusion  Continue tele

## 2020-08-25 NOTE — PROGRESS NOTES
Report received from NOC RN, assumed care of pt.   POC and medications reviewed with pt. Pt verbalized understanding.   AOx4  C/o dizziness with ambulation at this time.  Denies pain, SOB at this time.   Safety measures in place.  Hourly rounding in place.

## 2020-08-25 NOTE — ASSESSMENT & PLAN NOTE
Bradycardia on metoprolol, now resolved  No events on tele, other than bigem/couplets  Blood pressure has been low normal, monitor

## 2020-08-25 NOTE — PROGRESS NOTES
Hospital Medicine Daily Progress Note    Date of Service  8/25/2020    Chief Complaint  84 y.o. male admitted 8/23/2020 with dizziness    Hospital Course    History of hypertension, hyperlipidemia, chronic systolic heart failure, chronic left recurrent pleural effusion, mild cognitive impairment admitted with severe dizziness.  Head CT was negative, blood pressure has been running on the low end of normal, heart rate has been ranging from 40-50.  Hearing loss in the right ear, but ear exam is unrevealing      Interval Problem Update  8/25: Neck auscultation reveals bruit, carotid Doppler ordered.  Pending brain MRI, pending PT consult for vestibular treatment    Consultants/Specialty  PT    Code Status  Full Code    Disposition  To be determined    Review of Systems  Review of Systems   Constitutional: Positive for malaise/fatigue. Negative for chills and fever.   HENT: Positive for hearing loss (R ear). Negative for sore throat.    Respiratory: Negative for cough and shortness of breath.    Cardiovascular: Negative for chest pain and palpitations.   Gastrointestinal: Positive for nausea and vomiting. Negative for abdominal pain, blood in stool, diarrhea and heartburn.   Genitourinary: Negative for dysuria and frequency.   Musculoskeletal: Negative for back pain and myalgias.   Neurological: Positive for dizziness. Negative for focal weakness, weakness and headaches.   Psychiatric/Behavioral: Negative for depression and hallucinations.   All other systems reviewed and are negative.       Physical Exam  Temp:  [36.4 °C (97.5 °F)-36.8 °C (98.2 °F)] 36.8 °C (98.2 °F)  Pulse:  [58-66] 66  Resp:  [18] 18  BP: (104-117)/(61-62) 117/62  SpO2:  [96 %-97 %] 97 %    Physical Exam  Constitutional:       Appearance: Normal appearance.   HENT:      Head: Normocephalic and atraumatic.      Nose: Nose normal.      Mouth/Throat:      Mouth: Mucous membranes are moist.   Eyes:      Extraocular Movements: Extraocular movements  intact.      Pupils: Pupils are equal, round, and reactive to light.   Neck:      Musculoskeletal: Normal range of motion and neck supple.      Vascular: Carotid bruit present.   Cardiovascular:      Rate and Rhythm: Normal rate and regular rhythm.      Heart sounds: No murmur.   Pulmonary:      Effort: Pulmonary effort is normal. No respiratory distress.      Breath sounds: No stridor.      Comments: Decreased breath sounds left base  Abdominal:      General: Abdomen is flat. Bowel sounds are normal. There is no distension.      Palpations: Abdomen is soft.      Tenderness: There is no abdominal tenderness.   Musculoskeletal:         General: No swelling, tenderness or deformity.   Skin:     General: Skin is warm and dry.      Coloration: Skin is pale.   Neurological:      General: No focal deficit present.      Mental Status: He is alert and oriented to person, place, and time. Mental status is at baseline.      Cranial Nerves: No cranial nerve deficit.      Sensory: No sensory deficit.      Motor: Weakness present.      Coordination: Coordination normal.      Deep Tendon Reflexes: Reflexes normal.   Psychiatric:         Mood and Affect: Mood normal.         Behavior: Behavior normal.         Thought Content: Thought content normal.         Fluids    Intake/Output Summary (Last 24 hours) at 8/25/2020 1135  Last data filed at 8/25/2020 0900  Gross per 24 hour   Intake 720 ml   Output 925 ml   Net -205 ml       Laboratory  Recent Labs     08/23/20  1441 08/24/20  0358   WBC 10.2 9.4   RBC 4.19* 4.19*   HEMOGLOBIN 12.6* 12.5*   HEMATOCRIT 38.8* 39.6*   MCV 92.6 94.5   MCH 30.1 29.8   MCHC 32.5* 31.6*   RDW 48.9 51.0*   PLATELETCT 264 282   MPV 9.6 9.8     Recent Labs     08/23/20  1441 08/24/20  0358   SODIUM 144 143   POTASSIUM 3.6 3.7   CHLORIDE 107 104   CO2 25 29   GLUCOSE 147* 92   BUN 13 12   CREATININE 0.56 0.60   CALCIUM 8.8 8.9                   Imaging  CT-HEAD W/O   Final Result      No acute intracranial  abnormality is identified.      Atrophy      There are periventricular and subcortical white matter changes present.  This finding is nonspecific and could be from previous small vessel ischemia, demyelination, or gliosis.         DX-CHEST-PORTABLE (1 VIEW)   Final Result      Stable chest x-ray findings with persistent left basilar consolidation and pleural fluid.      MR-BRAIN-W/O    (Results Pending)   US-CAROTID DOPPLER BILAT    (Results Pending)        Assessment/Plan  * Vertigo- (present on admission)  Assessment & Plan  Likely peripheral vertigo, he also complains of hearing loss which could be consistent with acoustic neuroma.  CT head negative.  External ear evaluation negative.  Started on meclizine with no improvement  Symptoms occur while lying supine therefore orthostatic is unlikely  PT consult pending for epley's maneuver  He has not been on telemetry, heart rate has been 48-50.  Add tele monitoring  +Bruis bilaterally on neck auscultation: order carotid doppler  Follow-up brain MRI    Pleural effusion on left- (present on admission)  Assessment & Plan  He has had recurrent left pleural effusions, being followed by outpatient pulmonology    Chronic systolic heart failure (HCC)- (present on admission)  Assessment & Plan  He has no evidence of overload  Continue metoprolol 25 mg  Continue Lasix 40 p.o. 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

## 2020-08-25 NOTE — PROGRESS NOTES
Called placed to MRI to see what time pt would be getting MRI. MRI tech told this RN that pt would most likely have MRI at 1500.

## 2020-08-26 PROBLEM — I61.9: Status: ACTIVE | Noted: 2020-01-01

## 2020-08-26 NOTE — CARE PLAN
Problem: Safety  Goal: Will remain free from injury  Outcome: PROGRESSING AS EXPECTED  Goal: Will remain free from falls  Outcome: PROGRESSING AS EXPECTED  Intervention: Implement fall precautions  Flowsheets (Taken 8/25/2020 2648)  Bed Alarm: Yes - Alarm On  Environmental Precautions:   Treaded Slipper Socks on Patient   Personal Belongings, Wastebasket, Call Bell etc. in Easy Reach   Transferred to Stronger Side   Report Given to Other Health Care Providers Regarding Fall Risk   Bed in Low Position   Communication Sign for Patients & Families   Mobility Assessed & Appropriate Sign Placed     Problem: Skin Integrity  Goal: Risk for impaired skin integrity will decrease  Outcome: PROGRESSING AS EXPECTED

## 2020-08-26 NOTE — PROGRESS NOTES
Hospital Medicine Daily Progress Note    Date of Service  8/26/2020    Chief Complaint  84 y.o. male admitted 8/23/2020 with dizziness    Hospital Course    History of hypertension, hyperlipidemia, chronic systolic heart failure, chronic left recurrent pleural effusion, mild cognitive impairment admitted with severe dizziness.  Head CT was negative, blood pressure has been running on the low end of normal, heart rate has been ranging from 40-50.  Hearing loss in the right ear, but ear exam is unrevealing      Interval Problem Update  8/25: Neck auscultation reveals bruit, carotid Doppler ordered.  Pending brain MRI, no improvement with vestibular PT  8/26: MRI reveals a chronic punctate microhemorrhage in left basal ganglia.  Lovenox held.  Meclizine added.  Right ear canal exam with significant earwax, otherwise normal.    Consultants/Specialty  PT/OT  Will discuss with neurology    Code Status  Full Code    Disposition  SNF    Review of Systems  Review of Systems   Constitutional: Positive for malaise/fatigue. Negative for chills and fever.   HENT: Positive for hearing loss (R ear). Negative for sore throat.    Eyes: Negative for blurred vision.   Respiratory: Negative for cough and shortness of breath.    Cardiovascular: Negative for chest pain and palpitations.   Gastrointestinal: Negative for abdominal pain, blood in stool, diarrhea, heartburn, nausea and vomiting.   Genitourinary: Negative for dysuria and frequency.   Musculoskeletal: Negative for back pain and myalgias.   Neurological: Positive for dizziness. Negative for tingling, tremors, sensory change, speech change, focal weakness, loss of consciousness, weakness and headaches.   Psychiatric/Behavioral: Negative for depression and hallucinations. The patient is not nervous/anxious.    All other systems reviewed and are negative.       Physical Exam  Temp:  [36.3 °C (97.3 °F)-36.9 °C (98.5 °F)] 36.3 °C (97.3 °F)  Pulse:  [62-72] 64  Resp:  [18] 18  BP:  (101-123)/(59-72) 105/59  SpO2:  [94 %-98 %] 94 %    Physical Exam  Constitutional:       Appearance: Normal appearance.      Comments: Listless, elderly, frail   HENT:      Head: Normocephalic and atraumatic.      Right Ear: Ear canal and external ear normal. There is impacted cerumen.      Nose: Nose normal.      Mouth/Throat:      Mouth: Mucous membranes are moist.   Eyes:      Extraocular Movements: Extraocular movements intact.      Pupils: Pupils are equal, round, and reactive to light.   Neck:      Musculoskeletal: Normal range of motion and neck supple.      Vascular: No carotid bruit.   Cardiovascular:      Rate and Rhythm: Normal rate and regular rhythm.      Heart sounds: No murmur.   Pulmonary:      Effort: Pulmonary effort is normal. No respiratory distress.      Breath sounds: No stridor.      Comments: Decreased breath sounds left base  Abdominal:      General: Abdomen is flat. Bowel sounds are normal. There is no distension.      Palpations: Abdomen is soft.      Tenderness: There is no abdominal tenderness. There is no guarding.   Musculoskeletal:         General: No swelling, tenderness or deformity.   Skin:     General: Skin is warm and dry.      Coloration: Skin is pale.   Neurological:      General: No focal deficit present.      Mental Status: He is alert and oriented to person, place, and time. Mental status is at baseline.      Cranial Nerves: No cranial nerve deficit.      Sensory: No sensory deficit.      Motor: Weakness present.      Coordination: Coordination normal.      Deep Tendon Reflexes: Reflexes normal.   Psychiatric:         Mood and Affect: Mood normal.         Behavior: Behavior normal.         Thought Content: Thought content normal.         Fluids    Intake/Output Summary (Last 24 hours) at 8/26/2020 1252  Last data filed at 8/26/2020 0600  Gross per 24 hour   Intake 360 ml   Output 1400 ml   Net -1040 ml       Laboratory  Recent Labs     08/23/20  1441 08/24/20  0358   WBC  10.2 9.4   RBC 4.19* 4.19*   HEMOGLOBIN 12.6* 12.5*   HEMATOCRIT 38.8* 39.6*   MCV 92.6 94.5   MCH 30.1 29.8   MCHC 32.5* 31.6*   RDW 48.9 51.0*   PLATELETCT 264 282   MPV 9.6 9.8     Recent Labs     08/23/20  1441 08/24/20  0358   SODIUM 144 143   POTASSIUM 3.6 3.7   CHLORIDE 107 104   CO2 25 29   GLUCOSE 147* 92   BUN 13 12   CREATININE 0.56 0.60   CALCIUM 8.8 8.9                   Imaging  MR-BRAIN-W/O   Final Result      1.  No acute abnormality.   2.  Punctate chronic microhemorrhage in the left basal ganglia.   3.  Moderate cerebral volume loss.      US-CAROTID DOPPLER BILAT   Final Result      CT-HEAD W/O   Final Result      No acute intracranial abnormality is identified.      Atrophy      There are periventricular and subcortical white matter changes present.  This finding is nonspecific and could be from previous small vessel ischemia, demyelination, or gliosis.         DX-CHEST-PORTABLE (1 VIEW)   Final Result      Stable chest x-ray findings with persistent left basilar consolidation and pleural fluid.           Assessment/Plan  * Vertigo- (present on admission)  Assessment & Plan  Likely peripheral vertigo, associated with right ear hearing loss  CT head negative.  Carotid Doppler negative  He had no improvement with Epley maneuver/vestibular PT  MRA shows chronic punctate microhemorrhage in the left basal ganglia, this may be contributing to symptoms  I will discuss with neurology  I evaluated his right ear today and noted no significant abnormalities other than large volume of earwax, will attempt to irrigate  Meclizine as needed  PT OT recommended SNF, referral placed    Pleural effusion on left- (present on admission)  Assessment & Plan  He has had recurrent left pleural effusions, being followed by outpatient pulmonology    Chronic intracerebral hemorrhage (HCC)  Assessment & Plan  And revealed a chronic punctate microhemorrhage in the left basal ganglia.  This may be contributing to symptoms  I  will discuss with neurology  Hold Lovenox  Continue statin  PT OT, SNF    Chronic hypoxemic respiratory failure (HCC)- (present on admission)  Assessment & Plan  On 2 L at baseline due to recurrent left pleural effusion  Stable    Chronic systolic heart failure (HCC)- (present on admission)  Assessment & Plan  He has no evidence of overload  Continue metoprolol 25 mg  Continue Lasix 40 p.o. 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

## 2020-08-26 NOTE — CARE PLAN
Problem: Safety  Goal: Will remain free from injury  Outcome: PROGRESSING AS EXPECTED  Goal: Will remain free from falls  Outcome: PROGRESSING AS EXPECTED     Problem: Venous Thromboembolism (VTW)/Deep Vein Thrombosis (DVT) Prevention:  Goal: Patient will participate in Venous Thrombosis (VTE)/Deep Vein Thrombosis (DVT)Prevention Measures  Outcome: PROGRESSING AS EXPECTED     Problem: Knowledge Deficit  Goal: Knowledge of disease process/condition, treatment plan, diagnostic tests, and medications will improve  Outcome: PROGRESSING AS EXPECTED  Goal: Knowledge of the prescribed therapeutic regimen will improve  Outcome: PROGRESSING AS EXPECTED

## 2020-08-26 NOTE — THERAPY
Physical Therapy   Daily Treatment     Patient Name: Chucho Marin  Age:  84 y.o., Sex:  male  Medical Record #: [id number]  Today's Date: 8/26/2020     Precautions: (P) Fall Risk    Assessment    Pt continues to be limited by dizziness during session. Pt was able to perform bed mobility with Min A at times for positioning for epley's maneuver. Pt continued to demonstrate with L sided nystagmus during visual screens, however, was unable differentiate which side head turns caused the most dizziness. The L sided was treated again, however, with longer durations in positions. Upon re-assessment pt reported of continued dizziness and no improvement in symptoms. Pt appeared to fatigued due to frequent repositioning and was repositioned back in supine. Pt encouraged to ambulate with [facility] [place] to prevent deconditioning. Will continue to follow while in house, will continue to recommend post acute therapy prior to d/c home given current objective findings, age, and fall risk.     Plan    Continue current treatment plan.    DC Equipment Recommendations: (P) Front-Wheel Walker  Discharge Recommendations: (P) Recommend post-acute placement for additional physical therapy services prior to discharge home     Objective       08/26/20 1205   Balance   Sitting Balance (Static) Fair +   Sitting Balance (Dynamic) Fair   Weight Shift Sitting Fair   Skilled Intervention Verbal Cuing   Bed Mobility    Supine to Sit Minimal Assist   Sit to Supine Minimal Assist   Scooting Minimal Assist   Rolling Minimal Assist to Rt.;Minimum Assist to Lt.   Skilled Intervention Verbal Cuing;Tactile Cuing   Comments cues for using railings and bridging mechanics to scoot up in bed   Patient / Family Goals    Patient / Family Goal #1 to go home    Short Term Goals    Short Term Goal # 1 pt will go supine<>sit w/hob flat and rails down w/spv in 6tx for safe d/c home    Goal Outcome # 1 goal not met   Short Term Goal # 2 pt will go sit<>stand w/fww w/spv in  6tx for safe d/c home    Goal Outcome # 2 Goal not met   Short Term Goal # 3 pt will ambulate for 150ft w/fww w/spv in 6tx for safe d/c home   Goal Outcome # 3 Goal not met   Short Term Goal # 4 pt will go up/down 3 steps w/spv in 6tx for safe d/c home    Goal Outcome # 4 Goal not met

## 2020-08-26 NOTE — PROGRESS NOTES
Telemetry Shift Summary    Rhythm [place] Rhythm  HR Range 60s  Ectopy rare PVC  Measurements 0.10/0.08/0.36        Normal Values  Rhythm SR  HR Range    Measurements 0.12-0.20 / 0.06-0.10  / 0.30-0.52

## 2020-08-26 NOTE — PROGRESS NOTES
Telemetry Shift Summary    Rhythm Acc Junctional, sinus beats  HR Range 60's  Ectopy Rare trigem  Measurements 0.08/0.08/0.36        Normal Values  Rhythm SR  HR Range    Measurements 0.12-0.20 / 0.06-0.10  / 0.30-0.52

## 2020-08-26 NOTE — PROGRESS NOTES
Report received from NOC RN, assumed care of pt.   POC and medications reviewed with pt. Pt verbalized understanding.   AOx4  Still very dizzy when standing or attempting to ambulate.  Denies pain, SOB at this time.   Safety measures in place.  Hourly rounding in place.

## 2020-08-26 NOTE — DISCHARGE PLANNING
"Hospital Care Management Discharge Planning       Anticipated Discharge Disposition:   · SNF     Action:   · Met with pt at bedside to discuss DC plan. Pt is agreeable to SNF placement. Pt would like referrals sent to [facility]. [facility], 2. [facility]. SNF choice completed and fax[facility] to [facility].     Barriers to Discharge:   · SNF acceptance     Plan:   · RN CM to follow up on referrals.   · Hospital Care Management Team to continue to provide support services and assistance with discharge planning as needed.       Current Expected Day of Discharge: TBD    For further assistance please contact the assigned RN Case Manager or  at the extension listed under \"Treatment Teams\".    "

## 2020-08-26 NOTE — ASSESSMENT & PLAN NOTE
Chronic punctate microhemorrhage in the left basal ganglia.  Likely an incidental finding as location does not fit with dizziness symptoms.  I discussed with neurology  Hold Lovenox, encourage ambulation  Continue statin  PT OT, SNF

## 2020-08-27 NOTE — PROGRESS NOTES
[facility] Medicine Daily Progress Note    Date of Service  8/27/2020    Chief Complaint  84 y.o. male admitted 8/23/2020 with dizziness    Hospital Course    History of hypertension, hyperlipidemia, chronic systolic heart failure, chronic left recurrent pleural effusion, mild cognitive impairment admitted with severe dizziness.  Head CT was negative, blood pressure has been running on the low end of normal, heart rate has been ranging from 40-50.  Hearing loss in the right ear, but ear exam is unrevealing      Interval Problem Update  8/25: Neck auscultation reveals bruit, carotid Doppler ordered.  Pending brain MRI, no improvement with vestibular PT  8/26: MRI reveals a chronic punctate microhemorrhage in left basal ganglia.  Lovenox held.  Meclizine added.  Right ear canal exam with significant earwax, otherwise normal.  8/27: Slight improvement, able to sit up in chair today.  Still dizziness when changing position    Consultants/Specialty  PT/OT  Phone consultation with neurology    Code Status  Full Code    Disposition  SNF    Review of Systems  Review of Systems   Constitutional: Positive for malaise/fatigue. Negative for chills and fever.   HENT: Positive for hearing loss (R ear). Negative for sore throat.    Eyes: Negative for blurred vision.   Respiratory: Negative for cough and shortness of breath.    Cardiovascular: Negative for chest pain and palpitations.   Gastrointestinal: Negative for abdominal pain, blood in stool, diarrhea, heartburn, nausea and vomiting.   Genitourinary: Negative for dysuria, frequency and hematuria.   Musculoskeletal: Negative for back pain and joint pain.   Neurological: Positive for dizziness. Negative for tingling, tremors, sensory change, speech change, focal weakness, seizures, loss of consciousness, weakness and headaches.   Psychiatric/Behavioral: Negative for depression and hallucinations. The patient is not nervous/anxious.    All other systems reviewed and are  negative.       Physical Exam  Temp:  [36.3 °C (97.3 °F)-37 °C (98.6 °F)] 37 °C (98.6 °F)  Pulse:  [61-83] 79  Resp:  [18-19] 18  BP: (105-122)/(63-76) 105/63  SpO2:  [93 %-99 %] 95 %    Physical Exam  Constitutional:       General: He is not in acute distress.     Appearance: Normal appearance.      Comments: Sitting up in chair, frail, hard of hearing   HENT:      Head: Normocephalic and atraumatic.      Right Ear: Ear canal and external ear normal. There is impacted cerumen.      Nose: Nose normal. No congestion.      Mouth/Throat:      Mouth: Mucous membranes are moist.   Eyes:      Extraocular Movements: Extraocular movements intact.      Pupils: Pupils are equal, round, and reactive to light.   Neck:      Musculoskeletal: Normal range of motion and neck supple.      Vascular: No carotid bruit.   Cardiovascular:      Rate and Rhythm: Normal rate and regular rhythm.      Heart sounds: No murmur.   Pulmonary:      Effort: Pulmonary effort is normal. No respiratory distress.      Breath sounds: No stridor.      Comments: Decreased breath sounds left base  Abdominal:      General: Abdomen is flat. Bowel sounds are normal. There is no distension.      Palpations: Abdomen is soft.      Tenderness: There is no abdominal tenderness. There is no guarding.   Musculoskeletal:         General: No swelling, tenderness or deformity.   Skin:     General: Skin is warm and dry.      Coloration: Skin is pale.      Findings: No erythema or rash.   Neurological:      General: No focal deficit present.      Mental Status: He is alert and oriented to person, place, and time. Mental status is at baseline.      Cranial Nerves: No cranial nerve deficit.      Sensory: No sensory deficit.      Motor: Weakness present.      Coordination: Coordination normal.      Deep Tendon Reflexes: Reflexes normal.      Comments: No nystagmus   Psychiatric:         Mood and Affect: Mood normal.         Behavior: Behavior normal.         Thought  Content: Thought content normal.         Fluids    Intake/Output Summary (Last 24 hours) at 8/27/2020 1031  Last data filed at 8/27/2020 0900  Gross per 24 hour   Intake 320 ml   Output 1480 ml   Net -1160 ml       Laboratory      Recent Labs     08/27/20  0345   SODIUM 138   POTASSIUM 4.5   CHLORIDE 100   CO2 28   GLUCOSE 118*   BUN 13   CREATININE 0.55   CALCIUM 9.1                   Imaging  MR-BRAIN-W/O   Final Result      1.  No acute abnormality.   2.  Punctate chronic microhemorrhage in the left basal ganglia.   3.  Moderate cerebral volume loss.      US-CAROTID DOPPLER BILAT   Final Result      CT-HEAD W/O   Final Result      No acute intracranial abnormality is identified.      Atrophy      There are periventricular and subcortical white matter changes present.  This finding is nonspecific and could be from previous small vessel ischemia, demyelination, or gliosis.         DX-CHEST-PORTABLE (1 VIEW)   Final Result      Stable chest x-ray findings with persistent left basilar consolidation and pleural fluid.           Assessment/Plan  * Vertigo- (present on admission)  Assessment & Plan  Associated with right ear hearing loss, but exam is unrevealing other than earwax.  CT head negative.  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  PT OT recommended SNF, referral placed    Pleural effusion on left- (present on admission)  Assessment & Plan  He has had recurrent left pleural effusions, being followed by outpatient pulmonology    Chronic intracerebral hemorrhage (HCC)  Assessment &  Plan  Chronic punctate microhemorrhage in the left basal ganglia.  Likely an incidental finding as location does not fit with dizziness symptoms.  I discussed with neurology  Hold Lovenox, encourage ambulation  Continue statin  PT OT, SNF    Chronic hypoxemic respiratory failure (HCC)- (present on admission)  Assessment & Plan  On 2 L at baseline due to recurrent left pleural effusion  Stable    Chronic systolic heart failure (HCC)- (present on admission)  Assessment & Plan  He has no evidence of overload  Continue metoprolol 25 mg  Continue Lasix 40 p.o. 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

## 2020-08-27 NOTE — PROGRESS NOTES
Monitor Summary     Rhythm: accelerated junctional 64-74  Measurements: 0.12/0.08/0.42  ECTOPIES: F PVC, O trig, O PAC        Normal Values  Rhythm SR  HR Range    Measurements 0.12-0.20 / 0.06-0.10  / 0.30-0.52

## 2020-08-27 NOTE — THERAPY
Physical Therapy   Daily Treatment     Patient Name: Chucho Marin  Age:  84 y.o., Sex:  male  Medical Record #: [id number]  Today's Date: 8/27/2020     Precautions: (P) Fall Risk    Assessment    Pt is progressing slightly with functional mobility, however, pt continues to be limited by dizziness and now some deconditioning. Pt was able to tolerate about 60 ft of ambulation before having to sit down. Pt reports fatigue was the primary factor in limiting his ability to ambulate longer distances. Pt demonstrated with poor upright standing posture, poor gait mechanics, and dec anita. With cues and sequencing pt was able to improve his mechanics, however, continues to revert back to poor mechanics and continually required cues. Pt will continue to benefit from skilled PT while in house, with recommendation for post acute therapy prior to d/c home.     Plan    Continue current treatment plan.    DC Equipment Recommendations: (P) Front-Wheel Walker  Discharge Recommendations: (P) Recommend post-acute placement for additional physical therapy services prior to discharge home    Objective       08/27/20 1400   Gait Analysis   Gait Level Of Assist Minimal Assist   Assistive Device Front Wheel Walker   Distance (Feet) 60   # of Times Distance was Traveled 2   Deviation Step To;Decreased Base Of Support;Bradykinetic;Shuffled Gait;Decreased Heel Strike   Weight Bearing Status fwb   Skilled Intervention Verbal Cuing;Facilitation;Sequencing   Comments pt able to improve gait and posture with frequent cues and sequencing    Bed Mobility    Supine to Sit Supervised   Sit to Supine Supervised   Scooting Supervised   Rolling Supervised   Skilled Intervention Verbal Cuing   Functional Mobility   Sit to Stand Minimal Assist   Bed, Chair, Wheelchair Transfer Minimal Assist   Skilled Intervention Verbal Cuing   Patient / Family Goals    Patient / Family Goal #1 to go home    Short Term Goals    Short Term Goal # 1 pt will go supine<>sit  w/hob flat and rails down w/spv in 6tx for safe d/c home    Goal Outcome # 1 goal not met   Short Term Goal # 2 pt will go sit<>stand w/fww w/spv in 6tx for safe d/c home    Goal Outcome # 2 Progressing as expected   Short Term Goal # 3 pt will ambulate for 150ft w/fww w/spv in 6tx for safe d/c home   Goal Outcome # 3 Progressing as expected   Short Term Goal # 4 pt will go up/down 3 steps w/spv in 6tx for safe d/c home    Goal Outcome # 4 Goal not met

## 2020-08-27 NOTE — DISCHARGE PLANNING
Agency/Facility Name: [facility]   Referral sent per Choice form @ 0813     Agency/Facility Name: [facility]   Outcome: Per Epic response, referral accepted.     Agency[facility]Facility Name: [facility]   Outcome: Per Epic response, referral accepted.

## 2020-08-27 NOTE — PROGRESS NOTES
" Pt AAO. Reports \"mild pain in my lungs.\" Declines any intervention.  Due meds given. Pt denies any nausea, SOB, but reports dizziness when getting up.  POC discussed with pt. Pt verbalized understanding. Tele moniotor observed. Pt on 2L O2 NC sat 95%. Safety and comfort measures in place.  No additional needs at this time. Bed alarm on.   "

## 2020-08-27 NOTE — DISCHARGE PLANNING
Agency/Facility Name: [facility]   Spoke To: Cha  Outcome:  Per Cha, no bed available today. Bed available tomorrow. Transportation scheduled by facility for 8/28 @ 1200.   RN CM notified.

## 2020-08-27 NOTE — THERAPY
Occupational Therapy   Initial Evaluation     Patient Name: Chucho Marin  Age:  84 y.o., Sex:  male  Medical Record #: [id number]  Today's Date: 8/27/2020     Precautions  Precautions: Fall Risk    Assessment  Patient is 84 y.o. male with a diagnosis of dizziness , syncope and a fall.  Additional factors influencing patient status / progress: Continued c/o dizziness, unsteady with functional transfers with observed LOB, requires assist for all level of ADLS and functional mobility. 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 He will benefit from Acute OT services to increase functional independence.      Plan    Recommend Occupational Therapy 3 times per week until therapy goals are met for the following treatments:  Neuro Re-Education / Balance, Self Care/Activities of Daily Living, Therapeutic Activities and Therapeutic Exercises.    DC Equipment Recommendations: (P) Front-Wheel Walker  Discharge Recommendations: (P) Recommend post-acute placement for additional occupational therapy services prior to discharge home      08/27/20 1002   Prior Living Situation   Prior Services None   Housing / Facility 1 Story House   Steps Into Home 3   Steps In Home 0   Bathroom Set up Walk In Shower   Equipment Owned Oxygen   Lives with - Patient's Self Care Capacity Spouse   Comments Wife is unable to assist with care   Prior Level of ADL Function   Self Feeding Independent   Grooming / Hygiene Independent   Bathing Independent   Dressing Independent   Toileting Independent   Prior Level of IADL Function   Medication Management Independent   Laundry Independent   Kitchen Mobility Independent   Finances Independent   Home Management Independent   Shopping Independent   Prior Level Of Mobility Independent Without Device in Community   Driving / Transportation Driving Independent   Occupation (Pre-Hospital Vocational) Retired Due To Age   Leisure Interests Family;Gardening   History of Falls   History of  Falls Yes   Precautions   Precautions Fall Risk   Balance Assessment   Sitting Balance (Static) Fair +   Sitting Balance (Dynamic) Fair +   Standing Balance (Static) Poor +   Standing Balance (Dynamic) Poor +   Weight Shift Sitting Good   Weight Shift Standing Fair   Comments LOB when first standing   Bed Mobility    Supine to Sit Supervised   Scooting Supervised   Rolling Supervised   Comments vc for safety   ADL Assessment   Eating Modified Independent   Grooming Supervision;Seated   Upper Body Dressing Supervision   Lower Body Dressing Minimal Assist   Toileting Minimal Assist   Functional Mobility   Sit to Stand Minimal Assist   Bed, Chair, Wheelchair Transfer Minimal Assist   Transfer Method Stand Step   Mobility FWW   Patient / Family Goals   Patient / Family Goal #1 Home with family   Short Term Goals   Short Term Goal # 1 Pt will be able to tolerate sitting up in a chair 3x daily for meals   Short Term Goal # 2 Pt will be able to complete functional transfers with Supervision   Short Term Goal # 3 Pt will be able to complete FB dressing with Supervision

## 2020-08-27 NOTE — CARE PLAN
Problem: Communication  Goal: The ability to communicate needs accurately and effectively will improve  Outcome: PROGRESSING AS EXPECTED   Pt reminded to call hen in need of assistance. Pt is hard of hearing to right ear, reiteration need.  Plan of care discussed to pt. Questions and concerns answered. Pt. Verbalized understanding. Communication board updated.     Problem: Safety  Goal: Will remain free from injury  Outcome: PROGRESSING AS EXPECTED   Pt educated to call when in need. Call light and personal belongings w/n reach. Room free of clutters. Bed locked and in lowest position. Answer call light immediately. Bed alarm on    Problem: Pain Management  Goal: Pain level will decrease to patient's comfort goal  Outcome: PROGRESSING AS EXPECTED   Pt's pain is at comfortable level. Declines interventions for now.

## 2020-08-27 NOTE — PROGRESS NOTES
Telemetry Shift Summary    Rhythm SR, AJR  HR Range 60s-70s  Ectopy OPVC, RPAC, Rcoup, Rtrigem  Measurements 0.16/0.10/0.32        Normal Values  Rhythm SR  HR Range    Measurements 0.12-0.20 / 0.06-0.10  / 0.30-0.52

## 2020-08-27 NOTE — CARE PLAN
Cont to c/o dizziness, with position changes given extra time to dangle before changing position, very [place], denies cp/SOB will try prn Antivert, denies n/v.

## 2020-08-28 NOTE — PROGRESS NOTES
[facility], took prednisone prior leaving. Left with facility med transportation and wife.

## 2020-08-28 NOTE — DISCHARGE INSTRUCTIONS
Vertigo  Vertigo is the feeling that you or the things around you are moving when they are not. This feeling can come and go at any time. Vertigo often goes away on its own. This condition can be dangerous if it happens when you are doing activities like driving or working with machines.  Your doctor will do tests to find the cause of your vertigo. These tests will also help your doctor decide on the best treatment for you.  Follow these instructions at home:  Eating and drinking         · Drink enough fluid to keep your pee (urine) pale yellow.  · Do not drink alcohol.  Activity  · Return to your normal activities as told by your doctor. Ask your doctor what activities are safe for you.  · In the morning, first sit up on the side of the bed. When you feel okay, stand slowly while you hold onto something until you know that your balance is fine.  · Move slowly. Avoid sudden body or head movements or certain positions, as told by your doctor.  · Use a cane if you have trouble standing or walking.  · Sit down right away if you feel dizzy.  · Avoid doing any tasks or activities that can cause danger to you or others if you get dizzy.  · Avoid bending down if you feel dizzy. Place items in your home so that they are easy for you to reach without leaning over.  · Do not drive or use heavy machinery if you feel dizzy.  General instructions  · Take over-the-counter and prescription medicines only as told by your doctor.  · Keep all follow-up visits as told by your doctor. This is important.  Contact a doctor if:  · Your medicine does not help your vertigo.  · You have a fever.  · Your problems get worse or you have new symptoms.  · Your family or friends see changes in your behavior.  · The feeling of being sick to your stomach gets worse.  · Your vomiting gets worse.  · You lose feeling (have numbness) in part of your body.  · You feel prickling and tingling in a part of your body.  Get help right away if:  · You have  trouble moving or talking.  · You are always dizzy.  · You pass out (faint).  · You get very bad headaches.  · You feel weak in your hands, arms, or legs.  · You have changes in your hearing.  · You have changes in how you see (vision).  · You get a stiff neck.  · Bright light starts to bother you.  Summary  · Vertigo is the feeling that you or the things around you are moving when they are not.  · Your doctor will do tests to find the cause of your vertigo.  · You may be told to avoid some tasks, positions, or movements.  · Contact a doctor if your medicine is not helping, or if you have a fever, new symptoms, or a change in behavior.  · Get help right away if you get very bad headaches, or if you have changes in how you speak, hear, or see.  This information is not intended to replace advice given to you by your health care provider. Make sure you discuss any questions you have with your health care provider.  Document Released: 09/26/2009 Document Revised: 11/11/2019 Document Reviewed: 11/11/2019  [employer] Patient Education © 2020 [employer]  Discharge Instructions    Discharged to group home by medical transportation with escort. Discharged via wheelchair, hospital escort: Yes.  Special equipment needed: Not Applicable    Be sure to schedule a follow-up appointment with your primary care doctor or any specialists as instructed.     Discharge Plan:   Diet Plan: Discussed  Confirmed Follow up Appointment: Patient to Call and Schedule Appointment  Confirmed Symptoms Management: Discussed  Medication Reconciliation Updated: Yes    I understand that a diet low in cholesterol, fat, and sodium is recommended for good health. Unless I have been given specific instructions below for another diet, I accept this instruction as my diet prescription.   Other diet: Regular    Special Instructions: None    · Is patient discharged on Warfarin / Coumadin?   No     Depression / Suicide Risk    As you are discharged from this  [facility] facility, it is important to learn how to keep safe from harming yourself.    Recognize the warning signs:  · Abrupt changes in personality, positive or negative- including increase in energy   · Giving away possessions  · Change in eating patterns- significant weight changes-  positive or negative  · Change in sleeping patterns- unable to sleep or sleeping all the time   · Unwillingness or inability to communicate  · Depression  · Unusual sadness, discouragement and loneliness  · Talk of wanting to die  · Neglect of personal appearance   · Rebelliousness- reckless behavior  · Withdrawal from people/activities they love  · Confusion- inability to concentrate     If you or a loved one observes any of these behaviors or has concerns about self-harm, here's what you can do:  · Talk about it- your feelings and reasons for harming yourself  · Remove any means that you might use to hurt yourself (examples: pills, rope, extension cords, firearm)  · Get professional help from the community (Mental Health, Substance Abuse, psychological counseling)  · Do not be alone:Call your Safe Contact- someone whom you trust who will be there for you.  · Call your local CRISIS HOTLINE 454-8725 or 111-301-8678  · Call your local Children's Mobile Crisis Response Team [place] (371) 426-8464 or www.[employer]  · Call the toll free National Suicide Prevention Hotlines   · National Suicide Prevention Lifeline 815-247-TXEM (8541)  · National Hope Line Network 800-SUICIDE (719-5779)

## 2020-08-28 NOTE — PROGRESS NOTES
Pt AAO. Denies any pain, nausea, SOB. Due meds given. POC discussed with pt including d/c plan tomorrow, medication, safety, diet, importance of mobilization with safety precautions.  Safety and comfort measures in place. No additional needs at this time.

## 2020-08-28 NOTE — CARE PLAN
Problem: Communication  Goal: The ability to communicate needs accurately and effectively will improve  Outcome: MET     Problem: Safety  Goal: Will remain free from injury  Outcome: MET  Goal: Will remain free from falls  Outcome: MET     Problem: Infection  Goal: Will remain free from infection  Outcome: MET     Problem: Venous Thromboembolism (VTW)/Deep Vein Thrombosis (DVT) Prevention:  Goal: Patient will participate in Venous Thrombosis (VTE)/Deep Vein Thrombosis (DVT)Prevention Measures  Outcome: MET     Problem: Bowel/Gastric:  Goal: Normal bowel function is maintained or improved  Outcome: MET  Goal: Will not experience complications related to bowel motility  Outcome: MET     Problem: Knowledge Deficit  Goal: Knowledge of disease process/condition, treatment plan, diagnostic tests, and medications will improve  Outcome: MET  Goal: Knowledge of the prescribed therapeutic regimen will improve  Outcome: MET     Problem: Discharge Barriers/Planning  Goal: Patient's continuum of care needs will be met  Outcome: MET     Problem: Pain Management  Goal: Pain level will decrease to patient's comfort goal  Outcome: MET     Problem: Skin Integrity  Goal: Risk for impaired skin integrity will decrease  Outcome: MET

## 2020-08-28 NOTE — CARE PLAN
Problem: Safety  Goal: Will remain free from injury  Outcome: PROGRESSING AS EXPECTED   Pt tends to forget to call at times. Pt reminded to call when in need of assistance. Bed alarm on. Bed in lowest positions. Call light w/n reach.     Problem: Bowel/Gastric:  Goal: Normal bowel function is maintained or improved  Outcome: PROGRESSING AS EXPECTED   Pt denies any nausea/vomiting, but is having loose stool tonight. Will hold stool softener in the morning.    Problem: Pain Management  Goal: Pain level will decrease to patient's comfort goal  Outcome: PROGRESSING AS EXPECTED   Pt denies any pain. Encourage to inform RN if pain is greater than comfort level.

## 2020-08-28 NOTE — DISCHARGE SUMMARY
Discharge Summary    CHIEF COMPLAINT ON ADMISSION  Chief Complaint   Patient presents with   • Dizziness     Biba for dizziness x 2 days  given 700cc in ambulance       Reason for Admission  EMS     Admission Date  8/23/2020    CODE STATUS  Full Code    HPI & HOSPITAL COURSE  This is a 84 y.o. 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  Head CT was negative.  His blood pressure was running in the low end of normal but this was not correlated to his symptoms.  Heart rate was initially ranging from 40-50, however this improved over the next 24 hours and also was not correlated with his dizziness symptoms.  He worked with vestibular physical therapy but did not have improvement of his dizziness with this.  He reported hearing loss in the right ear, but ear exam is unrevealing, other than large amount but nonocclusive ear wax.  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  I discussed this with neurology and they confirmed this is likely an incidental finding.      Dizziness did slightly improve throughout hospital stay however he remains unsteady.  There was concern for labrynthitis so he was started on prednisone tx.  If he improved, could consider increasing course.  He usually gets his ears cleaned every year by his PCP.  He is due for this appointment.  On exam it reveals he is due for this.  He will need to arrange outpatient follow-up for cerumen evacuation.    He was given meclizine to take as needed, this was only providing minimal symptom relief.    He worked with PT and OT who determined he was unsteady to discharge home independently therefore he was recommended skilled nursing facility.    Therefore, he is discharged in fair and stable condition to skilled nursing  facility.    The patient met 2-midnight criteria for an inpatient stay at the time of discharge.    Discharge Date  8/28/2020    FOLLOW UP ITEMS POST DISCHARGE  Follow-up with PCP for cerumen removal  Continue PT OT    DISCHARGE DIAGNOSES  Principal Problem:    Vertigo POA: Yes  Active Problems:    Pleural effusion on left POA: Yes    Frequent PVCs POA: Yes    Hypothyroidism POA: Yes    GERD (gastroesophageal reflux disease) POA: Yes    HLD (hyperlipidemia) POA: Yes    Chronic systolic heart failure (HCC) POA: Yes    Chronic hypoxemic respiratory failure (HCC) POA: Yes    Chronic intracerebral hemorrhage (HCC) POA: Unknown  Resolved Problems:    Hyperglycemia POA: Yes      FOLLOW UP  Future Appointments   Date Time Provider Department Center   9/18/2020 11:15 AM Michel Martinez M.D. [facility] None     [address] [address]  [phone]          MEDICATIONS ON DISCHARGE     Medication List      START taking these medications      Instructions   meclizine 25 MG Tabs  Commonly known as: ANTIVERT   Take 1 Tab by mouth 3 times a day as needed for Dizziness.  Dose: 25 mg     predniSONE 20 MG Tabs  Start taking on: August 29, 2020  Commonly known as: DELTASONE   Take 1 Tab by mouth every day for 3 days.  Dose: 20 mg        CONTINUE taking these medications      Instructions   Clear Eyes for Dry Eyes 1-0.25 % Soln  Generic drug: Carboxymethylcellul-Glycerin   Place 1 Drop in both eyes as needed (For dry eye).  Dose: 1 Drop     donepezil 5 MG Tabs  Commonly known as: ARICEPT   Take 5 mg by mouth every evening.  Dose: 5 mg     famotidine 20 MG Tabs  Commonly known as: PEPCID   Take 20 mg by mouth 2 times a day. Indications: Gastroesophageal Reflux Disease  Dose: 20 mg     fluticasone 50 MCG/ACT nasal spray  Commonly known as: FLONASE   Spray 1 Spray in nose every day.  Dose: 1 Spray     furosemide 40 MG Tabs  Commonly known as: LASIX   Take 40 mg by mouth 2 Times a Day.  Dose:  40 mg     levothyroxine 75 MCG Tabs  Commonly known as: SYNTHROID   Take 75 mcg by mouth Every morning on an empty stomach.  Dose: 75 mcg     metoprolol SR 25 MG Tb24  Commonly known as: TOPROL XL   Take 25 mg by mouth every day.  Dose: 25 mg     potassium Chloride ER 20 MEQ Tbcr tablet  Commonly known as: K-TAB   Take 20 mEq by mouth 2 times a day.  Dose: 20 mEq     pravastatin 20 MG Tabs  Commonly known as: PRAVACHOL   Take 20 mg by mouth every evening.  Dose: 20 mg            Allergies  No Known Allergies    DIET  Orders Placed This Encounter   Procedures   • Diet Order Regular     Standing Status:   Standing     Number of Occurrences:   1     Order Specific Question:   Diet:     Answer:   Regular [1]       ACTIVITY  As tolerated.  Weight bearing as tolerated    CONSULTATIONS  Neurology over the phone    PROCEDURES  None    LABORATORY  Lab Results   Component Value Date    SODIUM 138 08/27/2020    POTASSIUM 4.5 08/27/2020    CHLORIDE 100 08/27/2020    CO2 28 08/27/2020    GLUCOSE 118 (H) 08/27/2020    BUN 13 08/27/2020    CREATININE 0.55 08/27/2020    CREATININE 0.9 10/30/2007        Lab Results   Component Value Date    WBC 9.4 08/24/2020    HEMOGLOBIN 12.5 (L) 08/24/2020    HEMATOCRIT 39.6 (L) 08/24/2020    PLATELETCT 282 08/24/2020        Total time of the discharge process exceeds 40 minutes.

## 2020-08-28 NOTE — PROGRESS NOTES
Report received from night shift RN. Assume care. Pt. AAOx 3 pt is bed,  Assessment completed. VSS. C/o dizziness-prn will be given.  Denies pain,  Pt was update for the care for the day. White board updated, All question answered. Pt has call light within reach,  bed is in the lowest position. Pt has no other needs at this time.    1110 Report given to John [facility]ERN[facility]NDEZ [facility]

## 2020-08-28 NOTE — PROGRESS NOTES
Wife notified of pt transfer to [facility] tomorrow at 1200 she will come to floor to bring him clothes at 1115 tomorrow and google map at bedside for wife to [facility].

## 2020-08-28 NOTE — PROGRESS NOTES
Telemetry Shift Summary    Rhythm SR/AJR  HR Range 69-80  Ectopy oPVC, rTrig, rCoup  Measurements 0.10/0.08/0.40        Normal Values  Rhythm SR  HR Range    Measurements 0.12-0.20 / 0.06-0.10  / 0.30-0.52

## 2020-09-09 PROBLEM — J90 PLEURAL EFFUSION ON LEFT: Status: RESOLVED | Noted: 2020-01-01 | Resolved: 2020-01-01

## 2020-09-09 PROBLEM — I50.22 CHRONIC SYSTOLIC HEART FAILURE (HCC): Status: RESOLVED | Noted: 2020-01-01 | Resolved: 2020-01-01

## 2020-09-10 PROBLEM — I26.99 ACUTE PULMONARY EMBOLISM WITHOUT ACUTE COR PULMONALE (HCC): Status: ACTIVE | Noted: 2020-01-01

## 2020-09-10 PROBLEM — J90 PLEURAL EFFUSION: Status: ACTIVE | Noted: 2020-01-01

## 2020-09-10 PROBLEM — R41.3 MEMORY DEFICIT: Status: ACTIVE | Noted: 2020-01-01

## 2020-09-10 NOTE — ED TRIAGE NOTES
Comes in by ambulance, patient was at his ent office and they were tilting him when he noted acute onset of left sided cp with sob.  Recent admit for pleural effusion.

## 2020-09-10 NOTE — DISCHARGE PLANNING
Hospital Care Management Team Assessment     In the case of an emergency, pt's NOK is Genet Marin (wife) at [phone].     This RNCM spoke with patient at bedside, verified the accuracy of the facesheet, and obtained the information used in this assessment.      Pt lives with his wife in a single story house in [place], [place]. Prior to current hospitalization, pt was assisted with ADLS/IADLS by his wife and son. Pt states it's difficult getting dressed and tying his shoes. Pt uses a 2-wheel walker to assist with walking. Pt's son drives him to and from his appointments. Pt is currently reitired. Pt has prescription coverage, and uses the [facility] on [facility]. Pt's support system includes his son and his wife. Pt's discharge plan is TBD once medically cleared.           Information Source  Orientation : Oriented x 4  Information Given By: Patient  Informant's Name: Chucho Marin  Who is responsible for making decisions for patient? : Patient    Readmission Evaluation  Is this a readmission?: No    Elopement Risk  Legal Hold: No  Ambulatory or Self Mobile in Wheelchair: No-Not an Elopement Risk  Elopement Risk: Not at Risk for Elopement    Interdisciplinary Discharge Planning  Does Admitting Nurse Feel This Could be a Complex Discharge?: No  Primary Care Physician: Dr. Gibson Quintana  Lives with - Patient's Self Care Capacity: Spouse  Patient or legal guardian wants to designate a caregiver: No  Support Systems: Spouse / Significant Other  Housing / Facility: [address] [facility] [facility]  Do You Take your Prescribed Medications Regularly: Yes  Able to Return to Previous ADL's: Future Time w/Therapy  Mobility Issues: Yes  Prior Services: Intermittent Physical Support for ADL Per Family  Patient Prefers to be Discharged to:: TBD  Assistance Needed: Yes  Durable Medical Equipment: Walker    Discharge Preparedness  What is your plan after discharge?: Home with help  What are your discharge supports?: Spouse  Prior Functional Level:  Uses Walker, Needs Assist with Activities of Daily Living, Independent with Medication Management  Difficulity with ADLs: Dressing, Walking  Difficulty with ADLs Comment: Assisted by wife  Difficulity with IADLs: Cooking, Driving, Laundry  Difficulity with IADL Comments: Son drives    Functional Assesment  Prior Functional Level: Uses Walker, Needs Assist with Activities of Daily Living, Independent with Medication Management    Finances  Financial Barriers to Discharge: No  Prescription Coverage: Yes    Vision / Hearing Impairment  Vision Impairment : No  Hearing Impairment : Yes  Hearing Impairment: Both Ears, Right Ear(right ear completely deaf)  Does Pt Need Special Equipment for the Hearing Impaired?: No         Advance Directive  Advance Directive?: None  Advance Directive offered?: AD Booklet refused    Domestic Abuse  Have you ever been the victim of abuse or violence?: No  Physical Abuse or Sexual Abuse: No  Verbal Abuse or Emotional Abuse: No  Possible Abuse/Neglect Reported to:: Not Applicable    Psychological Assessment  History of Substance Abuse: None  History of Psychiatric Problems: No  Non-compliant with Treatment: No  Newly Diagnosed Illness: No    Discharge Risks or Barriers  Discharge risks or barriers?: Complex medical needs, Post-acute placement / services  Patient risk factors: Vulnerable adult, Complex medical needs

## 2020-09-10 NOTE — DIETARY
"Nutrition services: Day 0 of admit.  Chucho Marin is a 84 y.o. male with admitting DX of Chest pain, Pleuritic chest pain, Pleural effusion. DX includes vertigo, hyperlipidemia, hypothyroid.     Pt has a low BMI of 18.63.       Assessment:  Height: 180.3 cm (5' 11\")  Weight: 60.6 kg (133 lb 9.6 oz)  Body mass index is 18.63 kg/m²., BMI classification: low based on pt's age.   Diet/Intake: regular/50-75% at breakfast this morning    Evaluation:   1. During RD visit, pt reports a UBW of 165 lb before his hospitalizations in July of this year. Based on this weight, pt has lost 32 lb (19%) x 2 months. RD verified wt hx in Epic. Weight loss actually has been over a 3 month period. Weight loss is significant. Of note, some of pt's weight loss may be related to fluids with report of multiple thoracentesis during prior hospitalizations. Pt did not think that his PO intake has changed since then. He has been less active. Wt loss may be related to loss of muscle mass due to decreased activity. Prior to his hospitalizations in July he said that he and his wife would go out to the grocery store and run errands. Severe muscle loss noted in the temple region and fat loss in zygomatic arch with prominent cheekbones noted. He also has thin upper arms with loose skin.   2. Supplements and snacks offered but pt refused. He does not like supplements because they are too sweet. RD encouraged pt to eat at least 1/2 of his meals and focus on protein foods (meats, eggs, and dairy products). Dietary is visiting pt daily for meal orders and preferences.   3. Labs: 9/10/20: potassium=3.4 (L), glucose=110 (H), lipid panel WNL except HDL is < recommended at 25.   4. Meds: Pepcid, Lasix (held)    Malnutrition Risk: Severe malnutrition in the context of chronic disease which is most likely related to frequent illness leading to multiple hospitalizations since May and loss of muscle mass due to inactivity AEB weight loss of 19% x 3 months, " severe loss of muscle in temple region and upper arm, and fat loss in orbital region with pronounced cheekbones noted.     Recommendations/Plan:   1. Encourage intake of >50%  2. Document intake of all meals  as % taken in ADL's to provide interdisciplinary communication across all shifts.   3. Monitor weight.  4. Nutrition rep will continue to see patient for ongoing meal and snack preferences.     RD will monitor.

## 2020-09-10 NOTE — ASSESSMENT & PLAN NOTE
-Multiple episodes of exudative Left Recurrent pleural effusion and thoracentesis x 6  -Informs about 20 pound weight loss since June  -CT thorax(9/10)- Moderate Loculated pleural effusion more worse on the left but indiactes no pleural/parenchymal disease  -CT chest,abdomen,Pelvis - No evidence for Malignancy  -O2 saturations at base line and hemodynamically stable  -Possiible underlying pleural malignancy which has been undetected causing recurrent effusions  -Pulmonology following, appreciate recommendations    PLAN:  - Poor candidate for thoracoscopy and risks > benefits and a conservative approach is appropriate with no further invasive procedures. Continued thoracenteses seems of little benefit from a diagnostic or therapeutic perspective. Cytology has been now negative x 3, and his most recent thoracentesis caused a great deal of discomfort and provided little if any symptomatic relief.  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

## 2020-09-10 NOTE — CARE PLAN
Problem: Communication  Goal: The ability to communicate needs accurately and effectively will improve  Outcome: PROGRESSING AS EXPECTED  Intervention: [place] patient and significant other/support system to call light to alert staff of needs  Flowsheets (Taken 9/10/2020 1665)  Oriented to:: All of the Following : Location of Bathroom, Visiting Policy, Unit Routine, Call Light and Bedside Controls, Bedside Rail Policy, Smoking Policy, Rights and Responsibilities, Bedside Report, and Patient Education Notebook  Note: Pt oriented to the unit and unit routines such as bed side report, doctor rounding, and current visitor policy. Pt demonstrated verbal understanding of information provided and demonstrates ability to use call light appropriately.  Intervention: Reorient patient to environment as needed  Flowsheets (Taken 9/10/2020 7277)  Oriented to:: All of the Following : Location of Bathroom, Visiting Policy, Unit Routine, Call Light and Bedside Controls, Bedside Rail Policy, Smoking Policy, Rights and Responsibilities, Bedside Report, and Patient Education Notebook     Problem: Knowledge Deficit  Goal: Knowledge of disease process/condition, treatment plan, diagnostic tests, and medications will improve  Outcome: PROGRESSING AS EXPECTED  Intervention: Explain information regarding disease process/condition, treatment plan, diagnostic tests, and medications and document in education  Note: Pt educated regarding current disease process and the associated treatments, diagnostics and PLOC. Patient is agreeable to information provided and is an active participant in PLOC.

## 2020-09-10 NOTE — PROGRESS NOTES
Monitor summary:   SR 67-88    (R) PAC, trigem, 3-8 beats accelerated idioventricular     .18/.08/.44

## 2020-09-10 NOTE — ED PROVIDER NOTES
ED Provider Note    CHIEF COMPLAINT  Chief Complaint   Patient presents with   • Chest Pain   • Shortness of Breath       HPI  Chucho Marin is a 84 y.o. male who presents for evaluation of left-sided chest pain cough shortness of breath.  The patient has a complex medical history.  He has history of prior left-sided pleural effusion that required thoracentesis around 4 months ago.  He was actually at his ENTs being evaluated for some ringing in his left ear and possible labyrinthitis.  He appeared clinically ill and the physician called an ambulance to send him here.  He denies high fever or runny nose.  He reports left-sided chest pain with inspiration.  She denies leg swelling or hemoptysis.  No known COVID-19 exposures.  He denies headache or neck stiffness    REVIEW OF SYSTEMS  See HPI for further details.  Positive for left-sided chest pain chronic shortness of breath that is worsened no leg swelling hemoptysis no fever all other systems are negative.     PAST MEDICAL HISTORY  Past Medical History:   Diagnosis Date   • CHF (congestive heart failure) ([facility]) 5/29/2020   • CHEST PAIN 6/1/2011   • Back pain    • Bronchitis    • Daytime sleepiness    • Frequent urination    • Pleural effusion    • Ringing in ears    • Shortness of breath    • Sweat, sweating, excessive    • Wears glasses    • Weight loss        FAMILY HISTORY  Noncontributory    SOCIAL HISTORY  Social History     Socioeconomic History   • Marital status:      Spouse name: Not on file   • Number of children: Not on file   • Years of education: Not on file   • Highest education level: Not on file   Occupational History   • Not on file   Social Needs   • Financial resource strain: Not on file   • Food insecurity     Worry: Not on file     Inability: Not on file   • Transportation needs     Medical: Not on file     Non-medical: Not on file   Tobacco Use   • Smoking status: Never Smoker   • Smokeless tobacco: Never Used   Substance and Sexual  Activity   • Alcohol use: No   • Drug use: No   • Sexual activity: Not on file     Comment: na   Lifestyle   • Physical activity     Days per week: Not on file     Minutes per session: Not on file   • Stress: Not on file   Relationships   • Social connections     Talks on phone: Not on file     Gets together: Not on file     Attends Gnosticist service: Not on file     Active member of club or organization: Not on file     Attends meetings of clubs or organizations: Not on file     Relationship status: Not on file   • Intimate partner violence     Fear of current or ex partner: Not on file     Emotionally abused: Not on file     Physically abused: Not on file     Forced sexual activity: Not on file   Other Topics Concern   • Not on file   Social History Narrative   • Not on file       SURGICAL HISTORY  Past Surgical History:   Procedure Laterality Date   • PB REMV 2ND CATARACT,CORN-SCLER SECTN     • PROSTATECTOMY ROBOTIC     • TONSILLECTOMY         CURRENT MEDICATIONS  Home Medications    **Home medications have not yet been reviewed for this encounter**         ALLERGIES  No Known Allergies    PHYSICAL EXAM  VITAL SIGNS: Wt 67 kg (147 lb 11.3 oz)   BMI 20.60 kg/m²       Constitutional: Elderly, ill-appearing  HENT: Normocephalic, Atraumatic, Bilateral external ears normal, Oropharynx moist, No oral exudates, Nose normal.   Eyes: PERRLA, EOMI, Conjunctiva normal, No discharge.   Neck: Normal range of motion, No tenderness, Supple, No stridor.   Lymphatic: No lymphadenopathy noted.   Cardiovascular: Normal heart rate, Normal rhythm, No murmurs, No rubs, No gallops.   Thorax & Lungs: Diminished breath sounds on the left side, No chest tenderness.   Abdomen: Bowel sounds normal, Soft, No tenderness, No masses, No pulsatile masses.   Skin: Warm, Dry, No erythema, No rash.   Back: No tenderness, No CVA tenderness.   Extremities: Intact distal pulses, No edema, No tenderness, No cyanosis, No clubbing.   Neurologic: Alert &  oriented x 3, Normal motor function, Normal sensory function, No focal deficits noted.   Psychiatric: Affect normal, Judgment normal, Mood normal.     DX-CHEST-PORTABLE (1 VIEW)   Final Result      Stable left pleural effusion with associated compressive atelectasis.          Results for orders placed or performed during the hospital encounter of 09/09/20   CBC with Differential   Result Value Ref Range    WBC 17.7 (H) 4.8 - 10.8 K/uL    RBC 4.72 4.70 - 6.10 M/uL    Hemoglobin 14.3 14.0 - 18.0 g/dL    Hematocrit 44.8 42.0 - 52.0 %    MCV 94.9 81.4 - 97.8 fL    MCH 30.3 27.0 - 33.0 pg    MCHC 31.9 (L) 33.7 - 35.3 g/dL    RDW 48.0 35.9 - 50.0 fL    Platelet Count 403 164 - 446 K/uL    MPV 9.4 9.0 - 12.9 fL    Neutrophils-Polys 82.40 (H) 44.00 - 72.00 %    Lymphocytes 10.60 (L) 22.00 - 41.00 %    Monocytes 5.20 0.00 - 13.40 %    Eosinophils 0.80 0.00 - 6.90 %    Basophils 0.20 0.00 - 1.80 %    Immature Granulocytes 0.80 0.00 - 0.90 %    Nucleated RBC 0.00 /100 WBC    Neutrophils (Absolute) 14.56 (H) 1.82 - 7.42 K/uL    Lymphs (Absolute) 1.87 1.00 - 4.80 K/uL    Monos (Absolute) 0.92 (H) 0.00 - 0.85 K/uL    Eos (Absolute) 0.14 0.00 - 0.51 K/uL    Baso (Absolute) 0.04 0.00 - 0.12 K/uL    Immature Granulocytes (abs) 0.14 (H) 0.00 - 0.11 K/uL    NRBC (Absolute) 0.00 K/uL   Complete Metabolic Panel (CMP)   Result Value Ref Range    Sodium 139 135 - 145 mmol/L    Potassium 3.3 (L) 3.6 - 5.5 mmol/L    Chloride 95 (L) 96 - 112 mmol/L    Co2 28 20 - 33 mmol/L    Anion Gap 16.0 7.0 - 16.0    Glucose 153 (H) 65 - 99 mg/dL    Bun 21 8 - 22 mg/dL    Creatinine 0.90 0.50 - 1.40 mg/dL    Calcium 9.1 8.5 - 10.5 mg/dL    AST(SGOT) 20 12 - 45 U/L    ALT(SGPT) 14 2 - 50 U/L    Alkaline Phosphatase 93 30 - 99 U/L    Total Bilirubin 0.2 0.1 - 1.5 mg/dL    Albumin 3.3 3.2 - 4.9 g/dL    Total Protein 6.9 6.0 - 8.2 g/dL    Globulin 3.6 (H) 1.9 - 3.5 g/dL    A-G Ratio 0.9 g/dL   Troponin   Result Value Ref Range    Troponin T 10 6 - 19 ng/L    LACTIC ACID   Result Value Ref Range    Lactic Acid 2.0 0.5 - 2.0 mmol/L   proBrain Natriuretic Peptide, NT   Result Value Ref Range    NT-proBNP 178 (H) 0 - 125 pg/mL   ESTIMATED GFR   Result Value Ref Range    GFR If African American >60 >60 mL/min/1.73 m 2    GFR If Non African American >60 >60 mL/min/1.73 m 2   EKG   Result Value Ref Range    Report       [facility] Emergency Dept[facility]    Test Date:  2020  Pt Name:    MONTSERRAT ARAUJO                     Department: ER  MRN:        [id number]                      Room:        01  Gender:     Male                         Technician: [id number]  :        1935                   Requested By:ER TRIAGE PROTOCOL  Order #:    [id number]                    Reading MD: LEXIE RAMON MD    Measurements  Intervals                                Axis  Rate:       97                           P:          52  NC:         132                          QRS:        18  QRSD:       92                           T:          149  QT:         324  QTc:        412    Interpretive Statements  SINUS RHYTHM  PROBABLE LEFT ATRIAL ABNORMALITY  NONSPECIFIC T ABNORMALITIES, LATERAL LEADS  Compared to ECG 2020 14:34:42  No significant changes  Electronically Signed On 2020 17:26:59 PDT by LEXIE RAMON MD        EKG interpretation by me rate 97 sinus rhythm with elevated rate.  Nonspecific T wave flattening in lateral leads no acute ST segment elevation or depression no ectopy.  Intervals and axis are otherwise normal no pathological T wave inversions    COURSE & MEDICAL DECISION MAKING  Pertinent Labs & Imaging studies reviewed. (See chart for details)  Patient presents here with left-sided chest pain.  His EKG and troponin do not suggest ischemia.  I suspect his left-sided chest discomfort is from this left-sided pleural effusion which appears to be chronic in nature.  I reviewed the records from admission 4 to 5 months ago when he had it drained.   Apparently cytology for cancer was negative.  He has leukocytosis.  I suspect you could have gotten infected.  He has been tested for COVID 19 several times and has not left the house since his last hospitalization.  Repeat COVID testing was not performed as this appears to be chronic but potentially infected.  He has had increased oxygen requirement.  He does not appear to be septic.  He will be admitted to [place] internal [facility]    FINAL IMPRESSION  1.  Left-sided pleural effusion  2.  Atypical chest pain secondary to #1      Electronically signed by: Basilio Bender M.D., 9/9/2020 5:15 PM

## 2020-09-10 NOTE — PROGRESS NOTES
"Pharmacy Kinetics 84 y.o. male on vancomycin day # 1 9/10/2020    Currently on Vancomycin 1500 mg iv x1 (0300)  Provider specified end date: TBD    Indication for Treatment: pleural effusion with leukocytosis     Pertinent history per medical record: Admitted on 2020 for left-sided chest pain, cough, SOB with hx of pleural effusion.     Other antibiotics: Zosyn 3.375 iv q8h     Allergies: Patient has no known allergies.     List concerns for renal function: age, concurrent use of Zosyn     Pertinent cultures to date:    - blood cx pending     MRSA nares swab if pneumonia is a concern (ordered/positive/negative/n-a): ordered     Recent Labs     20  1705   WBC 17.7*   NEUTSPOLYS 82.40*     Recent Labs     20  1705   BUN 21   CREATININE 0.90   ALBUMIN 3.3     No results for input(s): VANCOTROUGH, VANCOPEAK, VANCORANDOM in the last 72 hours.    Intake/Output Summary (Last 24 hours) at 9/10/2020 0044  Last data filed at 2020 2200  Gross per 24 hour   Intake 600 ml   Output no documentation   Net 600 ml      Blood Pressure 105/65   Pulse 82   Temperature 36.9 °C (98.4 °F) (Temporal)   Respiration 16   Height 1.803 m (5' 11\")   Weight 60.6 kg (133 lb 9.6 oz)   Oxygen Saturation 98%  Temp (24hrs), Av.9 °C (98.4 °F), Min:36.9 °C (98.4 °F), Max:36.9 °C (98.4 °F)      A/P   1. Vancomycin dose change: new start vanco   2. Vancomycin maintenance dose: 1000 mg iv q24h (0600)  3. Next vancomycin level: TBD by rounding pharmacist pending clinical plan   4. Goal trough: 15-20 mcg/mL   5. Comments: Renal fxn appears stable. Pharmacy will continue to follow and recommend de-escalation of antibiotics as appropriate.      Ibis Jasmine, PharmD      "

## 2020-09-10 NOTE — ED NOTES
Report given to Gaby HERNÁNDEZ. Pt transferred to tele Lizzie baker/ Gaby on the monitor and oxygen. Belongings with pt.

## 2020-09-10 NOTE — ED NOTES
First set of cultures obtained and sent, and lactic obtained and sent. Pt attempted to use urinal and unable to use. Pt provided water. No other needs at this time.

## 2020-09-10 NOTE — CARE PLAN
Problem: Respiratory:  Goal: Respiratory status will improve  Outcome: PROGRESSING AS EXPECTED     Problem: Communication  Goal: The ability to communicate needs accurately and effectively will improve  Outcome: PROGRESSING AS EXPECTED     Problem: Safety  Goal: Will remain free from injury  Outcome: PROGRESSING AS EXPECTED

## 2020-09-10 NOTE — H&P
History & Physical Note    Date of Admission: 9/10/2020  Admission Status: Observation-Outpatient  [facility] Team: [facility] IM Yellow Team  Attending: Dr. Baker  Senior Resident: Dr. Carlson  Contact Number: [phone]    Chief Complaint: Chest Pain and Shortness of Breath       History of Present Illness (HPI): Pt is a 83 y/o male with a PMH of CHF, pleural effusion, vertigo, per his account 3-4 weeks had latest thoracentesis at [facility],presenting with a 3-4 day hx of chest pain. Chest pain is non-radiating, has been progressively worsening since onset , which he states was spontaneous (no precipitating event), and is accompanied by increased dyspnea. Symptoms are alleviated with rest and exacerbated by exertion and inspiration.Denies fever, chills, cough, nausea, vomiting. Did  not take meds for the chest pain. No recent hx of travels nor exposure to sick contacts.        Review of Systems: Review of Systems   Constitutional: Negative for chills and fever.   HENT: Negative for ear pain and tinnitus.    Eyes: Negative for double vision and photophobia.   Respiratory: Positive for shortness of breath. Negative for hemoptysis and sputum production.    Cardiovascular: Positive for chest pain. Negative for palpitations.   Gastrointestinal: Negative for abdominal pain and nausea.   Genitourinary: Negative for frequency and urgency.   Musculoskeletal: Negative for back pain and neck pain.   Skin: Negative for itching and rash.   Neurological: Negative for tingling and tremors.   Endo/Heme/Allergies: Negative for environmental allergies. Does not bruise/bleed easily.   Psychiatric/Behavioral: Negative for substance abuse and suicidal ideas.        Past Medical History:    has a past medical history of Back pain, Bronchitis, CHEST PAIN (6/1/2011), CHF (congestive heart failure) ([facility]) (5/29/2020), Daytime sleepiness, Frequent urination, Pleural effusion, Ringing in ears, Shortness of breath, Sweat, sweating, excessive, Wears  glasses, and Weight loss. He also has no past medical history of AAA (abdominal aortic aneurysm) ([facility]), Anemia, Atrial fibrillation (HCC), Blood transfusion, Cancer (HCC), Carotid artery disease (HCC), COPD, Diabetes, Heart attack (HCC), Hypertension, Kidney disease, or PVD (peripheral vascular disease) ([facility]).    Past Surgical History:  has a past surgical history that includes pr remv 2nd cataract,corn-scler sectn; prostatectomy robotic; and tonsillectomy.    Medications:   Prior to Admission Medications   Prescriptions Last Dose Informant Patient Reported? Taking?   Carboxymethylcellul-Glycerin (CLEAR EYES FOR DRY EYES) 1-0.25 % Solution  Patient Yes No   Sig: Place 1 Drop in both eyes as needed (For dry eye).   donepezil (ARICEPT) 5 MG Tab  Patient's Home Pharmacy Yes No   Sig: Take 5 mg by mouth every evening.   famotidine (PEPCID) 20 MG Tab   Yes No   Sig: Take 20 mg by mouth 2 times a day. Indications: Gastroesophageal Reflux Disease   fluticasone (FLONASE) 50 MCG/ACT nasal spray  Patient Yes No   Sig: Spray 1 Spray in nose every day.   furosemide (LASIX) 40 MG Tab  Patient's Home Pharmacy Yes No   Sig: Take 40 mg by mouth 2 Times a Day.   levothyroxine (SYNTHROID) 75 MCG Tab  Patient's Home Pharmacy Yes No   Sig: Take 75 mcg by mouth Every morning on an empty stomach.   meclizine (ANTIVERT) 25 MG Tab   No No   Sig: Take 1 Tab by mouth 3 times a day as needed for Dizziness.   metoprolol SR (TOPROL XL) 25 MG TABLET SR 24 HR  Patient's Home Pharmacy Yes No   Sig: Take 25 mg by mouth every day.   potassium Chloride ER (K-TAB) 20 MEQ Tab CR tablet  Patient's Home Pharmacy Yes No   Sig: Take 20 mEq by mouth 2 times a day.   pravastatin (PRAVACHOL) 20 MG Tab  Patient's Home Pharmacy Yes No   Sig: Take 20 mg by mouth every evening.      Facility-Administered Medications: None        Allergies: No Known Allergies    Sh- denies etoh, tobacco use, recreational drug use; lives with wife         Fh- says he thinks mother  and father both passed of heart attack    Primary Care Provider:  Gibson Quintana M.D.      Vitals:  Temp:  [36.9 °C (98.4 °F)] 36.9 °C (98.4 °F)  Pulse:  [82-92] 82  Resp:  [16-22] 16  BP: (105-126)/(65-72) 105/65  SpO2:  [90 %-98 %] 98 %    Physical Exam  Constitutional:       Appearance: Normal appearance.   HENT:      Head: Normocephalic and atraumatic.      Right Ear: Tympanic membrane normal.      Left Ear: Tympanic membrane normal.      Nose: Nose normal.      Mouth/Throat:      Mouth: Mucous membranes are moist.      Pharynx: Oropharynx is clear.   Eyes:      Extraocular Movements: Extraocular movements intact.      Conjunctiva/sclera: Conjunctivae normal.      Pupils: Pupils are equal, round, and reactive to light.   Neck:      Musculoskeletal: No neck rigidity or muscular tenderness.   Cardiovascular:      Rate and Rhythm: Normal rate.      Heart sounds: No murmur. No gallop.    Pulmonary:      Effort: No respiratory distress.      Breath sounds: No stridor. No wheezing.      Comments: Dull to percuss  Abdominal:      General: There is no distension.      Tenderness: There is no abdominal tenderness. There is no rebound.   Musculoskeletal:         General: No deformity or signs of injury.   Skin:     Coloration: Skin is not jaundiced or pale.   Neurological:      Mental Status: He is alert.      Motor: No weakness.      Coordination: Coordination normal.         Labs:   Results for orders placed or performed during the hospital encounter of 09/09/20   CBC with Differential   Result Value Ref Range    WBC 17.7 (H) 4.8 - 10.8 K/uL    RBC 4.72 4.70 - 6.10 M/uL    Hemoglobin 14.3 14.0 - 18.0 g/dL    Hematocrit 44.8 42.0 - 52.0 %    MCV 94.9 81.4 - 97.8 fL    MCH 30.3 27.0 - 33.0 pg    MCHC 31.9 (L) 33.7 - 35.3 g/dL    RDW 48.0 35.9 - 50.0 fL    Platelet Count 403 164 - 446 K/uL    MPV 9.4 9.0 - 12.9 fL    Neutrophils-Polys 82.40 (H) 44.00 - 72.00 %    Lymphocytes 10.60 (L) 22.00 - 41.00 %    Monocytes 5.20  0.00 - 13.40 %    Eosinophils 0.80 0.00 - 6.90 %    Basophils 0.20 0.00 - 1.80 %    Immature Granulocytes 0.80 0.00 - 0.90 %    Nucleated RBC 0.00 /100 WBC    Neutrophils (Absolute) 14.56 (H) 1.82 - 7.42 K/uL    Lymphs (Absolute) 1.87 1.00 - 4.80 K/uL    Monos (Absolute) 0.92 (H) 0.00 - 0.85 K/uL    Eos (Absolute) 0.14 0.00 - 0.51 K/uL    Baso (Absolute) 0.04 0.00 - 0.12 K/uL    Immature Granulocytes (abs) 0.14 (H) 0.00 - 0.11 K/uL    NRBC (Absolute) 0.00 K/uL   Complete Metabolic Panel (CMP)   Result Value Ref Range    Sodium 139 135 - 145 mmol/L    Potassium 3.3 (L) 3.6 - 5.5 mmol/L    Chloride 95 (L) 96 - 112 mmol/L    Co2 28 20 - 33 mmol/L    Anion Gap 16.0 7.0 - 16.0    Glucose 153 (H) 65 - 99 mg/dL    Bun 21 8 - 22 mg/dL    Creatinine 0.90 0.50 - 1.40 mg/dL    Calcium 9.1 8.5 - 10.5 mg/dL    AST(SGOT) 20 12 - 45 U/L    ALT(SGPT) 14 2 - 50 U/L    Alkaline Phosphatase 93 30 - 99 U/L    Total Bilirubin 0.2 0.1 - 1.5 mg/dL    Albumin 3.3 3.2 - 4.9 g/dL    Total Protein 6.9 6.0 - 8.2 g/dL    Globulin 3.6 (H) 1.9 - 3.5 g/dL    A-G Ratio 0.9 g/dL   Troponin   Result Value Ref Range    Troponin T 10 6 - 19 ng/L   LACTIC ACID   Result Value Ref Range    Lactic Acid 2.0 0.5 - 2.0 mmol/L   proBrain Natriuretic Peptide, NT   Result Value Ref Range    NT-proBNP 178 (H) 0 - 125 pg/mL   PROCALCITONIN   Result Value Ref Range    Procalcitonin 0.18 <0.25 ng/mL   ESTIMATED GFR   Result Value Ref Range    GFR If African American >60 >60 mL/min/1.73 m 2    GFR If Non African American >60 >60 mL/min/1.73 m 2   Magnesium   Result Value Ref Range    Magnesium 2.1 1.5 - 2.5 mg/dL   EKG   Result Value Ref Range    Report       [facility] Emergency Dept.    Test Date:  2020  Pt Name:    MONTSERRAT ARAUJO                     Department: ER  MRN:        [id number]                      Room:       [facility]  Gender:     Male                         Technician: [id number]  :        1935                   Requested  By:ER TRIAGE PROTOCOL  Order #:    [id number]                    Reading MD: LEXIE RAMON MD    Measurements  Intervals                                Axis  Rate:       97                           P:          52  IL:         132                          QRS:        18  QRSD:       92                           T:          149  QT:         324  QTc:        412    Interpretive Statements  SINUS RHYTHM  PROBABLE LEFT ATRIAL ABNORMALITY  NONSPECIFIC T ABNORMALITIES, LATERAL LEADS  Compared to ECG 2020 14:34:42  No significant changes  Electronically Signed On 2020 17:26:59 PDT by LEXIE RAMON MD          EKG:   Results for orders placed or performed during the hospital encounter of 20   EKG   Result Value Ref Range    Report       [facility] Emergency Dept.    Test Date:  2020  Pt Name:    MONTSERRAT ARAUJO                     Department: ER  MRN:        [id number]                      Room:        01  Gender:     Male                         Technician: [id number]  :        1935                   Requested By:ER TRIAGE PROTOCOL  Order #:    [id number]                    Reading MD: LEXIE RAMON MD    Measurements  Intervals                                Axis  Rate:       97                           P:          52  IL:         132                          QRS:        18  QRSD:       92                           T:          149  QT:         324  QTc:        412    Interpretive Statements  SINUS RHYTHM  PROBABLE LEFT ATRIAL ABNORMALITY  NONSPECIFIC T ABNORMALITIES, LATERAL LEADS  Compared to ECG 2020 14:34:42  No significant changes  Electronically Signed On 2020 17:26:59 PDT by LEXIE RAMON MD          Imaging:   DX-CHEST-PORTABLE (1 VIEW)   Final Result      Stable left pleural effusion with associated compressive atelectasis.           Previous Data Review: Reviewed    Pleural effusion  Assessment & Plan  Pt is an 83 y/o male with a PMH of CHF, pleural  effusion, vertigo, per his account 3-4 weeks had latest thoracentesis at [facility],presenting with a 3-4 day hx of chest pain,  CXR done showing stable left pleural effusion with associated compressive atelectasis, leukocytosis of 17.7, procalcitonin wnl, BNP of 178, troponin of 10, given zosyn in ED, being admitted for pleural effusion with potential infection.    Plan:  Abx: zosyn and vancomycin  F/u bcx  MIVF  Telemetry monitoring  Repeat ekg and troponin stat in event of newly occurring chest pain  CTM CBC wbc trend while also following up with U/A should leukocytosis be explained by urinary etiology  Incentive spirometry  Low threshold for thoracentesis if dyspnea worsens or becomes septic  Continue home lasix for diuresis    Vertigo- (present on admission)  Assessment & Plan  PMH of vertigo    Plan:  Continue home meclizine    HLD (hyperlipidemia)- (present on admission)  Assessment & Plan  PMH of HLD    Plan:  Continue home statin    Hypothyroidism- (present on admission)  Assessment & Plan  PMH of hypothryoidism    Plan:  Continue home levothyroxine

## 2020-09-10 NOTE — CONSULTS
Pulmonary Consultation    Date of consult: 9/10/2020    Referring Physician  Mery Baker M.D.    Reason for Consultation  Recurrent left-sided exudative pleural effusion    History of Presenting Illness  Desmond Marin is an 84 y.o. male who presented 9/9/2020 with left-sided chest pain and worsening shortness of breath.  The patient is well-known to me from his hospitalization in 5/2020 when he initially presented with a symptomatic left-sided pleural effusion that was exudative in nature.  At that time, fluid was brown, cloudy wbc 484, rbc 5000. TP 4, glucose 138, fluid  and ph 8. Cx negative. Path suggestive of reactive mesothelial cells. Fluid meets exudative criteria by LDH alone.  Fluid reaccumulated despite diuresis, repeat thoracentesis 6/3, exudative by all 3 lights criteria. Cultures have consistently been negative and cytology showed atypical cells but nothing definitive of malignancy.  Screening serologies have further been unrevealing. A repeat CT chest has shown reactive lymphadenopathy with no definitive pulmonary parenchymal or pleural abnormalities.  He has lost about 15 pounds since 2019.  Since his last visit with me in clinic last month he appears to have lost another 13 pounds.    The repeated reaccumulation of the effusion, lymphocyte predominance, and weight loss are all concerning for underlying pleural malignancy. He followed with me in the pulmonary clinic as well as with his cardiologist Dr. Rosenberg at [place]. We have had multiple discussions regarding management, particularly about the concern for malignancy, with the agreement that he is a less than ideal candidate for thoracoscopy. Pt and Dr Rosenberg were in agreement that continued drainage with cytology would be pursued as long as he has symptomatic benefit. Of note he has declined Pleurx catheter placement in the past.      He presents today for worsening left-sided pleuritic chest pain has been progressively worsening over  the last several days prior to admission.  Supplemental oxygen requirements are stable at baseline of 2-3L at rest.  Has a mild leukocytosis on presentation but otherwise is nontoxic-appearing.  He continues to have short-term memory difficulties which were noted during his clinic visits with me as well.     Repeat CT chest with contrast was performed this hospitalization shows reaccumulation of the large left-sided effusion and incidentally identified new right-sided pulmonary embolism.    Code Status  Full Code    Review of Systems  Review of Systems   Constitutional: Negative for chills, fever and weight loss.   HENT: Negative for congestion, sinus pain and sore throat.    Eyes: Negative for pain and discharge.   Respiratory: Positive for shortness of breath. Negative for cough, sputum production, wheezing and stridor.    Cardiovascular: Positive for chest pain (left sided). Negative for orthopnea and leg swelling.   Gastrointestinal: Negative for abdominal pain, diarrhea, nausea and vomiting.   Genitourinary: Negative for dysuria, frequency and urgency.   Musculoskeletal: Negative for myalgias.   Skin: Negative for rash.   Neurological: Negative for dizziness, sensory change, focal weakness, loss of consciousness and headaches.   Psychiatric/Behavioral: Negative.    All other systems reviewed and are negative.      Past Medical History   has a past medical history of Back pain, Bronchitis, CHEST PAIN (6/1/2011), CHF (congestive heart failure) (Formerly [facility]) (5/29/2020), Daytime sleepiness, Frequent urination, Pleural effusion, Ringing in ears, Shortness of breath, Sweat, sweating, excessive, Wears glasses, and Weight loss. He also has no past medical history of AAA (abdominal aortic aneurysm) (Formerly [facility]), Anemia, Atrial fibrillation ([facility]ly [facility]), Blood transfusion, Cancer ([facility]ly [facility]), Carotid artery disease (HCC), COPD, Diabetes, Heart attack ([facility]ly [facility]), Hypertension, Kidney disease, or PVD (peripheral vascular disease) (Formerly [facility]).    Surgical  History   has a past surgical history that includes pr remv 2nd cataract,corn-scler sectn; prostatectomy robotic; and tonsillectomy.    Family History  Family history reviewed and no significant conditions or diseases relevant to the chief complaint were identified    Social History   reports that he has never smoked. He has never used smokeless tobacco. He reports that he does not drink alcohol or use drugs.    Medications  Home Medications     Reviewed by Domenica Graves R.N. (Registered Nurse) on 09/09/20 at 2245  Med List Status: Unable to Obtain   Medication Last Dose Status   Carboxymethylcellul-Glycerin (CLEAR EYES FOR DRY EYES) 1-0.25 % Solution 9/9/2020 Active   donepezil (ARICEPT) 5 MG Tab 9/9/2020 Active   famotidine (PEPCID) 20 MG Tab 9/9/2020 Active   fluticasone (FLONASE) 50 MCG/ACT nasal spray 9/9/2020 Active   furosemide (LASIX) 40 MG Tab 9/9/2020 Active   levothyroxine (SYNTHROID) 75 MCG Tab 9/9/2020 Active   metoprolol SR (TOPROL XL) 25 MG TABLET SR 24 HR 9/9/2020 Active   potassium Chloride ER (K-TAB) 20 MEQ Tab CR tablet 9/9/2020 Active   pravastatin (PRAVACHOL) 20 MG Tab 9/9/2020 Active              Current Facility-Administered Medications   Medication Dose Route Frequency Provider Last Rate Last Dose   • heparin infusion 25,000 units in 500 mL 0.45% NACL  0-30 Units/kg/hr Intravenous Continuous Irina Carlson M.D. 21.8 mL/hr at 09/10/20 1710 18 Units/kg/hr at 09/10/20 1710   • heparin injection 2,400 Units  40 Units/kg Intravenous PRN Irina Carlson M.D.       • enalaprilat (VASOTEC) injection 1.25 mg  1.25 mg Intravenous Q6HRS PRN Cecy Hill M.D.       • donepezil (ARICEPT) tablet 5 mg  5 mg Oral Nightly Cecy Hill M.D.       • famotidine (PEPCID) tablet 20 mg  20 mg Oral BID Cecy Hill M.D.   20 mg at 09/10/20 1715   • fluticasone (FLONASE) nasal spray 50 mcg  1 [place] Nasal DAILY Cecy Hill M.D.   50 mcg at 09/10/20 0603   • [Held by provider] furosemide  (LASIX) tablet 40 mg  40 mg Oral BID Cecy Hill M.D.       • levothyroxine (SYNTHROID) tablet 75 mcg  75 mcg Oral AM ES Cecy Hill M.D.   75 mcg at 09/10/20 0603   • metoprolol SR (TOPROL XL) tablet 25 mg  25 mg Oral DAILY Cecy Hill M.D.   25 mg at 09/10/20 0602   • pravastatin (PRAVACHOL) tablet 20 mg  20 mg Oral Nightly Cecy Hill M.D.           Allergies  No Known Allergies    Vital Signs last 24 hours  Temp:  [36.3 °C (97.4 °F)-37.3 °C (99.1 °F)] 36.7 °C (98.1 °F)  Pulse:  [63-92] 67  Resp:  [16-22] 17  BP: ()/(57-76) 98/58  SpO2:  [90 %-98 %] 94 %    Physical Exam  Physical Exam  Vitals signs and nursing note reviewed.   Constitutional:       General: He is not in acute distress.     Appearance: He is well-developed. He is not ill-appearing or diaphoretic.      Comments: Thin, gaunt appearing   HENT:      Head: Normocephalic and atraumatic.      Nose: Nose normal.      Mouth/Throat:      Mouth: Mucous membranes are dry.      Pharynx: No oropharyngeal exudate.   Eyes:      General: No scleral icterus.        Right eye: No discharge.         Left eye: No discharge.      Conjunctiva/sclera: Conjunctivae normal.      Pupils: Pupils are equal, round, and reactive to light.   Neck:      Musculoskeletal: Normal range of motion and neck supple.      Thyroid: No thyromegaly.      Vascular: No JVD.      Trachea: No tracheal deviation.   Cardiovascular:      Rate and Rhythm: Normal rate and regular rhythm.      Heart sounds: Normal heart sounds. No murmur.   Pulmonary:      Effort: Pulmonary effort is normal. No respiratory distress.      Breath sounds: Normal breath sounds. No stridor. No wheezing or rales.      Comments: Decreased BS post left  Abdominal:      General: Bowel sounds are normal. There is no distension.      Palpations: Abdomen is soft.      Tenderness: There is no abdominal tenderness. There is no guarding.   Musculoskeletal: Normal range of motion.         General: No  tenderness.      Right lower leg: No edema.      Left lower leg: No edema.   Lymphadenopathy:      Cervical: No cervical adenopathy.   Skin:     General: Skin is warm and dry.      Capillary Refill: Capillary refill takes less than 2 seconds.      Coloration: Skin is not pale.      Findings: No erythema.   Neurological:      General: No focal deficit present.      Mental Status: He is alert and oriented to person, place, and time.      Sensory: No sensory deficit.      Motor: No abnormal muscle tone.      Coordination: Coordination normal.      Deep Tendon Reflexes: Reflexes normal.   Psychiatric:         Mood and Affect: Mood normal.         Behavior: Behavior normal.         Thought Content: Thought content normal.         Judgment: Judgment normal.       Fluids    Intake/Output Summary (Last 24 hours) at 9/10/2020 1734  Last data filed at 9/10/2020 1600  Gross per 24 hour   Intake 1320 ml   Output 300 ml   Net 1020 ml       Laboratory  Recent Results (from the past 48 hour(s))   CBC with Differential    Collection Time: 09/09/20  5:05 PM   Result Value Ref Range    WBC 17.7 (H) 4.8 - 10.8 K/uL    RBC 4.72 4.70 - 6.10 M/uL    Hemoglobin 14.3 14.0 - 18.0 g/dL    Hematocrit 44.8 42.0 - 52.0 %    MCV 94.9 81.4 - 97.8 fL    MCH 30.3 27.0 - 33.0 pg    MCHC 31.9 (L) 33.7 - 35.3 g/dL    RDW 48.0 35.9 - 50.0 fL    Platelet Count 403 164 - 446 K/uL    MPV 9.4 9.0 - 12.9 fL    Neutrophils-Polys 82.40 (H) 44.00 - 72.00 %    Lymphocytes 10.60 (L) 22.00 - 41.00 %    Monocytes 5.20 0.00 - 13.40 %    Eosinophils 0.80 0.00 - 6.90 %    Basophils 0.20 0.00 - 1.80 %    Immature Granulocytes 0.80 0.00 - 0.90 %    Nucleated RBC 0.00 /100 WBC    Neutrophils (Absolute) 14.56 (H) 1.82 - 7.42 K/uL    Lymphs (Absolute) 1.87 1.00 - 4.80 K/uL    Monos (Absolute) 0.92 (H) 0.00 - 0.85 K/uL    Eos (Absolute) 0.14 0.00 - 0.51 K/uL    Baso (Absolute) 0.04 0.00 - 0.12 K/uL    Immature Granulocytes (abs) 0.14 (H) 0.00 - 0.11 K/uL    NRBC (Absolute)  0.00 K/uL   Complete Metabolic Panel (CMP)    Collection Time: 20  5:05 PM   Result Value Ref Range    Sodium 139 135 - 145 mmol/L    Potassium 3.3 (L) 3.6 - 5.5 mmol/L    Chloride 95 (L) 96 - 112 mmol/L    Co2 28 20 - 33 mmol/L    Anion Gap 16.0 7.0 - 16.0    Glucose 153 (H) 65 - 99 mg/dL    Bun 21 8 - 22 mg/dL    Creatinine 0.90 0.50 - 1.40 mg/dL    Calcium 9.1 8.5 - 10.5 mg/dL    AST(SGOT) 20 12 - 45 U/L    ALT(SGPT) 14 2 - 50 U/L    Alkaline Phosphatase 93 30 - 99 U/L    Total Bilirubin 0.2 0.1 - 1.5 mg/dL    Albumin 3.3 3.2 - 4.9 g/dL    Total Protein 6.9 6.0 - 8.2 g/dL    Globulin 3.6 (H) 1.9 - 3.5 g/dL    A-G Ratio 0.9 g/dL   Troponin    Collection Time: 20  5:05 PM   Result Value Ref Range    Troponin T 10 6 - 19 ng/L   LACTIC ACID    Collection Time: 20  5:05 PM   Result Value Ref Range    Lactic Acid 2.0 0.5 - 2.0 mmol/L   proBrain Natriuretic Peptide, NT    Collection Time: 20  5:05 PM   Result Value Ref Range    NT-proBNP 178 (H) 0 - 125 pg/mL   PROCALCITONIN    Collection Time: 20  5:05 PM   Result Value Ref Range    Procalcitonin 0.18 <0.25 ng/mL   ESTIMATED GFR    Collection Time: 20  5:05 PM   Result Value Ref Range    GFR If African American >60 >60 mL/min/1.73 m 2    GFR If Non African American >60 >60 mL/min/1.73 m 2   EKG    Collection Time: 20  5:05 PM   Result Value Ref Range    Report       [facility] Emergency Dept.    Test Date:  2020  Pt Name:    MONTSERRAT ARAUJO                     Department: ER  MRN:        [id number]                      Room:        01  Gender:     Male                         Technician: [id number]  :        1935                   Requested By:ER TRIAGE PROTOCOL  Order #:    [id number]                    Reading MD: LEXIE RAMON MD    Measurements  Intervals                                Axis  Rate:       97                           P:          52  ND:         132                          QRS:         18  QRSD:       92                           T:          149  QT:         324  QTc:        412    Interpretive Statements  SINUS RHYTHM  PROBABLE LEFT ATRIAL ABNORMALITY  NONSPECIFIC T ABNORMALITIES, LATERAL LEADS  Compared to ECG 08/23/2020 14:34:42  No significant changes  Electronically Signed On 9-9-2020 17:26:59 PDT by LEXIE RAMON MD     Magnesium    Collection Time: 09/09/20  8:13 PM   Result Value Ref Range    Magnesium 2.1 1.5 - 2.5 mg/dL   CBC with Differential    Collection Time: 09/10/20  6:11 AM   Result Value Ref Range    WBC 11.3 (H) 4.8 - 10.8 K/uL    RBC 3.83 (L) 4.70 - 6.10 M/uL    Hemoglobin 11.5 (L) 14.0 - 18.0 g/dL    Hematocrit 36.0 (L) 42.0 - 52.0 %    MCV 94.0 81.4 - 97.8 fL    MCH 30.0 27.0 - 33.0 pg    MCHC 31.9 (L) 33.7 - 35.3 g/dL    RDW 47.4 35.9 - 50.0 fL    Platelet Count 337 164 - 446 K/uL    MPV 9.6 9.0 - 12.9 fL    Neutrophils-Polys 78.20 (H) 44.00 - 72.00 %    Lymphocytes 11.80 (L) 22.00 - 41.00 %    Monocytes 7.50 0.00 - 13.40 %    Eosinophils 1.30 0.00 - 6.90 %    Basophils 0.40 0.00 - 1.80 %    Immature Granulocytes 0.80 0.00 - 0.90 %    Nucleated RBC 0.00 /100 WBC    Neutrophils (Absolute) 8.81 (H) 1.82 - 7.42 K/uL    Lymphs (Absolute) 1.33 1.00 - 4.80 K/uL    Monos (Absolute) 0.84 0.00 - 0.85 K/uL    Eos (Absolute) 0.15 0.00 - 0.51 K/uL    Baso (Absolute) 0.04 0.00 - 0.12 K/uL    Immature Granulocytes (abs) 0.09 0.00 - 0.11 K/uL    NRBC (Absolute) 0.00 K/uL   Comp Metabolic Panel (CMP)    Collection Time: 09/10/20  6:11 AM   Result Value Ref Range    Sodium 142 135 - 145 mmol/L    Potassium 3.4 (L) 3.6 - 5.5 mmol/L    Chloride 100 96 - 112 mmol/L    Co2 31 20 - 33 mmol/L    Anion Gap 11.0 7.0 - 16.0    Glucose 110 (H) 65 - 99 mg/dL    Bun 16 8 - 22 mg/dL    Creatinine 0.72 0.50 - 1.40 mg/dL    Calcium 8.8 8.5 - 10.5 mg/dL    AST(SGOT) 8 (L) 12 - 45 U/L    ALT(SGPT) 6 2 - 50 U/L    Alkaline Phosphatase 73 30 - 99 U/L    Total Bilirubin 0.3 0.1 - 1.5 mg/dL    Albumin 2.7  (L) 3.2 - 4.9 g/dL    Total Protein 5.7 (L) 6.0 - 8.2 g/dL    Globulin 3.0 1.9 - 3.5 g/dL    A-G Ratio 0.9 g/dL   Lipid Profile (Lipid Panel) Fasting    Collection Time: 09/10/20  6:11 AM   Result Value Ref Range    Cholesterol,Tot 100 100 - 199 mg/dL    Triglycerides 103 0 - 149 mg/dL    HDL 25 (A) >=40 mg/dL    LDL 54 <100 mg/dL   ESTIMATED GFR    Collection Time: 09/10/20  6:11 AM   Result Value Ref Range    GFR If African American >60 >60 mL/min/1.73 m 2    GFR If Non African American >60 >60 mL/min/1.73 m 2   COVID/SARS CoV-2 PCR    Collection Time: 09/10/20  6:12 AM    Specimen: Nasopharyngeal; Respirate   Result Value Ref Range    COVID Order Status Received    SARS-CoV-2, PCR (In-House)    Collection Time: 09/10/20  6:12 AM   Result Value Ref Range    SARS-CoV-2 Source NP Swab     SARS-CoV-2 by PCR NotDetected    URINALYSIS    Collection Time: 09/10/20  2:50 PM    Specimen: Urine, Clean Catch   Result Value Ref Range    Color Yellow     Character Clear     Specific Gravity 1.023 <1.035    Ph 5.0 5.0 - 8.0    Glucose Negative Negative mg/dL    Ketones Negative Negative mg/dL    Protein Negative Negative mg/dL    Bilirubin Negative Negative    Urobilinogen, Urine 1.0 Negative    Nitrite Negative Negative    Leukocyte Esterase Negative Negative    Occult Blood Negative Negative    Micro Urine Req see below    aPTT    Collection Time: 09/10/20  4:26 PM   Result Value Ref Range    APTT 35.3 24.7 - 36.0 sec   Prothrombin Time    Collection Time: 09/10/20  4:26 PM   Result Value Ref Range    PT 14.8 (H) 12.0 - 14.6 sec    INR 1.13 0.87 - 1.13   Heparin Xa (Unfractionated)    Collection Time: 09/10/20  4:26 PM   Result Value Ref Range    Heparin Xa (UFH) <0.10 IU/mL     Imaging  CT-CHEST (THORAX) WITH   Final Result         1. New segmental and subsegmental pulmonary emboli in the right lower lobe.      2. Moderate loculated left pleural effusion, worse than prior.      3. Patchy linear opacity in the left lung,  likely atelectasis.            CRITICAL RESULT READ BACK: Preliminary findings discussed with and critical read back performed by Dr. NATANAEL RAZO via telephone on 9/10/2020 3:12 PM      DX-CHEST-PORTABLE (1 VIEW)   Final Result      Stable left pleural effusion with associated compressive atelectasis.      US-EXTREMITY VENOUS LOWER BILAT    (Results Pending)   MR-BRAIN-WITH    (Results Pending)     CT chest with contrast from today personally reviewed, showing reaccumulation of the left-sided pleural effusion, streaky linear opacity in the left lung with no definitive mass, and new segmental and subsegmental pulmonary emboli in the right pulmonary artery.  Imaging reviewed with radiology    Assessment/Plan    * Recurrent pleural effusion on left  Assessment & Plan  Since 5/2020, has been tapped x 6 (mostly as outpatient at [facility]), consistently exudative, cultures and cytology negative  JEANIE 1:160 not of clinical significance, JEANIE subsets negative, IGRA, RA, CCP negative  Continues to reaccumulate despite diuresis  CT chest without definitive pleural or parenchymal disease, but suspicion remains high given weight loss and lymphocytic predominant effusion at 51%  CT abdomen/pelvis previously showed non-specific homogenous liver hypodensity, planned for outpatient MRI    -- hold on thoracentesis right now- while symptomatic due to pleurisy, supplemental O2 requirements are stable at baseline  -- I have reached out to pt PCP Dr Quintana and Dr Rosenberg (pt cardiologist) to discuss options; as he continues to reaccumulate despite diuresis and scheduled thoracenteses, need to consider pleurex (which pt has declined in past) or thoracoscopy (high-risk patient).  -- I have also asked Dr Leslie with thoracic surgery to review imaging and provide his input from a surgical perspective re: thoracoscopy.  -- have requested records from most recent pleural fluid studies from [facility] and liver MRI results from PCP.    Acute  pulmonary embolism without acute cor pulmonale (HCC)  Assessment & Plan  - no evidence of RV strain on CT  - Hemodynamically stable and supplemental O2 requirement stable  - Start heparin gtt, discussed with resident team  - DVT LE extremity ultrasound ordered    Chronic hypoxemic respiratory failure (HCC)- (present on admission)  Assessment & Plan  Due to pleural effusion and PE  -Previously had MINIMAL symptomatic relief after thoracentesis  -Continues to require 2 L at rest, 3 L with ambulation, stable despite PE    Memory difficulties- (present on admission)  Assessment & Plan  - Long term memory intact but short term poor, cannot recall our office visits  - ? intracranial pathology/malignancy  - MRI brain ordered    Discussed patient condition and risk of morbidity and/or mortality with RN, residents, thoracic surgery and radiology.    This note was generated using voice recognition software which has a chance of producing errors of grammar and content.  I have made every reasonable attempt to find and correct any errors, but it should be expected that some may not be found prior to finalization of this note.  __________  Michel Martinez MD  Pulmonary and Critical Care Medicine  [facility]

## 2020-09-10 NOTE — PROGRESS NOTES
2 RN Skin Check    2 RN skin check complete with BETTY Griffin.   Devices in place: Nasal Cannula.  Skin assessed under devices: yes.  Confirmed pressure ulcers found on: n/a.   - Bilateral ears and sacrum pink blanching.    - Bilateral heels dry and flaky.   New potential pressure ulcers noted on n/a. Wound consult placed No.  The following interventions in place Pillows, Lotion, Waffle bed overlay and Waffle chair cushion.

## 2020-09-10 NOTE — RESPIRATORY CARE
COPD EDUCATION by COPD CLINICAL EDUCATOR  9/10/2020 at 6:49 AM by Parul Lynn, CHANDU     Patient reviewed by COPD education team. Patient does not have a history or diagnosis of COPD and is a non-smoker, therefore does not qualify for the COPD program.

## 2020-09-10 NOTE — PROGRESS NOTES
Assumed care of patient at 0715, received bedside report from night shift RN. Bed is locked and in lowest position with call light within reach. Treaded socks in place. Patient updated on plan of care, no complaints or pain at this time. White board updated. Pt A&O3, disoriented to date. Patient's breathing pattern is unlabored. Tele monitor in place and cardiac rhythm being monitored. All needs met at this time.

## 2020-09-10 NOTE — PROGRESS NOTES
Pt transferred to floor by BETTY Griffin. Pt is AxO3, disoriented to exact date although knows it is September 2020. Pt VSS on 2L NC. Pt hard of hearing in right ear. No hearing aids available.  Pt updated on POC, educated on use of call light. All fall precautions in place and yellow fall risk band placed on patient. Will continue to monitor.

## 2020-09-11 PROBLEM — R41.3 MEMORY DEFICIT: Status: RESOLVED | Noted: 2020-01-01 | Resolved: 2020-01-01

## 2020-09-11 NOTE — ASSESSMENT & PLAN NOTE
- Long term memory intact but short term poor, cannot recall our office visits  - MRI 9/11: No acute intracranial abnormality or pathologic enhancement.

## 2020-09-11 NOTE — ASSESSMENT & PLAN NOTE
-Baseline O2 requirements - 3L O2  -Secondary to Recurrent pleural effusions and now PE  -Currently on Room Air  -Will monitor

## 2020-09-11 NOTE — PROGRESS NOTES
Assumed care of PT. A&O 4. Pt resting in bed with no signs of labored breathing. On 2L NC. Tele monitor in place, cardiac rhythm being monitored. Call light within reach, bed in lowest position, upper bed rails up. Pt was updated on plan of care for the day. Will continue to monitor.

## 2020-09-11 NOTE — CARE PLAN
Problem: Safety  Goal: Will remain free from falls  Outcome: PROGRESSING AS EXPECTED  Note: Pt mobility assessed at beginning of shift. Pt is standby assist. Fall precautions in place. Non-slip socks on. Bed in lowest locked position. Bed alarm on. Call light within reach. Pt educated to call for assistance and verbalizes understanding.      Problem: Knowledge Deficit  Goal: Knowledge of disease process/condition, treatment plan, diagnostic tests, and medications will improve  Outcome: PROGRESSING AS EXPECTED  Note: Pt educated about disease process. Reason why medications are taken. And informed about treatment plan.

## 2020-09-11 NOTE — ASSESSMENT & PLAN NOTE
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  We will have a conservative approach and close monitoring as an outpatient.   -- RUQ reassuring as are tumor markers  -- AFP and PSA pending       -- Breathing comfortably at rest on room air and with ambulation, stable       -- PT/OT recommending HH  -- pt already scheduled to f/u with me in pulm clinic 9/18/2020

## 2020-09-11 NOTE — PROGRESS NOTES
Saw patient at bedside after 8:25 page regarding pupillary changes (right pupil size>left). Neurological exam significant for the above noted finding mild right upper extremity weakness comparing to left, which patient stated was due to having a fall on that side in his home earlier in the week prior to admission. Dr. Michel Martinez had ordered an MRI (for AMS ) that we updated to STAT upon the bedside assessment. Spoke to radiologist on call and he stated no acute ischemic changes concerning for stroke.

## 2020-09-11 NOTE — CARE PLAN
Problem: Communication  Goal: The ability to communicate needs accurately and effectively will improve  Intervention: Educate patient and significant other/support system about the plan of care, procedures, treatments, medications and allow for questions  Note: MRI, LE duplex, heparin gtt      Problem: Venous Thromboembolism (VTW)/Deep Vein Thrombosis (DVT) Prevention:  Goal: Patient will participate in Venous Thrombosis (VTE)/Deep Vein Thrombosis (DVT)Prevention Measures  Note: Heparin gtt

## 2020-09-11 NOTE — PROGRESS NOTES
Pulmonary Progress Note    Date of admission  9/9/2020    Chief Complaint  84 y.o. male admitted 9/9/2020 with worsening SOB in the setting of recurrent left-sided exudative pleural effusion    Hospital Course  Per Pulm attending note:  Desmond Marin is an 84 y.o. male who presented 9/9/2020 with left-sided chest pain and worsening shortness of breath.  The patient is well-known to me from his hospitalization in 5/2020 when he initially presented with a symptomatic left-sided pleural effusion that was exudative in nature.  At that time, fluid was brown, cloudy wbc 484, rbc 5000. TP 4, glucose 138, fluid  and ph 8. Cx negative. Path suggestive of reactive mesothelial cells. Fluid meets exudative criteria by LDH alone.  Fluid reaccumulated despite diuresis, repeat thoracentesis 6/3, exudative by all 3 lights criteria. Cultures have consistently been negative and cytology showed atypical cells but nothing definitive of malignancy.  Screening serologies have further been unrevealing. A repeat CT chest has shown reactive lymphadenopathy with no definitive pulmonary parenchymal or pleural abnormalities.  He has lost about 15 pounds since 2019.  Since his last visit with me in clinic last month he appears to have lost another 13 pounds.     The repeated reaccumulation of the effusion, lymphocyte predominance, and weight loss are all concerning for underlying pleural malignancy. He followed with me in the pulmonary clinic as well as with his cardiologist Dr. Rosenberg at [place]. We have had multiple discussions regarding management, particularly about the concern for malignancy, with the agreement that he is a less than ideal candidate for thoracoscopy. Pt and Dr Rosenberg were in agreement that continued drainage with cytology would be pursued as long as he has symptomatic benefit. Of note he has declined Pleurx catheter placement in the past.      Interval Problem Update  Reviewed last 24 hour events:  Mr Marin was  examined at his bedside this morning. He said that he had an abdominal pain late in the evening last night, but he felt better in the morning. He is able to void and defecate without any trouble.    Review of Systems  Review of Systems   Constitutional: Positive for malaise/fatigue and weight loss. Negative for chills and fever.   HENT: Positive for hearing loss. Negative for congestion and sore throat.    Eyes: Negative for pain.   Respiratory: Positive for shortness of breath. Negative for wheezing.    Cardiovascular: Positive for chest pain. Negative for palpitations and leg swelling.   Gastrointestinal: Negative for abdominal pain, constipation, diarrhea, heartburn, nausea and vomiting.   Neurological: Negative for headaches.   Psychiatric/Behavioral: Positive for memory loss.        Vital Signs for last 24 hours   Temp:  [36.1 °C (97 °F)-36.8 °C (98.2 °F)] 36.8 °C (98.2 °F)  Pulse:  [62-67] 65  Resp:  [15-17] 15  BP: ()/(51-90) 103/60  SpO2:  [93 %-96 %] 93 %    Physical Exam   Physical Exam  Constitutional:       Appearance: He is ill-appearing. He is not toxic-appearing.   HENT:      Head: Normocephalic.      Nose: Nose normal. No congestion or rhinorrhea.      Mouth/Throat:      Mouth: Mucous membranes are moist.   Eyes:      Extraocular Movements: Extraocular movements intact.      Conjunctiva/sclera: Conjunctivae normal.      Comments: Right eye was dilated under no light and reactive constriction under the light  Left eye was constricted under no light, and it mildly constricted further under the light   Cardiovascular:      Rate and Rhythm: Normal rate and regular rhythm.      Pulses: Normal pulses.      Heart sounds: No murmur. No friction rub.   Pulmonary:      Effort: Pulmonary effort is normal. No respiratory distress.      Breath sounds: No stridor. No wheezing or rales.      Comments: Breath sounds are very distant/absent in the left lower lobe  Chest:      Chest wall: No tenderness.    Abdominal:      General: Abdomen is flat. Bowel sounds are normal.      Palpations: Abdomen is soft.   Musculoskeletal:      Right lower leg: No edema.      Left lower leg: No edema.      Comments: Muscle strength 4/5 in the upper and lower extremities bilaterally   Skin:     General: Skin is warm.   Neurological:      General: No focal deficit present.      Mental Status: He is alert and oriented to person, place, and time.      Cranial Nerves: No cranial nerve deficit.      Sensory: No sensory deficit.      Motor: No weakness.         Medications  Current Facility-Administered Medications   Medication Dose Route Frequency Provider Last Rate Last Dose   • rivaroxaban (XARELTO) tablet 15 mg  15 mg Oral BID WITH MEALS Irina Carlson M.D.        Followed by   • [START ON 10/2/2020] rivaroxaban (XARELTO) tablet 20 mg  20 mg Oral PM MEAL Irina Carlson M.D.       • enalaprilat (VASOTEC) injection 1.25 mg  1.25 mg Intravenous Q6HRS PRN Cecy Hill M.D.       • [Held by provider] donepezil (ARICEPT) tablet 5 mg  5 mg Oral Nightly Cecy Hill M.D.   5 mg at 09/10/20 2210   • famotidine (PEPCID) tablet 20 mg  20 mg Oral BID Cecy Hill M.D.   20 mg at 09/11/20 0548   • fluticasone (FLONASE) nasal spray 50 mcg  1 [place] Nasal DAILY Cecy Hill M.D.   50 mcg at 09/11/20 0549   • [Held by provider] furosemide (LASIX) tablet 40 mg  40 mg Oral BID Cecy Hill M.D.   Stopped at 09/11/20 0600   • levothyroxine (SYNTHROID) tablet 75 mcg  75 mcg Oral AM ES Cecy Hill M.D.   75 mcg at 09/11/20 0548   • metoprolol SR (TOPROL XL) tablet 25 mg  25 mg Oral DAILY Cecy Hill M.D.   25 mg at 09/11/20 0548   • pravastatin (PRAVACHOL) tablet 20 mg  20 mg Oral Nightly Cecy Hill M.D.   20 mg at 09/10/20 2210       Fluids    Intake/Output Summary (Last 24 hours) at 9/11/2020 1459  Last data filed at 9/11/2020 1300  Gross per 24 hour   Intake 992.06 ml   Output --   Net 992.06 ml        Laboratory          Recent Labs     09/09/20  1705 09/09/20  2013 09/10/20  0611   SODIUM 139  --  142   POTASSIUM 3.3*  --  3.4*   CHLORIDE 95*  --  100   CO2 28  --  31   BUN 21  --  16   CREATININE 0.90  --  0.72   MAGNESIUM  --  2.1  --    CALCIUM 9.1  --  8.8     Recent Labs     09/09/20  1705 09/10/20  0611   ALTSGPT 14 6   ASTSGOT 20 8*   ALKPHOSPHAT 93 73   TBILIRUBIN 0.2 0.3   GLUCOSE 153* 110*     Recent Labs     09/09/20  1705 09/10/20  0611   WBC 17.7* 11.3*   NEUTSPOLYS 82.40* 78.20*   LYMPHOCYTES 10.60* 11.80*   MONOCYTES 5.20 7.50   EOSINOPHILS 0.80 1.30   BASOPHILS 0.20 0.40   ASTSGOT 20 8*   ALTSGPT 14 6   ALKPHOSPHAT 93 73   TBILIRUBIN 0.2 0.3     Recent Labs     09/09/20  1705 09/10/20  0611 09/10/20  1626   RBC 4.72 3.83*  --    HEMOGLOBIN 14.3 11.5*  --    HEMATOCRIT 44.8 36.0*  --    PLATELETCT 403 337  --    PROTHROMBTM  --   --  14.8*   APTT  --   --  35.3   INR  --   --  1.13       Imaging  US-EXTREMITY VENOUS LOWER BILAT   Final Result      MR-BRAIN-WITH & W/O   Final Result      1.  Moderate cerebral volume loss.   2.  Chronic microhemorrhage in the left basal ganglia.   3.  No acute intracranial abnormality or pathologic enhancement.      CT-CHEST (THORAX) WITH   Final Result         1. New segmental and subsegmental pulmonary emboli in the right lower lobe.      2. Moderate loculated left pleural effusion, worse than prior.      3. Patchy linear opacity in the left lung, likely atelectasis.            CRITICAL RESULT READ BACK: Preliminary findings discussed with and critical read back performed by Dr. NATANAEL RAZO via telephone on 9/10/2020 3:12 PM      DX-CHEST-PORTABLE (1 VIEW)   Final Result      Stable left pleural effusion with associated compressive atelectasis.        U/S:  LEFT lower extremity DVT in the common femoral, profunda femoris, femoral    and leg veins    [facility] sent back the requested medical record sent this morning. It appears that several  thoracocenteses were done there, and on 08/05/20 a cytology revealed lit malignant cells. This was the 3rd one done this year after 2 thoracs previously done at [facility] (06/23/20 and 05/27/20). They have all inconclusive for malignant cells with the one on 06/03/20 also showing fibrinoid debris          Assessment/Plan  * Recurrent pleural effusion on left  Assessment & Plan  Since 5/2020, has been tapped x 6 (mostly as outpatient at [facility]), consistently exudative, cultures and cytology negative  JEANIE 1:160 not of clinical significance, JEANIE subsets negative, IGRA, RA, CCP negative  Continues to reaccumulate despite diuresis  CT chest without definitive pleural or parenchymal disease, but suspicion remains high given weight loss and lymphocytic predominant effusion at 51%  CT abdomen/pelvis previously showed non-specific homogenous liver hypodensity, planned for outpatient MRI    -- After a thorough conversation with Dr Baker (hospitalist), Dr Quintana (PCP), and Dr Leslie (Surgeon), it appears that the patient would be a poor candidate for surgery given that he just started on anticoagulant for PE.  -- With 3 cytologies already done and yielding no malignant cells, and the persistence of the pleural effusion on this patient, there is a high likelihood of this presentation being a trapped lung.  -- Patient is likely to be discharged with close follow-up outpatient    Acute pulmonary embolism without acute cor pulmonale (HCC)  Assessment & Plan  - no evidence of RV strain on CT  - Hemodynamically stable and supplemental O2 requirement stable  - DVT LE extremity ultrasound ordered  - Continue Xarelto      Chronic hypoxemic respiratory failure (HCC)- (present on admission)  Assessment & Plan  Due to pleural effusion and PE  -Previously had MINIMAL symptomatic relief after thoracentesis  -Continues to require 2 L at rest, 3 L with ambulation, stable despite PE    Memory difficulties- (present on admission)  Assessment &  Plan  - Long term memory intact but short term poor, cannot recall our office visits  - ? intracranial pathology/malignancy  - MRI: No acute intracranial abnormality or pathologic enhancement.    Disposition  - Patient likely to be discharged  - Close follow recommended with PCP and pulmonology for serial thoracocentesis recommended / symptomatic treatment  - Discharge on NOAC for DVT//PE  - Surgery not recommended  -  Prognosis appear to be mildly poor  - PT/OT recommended before discharged

## 2020-09-11 NOTE — ASSESSMENT & PLAN NOTE
Due to pleural effusion and PE  -Previously had MINIMAL symptomatic relief after thoracentesis  -Stable despite PE, continues 1-2 L at rest, 3 L with ambulation

## 2020-09-11 NOTE — PROGRESS NOTES
Paged md for pupil changes, pupils react however right is bigger than left pupil. No vision changes. Neuro otherwise intact

## 2020-09-11 NOTE — ASSESSMENT & PLAN NOTE
- no evidence of RV strain on CT  - Hemodynamically stable and supplemental O2 requirement stable  - DVT LE extremity ultrasound + thrombus LLE  - Continue Xarelto

## 2020-09-11 NOTE — PROGRESS NOTES
Daily Progress Note:     Date of Service: 9/10/2020  Primary Team: [facility] IM Yellow Team   Attending: Mery Baker M.D.   Senior Resident: Dr. Carlson  Contact:  [phone]    Chief Complaint:   Chest Pain and shortness of breath    Subjective:  - No acute overnight events  - Reports shortness of breath and left sided pleuritic chest pain. Informs about having lost approximately 20 pounds since June.   - History significant for multiple pleural effusions(about 6) which have been tapped outpatient at [facility]  - Had CT thorax with contrast with showed segmental and subsegmental PE, was hemodynamically stable and started on Heparin Drip.    Consultants/Specialty:  Pulmonology  Review of Systems:    Review of Systems   Constitutional: Negative for chills, fever and weight loss.   HENT: Negative for congestion, sinus pain and sore throat.    Eyes: Negative for pain and discharge.   Respiratory: Positive for shortness of breath. Negative for cough, sputum production, wheezing and stridor.    Cardiovascular: Positive for chest pain (left sided). Negative for orthopnea and leg swelling.   Gastrointestinal: Negative for abdominal pain, diarrhea, nausea and vomiting.   Genitourinary: Negative for dysuria, frequency and urgency.   Musculoskeletal: Negative for myalgias.   Skin: Negative for rash.   Neurological: Negative for dizziness, sensory change, focal weakness, loss of consciousness and headaches.   Psychiatric/Behavioral: Negative.    All other systems reviewed and are negative.      Objective Data:   Physical Exam:   Vitals:   Temp:  [36.3 °C (97.4 °F)-37.3 °C (99.1 °F)] 36.4 °C (97.6 °F)  Pulse:  [63-85] 67  Resp:  [16-19] 17  BP: ()/(57-90) 106/90  SpO2:  [90 %-98 %] 94 %    Physical Exam  Constitutional:       Appearance: Normal appearance.   HENT:      Head: Normocephalic and atraumatic.      Right Ear: Tympanic membrane normal.      Left Ear: Tympanic membrane normal.      Nose: Nose normal.       Mouth/Throat:      Mouth: Mucous membranes are moist.      Pharynx: Oropharynx is clear.   Eyes:      Extraocular Movements: Extraocular movements intact.      Conjunctiva/sclera: Conjunctivae normal.      Pupils: Pupils are equal, round, and reactive to light.   Neck:      Musculoskeletal: No neck rigidity or muscular tenderness.   Cardiovascular:      Rate and Rhythm: Normal rate.      Heart sounds: No murmur. No gallop.    Pulmonary:      Effort: No respiratory distress.      Breath sounds: No stridor. No wheezing.      Comments: Dull to percuss  Abdominal:      General: There is no distension.      Tenderness: There is no abdominal tenderness. There is no rebound.   Musculoskeletal:         General: No deformity or signs of injury.   Skin:     Coloration: Skin is not jaundiced or pale.   Neurological:      Mental Status: He is alert.      Motor: No weakness.      Coordination: Coordination normal.               * Recurrent pleural effusion on left  Assessment & Plan  Multiple episodes of Left Recurrent pleural effusion and underwent about 6 thoracentesis with repeat fluid reaccumulation  Informs about 20 pound weight loss since June  Previous fluid meet exudative criteria and negative for cultures/cytology  CT thorax(9/10)- Moderate Loculated pleural effusion more worse on the left but indiactes no pleural/parenchymal disease  CT chest,abdomen,Pelvis - No evidence for Malignancy found.  O2 saturations at base line and hemodynamically stable  Possiible underlying pleural malignancy which has been undetected causing recurrent effusions  Plan:  - If worsening effusion and patient is hemodynamically unstable might require throacocentesis in the future if no cause identified but hold for now since patient asymptomatic  - Might need thoracoscopy or pleurodesis if no cause identified  - Pulmonology on board, appreciate recommendations        Acute pulmonary embolism without acute cor pulmonale (HCC)  Assessment &  Plan  CT thorax- Acute segmental and subsegmental PE  Hemodynamically stable with no right heart strain and O2 requirements stable at baseline  Plan:  - Heparin Drip      Chronic hypoxemic respiratory failure (HCC)- (present on admission)  Assessment & Plan  Baseline O2 requirements - 3L O2  Secondary to Recurrent pleural effusions and now PE  Plan:  - Continue supplemntal oxygen      Memory deficit- (present on admission)  Assessment & Plan  Plan:  Continue Donepezil    HLD (hyperlipidemia)- (present on admission)  Assessment & Plan    Continue home statin    Hypothyroidism- (present on admission)  Assessment & Plan  Continue home levothyroxine

## 2020-09-11 NOTE — ASSESSMENT & PLAN NOTE
CT thorax- Acute segmental and subsegmental PE  Hemodynamically stable with no right heart strain and O2 requirements stable at baseline  Venous doppler confirms left lower extremity DVT    Plan:  - Initially on Heparin Drip and transitioned to DOAC  -Continue Xarelto

## 2020-09-12 NOTE — PROGRESS NOTES
Assumed care of PT. A&O 4. Pt resting in bed with no signs of labored breathing. On 1L NC while sleeping. Tele monitor in place, cardiac rhythm being monitored. Call light within reach, bed in lowest position, upper bed rails up. Pt was updated on plan of care for the day. Will continue to monitor.

## 2020-09-12 NOTE — FACE TO FACE
Face to Face Supporting Documentation - Home Health    The encounter with this patient was in whole or in part the primary reason for home health admission.    Date of encounter:   Patient:                    MRN:                       YOB: 2020  Chucho Marin  [id number]  1935     Home health to see patient for:  Physical Therapy evaluation and treatment    Skilled need for:  Exacerbation of Chronic Disease State Weakness    Skilled nursing interventions to include:  Comment: Physical therapy    Homebound status evidenced by:  Need the aid of supportive devices such as crutches, canes, wheelchairs or walkers. Leaving home requires a considerable and taxing effort. There is a normal inability to leave the home.    Community Physician to provide follow up care: Gibson Quintana M.D.     Optional Interventions? No      I certify the face to face encounter for this home health care referral meets the CMS requirements and the encounter/clinical assessment with the patient was, in whole, or in part, for the medical condition(s) listed above, which is the primary reason for home health care. Based on my clinical findings: the service(s) are medically necessary, support the need for home health care, and the homebound criteria are met.  I certify that this patient has had a face to face encounter by myself.  Irina Carlson M.D. - NPI: [id number]

## 2020-09-12 NOTE — PROGRESS NOTES
Pulmonary Progress Note    Date of admission  9/9/2020    Chief Complaint  84 y.o. male admitted 9/9/2020 with worsening SOB in the setting of recurrent left-sided exudative pleural effusion    Hospital Course  Desmond Marin is an 84 y.o. male who presented 9/9/2020 with left-sided chest pain and worsening shortness of breath.  The patient is well-known to me from his hospitalization in 5/2020 when he initially presented with a symptomatic left-sided pleural effusion that was exudative in nature.  At that time, fluid was brown, cloudy wbc 484, rbc 5000. TP 4, glucose 138, fluid  and ph 8. Cx negative. Path suggestive of reactive mesothelial cells. Fluid meets exudative criteria by LDH alone.  Fluid reaccumulated despite diuresis, repeat thoracentesis 6/3, exudative by all 3 lights criteria. Cultures have consistently been negative and cytology showed atypical cells but nothing definitive of malignancy.  Screening serologies have further been unrevealing. A repeat CT chest has shown reactive lymphadenopathy with no definitive pulmonary parenchymal or pleural abnormalities.  He has lost about 15 pounds since 2019.  Since his last visit with me in clinic last month he appears to have lost another 13 pounds.     The repeated reaccumulation of the effusion, lymphocyte predominance, and weight loss are all concerning for underlying pleural malignancy. He followed with me in the pulmonary clinic as well as with his cardiologist Dr. Rosenberg at [facility]. We have had multiple discussions regarding management, particularly about the concern for malignancy, with the agreement that he is a less than ideal candidate for thoracoscopy. Pt and Dr Rosenberg were in agreement that continued drainage with cytology would be pursued as long as he has symptomatic benefit. Of note he has declined Pleurx catheter placement in the past.      Interval Problem Update  Reviewed last 24 hour events:  Uneventful night  Sleeping on 1 L  nasal cannula no desaturations  See note from yesterday regarding discussion/plan of care    Review of Systems  Review of Systems   Constitutional: Positive for malaise/fatigue and weight loss. Negative for chills and fever.   HENT: Positive for hearing loss. Negative for congestion and sore throat.    Eyes: Negative for pain.   Respiratory: Positive for shortness of breath. Negative for wheezing.    Cardiovascular: Positive for chest pain. Negative for palpitations and leg swelling.   Gastrointestinal: Negative for abdominal pain, constipation, diarrhea, heartburn, nausea and vomiting.   Neurological: Negative for headaches.   Psychiatric/Behavioral: Positive for memory loss.        Vital Signs for last 24 hours   Temp:  [36.8 °C (98.2 °F)-37.3 °C (99.2 °F)] 37.3 °C (99.2 °F)  Pulse:  [65-69] 66  Resp:  [15-18] 15  BP: ()/(59-64) 108/64  SpO2:  [91 %-94 %] 94 %    Physical Exam   Physical Exam  Constitutional:       Appearance: He is ill-appearing ( chronically). He is not toxic-appearing.   HENT:      Head: Normocephalic.      Nose: Nose normal. No congestion or rhinorrhea.      Mouth/Throat:      Mouth: Mucous membranes are moist.   Eyes:      Extraocular Movements: Extraocular movements intact.      Conjunctiva/sclera: Conjunctivae normal.      Comments: Right eye was dilated under no light and reactive constriction under the light  Left eye was constricted under no light, and it mildly constricted further under the light   Cardiovascular:      Rate and Rhythm: Normal rate and regular rhythm.      Pulses: Normal pulses.      Heart sounds: No murmur. No friction rub.   Pulmonary:      Effort: Pulmonary effort is normal. No respiratory distress.      Breath sounds: No stridor. No wheezing or rales.      Comments: Breath sounds are very distant/absent in the left lower lobe  Chest:      Chest wall: No tenderness.   Abdominal:      General: Abdomen is flat. Bowel sounds are normal.      Palpations: Abdomen is  soft.   Musculoskeletal:      Right lower leg: No edema.      Left lower leg: No edema.      Comments: Muscle strength 4/5 in the upper and lower extremities bilaterally   Skin:     General: Skin is warm.   Neurological:      General: No focal deficit present.      Mental Status: He is alert and oriented to person, place, and time.      Cranial Nerves: No cranial nerve deficit.      Sensory: No sensory deficit.      Motor: No weakness.       Medications  Current Facility-Administered Medications   Medication Dose Route Frequency Provider Last Rate Last Dose   • rivaroxaban (XARELTO) tablet 15 mg  15 mg Oral BID WITH MEALS Irina Carlson M.D.   15 mg at 09/12/20 0719    Followed by   • [START ON 10/2/2020] rivaroxaban (XARELTO) tablet 20 mg  20 mg Oral PM MEAL Irina Carlson M.D.       • enalaprilat (VASOTEC) injection 1.25 mg  1.25 mg Intravenous Q6HRS PRN Cecy Hill M.D.       • [Held by provider] donepezil (ARICEPT) tablet 5 mg  5 mg Oral Nightly Cecy Hill M.D.   5 mg at 09/10/20 2210   • famotidine (PEPCID) tablet 20 mg  20 mg Oral BID Cecy Hill M.D.   20 mg at 09/12/20 0528   • fluticasone (FLONASE) nasal spray 50 mcg  1 [place] Nasal DAILY Cecy Hill M.D.   50 mcg at 09/12/20 0600   • [Held by provider] furosemide (LASIX) tablet 40 mg  40 mg Oral BID Cecy Hill M.D.   Stopped at 09/11/20 0600   • levothyroxine (SYNTHROID) tablet 75 mcg  75 mcg Oral AM ES Cecy Hill M.D.   75 mcg at 09/12/20 0528   • metoprolol SR (TOPROL XL) tablet 25 mg  25 mg Oral DAILY Cecy Hill M.D.   25 mg at 09/12/20 0528   • pravastatin (PRAVACHOL) tablet 20 mg  20 mg Oral Nightly Cecy Hill M.D.   20 mg at 09/11/20 2017     Fluids    Intake/Output Summary (Last 24 hours) at 9/12/2020 0912  Last data filed at 9/11/2020 1300  Gross per 24 hour   Intake 375 ml   Output --   Net 375 ml     Laboratory          Recent Labs     09/09/20  1705 09/09/20  2013 09/10/20  0611   SODIUM 139   --  142   POTASSIUM 3.3*  --  3.4*   CHLORIDE 95*  --  100   CO2 28  --  31   BUN 21  --  16   CREATININE 0.90  --  0.72   MAGNESIUM  --  2.1  --    CALCIUM 9.1  --  8.8     Recent Labs     09/09/20  1705 09/10/20  0611   ALTSGPT 14 6   ASTSGOT 20 8*   ALKPHOSPHAT 93 73   TBILIRUBIN 0.2 0.3   GLUCOSE 153* 110*     Recent Labs     09/09/20  1705 09/10/20  0611   WBC 17.7* 11.3*   NEUTSPOLYS 82.40* 78.20*   LYMPHOCYTES 10.60* 11.80*   MONOCYTES 5.20 7.50   EOSINOPHILS 0.80 1.30   BASOPHILS 0.20 0.40   ASTSGOT 20 8*   ALTSGPT 14 6   ALKPHOSPHAT 93 73   TBILIRUBIN 0.2 0.3     Recent Labs     09/09/20  1705 09/10/20  0611 09/10/20  1626   RBC 4.72 3.83*  --    HEMOGLOBIN 14.3 11.5*  --    HEMATOCRIT 44.8 36.0*  --    PLATELETCT 403 337  --    PROTHROMBTM  --   --  14.8*   APTT  --   --  35.3   INR  --   --  1.13       Imaging  US-RUQ   Final Result         1.  Irregular hepatic contour, appearance suggests changes of cirrhosis.   2.  Hypoechoic appearance of the left hepatic lobe compared to the right, could represent changes related to geographic hepatic steatosis. No discrete mass is appreciated.      US-EXTREMITY VENOUS LOWER BILAT   Final Result      MR-BRAIN-WITH & W/O   Final Result      1.  Moderate cerebral volume loss.   2.  Chronic microhemorrhage in the left basal ganglia.   3.  No acute intracranial abnormality or pathologic enhancement.      CT-CHEST (THORAX) WITH   Final Result         1. New segmental and subsegmental pulmonary emboli in the right lower lobe.      2. Moderate loculated left pleural effusion, worse than prior.      3. Patchy linear opacity in the left lung, likely atelectasis.            CRITICAL RESULT READ BACK: Preliminary findings discussed with and critical read back performed by Dr. NATANAEL RAZO via telephone on 9/10/2020 3:12 PM      DX-CHEST-PORTABLE (1 VIEW)   Final Result      Stable left pleural effusion with associated compressive atelectasis.        U/S:  LEFT lower extremity  DVT in the common femoral, profunda femoris, femoral    and leg veins    [facility] cytology: 08/05/20 no malignant cells. This was the 3rd one done this year after 2 thoracs previously done at [facility] (06/23/20 and 05/27/20). 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  We will have a conservative approach and close monitoring as an outpatient.   -- f/u AFP, CEA, CA 19-9, PSA, RUQ U/S awaiting results not to preclude discharge       -- Breathing comfortably at rest on 1-2L, 2-3 L with ambulation, stable       -- PT/OT and home safety evals for falls at home prior to admission  -- pt already scheduled to f/u with me in pulm clinic 9/18/2020    Acute pulmonary embolism without acute cor pulmonale (HCC)  Assessment & Plan  - no evidence of RV strain on CT  - Hemodynamically stable and supplemental O2 requirement stable  - DVT LE extremity ultrasound + thrombus LLE  - Continue Xarelto    Chronic hypoxemic respiratory failure (HCC)- (present on admission)  Assessment & Plan  Due to pleural effusion and PE  -Previously had MINIMAL symptomatic relief after  thoracentesis  -Stable despite PE, continues 1-2 L at rest, 3 L with ambulation    Memory difficulties- (present on admission)  Assessment & Plan  - Long term memory intact but short term poor, cannot recall our office visits  - MRI 9/11: No acute intracranial abnormality or pathologic enhancement.    Case discussed with patient, wife, hospitalist and other consultants.    I have performed a physical exam and reviewed and updated ROS and Plan today (9/12/2020). In review of yesterday's note (9/11/2020), there are no changes except as documented above.     This note was generated using voice recognition software which has a chance of producing errors of grammar and content.  I have made every reasonable attempt to find and correct any errors, but it should be expected that some may not be found prior to finalization of this note.  __________  Michel Martinez MD  Pulmonary and Critical Care Medicine  [facility]

## 2020-09-12 NOTE — DISCHARGE PLANNING
Anticipated Discharge Disposition: home with home health    Action: Spoke with pt/spouse and obtained choice for home health: [facility]. Pt has been on service with them in the past. Faxed choice form to DANIA Saucedo at [phone].     Barriers to Discharge: medical clearance, home health acceptance.     Plan: follow up with [facility]

## 2020-09-12 NOTE — DISCHARGE PLANNING
Received Choice form at [phone]  Agency/Facility Name: [facility]   Referral sent per Choice form @ [phone]

## 2020-09-12 NOTE — THERAPY
"Occupational Therapy   Initial Evaluation     Patient Name: Chucho Marin  Age:  84 y.o., Sex:  male  Medical Record #: [id number]  Today's Date: 9/12/2020     Precautions  Precautions: Fall Risk    Assessment  Patient is 84 y.o. male with a diagnosis of Left side chest pain, found to have L sided pleural effusion.  Additional factors influencing patient status / progress: Pt with history of recurrent pleural effusions, concern for malignancy. Patient doing fairly well, using FWW for stability. Reports independent PLOF, currently is supv-Min A level for safety, becomes out of breath with activity and when talking. Recommend home health for this patient.     Plan    Recommend Occupational Therapy 3 times per week until therapy goals are met for the following treatments:  Adaptive Equipment, Cognitive Skill Development, Manual Therapy Techniques, Neuro Re-Education / Balance, Self Care/Activities of Daily Living, Therapeutic Activities and Therapeutic Exercises.       Discharge Recommendations: Recommend home health for continued occupational therapy services     Subjective    \"I need to find someone who installs the grab bars for people like me who need them.\"     Objective       09/12/20 1226   Prior Living Situation   Prior Services None   Housing / Facility 1 Story House   Steps Into Home 3   Bathroom Set up Bathtub / Shower Combination  (Plans to install grab bars)   Equipment Owned None   Lives with - Patient's Self Care Capacity Spouse   Comments Pt reports that he and his wife help each other   Prior Level of ADL Function   Comments Ind prior   Prior Level of IADL Function   Medication Management Independent   Laundry Independent   Kitchen Mobility Independent   Finances Independent   Home Management Independent   Shopping Independent   Prior Level Of Mobility Independent Without Device in Community;Independent Without Device in Home   Driving / Transportation Driving Independent   Balance Assessment   Sitting " Balance (Static) Good   Sitting Balance (Dynamic) Fair +   Standing Balance (Static) Fair   Standing Balance (Dynamic) Fair   Weight Shift Sitting Good   Weight Shift Standing Fair   Comments WIth FWW   ADL Assessment   Grooming Standing;Supervision   Lower Body Dressing Supervision   Functional Mobility   Sit to Stand Minimal Assist   Bed, Chair, Wheelchair Transfer Minimal Assist   Transfer Method Stand Pivot   Mobility sit><stand   Short Term Goals   Short Term Goal # 1 Pt will complete toilet xfer supv   Short Term Goal # 2 Pt will tolerate 10 mins standing ADL

## 2020-09-12 NOTE — PROGRESS NOTES
Daily Progress Note:     Date of Service: 9/11/2020  Primary Team: [employer] Yellow Team   Attending: Mery Baker M.D.   Senior Resident: Dr. Carlson  Contact:  [phone]    Chief Complaint:       Subjective:  - Overnight, there were concerns with regards to pupillary chnages with size of R>L abut with no other focal neurological deficits and MRI of brain negative.  - Doppler US of left lower extremity postive for DVT and he was transitioned from Heparin Drip to NOAC  - Saturating well on room air with no requirement for supplemental oxygen      Consultants/Specialty:  Pulmonology  Review of Systems:    Review of Systems   Constitutional: Negative for chills, fever and weight loss.   HENT: Negative for congestion, sinus pain and sore throat.         Hearing difficulty on the right   Eyes: Negative for pain and discharge.   Respiratory: Positive for shortness of breath. Negative for cough, sputum production, wheezing and stridor.    Cardiovascular: Positive for chest pain (left sided). Negative for orthopnea and leg swelling.   Gastrointestinal: Negative for abdominal pain, diarrhea, nausea and vomiting.   Genitourinary: Negative for dysuria, frequency and urgency.   Musculoskeletal: Negative for myalgias.   Skin: Negative for rash.   Neurological: Negative for dizziness, sensory change, focal weakness, loss of consciousness and headaches.   Psychiatric/Behavioral: Negative.    All other systems reviewed and are negative.      Objective Data:   Physical Exam:   Vitals:   Temp:  [36.1 °C (97 °F)-36.8 °C (98.3 °F)] 36.8 °C (98.3 °F)  Pulse:  [62-67] 65  Resp:  [15-18] 18  BP: (102-119)/(51-90) 102/59  SpO2:  [93 %-96 %] 93 %     Physical Exam  Constitutional:       Appearance: Normal appearance.   HENT:      Head: Normocephalic and atraumatic.      Right Ear: Tympanic membrane normal.      Left Ear: Tympanic membrane normal.      Nose: Nose normal.      Mouth/Throat:      Mouth: Mucous membranes are moist.      Pharynx:  Oropharynx is clear.   Eyes:      Extraocular Movements: Extraocular movements intact.      Conjunctiva/sclera: Conjunctivae normal.      Pupils: Pupils are equal, round, and reactive to light.   Neck:      Musculoskeletal: No neck rigidity or muscular tenderness.   Cardiovascular:      Rate and Rhythm: Normal rate.      Heart sounds: No murmur. No gallop.    Pulmonary:      Effort: No respiratory distress.      Breath sounds: No stridor. No wheezing.      Comments: Dull to percuss  Abdominal:      General: There is no distension.      Tenderness: There is no abdominal tenderness. There is no rebound.   Musculoskeletal:         General: No deformity or signs of injury.   Skin:     Coloration: Skin is not jaundiced or pale.   Neurological:      Mental Status: He is alert.      Motor: No weakness.      Coordination: Coordination normal.             * Recurrent pleural effusion on left  Assessment & Plan  Multiple episodes of Left Recurrent pleural effusion and underwent about 6 thoracentesis with repeat fluid reaccumulation  Informs about 20 pound weight loss since June  Previous fluid meet exudative criteria and negative for cultures/cytology  CT thorax(9/10)- Moderate Loculated pleural effusion more worse on the left but indiactes no pleural/parenchymal disease  CT chest,abdomen,Pelvis - No evidence for Malignancy found.  O2 saturations at base line and hemodynamically stable  Possiible underlying pleural malignancy which has been undetected causing recurrent effusions  Plan:  - Poor candidate for thoracoscopy and risks > benefits and a conservative approach is appropriate with no further invasive procedures.  Similarly, continued thoracenteses seems of little benefit from a diagnostic or therapeutic perspective. Cytology has been now negative x 3, and his most recent thoracentesis caused a great deal of discomfort and provided little if any symptomatic relief.  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  - Continued concern for underlying malignancy that has eluded confirmation with cytology and imaging  - Pulmonology on board, appreciate recommendations        Acute pulmonary embolism without acute cor pulmonale (HCC)  Assessment & Plan  CT thorax- Acute segmental and subsegmental PE  Hemodynamically stable with no right heart strain and O2 requirements stable at baseline  Venous doppler confirms left lower extremity DVT  Plan:  - Heparin Drip discontinued and started on xarelto      Chronic hypoxemic respiratory failure (HCC)- (present on admission)  Assessment & Plan  Baseline O2 requirements - 3L O2  Secondary to Recurrent pleural effusions and now PE  Plan:  - Continue supplemntal oxygen      HLD (hyperlipidemia)- (present on admission)  Assessment & Plan    Continue home statin    Hypothyroidism- (present on admission)  Assessment & Plan  Continue home levothyroxine

## 2020-09-13 NOTE — PROGRESS NOTES
Pulmonary Progress Note    Date of admission  9/9/2020    Chief Complaint  84 y.o. male admitted 9/9/2020 with worsening SOB in the setting of recurrent left-sided exudative pleural effusion    Hospital Course  Desmond Marin is an 84 y.o. male who presented 9/9/2020 with left-sided chest pain and worsening shortness of breath.  The patient is well-known to me from his hospitalization in 5/2020 when he initially presented with a symptomatic left-sided pleural effusion that was exudative in nature.  At that time, fluid was brown, cloudy wbc 484, rbc 5000. TP 4, glucose 138, fluid  and ph 8. Cx negative. Path suggestive of reactive mesothelial cells. Fluid meets exudative criteria by LDH alone.  Fluid reaccumulated despite diuresis, repeat thoracentesis 6/3, exudative by all 3 lights criteria. Cultures have consistently been negative and cytology showed atypical cells but nothing definitive of malignancy.  Screening serologies have further been unrevealing. A repeat CT chest has shown reactive lymphadenopathy with no definitive pulmonary parenchymal or pleural abnormalities.  He has lost about 15 pounds since 2019.  Since his last visit with me in clinic last month he appears to have lost another 13 pounds.     The repeated reaccumulation of the effusion, lymphocyte predominance, and weight loss are all concerning for underlying pleural malignancy. He followed with me in the pulmonary clinic as well as with his cardiologist Dr. Rosenberg at [facility]. We have had multiple discussions regarding management, particularly about the concern for malignancy, with the agreement that he is a less than ideal candidate for thoracoscopy. Pt and Dr Rosenberg were in agreement that continued drainage with cytology would be pursued as long as he has symptomatic benefit. Of note he has declined Pleurx catheter placement in the past.      Interval Problem Update  Reviewed last 24 hour events:  Uneventful night  Sleeping comfortably  on room air  PT innaal noted, recommending HH  See note from 9/11 re: plan of care    Review of Systems  Review of Systems   Constitutional: Positive for malaise/fatigue and weight loss. Negative for chills and fever.   HENT: Positive for hearing loss. Negative for congestion and sore throat.    Eyes: Negative for pain.   Respiratory: Positive for shortness of breath. Negative for wheezing.    Cardiovascular: Positive for chest pain. Negative for palpitations and leg swelling.   Gastrointestinal: Negative for abdominal pain, constipation, diarrhea, heartburn, nausea and vomiting.   Neurological: Negative for headaches.   Psychiatric/Behavioral: Positive for memory loss.        Vital Signs for last 24 hours   Temp:  [36.9 °C (98.5 °F)-37.9 °C (100.3 °F)] 36.9 °C (98.5 °F)  Pulse:  [66-74] 68  Resp:  [15-18] 17  BP: (111-129)/(63-78) 111/63  SpO2:  [91 %-94 %] 94 %    Physical Exam   Physical Exam  Constitutional:       Appearance: He is ill-appearing ( chronically). He is not toxic-appearing.   HENT:      Head: Normocephalic.      Nose: Nose normal. No congestion or rhinorrhea.      Mouth/Throat:      Mouth: Mucous membranes are moist.   Eyes:      Extraocular Movements: Extraocular movements intact.      Conjunctiva/sclera: Conjunctivae normal.      Comments: Right eye was dilated under no light and reactive constriction under the light  Left eye was constricted under no light, and it mildly constricted further under the light   Cardiovascular:      Rate and Rhythm: Normal rate and regular rhythm.      Pulses: Normal pulses.      Heart sounds: No murmur. No friction rub.   Pulmonary:      Effort: Pulmonary effort is normal. No respiratory distress.      Breath sounds: No stridor. No wheezing or rales.      Comments: Breath sounds are very distant/absent in the left lower lobe  Chest:      Chest wall: No tenderness.   Abdominal:      General: Abdomen is flat. Bowel sounds are normal.      Palpations: Abdomen is soft.    Musculoskeletal:      Right lower leg: No edema.      Left lower leg: No edema.      Comments: Muscle strength 4/5 in the upper and lower extremities bilaterally   Skin:     General: Skin is warm.   Neurological:      General: No focal deficit present.      Mental Status: He is alert and oriented to person, place, and time.      Cranial Nerves: No cranial nerve deficit.      Sensory: No sensory deficit.      Motor: No weakness.       Medications  Current Facility-Administered Medications   Medication Dose Route Frequency Provider Last Rate Last Dose   • rivaroxaban (XARELTO) tablet 15 mg  15 mg Oral BID WITH MEALS Irina Carlson M.D.   15 mg at 09/13/20 0746    Followed by   • [START ON 10/2/2020] rivaroxaban (XARELTO) tablet 20 mg  20 mg Oral PM MEAL Irina Carlson M.D.       • enalaprilat (VASOTEC) injection 1.25 mg  1.25 mg Intravenous Q6HRS PRN Cecy Hill M.D.       • donepezil (ARICEPT) tablet 5 mg  5 mg Oral Nightly Cecy Hill M.D.   5 mg at 09/10/20 2210   • famotidine (PEPCID) tablet 20 mg  20 mg Oral BID Cecy Hill M.D.   20 mg at 09/13/20 0523   • fluticasone (FLONASE) nasal spray 50 mcg  1 [place] Nasal DAILY Cecy Hill M.D.   50 mcg at 09/13/20 0523   • levothyroxine (SYNTHROID) tablet 75 mcg  75 mcg Oral AM ES Cecy Hill M.D.   75 mcg at 09/13/20 0523   • metoprolol SR (TOPROL XL) tablet 25 mg  25 mg Oral DAILY Cecy Hill M.D.   25 mg at 09/13/20 0523     Fluids    Intake/Output Summary (Last 24 hours) at 9/13/2020 1653  Last data filed at 9/12/2020 1940  Gross per 24 hour   Intake 360 ml   Output --   Net 360 ml     Laboratory          Recent Labs     09/12/20 0906   SODIUM 139   POTASSIUM 3.8   CHLORIDE 103   CO2 26   BUN 12   CREATININE 0.54   CALCIUM 8.6     Recent Labs     09/12/20 0906   GLUCOSE 108*         No results for input(s): RBC, HEMOGLOBIN, HEMATOCRIT, PLATELETCT, PROTHROMBTM, APTT, INR, IRON, FERRITIN, TOTIRONBC in the last 72  hours.    Imaging  US-RUQ   Final Result         1.  Irregular hepatic contour, appearance suggests changes of cirrhosis.   2.  Hypoechoic appearance of the left hepatic lobe compared to the right, could represent changes related to geographic hepatic steatosis. No discrete mass is appreciated.      US-EXTREMITY VENOUS LOWER BILAT   Final Result      MR-BRAIN-WITH & W/O   Final Result      1.  Moderate cerebral volume loss.   2.  Chronic microhemorrhage in the left basal ganglia.   3.  No acute intracranial abnormality or pathologic enhancement.      CT-CHEST (THORAX) WITH   Final Result         1. New segmental and subsegmental pulmonary emboli in the right lower lobe.      2. Moderate loculated left pleural effusion, worse than prior.      3. Patchy linear opacity in the left lung, likely atelectasis.            CRITICAL RESULT READ BACK: Preliminary findings discussed with and critical read back performed by Dr. NATANAEL RAZO via telephone on 9/10/2020 3:12 PM      DX-CHEST-PORTABLE (1 VIEW)   Final Result      Stable left pleural effusion with associated compressive atelectasis.        U/S:  LEFT lower extremity DVT in the common femoral, profunda femoris, femoral    and leg veins    [facility] cytology: 08/05/20 no malignant cells. This was the 3rd one done this year after 2 thoracs previously done at [facility] (06/23/20 and 05/27/20). 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  We will have a conservative approach and close monitoring as an outpatient.   -- RUQ reassuring as are tumor markers  -- AFP and PSA pending       -- Breathing comfortably at rest on room air and with ambulation, stable       -- PT/OT recommending HH  -- pt already scheduled to f/u with me in pulm clinic 9/18/2020    Acute pulmonary embolism without acute cor pulmonale (HCC)  Assessment & Plan  - no evidence of RV strain on CT  - Hemodynamically stable and supplemental O2 requirement stable  - DVT LE extremity ultrasound + thrombus LLE  - Continue Xarelto    Chronic hypoxemic respiratory failure (HCC)- (present on admission)  Assessment & Plan  Due to pleural effusion and PE  -Previously had MINIMAL symptomatic relief after thoracentesis  -Stable despite PE, continues 1-2 L at rest, 3 L with ambulation    Memory difficulties- (present on admission)  Assessment & Plan  - Long term memory intact but short term poor, cannot recall our office visits  - MRI 9/11: No acute intracranial abnormality or pathologic enhancement.    I have performed a physical exam and reviewed and updated ROS and Plan today (9/12/2020). In review of yesterday's note (9/11/2020), there are no changes except as documented above.     This note was generated using voice recognition software which has a chance of producing errors of grammar and content.  I have made every reasonable attempt to find and correct any errors, but it should be expected that some may not be found prior to finalization of this note.  __________  Michel Martinez MD  Pulmonary and Critical Care Medicine  [facility]

## 2020-09-13 NOTE — PROGRESS NOTES
Assumed care of PT. A&O 4. Pt resting in bed with no signs of labored breathing. On RA. Tele monitor in place, cardiac rhythm being monitored. Call light within reach, bed in lowest position, upper bed rails up. Pt was updated on plan of care for the day. Will continue to monitor.

## 2020-09-13 NOTE — THERAPY
Physical Therapy   Initial Evaluation     Patient Name: Cuhcho Marin  Age:  84 y.o., Sex:  male  Medical Record #: [id number]  Today's Date: 9/13/2020     Precautions: Fall Risk    Assessment  Patient is 84 y.o. male with a diagnosis of L sided chest pain, found to have L sided pleural effusion. Pt c/o being very tired but willing to work with therapy. Pt completed bed mobility and transfers with SPV. Pt ambulated household distances with FWW and SPV, no LOB. Stairs deferred due to significant fatigue. PT will cont while in acute care setting to address limited activity tolerance and stair negotiation.    Plan    Recommend Physical Therapy 3 times per week until therapy goals are met for the following treatments:  Bed Mobility, Community Re-integration, Gait Training, Neuro Re-Education / Balance, Self Care/Home Evaluation, Stair Training, Therapeutic Activities and Therapeutic Exercises    DC Equipment Recommendations: Front-Wheel Walker  Discharge Recommendations: Recommend home health for continued physical therapy services          09/13/20 1214   Prior Living Situation   Prior Services None   Housing / Facility 1 Story House   Steps Into Home 3   Steps In Home 0   Equipment Owned None   Lives with - Patient's Self Care Capacity Spouse   Comments pt lives with wife who uses a FWW. Pts wife has 2 sons nearby and may be able to assist    Prior Level of Functional Mobility   Bed Mobility Independent   Transfer Status Independent   Ambulation Independent   Distance Ambulation (Feet)   (community)   Assistive Devices Used None   Stairs Independent   Comments independent in home   History of Falls   History of Falls Yes   Cognition    Level of Consciousness Alert   Comments pleasant and cooperative   Strength Lower Body   Lower Body Strength  WDL   Comments functional strength   Gait Analysis   Gait Level Of Assist Supervised   Assistive Device Front Wheel Walker   Distance (Feet) 100   # of Times Distance was Traveled  1   Deviation Shuffled Gait   # of Stairs Climbed 0   Weight Bearing Status fwb   Comments pt c/o being very tired   Bed Mobility    Supine to Sit Supervised   Sit to Supine Supervised   Scooting Supervised   Functional Mobility   Sit to Stand Supervised   Mobility in room and hallway with FWW   Short Term Goals    Short Term Goal # 1 Pt will be able to ambulate >150ft with FWW and SPV in order to return to community distances   Short Term Goal # 2 Pt will be able to negotiate 3 steps with no AD and SPV in 6tx in order to enter and exit home   Anticipated Discharge Equipment and Recommendations   DC Equipment Recommendations Front-Wheel Walker   Discharge Recommendations Recommend home health for continued physical therapy services

## 2020-09-13 NOTE — PROGRESS NOTES
Daily Progress Note:     Date of Service: 9/12/2020  Primary Team: [facility] IM Yellow Team   Attending: Mery Baker M.D.   Senior Resident: Dr. Carlson  Contact:  [phone]    Chief Complaint:   Left sided pleuritic chest pain      Subjective:  - No acute overnight events  - Patient is saturating well on room air and his telemetry/pulse oximetry has been discontinued  - He was made to walk with walker around the floor and walked about 400 feet with no difficulty  - He was evaluated by OT who recommended home health, and  updated.  - Pt. Has no clinical signs of volume overload and hisLAsix medication has been discontinued    Consultants/Specialty:  Pulmonary  Review of Systems:    Review of Systems   Constitutional: Negative for chills, fever and weight loss.   HENT: Negative for congestion, sinus pain and sore throat.         Hearing difficulty on the right   Eyes: Negative for pain and discharge.   Respiratory: Positive for shortness of breath. Negative for cough, sputum production, wheezing and stridor.    Cardiovascular: Positive for chest pain (left sided). Negative for orthopnea and leg swelling.   Gastrointestinal: Negative for abdominal pain, diarrhea, nausea and vomiting.   Genitourinary: Negative for dysuria, frequency and urgency.   Musculoskeletal: Negative for myalgias.   Skin: Negative for rash.   Neurological: Negative for dizziness, sensory change, focal weakness, loss of consciousness and headaches.   Psychiatric/Behavioral: Negative.    All other systems reviewed and are negative.      Objective Data:   Physical Exam:   Vitals:   Temp:  [36.8 °C (98.2 °F)-37.3 °C (99.2 °F)] 37.3 °C (99.2 °F)  Pulse:  [66-74] 74  Resp:  [15-18] 17  BP: (108-149)/(64-74) 121/66  SpO2:  [92 %-94 %] 93 %     Physical Exam  Constitutional:       Appearance: Normal appearance.   HENT:      Head: Normocephalic and atraumatic.      Right Ear: Tympanic membrane normal.      Left Ear: Tympanic membrane normal.       Nose: Nose normal.      Mouth/Throat:      Mouth: Mucous membranes are moist.      Pharynx: Oropharynx is clear.   Eyes:      Extraocular Movements: Extraocular movements intact.      Conjunctiva/sclera: Conjunctivae normal.      Pupils: Pupils are equal, round, and reactive to light.   Neck:      Musculoskeletal: No neck rigidity or muscular tenderness.   Cardiovascular:      Rate and Rhythm: Normal rate.      Heart sounds: No murmur. No gallop.    Pulmonary:      Effort: No respiratory distress.      Breath sounds: No stridor. No wheezing.      Comments: Dull to percuss  Abdominal:      General: There is no distension.      Tenderness: There is no abdominal tenderness. There is no rebound.   Musculoskeletal:         General: No deformity or signs of injury.   Skin:     Coloration: Skin is not jaundiced or pale.   Neurological:      Mental Status: He is alert.      Motor: No weakness.      Coordination: Coordination normal.           * Recurrent pleural effusion on left  Assessment & Plan  Multiple episodes of Left Recurrent pleural effusion and underwent about 6 thoracentesis with repeat fluid reaccumulation  Informs about 20 pound weight loss since June  Previous fluid meet exudative criteria and negative for cultures/cytology  CT thorax(9/10)- Moderate Loculated pleural effusion more worse on the left but indiactes no pleural/parenchymal disease  CT chest,abdomen,Pelvis - No evidence for Malignancy found.  O2 saturations at base line and hemodynamically stable  Possiible underlying pleural malignancy which has been undetected causing recurrent effusions  Plan:  - Poor candidate for thoracoscopy and risks > benefits and a conservative approach is appropriate with no further invasive procedures.  Similarly, continued thoracenteses seems of little benefit from a diagnostic or therapeutic perspective. Cytology has been now negative x 3, and his most recent thoracentesis caused a great deal of discomfort and provided  little if any symptomatic relief.  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  - Continued concern for underlying malignancy that has eluded confirmation with cytology and imaging  - Pulmonology on board, appreciate recommendations        Acute pulmonary embolism without acute cor pulmonale (HCC)  Assessment & Plan  CT thorax- Acute segmental and subsegmental PE  Hemodynamically stable with no right heart strain and O2 requirements stable at baseline  Venous doppler confirms left lower extremity DVT  Plan:  - Heparin Drip discontinued and started on xarelto      Chronic hypoxemic respiratory failure (HCC)- (present on admission)  Assessment & Plan  Baseline O2 requirements - 3L O2  Secondary to Recurrent pleural effusions and now PE  During this admission, he is saturating well on room air without supplemental oxygen  Plan:  - Supplemental oxygen discontinued and continue to monitor oxygen status      HLD (hyperlipidemia)- (present on admission)  Assessment & Plan    Continue home statin    Hypothyroidism- (present on admission)  Assessment & Plan  Continue home levothyroxine

## 2020-09-13 NOTE — CARE PLAN
Problem: Knowledge Deficit  Goal: Knowledge of disease process/condition, treatment plan, diagnostic tests, and medications will improve  Outcome: PROGRESSING AS EXPECTED  Note: Pt educated about disease process. Reason why medications are taken. And informed about treatment plan.

## 2020-09-14 NOTE — THERAPY
Physical Therapy   Daily Treatment     Patient Name: Chucho Marin  Age:  84 y.o., Sex:  male  Medical Record #: [id number]  Today's Date: 9/14/2020     Precautions: Fall Risk    Assessment    Pt appeared to be most limited by dizziness today despite stable BP (see below). He initiated bed mobility at spv and upon sit to stand, required min A for stability as R LE was not on floor. When questioned, pt reporting a leg length discrepancy and required ~5' of gait to adequately shift weight without min A. Pt did perform x90' of gait with FWW but declined stair assessment due to dizziness. PT will follow while pt remains here in house to address instability and poor activity tolerance.     Plan    Continue current treatment plan.    DC Equipment Recommendations: Front-Wheel Walker  Discharge Recommendations: Recommend home health for continued physical therapy services         Objective       09/14/20 1607   Balance   Comments Seated at spv; standing at CGA   Gait Analysis   Gait Level Of Assist   (Contact Guard Assist)   Assistive Device Front Wheel Walker   Distance (Feet) 90   Weight Bearing Status FWB   Comments pt reported dizziness during gait. /88mmHg. Approached stairs, but pt declined stating he was not ready today.    Bed Mobility    Supine to Sit Supervised   Sit to Supine Supervised   Scooting Supervised   Comments Seated BP: 112/61mmHg. Standing BP: 101/68mmHg.    Functional Mobility   Sit to Stand Minimal Assist   Bed, Chair, Wheelchair Transfer Supervised   Transfer Method Stand Step   Comments Initial STS at min A as pt with poor weight shift to R LE. Pt reports leg length discrepancy. Unsteady for first few steps but able to progress to CGA/SBA.    Short Term Goals    Short Term Goal # 1 Pt will be able to ambulate >150ft with FWW and SPV in order to return to community distances   Goal Outcome # 1 goal not met   Short Term Goal # 2 Pt will be able to negotiate 3 steps with no AD and SPV in 6tx in  order to enter and exit home   Goal Outcome # 2 Goal not met   Anticipated Discharge Equipment and Recommendations   DC Equipment Recommendations Front-Wheel Walker   Discharge Recommendations Recommend home health for continued physical therapy services

## 2020-09-14 NOTE — PROGRESS NOTES
Daily Progress Note:     Date of Service: 9/13/2020  Primary Team: [facility] Yellow Team   Attending: Mery Baker M.D.   Senior Resident:   Contact:  [phone]    Chief Complaint:   Left sided pleuritic chest pain    Subjective:  - No acute overnight events  - Pending Homehealth acceptance and discharge with close outpatient followup    Consultants/Specialty:  None  Review of Systems:    Review of Systems   Constitutional: Negative for chills, fever and weight loss.   HENT: Negative for congestion, sinus pain and sore throat.         Hearing difficulty on the right   Eyes: Negative for pain and discharge.   Respiratory: Positive for shortness of breath. Negative for cough, sputum production, wheezing and stridor.    Cardiovascular: Positive for chest pain (left sided). Negative for orthopnea and leg swelling.   Gastrointestinal: Negative for abdominal pain, diarrhea, nausea and vomiting.   Genitourinary: Negative for dysuria, frequency and urgency.   Musculoskeletal: Negative for myalgias.   Skin: Negative for rash.   Neurological: Negative for dizziness, sensory change, focal weakness, loss of consciousness and headaches.   Psychiatric/Behavioral: Negative.    All other systems reviewed and are negative.      Objective Data:   Physical Exam:   Vitals:   Temp:  [36.9 °C (98.5 °F)-37.9 °C (100.3 °F)] 37.1 °C (98.7 °F)  Pulse:  [66-72] 68  Resp:  [15-17] 16  BP: (106-129)/(63-78) 106/68  SpO2:  [91 %-94 %] 93 %    Physical Exam  Constitutional:       Appearance: Normal appearance.   HENT:      Head: Normocephalic and atraumatic.      Right Ear: Tympanic membrane normal.      Left Ear: Tympanic membrane normal.      Nose: Nose normal.      Mouth/Throat:      Mouth: Mucous membranes are moist.      Pharynx: Oropharynx is clear.   Eyes:      Extraocular Movements: Extraocular movements intact.      Conjunctiva/sclera: Conjunctivae normal.      Pupils: Pupils are equal, round, and reactive to light.   Neck:       Musculoskeletal: No neck rigidity or muscular tenderness.   Cardiovascular:      Rate and Rhythm: Normal rate.      Heart sounds: No murmur. No gallop.    Pulmonary:      Effort: No respiratory distress.      Breath sounds: No stridor. No wheezing.      Comments: Dull to percuss  Abdominal:      General: There is no distension.      Tenderness: There is no abdominal tenderness. There is no rebound.   Musculoskeletal:         General: No deformity or signs of injury.   Skin:     Coloration: Skin is not jaundiced or pale.   Neurological:      Mental Status: He is alert.      Motor: No weakness.      Coordination: Coordination normal.         * Recurrent pleural effusion on left  Assessment & Plan  Multiple episodes of Left Recurrent pleural effusion and underwent about 6 thoracentesis with repeat fluid reaccumulation  Informs about 20 pound weight loss since June  Previous fluid meet exudative criteria and negative for cultures/cytology  CT thorax(9/10)- Moderate Loculated pleural effusion more worse on the left but indiactes no pleural/parenchymal disease  CT chest,abdomen,Pelvis - No evidence for Malignancy found.  O2 saturations at base line and hemodynamically stable  Possiible underlying pleural malignancy which has been undetected causing recurrent effusions  Plan:  - Poor candidate for thoracoscopy and risks > benefits and a conservative approach is appropriate with no further invasive procedures.  Similarly, continued thoracenteses seems of little benefit from a diagnostic or therapeutic perspective. Cytology has been now negative x 3, and his most recent thoracentesis caused a great deal of discomfort and provided little if any symptomatic relief.  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  - Continued concern for underlying malignancy that has eluded confirmation with cytology and imaging  - Pulmonology on board, appreciate recommendations        Acute pulmonary  embolism without acute cor pulmonale (HCC)  Assessment & Plan  CT thorax- Acute segmental and subsegmental PE  Hemodynamically stable with no right heart strain and O2 requirements stable at baseline  Venous doppler confirms left lower extremity DVT  Plan:  - Heparin Drip discontinued and started on xarelto      Chronic hypoxemic respiratory failure (HCC)- (present on admission)  Assessment & Plan  Baseline O2 requirements - 3L O2  Secondary to Recurrent pleural effusions and now PE  During this admission, he is saturating well on room air without supplemental oxygen  Plan:  - Supplemental oxygen discontinued and continue to monitor oxygen status      HLD (hyperlipidemia)- (present on admission)  Assessment & Plan    Continue home statin    Hypothyroidism- (present on admission)  Assessment & Plan  Continue home levothyroxine

## 2020-09-14 NOTE — PROGRESS NOTES
Daily Progress Note:     Date of Service: 9/14/2020  Primary Team: [facility] Yellow Team   Attending: Mery Baker M.D.   Senior Resident: Dr. Daniel  Contact:  [phone]    ID: Patient is a 84-year-old male admitted with shortness of breath secondary to recurrent left-sided exudative pleural effusion    Overnight: No acute events    Subjective:  -Patient continues to have some shortness of breath but is saturating well on room air.  Denies any acute chest symptoms    Interval history:  9/14: Patient is medically stable for discharge pending home health arrangement.   is working on home health and might have an answer tomorrow from referral    Consultants/Specialty:  Pulmonology-Dr. Martinez    Review of Systems:    Review of Systems   Constitutional: Positive for weight loss. Negative for chills and fever.   HENT: Positive for hearing loss and sore throat. Negative for congestion.         Hearing difficulty on the right   Eyes: Negative for pain and discharge.   Respiratory: Positive for shortness of breath. Negative for cough, sputum production and wheezing.    Cardiovascular: Positive for chest pain (left sided). Negative for orthopnea and leg swelling.   Gastrointestinal: Negative for abdominal pain, diarrhea, nausea and vomiting.   Genitourinary: Negative for dysuria and frequency.   Musculoskeletal: Negative for joint pain and neck pain.   Skin: Negative for itching and rash.   Neurological: Negative for sensory change, focal weakness and headaches.   Endo/Heme/Allergies: Negative for polydipsia. Does not bruise/bleed easily.   Psychiatric/Behavioral: Negative for substance abuse. The patient is not nervous/anxious.    All other systems reviewed and are negative.    Objective Data:   Physical Exam:   Vitals:   Temp:  [36.8 °C (98.3 °F)-37.1 °C (98.8 °F)] 36.8 °C (98.3 °F)  Pulse:  [68-79] 79  Resp:  [16-17] 16  BP: (105-141)/(63-73) 105/73  SpO2:  [92 %-94 %] 92 %hos    Physical Exam  Constitutional:        Appearance: Normal appearance. He is ill-appearing.   HENT:      Head: Normocephalic and atraumatic.      Right Ear: Tympanic membrane normal.      Left Ear: Tympanic membrane normal.      Nose: Nose normal.      Mouth/Throat:      Mouth: Mucous membranes are moist.      Pharynx: Oropharynx is clear.   Eyes:      Extraocular Movements: Extraocular movements intact.      Conjunctiva/sclera: Conjunctivae normal.   Neck:      Musculoskeletal: Normal range of motion and neck supple.   Cardiovascular:      Rate and Rhythm: Normal rate and regular rhythm.      Pulses: Normal pulses.      Heart sounds: Normal heart sounds. No murmur.   Pulmonary:      Effort: No respiratory distress.      Comments: Decreased on left base  Abdominal:      General: There is no distension.      Tenderness: There is no abdominal tenderness. There is no rebound.   Musculoskeletal:         General: No deformity or signs of injury.   Skin:     Coloration: Skin is not jaundiced or pale.   Neurological:      General: No focal deficit present.      Mental Status: He is alert. Mental status is at baseline.   Psychiatric:         Mood and Affect: Mood normal.         Behavior: Behavior normal.         Thought Content: Thought content normal.      Comments: MOCA -15/30     Assessment and plan:  * Recurrent pleural effusion on left  Assessment & Plan  -Multiple episodes of exudative Left Recurrent pleural effusion and thoracentesis x 6  -Informs about 20 pound weight loss since June  -CT thorax(9/10)- Moderate Loculated pleural effusion more worse on the left but indiactes no pleural/parenchymal disease  -CT chest,abdomen,Pelvis - No evidence for Malignancy  -O2 saturations at base line and hemodynamically stable  -Possiible underlying pleural malignancy which has been undetected causing recurrent effusions  -Pulmonology following, appreciate recommendations    PLAN:  - Poor candidate for thoracoscopy and risks > benefits and a conservative  approach is appropriate with no further invasive procedures. Continued thoracenteses seems of little benefit from a diagnostic or therapeutic perspective. Cytology has been now negative x 3, and his most recent thoracentesis caused a great deal of discomfort and provided little if any symptomatic relief.  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  - Continued concern for underlying malignancy that has eluded confirmation with cytology and imaging  - Pulmonology will follow-up as outpatient, appointment scheduled for 9/18/2020    Acute pulmonary embolism without acute cor pulmonale (HCC)  Assessment & Plan  CT thorax- Acute segmental and subsegmental PE  Hemodynamically stable with no right heart strain and O2 requirements stable at baseline  Venous doppler confirms left lower extremity DVT    Plan:  - Initially on Heparin Drip and transitioned to DOAC  -Continue Xarelto    Chronic hypoxemic respiratory failure (HCC)- (present on admission)  Assessment & Plan  -Baseline O2 requirements - 3L O2  -Secondary to Recurrent pleural effusions and now PE  -Currently on Room Air  -Will monitor    HLD (hyperlipidemia)- (present on admission)  Assessment & Plan  -Continue pravastatin    Hypothyroidism- (present on admission)  Assessment & Plan  Continue home levothyroxine

## 2020-09-14 NOTE — DISCHARGE SUMMARY
Discharge Summary    Date of Admission: 9/9/2020  Date of Discharge: 9/16/2020  Discharging Attending: Mery Baker M.D.   Discharging Senior Resident: Dr. Daniel    CHIEF COMPLAINT ON ADMISSION  Chief Complaint   Patient presents with   • Chest Pain   • Shortness of Breath     Reason for Admission  Shortness of breath secondary to recurrent left-sided exudative pleural effusion    Admission Date  9/9/2020    CODE STATUS  Full Code    HPI & HOSPITAL COURSE  Desmond is a 84-year-old male presented with complaints of left-sided chest pain and worsening shortness of breath and imaging findings are consistent with left-sided pleural effusion.  His history significant for chronic recurrent left-sided pleural effusion status post multiple thoracocentesis with subsequent reaccumulation of fluid.  Previous pleural fluid studies were consistent with exudative type of effusion and cultures have been consistently negative, cytology showing atypical cells but nothing definitive of malignancy.  He informs losing about 20 pounds over the last several months.  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  His pulmonologist Dr. Michel Martinez was consulted, CT chest with contrast was ordered which showed large left-sided effusion and incidentally identified new right-sided PE along with DVT and he was started on heparin drip which was later transitioned to Xarelto.  He informs using about 3 L oxygen at home but was saturating well on room air during this admission and did not require supplemental oxygen.     After extensive discussion with his PCP (Dr. Quintana),primary cardiologist (Dr. Rosenberg) and thoracic surgery, it is agreed that he is a poor candidate for thoracoscopy with risks outweighing benefits and that conservative approach is appropriate with no further invasive procedures.He declined Pleurx catheter  placement in the past. Patient and wife were informed  of above discussion and are in agreement.  Cortez cognitive assessment score was 15/30 which confirms that there is a component of moderate cognitive impairment and donepezil has been continued which he was taking previously.  Patient had a spike of fever overnight on 9/14/20. However, he doesn't have any signs or symptoms of infection. Blood culutres were drawn which showed no growth to date.    It is decided that he will follow-up with Dr. Martinez outpatient in his clinic on 9/18/2020. PT/OT were consulted which recommended home health which is being set up. Patient is medically stable for discharge home today.    Therefore, he is discharged in fair and stable condition to home with close outpatient follow-up.    The patient met 2-midnight criteria for an inpatient stay at the time of discharge.    PHYSICAL EXAM ON DISCHARGE  Temp:  [36.8 °C (98.3 °F)-37.1 °C (98.8 °F)] 36.8 °C (98.3 °F)  Pulse:  [68-79] 79  Resp:  [16-17] 16  BP: (105-141)/(63-73) 105/73  SpO2:  [92 %-94 %] 92 %    Physical Exam  Constitutional:       Appearance: Normal appearance. He is ill-appearing.   HENT:      Head: Normocephalic and atraumatic.      Right Ear: Tympanic membrane normal.      Left Ear: Tympanic membrane normal.      Nose: Nose normal.      Mouth/Throat:      Mouth: Mucous membranes are moist.      Pharynx: Oropharynx is clear.   Eyes:      Extraocular Movements: Extraocular movements intact.      Conjunctiva/sclera: Conjunctivae normal.   Neck:      Musculoskeletal: Normal range of motion and neck supple.   Cardiovascular:      Rate and Rhythm: Normal rate and regular rhythm.      Pulses: Normal pulses.      Heart sounds: Normal heart sounds. No murmur.   Pulmonary:      Effort: No respiratory distress.      Comments: Decreased on left base  Abdominal:      General: There is no distension.      Tenderness: There is no abdominal tenderness. There is no rebound.    Musculoskeletal:         General: No deformity or signs of injury.   Skin:     Coloration: Skin is not jaundiced or pale.   Neurological:      General: No focal deficit present.      Mental Status: He is alert. Mental status is at baseline.   Psychiatric:         Mood and Affect: Mood normal.         Behavior: Behavior normal.         Thought Content: Thought content normal.     Discharge Date  9/16/20    FOLLOW UP ITEMS POST DISCHARGE  Follow up with pulomonology(Dr.William Martinez) on 9/18 in his clinic  F/U with PCP    DISCHARGE DIAGNOSES  Principal Problem:    Recurrent pleural effusion on left POA: Unknown  Active Problems:    Chronic hypoxemic respiratory failure (HCC) POA: Yes    Acute pulmonary embolism without acute cor pulmonale (HCC) POA: Unknown    Hypothyroidism POA: Yes    HLD (hyperlipidemia) POA: Yes    Memory difficulties POA: Yes  Resolved Problems:    Pleural effusion on left POA: Yes    Chronic systolic heart failure (HCC) POA: Yes    Memory deficit POA: Yes    FOLLOW UP  Future Appointments   Date Time Provider Department Center   9/18/2020 11:15 AM Michel Martinez M.D. [facility] None     No follow-up provider specified.    MEDICATIONS ON DISCHARGE     Medication List      ASK your doctor about these medications      Instructions   Clear Eyes for Dry Eyes 1-0.25 % Soln  Generic drug: Carboxymethylcellul-Glycerin   Place 1 Drop in both eyes as needed (For dry eye).  Dose: 1 Drop     donepezil 5 MG Tabs  Commonly known as: ARICEPT   Take 5 mg by mouth every evening.  Dose: 5 mg     famotidine 20 MG Tabs  Commonly known as: PEPCID   Take 20 mg by mouth 2 times a day. Indications: Gastroesophageal Reflux Disease  Dose: 20 mg     fluticasone 50 MCG/ACT nasal spray  Commonly known as: FLONASE   Spray 1 Spray in nose every day.  Dose: 1 Spray     furosemide 40 MG Tabs  Commonly known as: LASIX   Take 40 mg by mouth 2 Times a Day.  Dose: 40 mg     levothyroxine 75 MCG Tabs  Commonly known as: SYNTHROID    Take 75 mcg by mouth Every morning on an empty stomach.  Dose: 75 mcg     metoprolol SR 25 MG Tb24  Commonly known as: TOPROL XL   Take 25 mg by mouth every day.  Dose: 25 mg     potassium Chloride ER 20 MEQ Tbcr tablet  Commonly known as: K-TAB   Take 20 mEq by mouth 2 times a day.  Dose: 20 mEq     pravastatin 20 MG Tabs  Commonly known as: PRAVACHOL   Take 20 mg by mouth every evening.  Dose: 20 mg          Allergies  No Known Allergies    DIET  Orders Placed This Encounter   Procedures   • Diet Order Regular     Standing Status:   Standing     Number of Occurrences:   1     Order Specific Question:   Diet:     Answer:   Regular [1]     ACTIVITY  As tolerated.  Weight bearing as tolerated    CONSULTATIONS  Pulmonology

## 2020-09-14 NOTE — DISCHARGE PLANNING
@1500  Agency/Facility Name: [facility]   Spoke To: Mine  Outcome: Still reviewing referral.    @1250  Agency/Facility Name: [facility]   Spoke To: Denice  Outcome: Still waiting to see if office can accept.    @1100  Agency/Facility Name: [facility]   Spoke To: Denice  Outcome: Reviewing referral.

## 2020-09-14 NOTE — PROGRESS NOTES
Pulmonary Progress Note    Date of admission  9/9/2020    Chief Complaint  84 y.o. male admitted 9/9/2020 with worsening SOB in the setting of recurrent left-sided exudative pleural effusion    Hospital Course  Desmond Marin is an 84 y.o. male who presented 9/9/2020 with left-sided chest pain and worsening shortness of breath.  The patient is well-known to me from his hospitalization in 5/2020 when he initially presented with a symptomatic left-sided pleural effusion that was exudative in nature.  At that time, fluid was brown, cloudy wbc 484, rbc 5000. TP 4, glucose 138, fluid  and ph 8. Cx negative. Path suggestive of reactive mesothelial cells. Fluid meets exudative criteria by LDH alone.  Fluid reaccumulated despite diuresis, repeat thoracentesis 6/3, exudative by all 3 lights criteria. Cultures have consistently been negative and cytology showed atypical cells but nothing definitive of malignancy.  Screening serologies have further been unrevealing. A repeat CT chest has shown reactive lymphadenopathy with no definitive pulmonary parenchymal or pleural abnormalities.  He has lost about 15 pounds since 2019.  Since his last visit with me in clinic last month he appears to have lost another 13 pounds.     The repeated reaccumulation of the effusion, lymphocyte predominance, and weight loss are all concerning for underlying pleural malignancy. He followed with me in the pulmonary clinic as well as with his cardiologist Dr. Rosenberg at [place]. We have had multiple discussions regarding management, particularly about the concern for malignancy, with the agreement that he is a less than ideal candidate for thoracoscopy. Pt and Dr Rosenberg were in agreement that continued drainage with cytology would be pursued as long as he has symptomatic benefit. Of note he has declined Pleurx catheter placement in the past.      Interval Problem Update  Reviewed last 24 hour events:  Uneventful night  Sleeping comfortably  on room air  PT analy noted, recommending HH  See note from 9/11 re: plan of care  At this point, patient has still been in the hospital to sort out Home evaluation.    Review of Systems  Review of Systems   Constitutional: Positive for malaise/fatigue and weight loss. Negative for chills and fever.   HENT: Positive for hearing loss. Negative for congestion and sore throat.    Eyes: Negative for pain.   Respiratory: Positive for shortness of breath. Negative for wheezing.    Cardiovascular: Positive for chest pain. Negative for palpitations and leg swelling.   Gastrointestinal: Negative for abdominal pain, constipation, diarrhea, heartburn, nausea and vomiting.   Neurological: Negative for headaches.   Psychiatric/Behavioral: Positive for memory loss.        Vital Signs for last 24 hours   Temp:  [36.9 °C (98.5 °F)-37.4 °C (99.3 °F)] 37.1 °C (98.8 °F)  Pulse:  [66-75] 75  Resp:  [16-17] 17  BP: (106-141)/(63-78) 141/68  SpO2:  [91 %-94 %] 92 %    Physical Exam   Physical Exam  Constitutional:       Appearance: He is ill-appearing ( chronically). He is not toxic-appearing.   HENT:      Head: Normocephalic.      Nose: Nose normal. No congestion or rhinorrhea.      Mouth/Throat:      Mouth: Mucous membranes are moist.   Eyes:      Extraocular Movements: Extraocular movements intact.      Conjunctiva/sclera: Conjunctivae normal.      Comments: Right eye was dilated under no light and reactive constriction under the light  Left eye was constricted under no light, and it mildly constricted further under the light   Cardiovascular:      Rate and Rhythm: Normal rate and regular rhythm.      Pulses: Normal pulses.      Heart sounds: No murmur. No friction rub.   Pulmonary:      Effort: Pulmonary effort is normal. No respiratory distress.      Breath sounds: No stridor. No wheezing or rales.      Comments: Breath sounds are very distant/absent in the left lower lobe  Chest:      Chest wall: No tenderness.   Abdominal:       General: Abdomen is flat. Bowel sounds are normal.      Palpations: Abdomen is soft.   Musculoskeletal:      Right lower leg: No edema.      Left lower leg: No edema.      Comments: Muscle strength 4/5 in the upper and lower extremities bilaterally   Skin:     General: Skin is warm.   Neurological:      General: No focal deficit present.      Mental Status: He is alert and oriented to person, place, and time.      Cranial Nerves: No cranial nerve deficit.      Sensory: No sensory deficit.      Motor: No weakness.       Medications  Current Facility-Administered Medications   Medication Dose Route Frequency Provider Last Rate Last Dose   • rivaroxaban (XARELTO) tablet 15 mg  15 mg Oral BID WITH MEALS Irina Carlson M.D.   15 mg at 09/13/20 1659    Followed by   • [START ON 10/2/2020] rivaroxaban (XARELTO) tablet 20 mg  20 mg Oral PM MEAL Irina Carlson M.D.       • enalaprilat (VASOTEC) injection 1.25 mg  1.25 mg Intravenous Q6HRS PRN Cecy Hill M.D.       • donepezil (ARICEPT) tablet 5 mg  5 mg Oral Nightly Cecy Hill M.D.   5 mg at 09/13/20 2034   • famotidine (PEPCID) tablet 20 mg  20 mg Oral BID Cecy Hill M.D.   20 mg at 09/14/20 0544   • fluticasone (FLONASE) nasal spray 50 mcg  1 [place] Nasal DAILY Cecy Hill M.D.   50 mcg at 09/14/20 0544   • levothyroxine (SYNTHROID) tablet 75 mcg  75 mcg Oral AM ES Cecy Hill M.D.   75 mcg at 09/14/20 0544   • metoprolol SR (TOPROL XL) tablet 25 mg  25 mg Oral DAILY Cecy Hill M.D.   25 mg at 09/14/20 0600     Fluids  No intake or output data in the 24 hours ending 09/14/20 0716  Laboratory          Recent Labs     09/12/20  0906   SODIUM 139   POTASSIUM 3.8   CHLORIDE 103   CO2 26   BUN 12   CREATININE 0.54   CALCIUM 8.6     Recent Labs     09/12/20  0906   GLUCOSE 108*         No results for input(s): RBC, HEMOGLOBIN, HEMATOCRIT, PLATELETCT, PROTHROMBTM, APTT, INR, IRON, FERRITIN, TOTIRONBC in the last 72  hours.    Imaging  US-RUQ   Final Result         1.  Irregular hepatic contour, appearance suggests changes of cirrhosis.   2.  Hypoechoic appearance of the left hepatic lobe compared to the right, could represent changes related to geographic hepatic steatosis. No discrete mass is appreciated.      US-EXTREMITY VENOUS LOWER BILAT   Final Result      MR-BRAIN-WITH & W/O   Final Result      1.  Moderate cerebral volume loss.   2.  Chronic microhemorrhage in the left basal ganglia.   3.  No acute intracranial abnormality or pathologic enhancement.      CT-CHEST (THORAX) WITH   Final Result         1. New segmental and subsegmental pulmonary emboli in the right lower lobe.      2. Moderate loculated left pleural effusion, worse than prior.      3. Patchy linear opacity in the left lung, likely atelectasis.            CRITICAL RESULT READ BACK: Preliminary findings discussed with and critical read back performed by Dr. NATANAEL RAZO via telephone on 9/10/2020 3:12 PM      DX-CHEST-PORTABLE (1 VIEW)   Final Result      Stable left pleural effusion with associated compressive atelectasis.        U/S:  LEFT lower extremity DVT in the common femoral, profunda femoris, femoral    and leg veins    [facility] cytology: 08/05/20 no malignant cells. This was the 3rd one done this year after 2 thoracs previously done at [facility] (06/23/20 and 05/27/20). 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  We will have a conservative approach and close monitoring as an outpatient.   -- RUQ reassuring as are tumor markers  -- AFP and PSA pending       -- Breathing comfortably at rest on room air and with ambulation, stable       -- PT/OT recommending HH  -- pt already scheduled to f/u with me in pulm clinic 9/18/2020    Acute pulmonary embolism without acute cor pulmonale (HCC)  Assessment & Plan  - no evidence of RV strain on CT  - Hemodynamically stable and supplemental O2 requirement stable  - DVT LE extremity ultrasound + thrombus LLE  - Continue Xarelto    Chronic hypoxemic respiratory failure (HCC)- (present on admission)  Assessment & Plan  Due to pleural effusion and PE  -Previously had MINIMAL symptomatic relief after thoracentesis  -Stable despite PE, continues 1-2 L at rest, 3 L with ambulation    Memory difficulties- (present on admission)  Assessment & Plan  - Long term memory intact but short term poor, cannot recall our office visits  - MRI 9/11: No acute intracranial abnormality or pathologic enhancement.

## 2020-09-14 NOTE — CARE PLAN
Problem: Respiratory:  Goal: Respiratory status will improve  Outcome: PROGRESSING AS EXPECTED     Problem: Skin Integrity  Goal: Risk for impaired skin integrity will decrease  Outcome: PROGRESSING AS EXPECTED

## 2020-09-15 NOTE — CARE PLAN
Problem: Respiratory:  Goal: Respiratory status will improve  Outcome: PROGRESSING AS EXPECTED     Problem: Skin Integrity  Goal: Risk for impaired skin integrity will decrease  Outcome: PROGRESSING AS EXPECTED     Problem: Communication  Goal: The ability to communicate needs accurately and effectively will improve  Outcome: PROGRESSING AS EXPECTED     Problem: Safety  Goal: Will remain free from injury  Outcome: PROGRESSING AS EXPECTED

## 2020-09-15 NOTE — THERAPY
Occupational Therapy  Daily Treatment     Patient Name: Chucho Marin  Age:  84 y.o., Sex:  male  Medical Record #: [id number]  Today's Date: 9/15/2020     Precautions: Fall Risk    Assessment    Pt seen for OT tx. Received in bed, agreeable to therapy. Pt mobilized to EOB with no assist, but heavy use of bed rail. Mobilized to bathroom using FWW with no LOB. Pt stood for almost 16 minutes to complete shave, oral care, hair care. Very good attention to detail. By end of stand, pt expressed fatigue. Mobilized to arm chair. Pt then completed 3 additional sit to stands with supv, good control of descent using BUE on chair arms. Pt does demo some cognitive deficits. He had difficulty recalling recent events and where he was admitted from (appears as if pt came from SNF, but not entirely clear; pt did have recent admit [facility]). Pt also had difficulty comprehending and appropriately using pain scale. Would be beneficial to determine from family whether pt had any memory deficits at baseline. May benefit from SLP cognitive eval. Recommend use of shower chair at home. Provided hand-out with purchase options. Will continue to benefit from acute OT to maximize functional independence and safety.     Plan    Continue current treatment plan.    DC Equipment Recommendations: Tub / Shower Seat, Front-Wheel Walker  Discharge Recommendations:  Recommend home health for continued occupational therapy services     Objective       09/15/20 1506   Bed Mobility    Supine to Sit Supervised  (flat bed, heavy use of grab bar)   Scooting Supervised  (seated)   Rolling Supervised  (using bed rail )   Activities of Daily Living   Grooming Supervision;Standing  (oral care, shave, hair combing )   Toileting   (NT, completed recently with nursing )   Functional Mobility   Sit to Stand Supervised   Bed, Chair, Wheelchair Transfer Supervised   Transfer Method Stand Pivot  (FWW)   Short Term Goals   Short Term Goal # 1 Pt will complete  toilet xfer supv   Goal Outcome # 1 Progressing as expected  (chair transfers only this session )   Short Term Goal # 2 Pt will tolerate 10 mins standing ADL   Goal Outcome # 2 Goal met, new goal added   Short Term Goal # 2 B  Pt will complete seated shower with supv    Goal Outcome # 2 B Goal not met

## 2020-09-15 NOTE — PROGRESS NOTES
Daily Progress Note:     Date of Service: 9/15/2020  Primary Team: [facility] IM Yellow Team   Attending: Mery Baker M.D.   Senior Resident: Dr. Daniel  Contact:  [phone]    ID: Patient is a 84-year-old male admitted with shortness of breath secondary to recurrent left-sided exudative pleural effusion    Overnight: Patient spiked a fever of 100.9F.  Patient is asymptomatic.    Subjective:  -Patient continues to have shortness of breath and left-sided chest pain at his baseline.  Saturating well on room air.  He did not experience any fever or chills last night.  Denies any new or acute symptoms    Interval history:  9/14: Patient is medically stable for discharge pending home health arrangement.   is working on home health and might have an answer tomorrow from referral  9/15: Home health approved, will watch him for 24 hours because of fever overnight, BC x2 pending    Consultants/Specialty:  Pulmonology-Dr. Martinez    Review of Systems:    Review of Systems   Constitutional: Positive for weight loss. Negative for chills and fever.   HENT: Positive for hearing loss and sore throat. Negative for congestion.         Hearing difficulty on the right   Eyes: Negative for pain and discharge.   Respiratory: Positive for shortness of breath. Negative for cough, sputum production and wheezing.    Cardiovascular: Positive for chest pain (left sided). Negative for orthopnea and leg swelling.   Gastrointestinal: Negative for abdominal pain, diarrhea, nausea and vomiting.   Genitourinary: Negative for dysuria and frequency.   Musculoskeletal: Negative for joint pain and neck pain.   Skin: Negative for itching and rash.   Neurological: Negative for sensory change, focal weakness and headaches.   Endo/Heme/Allergies: Negative for polydipsia. Does not bruise/bleed easily.   Psychiatric/Behavioral: Negative for substance abuse. The patient is not nervous/anxious.    All other systems reviewed and are  negative.    Objective Data:   Physical Exam:   Vitals:   Temp:  [36.8 °C (98.3 °F)-38.3 °C (100.9 °F)] 37.7 °C (99.9 °F)  Pulse:  [71-80] 74  Resp:  [16-20] 16  BP: ()/(57-67) 122/62  SpO2:  [92 %-95 %] 94 %hos    Physical Exam  Constitutional:       Appearance: Normal appearance. He is ill-appearing.   HENT:      Head: Normocephalic and atraumatic.      Right Ear: Tympanic membrane normal.      Left Ear: Tympanic membrane normal.      Nose: Nose normal.      Mouth/Throat:      Mouth: Mucous membranes are moist.      Pharynx: Oropharynx is clear.   Eyes:      Extraocular Movements: Extraocular movements intact.      Conjunctiva/sclera: Conjunctivae normal.   Neck:      Musculoskeletal: Normal range of motion and neck supple.   Cardiovascular:      Rate and Rhythm: Normal rate and regular rhythm.      Pulses: Normal pulses.      Heart sounds: Normal heart sounds. No murmur.   Pulmonary:      Effort: No respiratory distress.      Comments: Decreased on left base  Abdominal:      General: There is no distension.      Tenderness: There is no abdominal tenderness. There is no rebound.   Musculoskeletal:         General: No deformity or signs of injury.   Skin:     Coloration: Skin is not jaundiced or pale.   Neurological:      General: No focal deficit present.      Mental Status: He is alert. Mental status is at baseline.   Psychiatric:         Mood and Affect: Mood normal.         Behavior: Behavior normal.         Thought Content: Thought content normal.      Comments: MOCA -15/30     Assessment and plan:  * Recurrent pleural effusion on left  Assessment & Plan  -Multiple episodes of exudative Left Recurrent pleural effusion and thoracentesis x 6  -Informs about 20 pound weight loss since June  -CT thorax(9/10)- Moderate Loculated pleural effusion more worse on the left but indiactes no pleural/parenchymal disease  -CT chest,abdomen,Pelvis - No evidence for Malignancy  -O2 saturations at base line and  hemodynamically stable  -Possiible underlying pleural malignancy which has been undetected causing recurrent effusions  -Pulmonology following, appreciate recommendations    PLAN:  - Poor candidate for thoracoscopy and risks > benefits and a conservative approach is appropriate with no further invasive procedures. Continued thoracenteses seems of little benefit from a diagnostic or therapeutic perspective. Cytology has been now negative x 3, and his most recent thoracentesis caused a great deal of discomfort and provided little if any symptomatic relief.  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  - Continued concern for underlying malignancy that has eluded confirmation with cytology and imaging  - Pulmonology will follow-up as outpatient, appointment scheduled for 9/18/2020    Acute pulmonary embolism without acute cor pulmonale (HCC)  Assessment & Plan  CT thorax- Acute segmental and subsegmental PE  Hemodynamically stable with no right heart strain and O2 requirements stable at baseline  Venous doppler confirms left lower extremity DVT    Plan:  - Initially on Heparin Drip and transitioned to DOAC  -Continue Xarelto    Chronic hypoxemic respiratory failure (HCC)- (present on admission)  Assessment & Plan  -Baseline O2 requirements - 3L O2  -Secondary to Recurrent pleural effusions and now PE  -Currently on Room Air  -Will monitor    HLD (hyperlipidemia)- (present on admission)  Assessment & Plan  -Continue pravastatin    Hypothyroidism- (present on admission)  Assessment & Plan  Continue home levothyroxine    DISPO: Home with , possible d/c tomorrow

## 2020-09-16 NOTE — CARE PLAN
Problem: Respiratory:  Goal: Respiratory status will improve  Outcome: PROGRESSING AS EXPECTED     Problem: Communication  Goal: The ability to communicate needs accurately and effectively will improve  Outcome: PROGRESSING AS EXPECTED     Problem: Safety  Goal: Will remain free from injury  Outcome: PROGRESSING AS EXPECTED  Goal: Will remain free from falls  Outcome: PROGRESSING AS EXPECTED

## 2020-09-16 NOTE — CARE PLAN
Problem: Infection  Goal: Will remain free from infection  Outcome: PROGRESSING AS EXPECTED  Note: Standard precautions in place. Education provided to patient. Hand hygiene being preformed correctly prior to and after patient contact by staff.       Problem: Discharge Barriers/Planning  Goal: Patient's continuum of care needs will be met  Outcome: PROGRESSING AS EXPECTED  Note: Discharge home with  planned for today. Pending blood culture results. No barriers at this time. Patient agreeable to POC.

## 2020-09-16 NOTE — DISCHARGE INSTRUCTIONS
Discharge Instructions    Discharged to home by car with relative. Discharged via wheelchair, hospital escort: Yes.  Special equipment needed: Not Applicable    Be sure to schedule a follow-up appointment with your primary care doctor or any specialists as instructed.     Discharge Plan: Follow up with your primary care provider.  Take your medications as prescribed.  Return to the emergency department if symptoms return.  Diet Plan: Discussed  Activity Level: Discussed  Confirmed Follow up Appointment: Patient to Call and Schedule Appointment  Confirmed Symptoms Management: Discussed  Medication Reconciliation Updated: Yes    I understand that a diet low in cholesterol, fat, and sodium is recommended for good health. Unless I have been given specific instructions below for another diet, I accept this instruction as my diet prescription.   Other diet: Regular, healthy diet    Special Instructions:     Pleural Effusion  You have a pleural effusion. This means you have fluid in the space between your lung and your chest wall. This condition may result from many different problems including:  · Pleurisy or inflammation of the lining of the lung.   · Lung infections such as pneumonia.   · Blood clots, heart disease, chest injuries, cancer, and other medical problems.   A pleural effusion may not cause any symptoms. If it is large, however, it can make you short of breath. Other symptoms include cough, chest pain, or fever. The diagnosis of a pleural effusion is most often made by chest X-ray, CT, or ultrasound studies. A sample of the fluid can be taken using local anesthetic and a needle or catheter placed between the ribs. This procedure may be needed to determine the cause of the effusion, or to treat a large effusion.   Treatment is specific to the cause, such as antibiotics for infections. Mild pain or anti-inflammatory medicines may be helpful in relieving symptoms. Chronic effusions often have to be periodically  drained. It is very important that you see your doctor or a specialist to be sure the effusion is not from a serious medical condition. A follow-up chest X-ray is usually needed.   SEEK IMMEDIATE MEDICAL CARE IF:  · You develop shortness of breath.   · You develop increased pain.   · You develop a high fever.       Pulmonary Embolism    A pulmonary embolism (PE) is a sudden blockage or decrease of blood flow in one or both lungs. Most blockages come from a blood clot that forms in the vein of a lower leg, thigh, or arm (deep vein thrombosis, DVT) and travels to the lungs. A clot is blood that has thickened into a gel or solid. PE is a dangerous and life-threatening condition that needs to be treated right away.  What are the causes?  This condition is usually caused by a blood clot that forms in a vein and moves to the lungs. In rare cases, it may be caused by air, fat, part of a tumor, or other tissue that moves through the veins and into the lungs.  What increases the risk?  The following factors may make you more likely to develop this condition:  · Experiencing a traumatic injury, such as breaking a hip or leg.  · Having:  ? A spinal cord injury.  ? Orthopedic surgery, especially hip or knee replacement.  ? Any major surgery.  ? A stroke.  ? DVT.  ? Blood clots or blood clotting disease.  ? Long-term (chronic) lung or heart disease.  ? Cancer treated with chemotherapy.  ? A central venous catheter.  · Taking medicines that contain estrogen. These include birth control pills and hormone replacement therapy.  · Being:  ? Pregnant.  ? In the period of time after your baby is delivered (postpartum).  ? Older than age 60.  ? Overweight.  ? A smoker, especially if you have other risks.  What are the signs or symptoms?  Symptoms of this condition usually start suddenly and include:  · Shortness of breath during activity or at rest.  · Coughing, coughing up blood, or coughing up blood-tinged mucus.  · Chest pain that is  often worse with deep breaths.  · Rapid or irregular heartbeat.  · Feeling light-headed or dizzy.  · Fainting.  · Feeling anxious.  · Fever.  · Sweating.  · Pain and swelling in a leg. This is a symptom of DVT, which can lead to PE.  How is this diagnosed?  This condition may be diagnosed based on:  · Your medical history.  · A physical exam.  · Blood tests.  · CT pulmonary angiogram. This test checks blood flow in and around your lungs.  · Ventilation-perfusion scan, also called a lung VQ scan. This test measures air flow and blood flow to the lungs.  · An ultrasound of the legs.  How is this treated?  Treatment for this condition depends on many factors, such as the cause of your PE, your risk for bleeding or developing more clots, and other medical conditions you have. Treatment aims to remove, dissolve, or stop blood clots from forming or growing larger. Treatment may include:  · Medicines, such as:  ? Blood thinning medicines (anticoagulants) to stop clots from forming.  ? Medicines that dissolve clots (thrombolytics).  · Procedures, such as:  ? Using a flexible tube to remove a blood clot (embolectomy) or to deliver medicine to destroy it (catheter-directed thrombolysis).  ? Inserting a filter into a large vein that carries blood to the heart (inferior vena cava). This filter (vena cava filter) catches blood clots before they reach the lungs.  ? Surgery to remove the clot (surgical embolectomy). This is rare.  You may need a combination of immediate, long-term (up to 3 months after diagnosis), and extended (more than 3 months after diagnosis) treatments. Your treatment may continue for several months (maintenance therapy). You and your health care provider will work together to choose the treatment program that is best for you.  Follow these instructions at home:  Medicines  · Take over-the-counter and prescription medicines only as told by your health care provider.  · If you are taking an anticoagulant  medicine:  ? Take the medicine every day at the same time each day.  ? Understand what foods and drugs interact with your medicine.  ? Understand the side effects of this medicine, including excessive bruising or bleeding. Ask your health care provider or pharmacist about other side effects.  General instructions  · Wear a medical alert bracelet or carry a medical alert card that says you have had a PE and lists what medicines you take.  · Ask your health care provider when you may return to your normal activities. Avoid sitting or lying for a long time without moving.  · Maintain a healthy weight. Ask your health care provider what weight is healthy for you.  · Do not use any products that contain nicotine or tobacco, such as cigarettes, e-cigarettes, and chewing tobacco. If you need help quitting, ask your health care provider.  · Talk with your health care provider about any travel plans. It is important to make sure that you are still able to take your medicine while on trips.  · Keep all follow-up visits as told by your health care provider. This is important.  Contact a health care provider if:  · You missed a dose of your blood thinner medicine.  Get help right away if:  · You have:  ? New or increased pain, swelling, warmth, or redness in an arm or leg.  ? Numbness or tingling in an arm or leg.  ? Shortness of breath during activity or at rest.  ? A fever.  ? Chest pain.  ? A rapid or irregular heartbeat.  ? A severe headache.  ? Vision changes.  ? A serious fall or accident, or you hit your head.  ? Stomach (abdominal) pain.  ? Blood in your vomit, stool, or urine.  ? A cut that will not stop bleeding.  · You cough up blood.  · You feel light-headed or dizzy.  · You cannot move your arms or legs.  · You are confused or have memory loss.  These symptoms may represent a serious problem that is an emergency. Do not wait to see if the symptoms will go away. Get medical help right away. Call your local emergency  services (911 in the [place] Do not drive yourself to the hospital.  Summary  · A pulmonary embolism (PE) is a sudden blockage or decrease of blood flow in one or both lungs. PE is a dangerous and life-threatening condition that needs to be treated right away.  · Treatments for this condition usually include medicines to thin your blood (anticoagulants) or medicines to break apart blood clots (thrombolytics).  · If you are given blood thinners, it is important to take the medicine every day at the same time each day.  · Understand what foods and drugs interact with any medicines that you are taking.  · If you have signs of PE or DVT, call your local emergency services (911 in the [place]  This information is not intended to replace advice given to you by your health care provider. Make sure you discuss any questions you have with your health care provider.    Rivaroxaban (Xarelto) oral tablets  What is this medicine?  RIVAROXABAN (ri va NADIA a ban) is an anticoagulant (blood thinner). It is used to treat blood clots in the lungs or in the veins. It is also used to prevent blood clots in the lungs or in the veins. It is also used to lower the chance of stroke in people with a medical condition called atrial fibrillation.  This medicine may be used for other purposes; ask your health care provider or pharmacist if you have questions.  COMMON BRAND NAME(S): Xarelto, Xarelto Starter Pack  What should I tell my health care provider before I take this medicine?  They need to know if you have any of these conditions:  · antiphospholipid antibody syndrome  · artificial heart valve  · bleeding disorders  · bleeding in the brain  · blood in your stools (black or tarry stools) or if you have blood in your vomit  · history of blood clots  · history of stomach bleeding  · kidney disease  · liver disease  · low blood counts, like low white cell, platelet, or red cell counts  · recent or planned spinal or epidural procedure  · take  medicines that treat or prevent blood clots  · an unusual or allergic reaction to rivaroxaban, other medicines, foods, dyes, or preservatives  · pregnant or trying to get pregnant  · breast-feeding  How should I use this medicine?  Take this medicine by mouth with a glass of water. Follow the directions on the prescription label. Take your medicine at regular intervals. Do not take it more often than directed. Do not stop taking except on your doctor's advice. Stopping this medicine may increase your risk of a blood clot. Be sure to refill your prescription before you run out of medicine.  If you are taking this medicine after hip or knee replacement surgery, take it with or without food. If you are taking this medicine for atrial fibrillation, take it with your evening meal. If you are taking this medicine to treat blood clots, take it with food at the same time each day. If you are unable to swallow your tablet, you may crush the tablet and mix it in applesauce. Then, immediately eat the applesauce. You should eat more food right after you eat the applesauce containing the crushed tablet.  Talk to your pediatrician regarding the use of this medicine in children. Special care may be needed.  Overdosage: If you think you have taken too much of this medicine contact a poison control center or emergency room at once.  NOTE: This medicine is only for you. Do not share this medicine with others.  What if I miss a dose?  If you take your medicine once a day and miss a dose, take the missed dose as soon as you remember. If it is almost time for your next dose, take only that dose. Do not take double or extra doses.  If you take your medicine twice a day and miss a dose, take the missed dose immediately. In this instance, 2 tablets may be taken at the same time. The next day you should take 1 tablet twice a day as directed.  What may interact with this medicine?  Do not take this medicine with any of the following  medications:  · defibrotide  This medicine may also interact with the following medications:  · aspirin and aspirin-like medicines  · certain antibiotics like erythromycin, azithromycin, and clarithromycin  · certain medicines for fungal infections like ketoconazole and itraconazole  · certain medicines for irregular heart beat like amiodarone, quinidine, dronedarone  · certain medicines for seizures like carbamazepine, phenytoin  · certain medicines that treat or prevent blood clots like warfarin, enoxaparin, and dalteparin  · conivaptan  · felodipine  · indinavir  · lopinavir; ritonavir  · NSAIDS, medicines for pain and inflammation, like ibuprofen or naproxen  · ranolazine  · rifampin  · ritonavir  · SNRIs, medicines for depression, like desvenlafaxine, duloxetine, levomilnacipran, venlafaxine  · SSRIs, medicines for depression, like citalopram, escitalopram, fluoxetine, fluvoxamine, paroxetine, sertraline  · [place]'s wort  · verapamil  This list may not describe all possible interactions. Give your health care provider a list of all the medicines, herbs, non-prescription drugs, or dietary supplements you use. Also tell them if you smoke, drink alcohol, or use illegal drugs. Some items may interact with your medicine.  What should I watch for while using this medicine?  Visit your healthcare professional for regular checks on your progress. You may need blood work done while you are taking this medicine. Your condition will be monitored carefully while you are receiving this medicine. It is important not to miss any appointments.  Avoid sports and activities that might cause injury while you are using this medicine. Severe falls or injuries can cause unseen bleeding. Be careful when using sharp tools or knives. Consider using an electric razor. Take special care brushing or flossing your teeth. Report any injuries, bruising, or red spots on the skin to your healthcare professional.  If you are going to need  surgery or other procedure, tell your healthcare professional that you are taking this medicine.  Wear a medical ID bracelet or chain. Carry a card that describes your disease and details of your medicine and dosage times.  What side effects may I notice from receiving this medicine?  Side effects that you should report to your doctor or health care professional as soon as possible:  · allergic reactions like skin rash, itching or hives, swelling of the face, lips, or tongue  · back pain  · redness, blistering, peeling or loosening of the skin, including inside the mouth  · signs and symptoms of bleeding such as bloody or black, tarry stools; red or dark-brown urine; spitting up blood or brown material that looks like coffee grounds; red spots on the skin; unusual bruising or bleeding from the eye, gums, or nose  · signs and symptoms of a blood clot such as chest pain; shortness of breath; pain, swelling, or warmth in the leg  · signs and symptoms of a stroke such as changes in vision; confusion; trouble speaking or understanding; severe headaches; sudden numbness or weakness of the face, arm or leg; trouble walking; dizziness; loss of coordination  Side effects that usually do not require medical attention (report to your doctor or health care professional if they continue or are bothersome):  · dizziness  · muscle pain  This list may not describe all possible side effects. Call your doctor for medical advice about side effects. You may report side effects to FDA at 4-971-FDA-9707.  Where should I keep my medicine?  Keep out of the reach of children.  Store at room temperature between 15 and 30 degrees C (59 and 86 degrees F). Throw away any unused medicine after the expiration date.  NOTE: This sheet is a summary. It may not cover all possible information. If you have questions about this medicine, talk to your doctor, pharmacist, or health care provider.  © 2020 [employer] (2020-03-16  09:45:59)    · Is patient discharged on Warfarin / Coumadin?   No     Depression / Suicide Risk    As you are discharged from this [facility] facility, it is important to learn how to keep safe from harming yourself.    Recognize the warning signs:  · Abrupt changes in personality, positive or negative- including increase in energy   · Giving away possessions  · Change in eating patterns- significant weight changes-  positive or negative  · Change in sleeping patterns- unable to sleep or sleeping all the time   · Unwillingness or inability to communicate  · Depression  · Unusual sadness, discouragement and loneliness  · Talk of wanting to die  · Neglect of personal appearance   · Rebelliousness- reckless behavior  · Withdrawal from people/activities they love  · Confusion- inability to concentrate     If you or a loved one observes any of these behaviors or has concerns about self-harm, here's what you can do:  · Talk about it- your feelings and reasons for harming yourself  · Remove any means that you might use to hurt yourself (examples: pills, rope, extension cords, firearm)  · Get professional help from the community (Mental Health, Substance Abuse, psychological counseling)  · Do not be alone:Call your Safe Contact- someone whom you trust who will be there for you.  · Call your local CRISIS HOTLINE 244-2863 or 983-462-0750  · Call your local Children's Mobile Crisis Response Team [place] (409) 237-5097 or www.[employer]  · Call the toll free National Suicide Prevention Hotlines   · National Suicide Prevention Lifeline 220-145-VIQX (3707)  · National Hope Line Network 800-SUICIDE (212-8145)

## 2020-09-16 NOTE — DISCHARGE PLANNING
Meds-to-Beds: Discharge prescription order listed below delivered to patient in discharge lounge. RN notified. Patient counseled. Per pharmacy,RN had requested Xarelto 20 mg to be kept on-hold. Instructed patient to take 20 mg tablets after completing the 15 mg tablets.      Chucho Marin   Home Medication Instructions CRISTIAN:[id number]    Printed on:09/16/20 2824   Medication Information                      rivaroxaban (XARELTO) 15 MG Tab tablet  Take 1 Tab by mouth 2 times a day, with meals for 16 days.               Verito Gan, PharmD

## 2020-09-16 NOTE — PROGRESS NOTES
Patient ready for discharge jim. BETTY Mittal notified. All personal belongings account for and in patient possession. DME equipment FWW delivered to bedside. Chart sent with patient to d/c jim.

## 2020-09-16 NOTE — PROGRESS NOTES
Patient arrived to discharge lounge in wheelchair.  FWW with patient.  Wheelchair with patient.  PIV discontinued by bedside RN.  Called meds to beds.  Xarelto medication to be filled by [facility] and delivered to patient in the discharge lounge.  Called patient's spouse and requested that she  patient.  Gave her instructions on  location.  Instructions, prescriptions and education given to patient.  Follow up appointments discussed.  Patient verbalized understanding of dc instructions and prescriptions.  Patient signed discharge instructions.  Patient verbalized he had all belongings with him.  Patient waiting for meds to beds and for his spouse to come pick him up.

## 2020-09-16 NOTE — PROGRESS NOTES
Patient's spouse here to pick patient up.  Discussed medications and follow up appointments with patient's spouse.  Received verbal understanding.  Meds to Beds pharmacist at chairside.  Explained Xarelto prescription to patient.  Patient wheeled out to his personal vehicle.  Assisted patient into the passenger side of his vehicle.  Assisted patient's spouse to put patient's wheelchair into the trunk.  Wished patient a speedy recovery.

## 2020-09-16 NOTE — PROGRESS NOTES
Assumed patient care. Bedside report received. Patient's plan of care reviewed. Patient found resting in bed, A&Ox4. VSS, on RA. No signs of distress, declines pain at this time. All needs met. Safety precautions in place. Bed in lowest/locked position. Bed alarm on. Call light and personal belongings within reach. Will continue to monitor.     Patient is medical

## 2020-09-18 NOTE — PROGRESS NOTES
Pulmonary Clinic Note    Chief Complaint:  No chief complaint on file.    HPI:   Desmond Marin is a very pleasant 84 y.o. male non smoker who returns to the pulmonary clinic for follow-up after his hospitalization earlier this week for shortness of breath and ground-level fall at home.  He has been following up with the pulmonary clinic for recurrent exudative left-sided pleural effusion since 5/2020.      First identified 5/2020, consistently exudative, cultures and cytology negative, but lymphocytic predominant and 30+ pound weight loss high concern for malignancy    Pleural fluid cytology negative x 3. Once showed a single cluster of atypical cells that could not be further characterized.    JEANIE 1:160 not of clinical significance, JEANIE subsets negative, IGRA, RA, CCP negative  Continues to reaccumulate despite diuresis    5/28/20 TTE- both systolic EF 45% and diastolic dysfunction (Grade I), small left ventricle sign and global hypokinesis    Interval events since hospitalization:  On his recent hospitalization he was found to have acute pulmonary embolism low risk and left lower extremity DVT for which he was started on Xarelto.  Fortunately supplemental oxygen requirements remained stable, able to ambulate with FWW on RA.    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 No clear underlying pulmonary parenchymal or pleural pathology on multiple CT scans.     Case was discussed with patient's PCP, wife Erendira, primary hospitalist and his other specialists, collective agreement is that he is too high risk for thoracoscopy and there seems to be little diagnostic/therapeutic benefit from continued thoracenteses.      RUQ US, tumor markers wnl    Past Medical History:   Diagnosis Date   • Back pain    • Bronchitis    • CHEST PAIN 6/1/2011   • CHF (congestive heart failure) ([facility]) 5/29/2020   • Daytime sleepiness    • Frequent urination     • Pleural effusion    • Ringing in ears    • Shortness of breath    • Sweat, sweating, excessive    • Wears glasses    • Weight loss        Past Surgical History:   Procedure Laterality Date   • PB REMV 2ND CATARACT,CORN-SCLER SECTN     • PROSTATECTOMY ROBOTIC     • TONSILLECTOMY         Social History     Socioeconomic History   • Marital status:      Spouse name: Not on file   • Number of children: Not on file   • Years of education: Not on file   • Highest education level: Not on file   Occupational History   • Not on file   Social Needs   • Financial resource strain: Not on file   • Food insecurity     Worry: Not on file     Inability: Not on file   • Transportation needs     Medical: Not on file     Non-medical: Not on file   Tobacco Use   • Smoking status: Never Smoker   • Smokeless tobacco: Never Used   Substance and Sexual Activity   • Alcohol use: No   • Drug use: No   • Sexual activity: Not on file     Comment: na   Lifestyle   • Physical activity     Days per week: Not on file     Minutes per session: Not on file   • Stress: Not on file   Relationships   • Social connections     Talks on phone: Not on file     Gets together: Not on file     Attends [employer] service: Not on file     Active member of club or organization: Not on file     Attends meetings of clubs or organizations: Not on file     Relationship status: Not on file   • Intimate partner violence     Fear of current or ex partner: Not on file     Emotionally abused: Not on file     Physically abused: Not on file     Forced sexual activity: Not on file   Other Topics Concern   • Not on file   Social History Narrative   • Not on file          No family history on file.    Current Outpatient Medications on File Prior to Visit   Medication Sig Dispense Refill   • rivaroxaban (XARELTO) 15 MG Tab tablet Take 1 Tab by mouth 2 times a day, with meals for 16 days. 32 Tab 0   • [START ON 10/2/2020] rivaroxaban (XARELTO) 20 MG Tab tablet Take 1  Tab by mouth with dinner. 30 Tab 0   • metoprolol SR (TOPROL XL) 25 MG TABLET SR 24 HR Take 25 mg by mouth every day.     • famotidine (PEPCID) 20 MG Tab Take 20 mg by mouth 2 times a day. Indications: Gastroesophageal Reflux Disease     • fluticasone (FLONASE) 50 MCG/ACT nasal spray Spray 1 Spray in nose every day.     • levothyroxine (SYNTHROID) 75 MCG Tab Take 75 mcg by mouth Every morning on an empty stomach.     • donepezil (ARICEPT) 5 MG Tab Take 5 mg by mouth every evening.       No current facility-administered medications on file prior to visit.      Allergies: Patient has no known allergies.    ROS:   Review of Systems   Constitutional: Positive for malaise/fatigue and weight loss. Negative for chills and fever.   HENT: Negative for congestion, sinus pain and sore throat.    Eyes: Negative for pain and discharge.   Respiratory: Positive for cough and shortness of breath. Negative for sputum production, wheezing and stridor.    Cardiovascular: Positive for chest pain. Negative for orthopnea and leg swelling.   Gastrointestinal: Negative for abdominal pain, diarrhea, nausea and vomiting.   Genitourinary: Negative for dysuria, frequency and urgency.   Musculoskeletal: Negative for myalgias.   Skin: Negative for rash.   Neurological: Negative for dizziness, sensory change, focal weakness, loss of consciousness and headaches.   Psychiatric/Behavioral: Positive for memory loss.   All other systems reviewed and are negative.    Vitals:  There were no vitals taken for this visit.    Physical Exam:  Physical Exam  Vitals signs and nursing note reviewed.   Constitutional:       Appearance: He is not ill-appearing or diaphoretic.      Comments: Very well-dressed  thin   HENT:      Head: Normocephalic and atraumatic.      Mouth/Throat:      Mouth: Mucous membranes are dry.   Eyes:      Pupils: Pupils are equal, round, and reactive to light.   Neck:      Musculoskeletal: Normal range of motion.   Cardiovascular:       Rate and Rhythm: Normal rate.      Heart sounds: No murmur.   Pulmonary:      Comments: Decreased BS post left  Abdominal:      General: Bowel sounds are normal.      Palpations: Abdomen is soft.   Musculoskeletal:      Right lower leg: No edema.      Left lower leg: No edema.   Skin:     General: Skin is warm.   Neurological:      General: No focal deficit present.      Mental Status: He is oriented to person, place, and time.   Psychiatric:         Mood and Affect: Mood normal.         Behavior: Behavior normal.         Thought Content: Thought content normal.         Judgment: Judgment normal.       Laboratory Data:    PFTs as reviewed by me personally show: None for review    Imaging as reviewed by me personally show:    CT chest abdomen pelvis 6/3/2020: personally reviewed, perihilar atelectasis minimal, do not think this is an underlying primary lung malignancy, although cannot rule out mesothelioma, pleural disease.     Assessment/Plan:    Problem List Items Addressed This Visit     None        * Recurrent pleural effusion on left  Assessment & Plan  First identified 5/2020, consistently exudative, cultures and cytology negative, but lymphocytic predominant and 30+ pound weight loss high concern for malignancy  Clinically this is now behaving as a trapped lung, last thoracentesis early August associated with significant discomfort  No clear underlying pulmonary parenchymal or pleural pathology on multiple CT scans  JEANIE 1:160 not of clinical significance, JEANIE subsets negative, IGRA, RA, CCP negative  Continues to reaccumulate despite diuresis     -- Case has been discussed with patient's PCP, wife Erendira, primary hospitalist and his other specialists, collective agreement is that he is too high risk for thoracoscopy and there seems to be little diagnostic/therapeutic benefit from continued thoracenteses.  We will have a conservative approach and close monitoring as an outpatient.   -- RUQ reassuring as are  tumor markers  -- AFP and PSA pending       -- Breathing comfortably at rest on room air and with ambulation, stable       -- PT/OT recommending HH  -- pt already scheduled to f/u with me in pulm clinic 9/18/2020     Acute pulmonary embolism without acute cor pulmonale (HCC)  Assessment & Plan  - no evidence of RV strain on CT  - Hemodynamically stable and supplemental O2 requirement stable  - DVT LE extremity ultrasound + thrombus LLE  - Continue Xarelto     Chronic hypoxemic respiratory failure (HCC)- (present on admission)  Assessment & Plan  Due to pleural effusion and PE  -Previously had MINIMAL symptomatic relief after thoracentesis  -Stable despite PE, continues 1-2 L at rest, 3 L with ambulation     Memory difficulties- (present on admission)  Assessment & Plan  - Long term memory intact but short term poor, cannot recall our office visits  - MRI 9/11: No acute intracranial abnormality or pathologic enhancement.    No follow-ups on file.     This note was generated using voice recognition software which has a chance of producing errors of grammar and possibly content.  I have made every reasonable attempt to find and correct any obvious errors, but it should be expected that some may not be found prior to finalization of this note.  __________  Michel Martinez MD  Pulmonary and Critical Care Medicine  [facility]

## 2020-09-23 PROBLEM — J96.11 CHRONIC HYPOXEMIC RESPIRATORY FAILURE (HCC): Chronic | Status: ACTIVE | Noted: 2020-01-01

## 2020-09-23 PROBLEM — J90 RECURRENT PLEURAL EFFUSION ON LEFT: Chronic | Status: ACTIVE | Noted: 2020-01-01

## 2020-09-23 PROBLEM — I26.99 ACUTE PULMONARY EMBOLISM WITHOUT ACUTE COR PULMONALE (HCC): Chronic | Status: ACTIVE | Noted: 2020-01-01

## 2020-09-23 NOTE — PROGRESS NOTES
"Chief Complaint   Patient presents with   • Follow-Up     Pleural effusion left // Last Seen 8/13/2020       HPI:  Chucho Marin is a 84 y.o. year old male here today for follow-up on hospital discharge for recurrent left-sided pleural effusion.  Accompanied by his wife.    Last office visit 8/13/2020 with Dr. Martinez.  He has been previously hospitalized for this multiple times.      Per Dr. Martinez's note 8/13/2020:  \"Chucho initially presented to the [facility] ER in 5/2020 with chest pain and shortness of breath and found to have a large left pleural effusion.  Underwent thoracentesis 5/27- brown, cloudy wbc 484, rbc 5000. TP 4, glucose 138, fluid  and ph 8. Cx negative. Path suggestive of reactive mesothelial cells. Fluid meets exudative criteria by LDH alone.  Fluid reaccumulated despite diuresis, repeat thoracentesis 6/3, exudative by all 3 lights criteria. Glucose, pH, triglycerides, amylase, cholesterol WNL.  Relatively low cell count however lymphocytic predominant 51%.  Pathology identified a single cluster of atypical cells that could not be further characterized.  No cough fever or chills, COVID ruled out.  5/28/20 TTE- both systolic EF 45% and diastolic dysfunction (Grade I), small left ventricle sign and global hypokinesis  Treated with lasix, diuretics DC'd due to worsening JEET, creatinine plateaued  CT chest abdomen pelvis 6/3/2020: personally reviewed, perihilar atelectasis minimal, do not think this is an underlying primary lung malignancy, although cannot rule out mesothelioma, pleural disease.     Past medical hx: CHF, dementia and hypothyroidism\"    Interval events since last OV 8/13/20:  Has undergone 4 thoracentesis at [facility] since his hospitalization 5/2020  Has lost 15lbs since 2019  Uses O2 2L at rest, 3L exertion    Admitted to [facility] 8/23/20 presented with dizziness and room spinning.  And multiple falls.  Head CT in ER negative.  Complaint of worsening shortness of " breath.  Imaging noted left-sided pleural effusion.  Previous pleural studies were consistent with exudative type of effusion cultures consistently negative, cytology showed atypical cells but nothing definitive malignancy.  He is lost weight over the last several months.  He had repeat reaccumulation of effusion along with component of trapped lung.  Dr. Martinez was consulted, CT chest with contrast ordered noting large left-sided effusion and incidentally identified new right-sided PE along with DVT with further imaging and started on heparin drip and transition to Xarelto.  He was saturating well on room air at that time and on supplemental oxygen.  PCP and cardiology along with thoracic surgery consulted and felt to be a poor candidate for thoracoscopy and risks outweigh benefits.  He declined Pleurx catheter and patient is wife are in agreement of monitoring his symptoms.  Moderate cognitive impairment noted on exam as well.  He was discharged home with home health and PT/OT.  AFP normal. CA-19 normal. Blood cultures negative.    Today he notes dyspnea to be unchanged and at baseline. He denies cough/phlegm/chest tightness. He notes having sore muscle since coming home and working with PT/OT. He remains on O2 24/7. He is tolerating Xarelto.    ROS: As per HPI and otherwise negative if not stated.    Past Medical History:   Diagnosis Date   • Back pain    • Bronchitis    • CHEST PAIN 6/1/2011   • CHF (congestive heart failure) ([facility]) 5/29/2020   • Daytime sleepiness    • Frequent urination    • Pleural effusion    • Ringing in ears    • Shortness of breath    • Sweat, sweating, excessive    • Wears glasses    • Weight loss        Past Surgical History:   Procedure Laterality Date   • PB REMV 2ND CATARACT,CORN-SCLER SECTN     • PROSTATECTOMY ROBOTIC     • TONSILLECTOMY         History reviewed. No pertinent family history.    Social History     Socioeconomic History   • Marital status:      Spouse name: Not  on file   • Number of children: Not on file   • Years of education: Not on file   • Highest education level: Not on file   Occupational History   • Not on file   Social Needs   • Financial resource strain: Not on file   • Food insecurity     Worry: Not on file     Inability: Not on file   • Transportation needs     Medical: Not on file     Non-medical: Not on file   Tobacco Use   • Smoking status: Never Smoker   • Smokeless tobacco: Never Used   Substance and Sexual Activity   • Alcohol use: No   • Drug use: No   • Sexual activity: Not on file     Comment: na   Lifestyle   • Physical activity     Days per week: Not on file     Minutes per session: Not on file   • Stress: Not on file   Relationships   • Social connections     Talks on phone: Not on file     Gets together: Not on file     Attends Shinto service: Not on file     Active member of club or organization: Not on file     Attends meetings of clubs or organizations: Not on file     Relationship status: Not on file   • Intimate partner violence     Fear of current or ex partner: Not on file     Emotionally abused: Not on file     Physically abused: Not on file     Forced sexual activity: Not on file   Other Topics Concern   • Not on file   Social History Narrative   • Not on file       Allergies as of 09/23/2020   • (No Known Allergies)        Vitals:  @Vital signs for this encounter:    Current medications as of today   Current Outpatient Medications   Medication Sig Dispense Refill   • rivaroxaban (XARELTO) 15 MG Tab tablet Take 1 Tab by mouth 2 times a day, with meals for 16 days. 32 Tab 0   • [START ON 10/2/2020] rivaroxaban (XARELTO) 20 MG Tab tablet Take 1 Tab by mouth with dinner. 30 Tab 0   • metoprolol SR (TOPROL XL) 25 MG TABLET SR 24 HR Take 25 mg by mouth every day.     • famotidine (PEPCID) 20 MG Tab Take 20 mg by mouth 2 times a day. Indications: Gastroesophageal Reflux Disease     • fluticasone (FLONASE) 50 MCG/ACT nasal spray Spray 1 Spray  in nose every day.     • levothyroxine (SYNTHROID) 75 MCG Tab Take 75 mcg by mouth Every morning on an empty stomach.     • donepezil (ARICEPT) 5 MG Tab Take 5 mg by mouth every evening.       No current facility-administered medications for this visit.          Physical Exam:   Gen:           Alert and oriented, No apparent distress. Mood and affect appropriate, normal interaction with examiner.  Eyes:          PERRL, EOM intact, sclere white, conjunctive moist.  Ears:          Not examined.   Hearing:     Grossly intact.  Nose:          Normal, no lesions or deformities.  Dentition:    Good dentition.  Oropharynx:   mask  Mallampati Classification: mask  Neck:        Supple, trachea midline, no masses.  Respiratory Effort: No intercostal retractions or use of accessory muscles.   Lung Auscultation:      CTA to right lobe; left lobe diminished in lower lobe; no crackles or rales.  CV:            Regular rate and rhythm. No murmurs, rubs or gallops.  Abd:           Not examined.   Lymphadenopathy: Not examined.  Gait and Station: Normal.  Digits and Nails: No clubbing, cyanosis, petechiae, or nodes.   Cranial Nerves: II-XII grossly intact.  Skin:        No rashes, lesions or ulcers noted.               Ext:           No cyanosis or edema.      Assessment:  1. Hospital discharge follow-up     2. Recurrent pleural effusion on left     3. Chronic hypoxemic respiratory failure (HCC)     4. Other acute pulmonary embolism without acute cor pulmonale (HCC)     5. Memory difficulties     6. BMI less than 19,adult     7. Nonsmoker         Immunizations:    Flu:recommend  Pneumovax 23:recommend  Prevnar 13:recommend    Plan:  Patient was seen for 25 minutes, more than 50% of time spent in face to face review, counseling, and arranging future evaluation and follow up of medical conditions and care related to treatment plan/reviewed testing and reports from hospital stay. Patient is clinically stable and will proceed with  following plan. Answered all patient questions to their satisfaction.  Reviewed case with Dr. Pelayo, monitor symptoms only at this time due to being at baseline. Advise CXR only if symptoms change.    1.  Patient symptoms are stable continues on oxygen 24/7.  We will continue to monitor closely.  No further imaging at this time.  Advised patient if symptoms worsen to inform the office and we will order chest x-ray to reevaluate effusion.  2.  Continue oxygen 24/7.  3.  Continue Xarelto as ordered at discharge.  4.  Continue PT/OT at home for deconditioning.  5.  Follow-up in 2 months with Dr. Martinez, sooner if needed.    Please note that this dictation was created using voice recognition software. I have made every reasonable attempt to correct obvious errors, but it is possible there are errors of grammar and possibly content that I did not discover before finalizing the note.

## 2020-09-24 PROBLEM — Z86.711 HISTORY OF PULMONARY EMBOLISM: Status: ACTIVE | Noted: 2020-01-01
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
## 2020-09-24 PROBLEM — M54.50 ACUTE LOW BACK PAIN: Status: ACTIVE | Noted: 2020-01-01

## 2020-09-24 NOTE — ED NOTES
Patient resting on cart, awake, alert, oriented x 4. Patient updated on POC, verbalizes understanding. Assessment unchanged. Denies needs or questions, call light within reach, will continue to monitor. Patient remains on monitoring. Siderailsx2, HOB elevated, provided with warm blankets.

## 2020-09-24 NOTE — ED NOTES
Pharmacy Medication Reconciliation      Medication reconciliation updated and complete per pt at bedside & pt home pharmacy  Allergies have been verified  No oral ABX within the last 14 days  Pt home pharmacy:[facility]-[id number]

## 2020-09-24 NOTE — ED NOTES
Patient is resting comfortably. Wife at bedside, remains on monitoring, will continue to monitor.

## 2020-09-24 NOTE — ED TRIAGE NOTES
"Pt bib ems c/o low back pain after fall on Monday. Pt states he went to get up off of his couch and his leg \"gave out\" and he fell onto his left side. Pt was given fentanyl 100mcg iv and versed 1mg iv pta. Pt takes xarelto  "

## 2020-09-24 NOTE — ED NOTES
Patient resting on cart, awake, alert, oriented x 4. Patient updated on POC, verbalizes understanding. Medicated per MAR. Assessment otherwise unchanged. Denies needs or questions, call light within reach, will continue to monitor. Patient remains on monitoring. Visitor at bedside.

## 2020-09-24 NOTE — ED NOTES
Patient is resting comfortably. Wife at bedside, provided with additional warm blankets. Siderailsx2, provided with urinal, will continue to monitor.

## 2020-09-24 NOTE — ED NOTES
Patient resting on cart, visualized with easy, unlabored respirs. Patient previously updated on POC, wife departs bedside. Assessment unchanged. Denies needs or questions, call light within reach, will continue to monitor. Patient remains on monitoring. Lights dimmed for comfort, siderailsx2, cart in low, locked position.

## 2020-09-24 NOTE — PROGRESS NOTES
TRIAGE OFFICER ADMISSION ACCEPTANCE NOTE:     - I spoke and discussed the case with the ER physician, Dr. Kurtz  - This is a 36-year-old male comes in after a ground-level fall had intractable back pain.  CT scan found an L1 compression fracture.  - Hospitalist, Dr. Booth will be admitting the patient.  He will place his admission orders himself.  - Please call admitting physician for full admission orders, and cross-coverage issues on patient's arrival to the unit.

## 2020-09-24 NOTE — ED PROVIDER NOTES
ED Provider Note    This patient was cared for during the COVID-19 pandemic. History and physical exam may be limited/truncated by the inherent challenges of PPE and the need to decrease staff exposure to novel coronavirus. Some aspects of disease management may be different to protect staff and help slow the spread of disease. I verified that, if possible, the patient was wearing a mask and I was wearing appropriate PPE every time I encountered the patient.       CHIEF COMPLAINT  Chief Complaint   Patient presents with   • Back Pain       HPI  Chucho Marin is a 84 y.o. male who presents with back pain and weakness.  Patient says that he is always had back troubles and he thinks that he was lifting a lot doing a lot of laundry on Monday and that is why his back is hurting him more.  It was a little bit more sore yesterday but this morning when he woke up it was excruciatingly painful.  He tried to get up from the couch and it hurts so much that he was unable to stand on his legs and he fell over.  It is more in his mid low back and radiates to his right leg.  He used to get epidural injections but has not had one for 2 to 3 years.  He is on chronic anticoagulation.  He denies any fall prior to today.  Before getting pain medication his back hurt severely whenever he tried to move his legs.  He was given pain medication in route and feels slightly better at this time.      REVIEW OF SYSTEMS  positive for back pain leg weakness, negative for fevers chills. All other systems are negative.     PAST MEDICAL HISTORY   has a past medical history of Back pain, Bronchitis, CHEST PAIN (6/1/2011), CHF (congestive heart failure) ([facility]) (5/29/2020), Daytime sleepiness, Frequent urination, Pleural effusion, Ringing in ears, Shortness of breath, Sweat, sweating, excessive, Wears glasses, and Weight loss.    SOCIAL HISTORY  Social History     Tobacco Use   • Smoking status: Never Smoker   • Smokeless tobacco: Never Used  "  Substance and Sexual Activity   • Alcohol use: No   • Drug use: No   • Sexual activity: Not on file     Comment: na       SURGICAL HISTORY   has a past surgical history that includes remv 2nd cataract,corn-scler sectn; prostatectomy robotic; and tonsillectomy.    CURRENT MEDICATIONS  Home Medications     Reviewed by Shar Walls (Pharmacy Tech) on 09/24/20 at 1603  Med List Status: Complete   Medication Last Dose Status   donepezil (ARICEPT) 5 MG Tab 9/23/2020 Active   furosemide (LASIX) 40 MG Tab 9/24/2020 Active   levothyroxine (SYNTHROID) 75 MCG Tab 9/24/2020 Active   metoprolol SR (TOPROL XL) 25 MG TABLET SR 24 HR 9/23/2020 Active   potassium chloride SA (KDUR) 20 MEQ Tab CR 9/24/2020 Active   pravastatin (PRAVACHOL) 20 MG Tab 9/23/2020 Active   rivaroxaban (XARELTO) 20 MG Tab tablet 9/23/2020 Active   testosterone cypionate (DEPO-TESTOSTERONE) 200 MG/ML Solution injection unk Active                ALLERGIES  No Known Allergies    PHYSICAL EXAM  VITAL SIGNS: /67   Pulse 67   Temp 36.2 °C (97.2 °F) (Temporal)   Resp 17   Ht 1.803 m (5' 11\")   Wt 65.8 kg (145 lb)   SpO2 97%   BMI 20.22 kg/m² .  Constitutional: Alert in no apparent distress.  Appears uncomfortable  HENT: No signs of trauma, Bilateral external ears normal, Nose normal.   Eyes: Pupils are equal and reactive, Conjunctiva normal, Non-icteric.   Neck: Normal range of motion, No tenderness, Supple, No stridor.   Cardiovascular: Regular rate and rhythm, no murmurs.   Thorax & Lungs: Normal breath sounds, No respiratory distress, No wheezing, No chest tenderness.   Abdomen: Bowel sounds normal, Soft, No tenderness, No masses, No peritoneal signs.  Skin: Warm, Dry, No erythema, No rash.   Back: Lower lumbar midline tenderness  Musculoskeletal:  no major deformities noted.   Neurologic: Alert,  No focal deficits noted.  Moving bilateral legs spontaneously  Psychiatric: Affect normal, Judgment normal, Mood normal.       DIAGNOSTIC " STUDIES / PROCEDURES          LABS  Labs Reviewed   CBC WITH DIFFERENTIAL - Abnormal; Notable for the following components:       Result Value    WBC 12.0 (*)     RBC 3.97 (*)     Hemoglobin 12.0 (*)     Hematocrit 38.8 (*)     MCHC 30.9 (*)     RDW 51.5 (*)     Neutrophils-Polys 84.30 (*)     Lymphocytes 8.00 (*)     Neutrophils (Absolute) 10.14 (*)     Lymphs (Absolute) 0.96 (*)     All other components within normal limits    Narrative:     Indicate which anticoagulants the patient is on:->UNKNOWN   COMP METABOLIC PANEL - Abnormal; Notable for the following components:    Glucose 117 (*)     Creatinine 0.48 (*)     Albumin 3.1 (*)     All other components within normal limits    Narrative:     Indicate which anticoagulants the patient is on:->UNKNOWN   URINALYSIS,CULTURE IF INDICATED - Abnormal; Notable for the following components:    Protein 30 (*)     All other components within normal limits   APTT - Abnormal; Notable for the following components:    APTT 38.5 (*)     All other components within normal limits    Narrative:     Indicate which anticoagulants the patient is on:->UNKNOWN   PROTHROMBIN TIME - Abnormal; Notable for the following components:    PT 15.5 (*)     INR 1.19 (*)     All other components within normal limits    Narrative:     Indicate which anticoagulants the patient is on:->UNKNOWN   ESTIMATED GFR    Narrative:     Indicate which anticoagulants the patient is on:->UNKNOWN   URINE MICROSCOPIC (W/UA)   COVID/SARS COV-2       RADIOLOGY  CTA ABDOMEN PELVIS W & W/O POST PROCESS   Final Result      1.  There is no evidence of solid organ injury or bowel injury.   2.  There is a mild acute or subacute L1 superior endplate compression fracture deformity. There is no paravertebral hematoma.   3.  There is atherosclerosis.   4.  There is no evidence of lower thoracic or abdominal aortic dissection.   5.  There is stable mild aneurysmal dilatation of the ascending thoracic aorta.   6.  There is a  loculated left-sided pleural effusion slightly increased over prior study with left lower lobe atelectasis.      CT-LSPINE W/O PLUS RECONS   Final Result      Mild compression fracture of L1 is new compared to 06/03/2020.      Mild degenerative changes.      Left pleural effusion          Medical records reviewed for continuity of care.  Patient has a recent admission for recurrent left pleural effusion    Differential Diagnosis includes but is not limited to: Retroperitoneal hematoma, compression fracture, metastatic process    COURSE & MEDICAL DECISION MAKING  Pertinent Labs & Imaging studies reviewed. (See chart for details)    This is an 84-year-old gentleman that presents with new onset back pain.  He was in so much pain he was unable to stand up and fell this morning.  There is no history of trauma prior to this he was actually just admitted a week or so ago for this recurrent pleural effusion.  CT scan shows a L1 compression fracture which I do suspect is the source of the patient's pain.  There is no evidence on CT scan of retroperitoneal hematoma which is reassuring.  He does have this recurrent pleural effusion which has reportedly not show any malignant cells.  However I am concerned given the degree of pain he is in.    The patient is unable to even move in bed without significant amount of pain.  He lives at home with his wife and therefore I do not think he is able to be discharged home safely.  Therefore he will require hospitalization for pain control.    I spoke with Dr. Booth, hospitalist who will consult for hospitalization.  I updated the patient's wife on the plan of action as well she is agreeable.  Patient will be hospitalized in guarded condition.    FINAL IMPRESSION  1. Closed compression fracture of body of L1 vertebra (HCC)     2. Lumbar back pain         2.   3.    This dictation has been creating using voice recognition software. The accuracy of the dictation is limited the abilities of  the software.  I expect there may be some errors of grammar and possibly content. I made every attempt to manually correct the errors within my dictation. However errors related to this voice recognition software may still exist and should be interpreted within the appropriate context.      The note accurately reflects work and decisions made by me.  Leonela Kurtz M.D.  9/24/2020  5:16 PM

## 2020-09-24 NOTE — ED NOTES
Patient resting on cart, awake, alert, oriented x 4. Patient updated on POC, verbalizes understanding, updated on ordered imaging. Assessment unchanged. Denies needs or questions, call light within reach, will continue to monitor. Patient remains on monitoring. Provided with urinal, lights dimmed for comfort, siderailsx2 for safety. Will continue to monitor.

## 2020-09-25 NOTE — ED NOTES
Patient resting on cart, states continued lower back pain. Updated on pending admission, remains on monitoring, on home dose O2 via NC. Will continue to monitor. Lights dimmed, positioned flat for comfort.

## 2020-09-25 NOTE — ED NOTES
Patient resting on cart, awake, alert, oriented x 4. Patient updated on POC, verbalizes understanding. Aware of pending transport at bedside. Assessment unchanged. Denies needs or questions, call light within reach, will continue to monitor. Patient remains on monitoring. Pt medicated per MAR.

## 2020-09-25 NOTE — CARE PLAN
Problem: Safety  Goal: Will remain free from injury  Outcome: PROGRESSING AS EXPECTED  Note: Safety precaution in effect. Non skid socks in use. Call light within reach. Reminded patient to call for assist. Hourly rounds in effect.      Problem: Infection  Goal: Will remain free from infection  Outcome: PROGRESSING AS EXPECTED  Note: Implement Standard precaution. Hand washing every encounter & before & after patient care.     Problem: Knowledge Deficit  Goal: Knowledge of disease process/condition, treatment plan, diagnostic tests, and medications will improve  Outcome: PROGRESSING AS EXPECTED  Note: Discussed Plan of care. Test - MRI. Questions answered.     Problem: Pain Management  Goal: Pain level will decrease to patient's comfort goal  Outcome: PROGRESSING AS EXPECTED  Note: Educated on pain scale. Encouraged to verbalize pain. Will medicate as per MAR.

## 2020-09-25 NOTE — ED NOTES
Patient is resting comfortably. Remains on monitoring, siderailsx2, call light within reach. assessment unchanged, will continue to monitor.

## 2020-09-25 NOTE — ASSESSMENT & PLAN NOTE
He endorsed dizziness and imbalance that resulted in his fall. Denies syncope. CT showing a mild L1 compression fracture. MRI shows it is amendable for kyphoplasty.   -Hold Xarelto for procedure today  -kyphoplasty today 9/29  -PT/OT evaluation pending, post kyphoplasty  -pain control; bowel regimen

## 2020-09-25 NOTE — CARE PLAN
Problem: Pain Management  Goal: Pain level will decrease to patient's comfort goal  Outcome: PROGRESSING SLOWER THAN EXPECTED     Problem: Mobility  Goal: Risk for activity intolerance will decrease  Outcome: PROGRESSING SLOWER THAN EXPECTED       Patient in severe pain at rest. Refusing repositioning and mobilization due to pain. Education provided.

## 2020-09-25 NOTE — PROGRESS NOTES
1833 Placed a call to DEBRA Kline RN. Report received over the phone.    1848 Received patient from ER via gurney accompanied by one female Transport. Transferred patient to bed using slide board Bed waffle cushion initiated. Fall Protocol in effect.    1850 Patient's in bed. Bedside report given to Domi Castañeda RN.

## 2020-09-25 NOTE — PROGRESS NOTES
0655 Patient's in bed. Bedside report received from KANU Castañeda RN at the beginning of the shift.     0806  Patient's in bed. Educate on  the importance/use of IS at least 10x every hour, able to reach 750. Medicated with Skelaxin (see MAR) for muscle spasms.Fall protocol in efect. Call light within reach. Reminded patient to call for assist. Assessment completed. No distress noted. Plan of care reviewed with the patient. Verbalized understanding.    0810 Dr Carter visited. POC discussed with the patient. Verbalized understanding.    0940 Patient's in bed. No distress noted.    1020 Patient 'in bed. Turned and repositioned.    1058 Medicated with Oxycodone (see MAR) for c/o's back pain, rates pain 8/10.    1129 New order received and acknowledged from Dr Carter (see MAR) and labs in am.    1240 Spoke to Geovany from MRI stated that MRI will be done later this afternoon. Notified patient.    1246 Received a call from patient's spouse (Genet) . POC discussed. Questions answered.     1539 Medicated with Oxycodone (see MAR) for c/o's back pain, rates pain 8/10.     1750 Patient's in bed, having Dinner.    1815 Patient left for MRI via gurney accompanied by one female transport to  MRI.    1845 Patient came back from MRI via gurney accompanied by one female Transport.    1920  Bedside report given to Domi BOBBY RN (Jitendra).

## 2020-09-25 NOTE — ASSESSMENT & PLAN NOTE
He endorses dizziness and imbalance but no syncope.  Orts that he has had 2 falls in the last week now with acute lower back pain.  -Mildly dehydrated on examination, hold home Lasix and start IVF at 50cc/hr  -PT/OT evaluation

## 2020-09-25 NOTE — H&P
"Hospital Medicine History & Physical Note    Date of Service  9/24/2020    Primary Care Physician  Gibson Quintana M.D.    Consultants  None    Code Status  Full Code    Chief Complaint  Chief Complaint   Patient presents with   • Back Pain       History of Presenting Illness  84 y.o. male with hx of recurrent left side pleural effusions, HTN, DLD, CHF, COPD on 2L chronic, dementia, hard of hearing, who presented 9/24/2020 with recent falls at home from \"dizziness\" but no loss of consciousness and he needed his wife to help him up from his first fall a week ago.  He had a fall now on Monday (four days ago) and has had increase in back pain and inability to get out of bed due to low back pain.  He denies any worsening of shortness of breath or chest pain. He states the pain is sharp and is shooting all over his body and helps if he just lays still and flat.      His recurrent pleural effusion evaluation per his last discharge summary:   \"history significant for chronic recurrent left-sided pleural effusion status post multiple thoracocentesis with subsequent reaccumulation of fluid.  Previous pleural fluid studies were consistent with exudative type of effusion and cultures have been consistently negative, cytology showing atypical cells but nothing definitive of malignancy.  He informs losing about 20 pounds over the last several months.  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  His pulmonologist Dr. Michel Martinez was consulted, CT chest with contrast was ordered which showed large left-sided effusion and incidentally identified new right-sided PE along with DVT and he was started on heparin drip which was later transitioned to Xarelto.  He informs using about 3 L oxygen at home but was saturating well on room air during this admission and did not require supplemental oxygen. After extensive " "discussion with his PCP (Dr. Quintana),primary cardiologist (Dr. Rosenberg) and thoracic surgery, it is agreed that he is a poor candidate for thoracoscopy with risks outweighing benefits and that conservative approach is appropriate with no further invasive procedures.He declined Pleurx catheter placement in the past. Patient and wife were informed  of above discussion and are in agreement \"    Review of Systems  Review of Systems   Constitutional: Positive for weight loss. Negative for chills and fever.   HENT: Negative for sore throat.    Eyes: Negative for blurred vision.   Respiratory: Negative for shortness of breath.    Cardiovascular: Negative for chest pain and leg swelling.   Gastrointestinal: Negative for nausea.   Genitourinary: Negative for dysuria and frequency.   Musculoskeletal: Positive for back pain and myalgias.   Neurological: Negative for sensory change, speech change and headaches.   Psychiatric/Behavioral: The patient is not nervous/anxious.        Past Medical History   has a past medical history of Back pain, Bronchitis, CHEST PAIN (2011), CHF (congestive heart failure) ([facility]) (2020), Daytime sleepiness, Frequent urination, Pleural effusion, Ringing in ears, Shortness of breath, Sweat, sweating, excessive, Wears glasses, and Weight loss.    Surgical History   has a past surgical history that includes pr remv 2nd cataract,corn-scler sectn; prostatectomy robotic; and tonsillectomy.     Family History  Parents     Social History   reports that he has never smoked. He has never used smokeless tobacco. He reports that he does not drink alcohol or use drugs.  States stepchildren but none of his own.  He is     Allergies  No Known Allergies    Medications  Prior to Admission Medications   Prescriptions Last Dose Informant Patient Reported? Taking?   donepezil (ARICEPT) 5 MG Tab 2020 at pm Patient's Home Pharmacy Yes No   Sig: Take 5 mg by mouth every evening.   furosemide " (LASIX) 40 MG Tab 2020 at 1000 Patient's Home Pharmacy Yes Yes   Sig: Take 40 mg by mouth 2 Times a Day.   levothyroxine (SYNTHROID) 75 MCG Tab 2020 at 1000 Patient's Home Pharmacy Yes No   Sig: Take 75 mcg by mouth Every morning on an empty stomach.   metoprolol SR (TOPROL XL) 25 MG TABLET SR 24 HR 2020 at  Patient's Home Pharmacy Yes No   Sig: Take 25 mg by mouth every bedtime.   potassium chloride SA (KDUR) 20 MEQ Tab CR 2020 at 1000 Patient's Home Pharmacy Yes Yes   Sig: Take 20 mEq by mouth 2 times a day.   pravastatin (PRAVACHOL) 20 MG Tab 2020 at pm Patient's Home Pharmacy Yes Yes   Sig: Take 20 mg by mouth every evening.   rivaroxaban (XARELTO) 20 MG Tab tablet 2020 at pm Patient's Home Pharmacy Yes Yes   Sig: Take 20 mg by mouth every evening.   testosterone cypionate (DEPO-TESTOSTERONE) 200 MG/ML Solution injection unk at [facility] Patient's Home Pharmacy Yes Yes   Si mg by Intramuscular route every 14 days.      Facility-Administered Medications: None       Physical Exam  Temp:  [36.2 °C (97.2 °F)] 36.2 °C (97.2 °F)  Pulse:  [62-76] 67  Resp:  [17] 17  BP: (121-132)/(64-70) 130/67  SpO2:  [94 %-99 %] 97 %    Physical Exam  Vitals signs reviewed.   Constitutional:       Appearance: Normal appearance. He is not diaphoretic.      Comments: Hard of hearing and has better hearing in his left ear   HENT:      Head: Normocephalic and atraumatic.      Nose: Nose normal.      Mouth/Throat:      Mouth: Mucous membranes are dry.      Pharynx: No oropharyngeal exudate.   Eyes:      General: No scleral icterus.        Right eye: No discharge.         Left eye: No discharge.      Extraocular Movements: Extraocular movements intact.      Conjunctiva/sclera: Conjunctivae normal.   Neck:      Musculoskeletal: No muscular tenderness.   Cardiovascular:      Rate and Rhythm: Normal rate and regular rhythm.      Pulses:           Radial pulses are 2+ on the right side and 2+ on the left side.         Dorsalis pedis pulses are 2+ on the right side and 2+ on the left side.      Heart sounds: No murmur.   Pulmonary:      Effort: Pulmonary effort is normal. No respiratory distress.      Breath sounds: Examination of the left-middle field reveals decreased breath sounds. Examination of the left-lower field reveals decreased breath sounds. Decreased breath sounds present. No wheezing.   Abdominal:      General: Bowel sounds are normal. There is no distension.      Palpations: Abdomen is soft.   Musculoskeletal:         General: No swelling or tenderness.      Right lower leg: No edema.      Left lower leg: No edema.   Lymphadenopathy:      Cervical: No cervical adenopathy.   Skin:     Coloration: Skin is not jaundiced or pale.   Neurological:      General: No focal deficit present.      Mental Status: He is alert. Mental status is at baseline.      Cranial Nerves: No cranial nerve deficit.   Psychiatric:         Mood and Affect: Mood normal.         Behavior: Behavior normal.         Laboratory:  Recent Labs     09/24/20  1043   WBC 12.0*   RBC 3.97*   HEMOGLOBIN 12.0*   HEMATOCRIT 38.8*   MCV 97.7   MCH 30.2   MCHC 30.9*   RDW 51.5*   PLATELETCT 392   MPV 9.4     Recent Labs     09/24/20  1043   SODIUM 136   POTASSIUM 3.8   CHLORIDE 98   CO2 26   GLUCOSE 117*   BUN 9   CREATININE 0.48*   CALCIUM 9.0     Recent Labs     09/24/20  1043   ALTSGPT 12   ASTSGOT 12   ALKPHOSPHAT 85   TBILIRUBIN 0.3   GLUCOSE 117*     Recent Labs     09/24/20  1043   APTT 38.5*   INR 1.19*     No results for input(s): NTPROBNP in the last 72 hours.      No results for input(s): TROPONINT in the last 72 hours.    Imaging:  CTA ABDOMEN PELVIS W & W/O POST PROCESS   Final Result      1.  There is no evidence of solid organ injury or bowel injury.   2.  There is a mild acute or subacute L1 superior endplate compression fracture deformity. There is no paravertebral hematoma.   3.  There is atherosclerosis.   4.  There is no evidence of  lower thoracic or abdominal aortic dissection.   5.  There is stable mild aneurysmal dilatation of the ascending thoracic aorta.   6.  There is a loculated left-sided pleural effusion slightly increased over prior study with left lower lobe atelectasis.      CT-LSPINE W/O PLUS RECONS   Final Result      Mild compression fracture of L1 is new compared to 06/03/2020.      Mild degenerative changes.      Left pleural effusion      MR-LUMBAR SPINE-W/O    (Results Pending)         Assessment/Plan:  I anticipate this patient is appropriate for observation status at this time.    * Acute low back pain  Assessment & Plan  CT showing a mild L1 compression fracture.  I have discussed this with interventional radiologist for potential kyphoplasty they are concerned that there may be other abnormalities up above this region and are requesting MRI.  MRI lumbar spine with visualization of the last few thoracic vertebrae above this area to be included  Pain management  PT/OT    Fall  Assessment & Plan  Patient denies syncope.  States he has lost his balance  We will have patient evaluated with physical therapy and Occupational Therapy once his back pain is been controlled  If new onset concern of dizziness ongoing may consider MRI brain    Recurrent pleural effusion on left- (present on admission)  Assessment & Plan  Worked up on prior admits and with pulmonary.  Exudate per prior notes with atypical cell on pathology but no malignancy identified.  O2 as needed.    History of pulmonary embolism  Assessment & Plan  Right side.  Patient is on Xarelto and will be continued  Supplemental oxygen    Hypothyroidism- (present on admission)  Assessment & Plan  Last TSH was 0.411 in the last month  Continue levothyroxine 75 mcg daily    Dementia (HCC)- (present on admission)  Assessment & Plan  Continue with Aricept  Watch narcotics and muscle relaxants with his mental status    HLD (hyperlipidemia)- (present on admission)  Assessment &  Plan  Continue pravastatin for ongoing active management    GERD (gastroesophageal reflux disease)- (present on admission)  Assessment & Plan  Continue with omeprazole

## 2020-09-25 NOTE — PROGRESS NOTES
Patient refusing 2 RN skin check due to pain. Patient refusing all repositioning. Patient educated on risk for skin breakdown. Patient verbalized understanding and continues to refuse.     Patient agreeable to placement of heel float boots, silicone nasal cannula with gray foam, and SCDs. Redness noted under left nostril. Patient wears nasal cannula at baseline. Waffle cushion and bed alarm in place prior to patient arrival to unit. Waffle cushion inflated. Extra linens under patient straightened out as much as possible since they cannot be removed until patient agreeable to rolling from side to side.

## 2020-09-25 NOTE — ASSESSMENT & PLAN NOTE
Worked up on prior admits and with patient pulmonary consult. Thora from June 2020 was exudative and pathology was inconclusive as there was just a single cluster of atypical cells.    -Outpatient follow-up  -RT protocol

## 2020-09-25 NOTE — PROGRESS NOTES
[facility] Medicine Daily Progress Note    Date of Service  9/25/2020    Chief Complaint  84 y.o. male admitted 9/24/2020 with back pain.    Hospital Course    83yo M with h/o recurrently left pleural effusions, HTN, COPD on 2L NC who presented with dizziness resulting in falls x2 and back pain.       Interval Problem Update  Feels okay when he's not moving around. Discussed plan of care and he expresses understanding. MRI pending to evaluate compression fracture and then possible kyphoplasty.     Consultants/Specialty  None    Code Status  Full Code    Disposition  Pending PT/OT evaluation.     Review of Systems  Review of Systems   Constitutional: Positive for malaise/fatigue. Negative for chills and fever.   Respiratory: Negative for cough and shortness of breath.    Cardiovascular: Negative for chest pain, palpitations and leg swelling.   Gastrointestinal: Negative for abdominal pain, nausea and vomiting.   Genitourinary: Negative for dysuria.   Musculoskeletal: Positive for back pain, falls and myalgias.   Neurological: Positive for dizziness and weakness. Negative for focal weakness, loss of consciousness and headaches.   Psychiatric/Behavioral: Negative.    All other systems reviewed and are negative.       Physical Exam  Temp:  [36.1 °C (96.9 °F)-36.7 °C (98 °F)] 36.1 °C (96.9 °F)  Pulse:  [62-82] 69  Resp:  [17] 17  BP: (102-132)/(60-71) 102/63  SpO2:  [94 %-99 %] 96 %    Physical Exam  Vitals signs reviewed.   Constitutional:       General: He is not in acute distress.     Appearance: He is not diaphoretic.      Interventions: Nasal cannula in place.   HENT:      Head: Normocephalic.      Mouth/Throat:      Pharynx: Oropharynx is clear.      Comments: Tacky mucous membranes  Eyes:      General: No scleral icterus.        Right eye: No discharge.         Left eye: No discharge.      Extraocular Movements: Extraocular movements intact.   Neck:      Musculoskeletal: Normal range of motion. No neck rigidity.    Cardiovascular:      Rate and Rhythm: Normal rate and regular rhythm.      Pulses: Normal pulses.   Pulmonary:      Effort: Pulmonary effort is normal. No respiratory distress.      Breath sounds: No wheezing or rales.   Abdominal:      General: There is no distension.      Palpations: Abdomen is soft.      Tenderness: There is no abdominal tenderness. There is no guarding or rebound.   Musculoskeletal:         General: No swelling or tenderness.   Skin:     General: Skin is dry.      Coloration: Skin is pale.   Neurological:      General: No focal deficit present.      Mental Status: He is alert.      Motor: Weakness (Mild, generalized) present.   Psychiatric:         Mood and Affect: Mood normal.         Speech: Speech normal.         Behavior: Behavior normal. Behavior is cooperative.         Fluids    Intake/Output Summary (Last 24 hours) at 9/25/2020 0739  Last data filed at 9/25/2020 0200  Gross per 24 hour   Intake --   Output 325 ml   Net -325 ml       Laboratory  Recent Labs     09/24/20  1043   WBC 12.0*   RBC 3.97*   HEMOGLOBIN 12.0*   HEMATOCRIT 38.8*   MCV 97.7   MCH 30.2   MCHC 30.9*   RDW 51.5*   PLATELETCT 392   MPV 9.4     Recent Labs     09/24/20  1043   SODIUM 136   POTASSIUM 3.8   CHLORIDE 98   CO2 26   GLUCOSE 117*   BUN 9   CREATININE 0.48*   CALCIUM 9.0     Recent Labs     09/24/20  1043   APTT 38.5*   INR 1.19*               Imaging  CTA ABDOMEN PELVIS W & W/O POST PROCESS   Final Result      1.  There is no evidence of solid organ injury or bowel injury.   2.  There is a mild acute or subacute L1 superior endplate compression fracture deformity. There is no paravertebral hematoma.   3.  There is atherosclerosis.   4.  There is no evidence of lower thoracic or abdominal aortic dissection.   5.  There is stable mild aneurysmal dilatation of the ascending thoracic aorta.   6.  There is a loculated left-sided pleural effusion slightly increased over prior study with left lower lobe  atelectasis.      CT-LSPINE W/O PLUS RECONS   Final Result      Mild compression fracture of L1 is new compared to 06/03/2020.      Mild degenerative changes.      Left pleural effusion      MR-LUMBAR SPINE-W/O    (Results Pending)        Assessment/Plan  * Acute low back pain- (present on admission)  Assessment & Plan  CT showing a mild L1 compression fracture.   -MRI L-spine pending to evaluate for possible kyphoplasty  -Hold Xarelto in case there is a procedure  -PT/OT evaluation pending  -Pain control    Fall- (present on admission)  Assessment & Plan  He endorses dizziness and imbalance but no syncope.  Orts that he has had 2 falls in the last week now with acute lower back pain.  -Mildly dehydrated on examination, hold home Lasix   -PT/OT evaluation     Recurrent pleural effusion on left- (present on admission)  Assessment & Plan  Worked up on prior admits and with patient pulmonary consult.  Pathology from past thoracentesis in June 2020 was inconclusive.  -Outpatient follow-up  -RT protocol    History of pulmonary embolism- (present on admission)  Assessment & Plan  New PE diagnosed on September 10, 2020.  Segmental and subsegmental pulmonary emboli of the right lower lobe.    -Patient was discharged on Xarelto   -Hold Xarelto for possible kyphoplasty   -RT protocol; supplemental oxygen as needed    Hypothyroidism- (present on admission)  Assessment & Plan  -Continue home Synthroid     Dementia (HCC)- (present on admission)  Assessment & Plan  - Continue with Aricept  -Reorient as needed, maintain day/night cycle  -Avoid overly sedating medications as possible     HLD (hyperlipidemia)- (present on admission)  Assessment & Plan  -Continue pravastatin       VTE prophylaxis: xarelto

## 2020-09-25 NOTE — RESPIRATORY CARE
COPD EDUCATION by COPD CLINICAL EDUCATOR  9/25/2020 at 7:12 AM by Danielle Sanders, CHANDU     Patient reviewed by COPD education team. Patient does not have a history or diagnosis of COPD and is a non-smoker.  Therefore does not qualify for the COPD program.

## 2020-09-25 NOTE — ASSESSMENT & PLAN NOTE
New PE diagnosed on September 10, 2020.  Segmental and subsegmental pulmonary emboli of the right lower lobe.    -was taking xarelto on discharge from his last admission   -Hold Xarelto for kyphoplasty today  -RT protocol; supplemental oxygen as needed

## 2020-09-25 NOTE — PROGRESS NOTES
2 RN skin check completed with BETTY Denny    Noted on Right inner buttocks, wound consult has been placed.   Devices in place Oxygen, SCD's.  Skin assessed under devices intact..  Confirmed pressure ulcers found on 9/25/2020.  New potential pressure ulcers noted on 9/25/2020. Wound consult placed on 9/25/2020.  The following interventions in place bed waffle cushion, oxy ears, extra pillows, heel float boots, q 2 hour turn, barrier cream.    Picture was taken and uploaded in EPIC

## 2020-09-26 PROBLEM — E87.1 HYPONATREMIA: Status: ACTIVE | Noted: 2020-01-01

## 2020-09-26 PROBLEM — S32.010A COMPRESSION FRACTURE OF FIRST LUMBAR VERTEBRA (HCC): Status: ACTIVE | Noted: 2020-01-01

## 2020-09-26 NOTE — THERAPY
Physical Therapy Contact Note    PT consult received, pending MRI for possible kyphoplasty; will follow up post finalized medical POC for accurate assessment of dc needs;     Margi Santos, PT,  [phone]

## 2020-09-26 NOTE — PROGRESS NOTES
Hospitalist called to ask if NPO @ 0000 order was needed for possible Kyphoplasty 9/26.  Hospitalist told this RN no NPO order is needed as Kyphoplasty is not ordered and Xarelkelly is still in patient's system.

## 2020-09-26 NOTE — WOUND TEAM
Pt seen for wound consult for R buttock. Has small moisture related skin tear to buttock. Surrounding tissue appears to be chronically discolored, per pt sleeps in recliner. Was incontinent of urine, cleansed with washcloth and barrier cream applied. RN Wound Prevention protocol initiated. Pt with L1 fracture pending kyphyoplasty, on waffle overlay, does not want float boots- heels floated with pillow. Barrier cream at bedside. No advanced wound care needs at this time. Please consult with worsening or concerns.

## 2020-09-26 NOTE — PROGRESS NOTES
Hospital Medicine Daily Progress Note    Date of Service  9/26/2020    Chief Complaint  84 y.o. male admitted 9/24/2020 with back pain.    Hospital Course    85yo M with h/o recurrently left pleural effusions, HTN, COPD on 2L NC who presented with dizziness resulting in falls x2 and back pain.       Interval Problem Update  No overnight events. Plan for kyphoplasty on Monday. Discussed case with Dr. Simms (IR). Pain is controlled when he's not moving.      Consultants/Specialty  None    Code Status  Full Code    Disposition  Pending PT/OT evaluation, post kyphoplasty on Monday. Anticipate d/c in 2-3 days    Review of Systems  Review of Systems   Constitutional: Positive for malaise/fatigue. Negative for chills and fever.   Respiratory: Negative for cough and shortness of breath.    Cardiovascular: Negative for chest pain, palpitations and leg swelling.   Gastrointestinal: Negative for abdominal pain, nausea and vomiting.   Genitourinary: Negative for dysuria.   Musculoskeletal: Positive for back pain, falls (at home) and myalgias.   Neurological: Positive for dizziness (improved) and weakness. Negative for focal weakness, loss of consciousness and headaches.   Psychiatric/Behavioral: Negative.  The patient is not nervous/anxious and does not have insomnia.    All other systems reviewed and are negative.       Physical Exam  Temp:  [36.4 °C (97.6 °F)-37.4 °C (99.3 °F)] 36.8 °C (98.3 °F)  Pulse:  [69-88] 88  Resp:  [15-17] 17  BP: ()/(55-72) 109/60  SpO2:  [91 %-98 %] 96 %    Physical Exam  Vitals signs reviewed.   Constitutional:       General: He is not in acute distress.     Appearance: He is not diaphoretic.      Interventions: Nasal cannula in place.   HENT:      Head: Normocephalic.      Mouth/Throat:      Pharynx: Oropharynx is clear.      Comments: Tacky mucous membranes  Eyes:      General: No scleral icterus.        Right eye: No discharge.         Left eye: No discharge.      Extraocular Movements:  Extraocular movements intact.   Neck:      Musculoskeletal: Normal range of motion. No neck rigidity.   Cardiovascular:      Rate and Rhythm: Normal rate and regular rhythm.      Pulses: Normal pulses.   Pulmonary:      Effort: Pulmonary effort is normal. No respiratory distress.      Breath sounds: No wheezing or rales.   Abdominal:      General: There is no distension.      Palpations: Abdomen is soft.      Tenderness: There is no abdominal tenderness. There is no guarding or rebound.   Musculoskeletal:         General: No swelling or tenderness.   Skin:     General: Skin is dry.      Coloration: Skin is pale.   Neurological:      General: No focal deficit present.      Mental Status: He is alert.      Motor: Weakness (Mild, generalized) present.   Psychiatric:         Mood and Affect: Mood normal.         Speech: Speech normal.         Behavior: Behavior normal. Behavior is cooperative.     Patient seen and evaluated today on 9/26, unchanged from 9/25.     Fluids    Intake/Output Summary (Last 24 hours) at 9/26/2020 0757  Last data filed at 9/25/2020 1530  Gross per 24 hour   Intake 240 ml   Output 250 ml   Net -10 ml       Laboratory  Recent Labs     09/24/20  1043 09/25/20  1051   WBC 12.0* 12.9*   RBC 3.97* 4.24*   HEMOGLOBIN 12.0* 12.8*   HEMATOCRIT 38.8* 40.1*   MCV 97.7 94.6   MCH 30.2 30.2   MCHC 30.9* 31.9*   RDW 51.5* 48.0   PLATELETCT 392 360   MPV 9.4 9.0     Recent Labs     09/24/20  1043 09/25/20  1051   SODIUM 136 133*   POTASSIUM 3.8 4.7   CHLORIDE 98 94*   CO2 26 29   GLUCOSE 117* 115*   BUN 9 9   CREATININE 0.48* 0.61   CALCIUM 9.0 9.0     Recent Labs     09/24/20  1043   APTT 38.5*   INR 1.19*               Imaging  CTA ABDOMEN PELVIS W & W/O POST PROCESS   Final Result      1.  There is no evidence of solid organ injury or bowel injury.   2.  There is a mild acute or subacute L1 superior endplate compression fracture deformity. There is no paravertebral hematoma.   3.  There is atherosclerosis.    4.  There is no evidence of lower thoracic or abdominal aortic dissection.   5.  There is stable mild aneurysmal dilatation of the ascending thoracic aorta.   6.  There is a loculated left-sided pleural effusion slightly increased over prior study with left lower lobe atelectasis.      CT-LSPINE W/O PLUS RECONS   Final Result      Mild compression fracture of L1 is new compared to 06/03/2020.      Mild degenerative changes.      Left pleural effusion      MR-LUMBAR SPINE-W/O    (Results Pending)   IR-CONSULT AND TREAT    (Results Pending)        Assessment/Plan  * Compression fracture of first lumbar vertebra (HCC)- (present on admission)  Assessment & Plan  CT showing a mild L1 compression fracture. MRI shows it is amendable for kyphoplasty.   -Hold Xarelto for procedure  - kyphoplasty on Monday  -PT/OT evaluation pending  -Pain control    Fall- (present on admission)  Assessment & Plan  He endorses dizziness and imbalance but no syncope.  Orts that he has had 2 falls in the last week now with acute lower back pain.  -Mildly dehydrated on examination, hold home Lasix and start IVF at 50cc/hr  -PT/OT evaluation     Recurrent pleural effusion on left- (present on admission)  Assessment & Plan  Worked up on prior admits and with patient pulmonary consult. Thora from June 2020 was exudative and pathology was inconclusive.  -Outpatient follow-up  -RT protocol    Hyponatremia- (present on admission)  Assessment & Plan  Na down to 126 today. Appears mildly dehydrated on examination  - holding lasix today  - NS at 50cc/hr  - repeat Na level this afternoon    History of pulmonary embolism- (present on admission)  Assessment & Plan  New PE diagnosed on September 10, 2020.  Segmental and subsegmental pulmonary emboli of the right lower lobe.    -Patient was discharged on Xarelto   -Hold Xarelto for possible kyphoplasty   -RT protocol; supplemental oxygen as needed    Chronic hypoxemic respiratory failure (HCC)- (present on  admission)  Assessment & Plan  Baseline uses 3L NC at home  - RT protocol  - supplemental oxygen as needed    Dementia (HCC)- (present on admission)  Assessment & Plan  - Continue with Aricept  - Reorient as needed, maintain day/night cycle  - Avoid overly sedating medications as possible     HLD (hyperlipidemia)- (present on admission)  Assessment & Plan  -Continue pravastatin    Hypothyroidism- (present on admission)  Assessment & Plan  -Continue home Synthroid        VTE prophylaxis: xarelto

## 2020-09-26 NOTE — CARE PLAN
Problem: Safety  Goal: Will remain free from injury  Outcome: PROGRESSING AS EXPECTED  Patient without injury in hospital setting.     Problem: Skin Integrity  Goal: Risk for impaired skin integrity will decrease  Outcome: PROGRESSING AS EXPECTED   Q2 turns maintained, incontinence pads utilized, frequent toileting offered, hourly rounding in place.

## 2020-09-26 NOTE — ASSESSMENT & PLAN NOTE
Improved from 126 --> 131--> 138  - s/p gentle IVF  - monitor fluid status daily  - held lasix as he was dry on examination; resume at lower dose (was taking 40mg BID, resume at 20mg BID)

## 2020-09-27 PROBLEM — E87.6 HYPOKALEMIA: Status: ACTIVE | Noted: 2020-01-01

## 2020-09-27 PROBLEM — R50.9 FEVER: Status: ACTIVE | Noted: 2020-01-01

## 2020-09-27 NOTE — PROGRESS NOTES
Hospital Medicine Daily Progress Note    Date of Service  9/27/2020    Chief Complaint  84 y.o. male admitted 9/24/2020 with back pain.    Hospital Course    85yo M with h/o recurrently left pleural effusions, HTN, COPD on 2L NC who presented with dizziness resulting in falls x2 and back pain.       Interval Problem Update  Did have a temp of 101.3 at 4 am today. Will obtain blood cultures but will hold off on antibiotics at this time as there is no clear infectious process. Discussed plan for kyphoplasty tomorrow. He understands the POC.     Consultants/Specialty  None    Code Status  Full Code    Disposition  Pending PT/OT evaluation, post kyphoplasty on Monday. Anticipate d/c in 2 days.    Review of Systems  Review of Systems   Constitutional: Positive for malaise/fatigue. Negative for chills, diaphoresis and fever.   HENT: Positive for hearing loss (baseline; right ear hearing loss).    Respiratory: Negative for cough and shortness of breath.    Cardiovascular: Negative for chest pain, palpitations and leg swelling.   Gastrointestinal: Negative for abdominal pain, nausea and vomiting.   Genitourinary: Negative for dysuria.   Musculoskeletal: Positive for back pain, falls (at home) and myalgias. Negative for neck pain.   Neurological: Positive for weakness. Negative for dizziness (resolved), focal weakness and headaches.   Psychiatric/Behavioral: The patient is not nervous/anxious and does not have insomnia.    All other systems reviewed and are negative.       Physical Exam  Temp:  [36.4 °C (97.5 °F)-38.5 °C (101.3 °F)] 38.5 °C (101.3 °F)  Pulse:  [81-87] 86  Resp:  [16-17] 17  BP: ()/(57-67) 118/65  SpO2:  [95 %-96 %] 96 %    Physical Exam  Vitals signs reviewed.   Constitutional:       General: He is not in acute distress.     Appearance: He is not diaphoretic.      Interventions: Nasal cannula in place.   HENT:      Head: Normocephalic.      Right Ear: Decreased hearing noted.      Mouth/Throat:       Mouth: Mucous membranes are moist.      Pharynx: Oropharynx is clear.   Eyes:      General:         Right eye: No discharge.         Left eye: No discharge.      Extraocular Movements: Extraocular movements intact.   Neck:      Musculoskeletal: Normal range of motion. No neck rigidity.   Cardiovascular:      Rate and Rhythm: Normal rate and regular rhythm.      Pulses: Normal pulses.   Pulmonary:      Effort: Pulmonary effort is normal. No respiratory distress.   Abdominal:      General: There is no distension.      Palpations: Abdomen is soft.      Tenderness: There is no abdominal tenderness. There is no guarding.   Musculoskeletal:         General: No swelling or tenderness.   Skin:     General: Skin is dry.      Coloration: Skin is pale.   Neurological:      General: No focal deficit present.      Mental Status: He is alert.      Motor: Weakness (Mild, generalized) present.   Psychiatric:         Mood and Affect: Mood normal.         Speech: Speech normal.         Behavior: Behavior normal. Behavior is cooperative.          Fluids  No intake or output data in the 24 hours ending 09/27/20 0718    Laboratory  Recent Labs     09/25/20  1051 09/26/20  0954 09/27/20  0432   WBC 12.9* 12.4* 13.6*   RBC 4.24* 3.91* 3.99*   HEMOGLOBIN 12.8* 11.9* 12.0*   HEMATOCRIT 40.1* 37.8* 37.6*   MCV 94.6 96.7 94.2   MCH 30.2 30.4 30.1   MCHC 31.9* 31.5* 31.9*   RDW 48.0 49.1 47.2   PLATELETCT 360 343 322   MPV 9.0 9.4 9.2     Recent Labs     09/25/20  1051 09/26/20  0954 09/26/20  1807 09/27/20  0432   SODIUM 133* 126* 132* 131*   POTASSIUM 4.7 3.7  --  3.6   CHLORIDE 94* 89*  --  92*   CO2 29 27  --  26   GLUCOSE 115* 123*  --  129*   BUN 9 13  --  11   CREATININE 0.61 0.64  --  0.56   CALCIUM 9.0 8.6  --  8.3*     Recent Labs     09/24/20  1043   APTT 38.5*   INR 1.19*               Imaging  MR-LUMBAR SPINE-W/O   Final Result      1.  Acute L1 vertebral compression fracture. This would be amenable to percutaneous augmentation  with vertebroplasty or kyphoplasty if clinically appropriate. Interventional radiology is available for consultation and therapy.   2.  Multilevel multifactorial degenerative changes   3.  LEFT pleural effusion      CTA ABDOMEN PELVIS W & W/O POST PROCESS   Final Result      1.  There is no evidence of solid organ injury or bowel injury.   2.  There is a mild acute or subacute L1 superior endplate compression fracture deformity. There is no paravertebral hematoma.   3.  There is atherosclerosis.   4.  There is no evidence of lower thoracic or abdominal aortic dissection.   5.  There is stable mild aneurysmal dilatation of the ascending thoracic aorta.   6.  There is a loculated left-sided pleural effusion slightly increased over prior study with left lower lobe atelectasis.      CT-LSPINE W/O PLUS RECONS   Final Result      Mild compression fracture of L1 is new compared to 06/03/2020.      Mild degenerative changes.      Left pleural effusion      IR-CONSULT AND TREAT    (Results Pending)        Assessment/Plan  * Compression fracture of first lumbar vertebra (HCC)- (present on admission)  Assessment & Plan  He endorsed dizziness and imbalance that resulted in his fall. Denies syncope. CT showing a mild L1 compression fracture. MRI shows it is amendable for kyphoplasty.   -Hold Xarelto for procedure  -kyphoplasty on Monday  -PT/OT evaluation pending, post kyphoplasty  -Pain control; bowel regimen    Recurrent pleural effusion on left- (present on admission)  Assessment & Plan  Worked up on prior admits and with patient pulmonary consult. Thora from June 2020 was exudative and pathology was inconclusive.  -Outpatient follow-up  -RT protocol    Fever  Assessment & Plan  Episode of fever early this morning. Denies cough or worsening SOB, no dysuria or abdominal pain. No evidence of an infectious process at this time. BP and HR stable.   - obtain blood cultures  - monitor clinically    Hyponatremia- (present on  admission)  Assessment & Plan  Improved from 126 --> 131 after gentle IVF  - monitor fluid status daily  - holding lasix today    History of pulmonary embolism- (present on admission)  Assessment & Plan  New PE diagnosed on September 10, 2020.  Segmental and subsegmental pulmonary emboli of the right lower lobe.    -Patient was discharged on Xarelto   -Hold Xarelto for kyphoplasty tomorrow  -RT protocol; supplemental oxygen as needed    Chronic hypoxemic respiratory failure (HCC)- (present on admission)  Assessment & Plan  Baseline uses 3L NC at home.  - RT protocol  - supplemental oxygen as needed    Hypokalemia  Assessment & Plan  K john to 3.6 today, mildly low.   - monitor and replete  - magnesium level pending    Dementia (HCC)- (present on admission)  Assessment & Plan  - Continue with Aricept  - Reorient as needed, maintain day/night cycle  - Avoid overly sedating medications as possible     HLD (hyperlipidemia)- (present on admission)  Assessment & Plan  -Continue pravastatin    Hypothyroidism- (present on admission)  Assessment & Plan  TSH normal.   -Continue home Synthroid        VTE prophylaxis: xarelto

## 2020-09-27 NOTE — ASSESSMENT & PLAN NOTE
Resolved. Episode of fever at 4am on 9/27. Denies cough or worsening SOB, no dysuria or abdominal pain. No evidence of an infectious process at this time. BP and HR stable. Leukocytosis resolved without intervention.  - follow up blood cultures  - monitor clinically

## 2020-09-27 NOTE — PROGRESS NOTES
Mobility Progress Note    Surgery patient: no  Date of surgery: 9/28/20, future surgery   Ambulated 50 ft on day of surgery? (N/A if patient did not undergo surgery today): n/a  Number of times ambulated 50 feet or greater today: 1  Patient has been up to chair, edge of bed or HOB 90 degrees for all meals?: unknown, for NOC shift yes   Goal met? (goal is ambulating at least 50 feet 2 times on day shift, one time on night shift): n/a  If patient did not meet mobility goal, why?: n/a, pt ambulated to bathroom and back

## 2020-09-27 NOTE — CARE PLAN
Problem: Communication  Goal: The ability to communicate needs accurately and effectively will improve  Outcome: PROGRESSING AS EXPECTED   Pt is calling for assistance from staff more often    Problem: Safety  Goal: Will remain free from falls  Outcome: PROGRESSING AS EXPECTED   The pt is a moderate to high fall risk; precautions are in place: bed alarm on, pt close to nursing station, and call light in reach.   Problem: Bowel/Gastric:  Goal: Normal bowel function is maintained or improved  Outcome: PROGRESSING AS EXPECTED   Pt had a large BM after not having BM for a few days.

## 2020-09-28 NOTE — PROGRESS NOTES
IR Nursing Note:    Attempted to consnet kypho today, pt oriented x 2-3, unclear if patient ate lunch.  Bedside RN updated along with Dr. Carter.  Patient to be NPO after midnight, no anticoagulation.  IR will consent with wife in AM.  Bedside RN to update wife.

## 2020-09-28 NOTE — PROGRESS NOTES
Pt returned to unit, unable to get kyphoplasty as patient stated he ate lunch. Pt is beginning to get slighty more confused. Dr. Carter updated. Attempted to call wife Genet no answer was unable to leave voicemail

## 2020-09-28 NOTE — CARE PLAN
Problem: Safety  Goal: Will remain free from injury  Outcome: PROGRESSING AS EXPECTED   Pt calls for assistance, call light in reach, and bed alarm on.   Problem: Venous Thromboembolism (VTW)/Deep Vein Thrombosis (DVT) Prevention:  Goal: Patient will participate in Venous Thrombosis (VTE)/Deep Vein Thrombosis (DVT)Prevention Measures  Outcome: PROGRESSING AS EXPECTED   The pt usually wears his SCDs, wanted them off this AM.   Problem: Bowel/Gastric:  Goal: Will not experience complications related to bowel motility  Outcome: PROGRESSING AS EXPECTED   Pt having regualr BMs daily.

## 2020-09-28 NOTE — PROGRESS NOTES
[facility] Medicine Daily Progress Note    Date of Service  9/28/2020    Chief Complaint  84 y.o. male admitted 9/24/2020 with back pain.    Hospital Course    83yo M with h/o recurrently left pleural effusions, HTN, COPD on 2L NC who presented with dizziness resulting in falls x2 and back pain.       Interval Problem Update  No fever since 4am on 9/27. Plan for kyphoplasty today. Per bedside RN, he is up standby assist but with moderate pain.     Consultants/Specialty  None    Code Status  Full Code    Disposition  Pending PT/OT evaluation, post kyphoplasty today. Anticipate d/c home in 1-2 days.    Review of Systems  Review of Systems   Constitutional: Positive for malaise/fatigue (unchanged). Negative for chills, diaphoresis and fever.   HENT: Positive for hearing loss (baseline; right ear hearing loss).    Respiratory: Negative for cough and shortness of breath.    Cardiovascular: Negative for chest pain, palpitations and leg swelling.   Gastrointestinal: Negative for abdominal pain, nausea and vomiting.   Genitourinary: Negative for dysuria.   Musculoskeletal: Positive for back pain, falls (at home) and myalgias. Negative for neck pain.   Neurological: Positive for weakness. Negative for dizziness (resolved), focal weakness, loss of consciousness and headaches.   Psychiatric/Behavioral: Negative for depression. The patient is not nervous/anxious and does not have insomnia.    All other systems reviewed and are negative.       Physical Exam  Temp:  [36.4 °C (97.5 °F)-37.3 °C (99.1 °F)] 37.3 °C (99.1 °F)  Pulse:  [65-85] 65  Resp:  [16] 16  BP: ()/(64-73) 96/66  SpO2:  [92 %-97 %] 97 %    Physical Exam  Vitals signs reviewed.   Constitutional:       General: He is not in acute distress.     Appearance: He is not diaphoretic.      Interventions: Nasal cannula in place.   HENT:      Head: Normocephalic.      Right Ear: Decreased hearing noted.      Mouth/Throat:      Mouth: Mucous membranes are moist.       Pharynx: Oropharynx is clear.   Eyes:      General:         Right eye: No discharge.         Left eye: No discharge.      Extraocular Movements: Extraocular movements intact.   Neck:      Musculoskeletal: Normal range of motion. No neck rigidity.   Cardiovascular:      Rate and Rhythm: Normal rate and regular rhythm.      Pulses: Normal pulses.   Pulmonary:      Effort: Pulmonary effort is normal. No respiratory distress.   Abdominal:      General: There is no distension.      Palpations: Abdomen is soft.      Tenderness: There is no abdominal tenderness. There is no guarding.   Musculoskeletal:         General: No swelling or tenderness.   Skin:     General: Skin is dry.      Coloration: Skin is pale.   Neurological:      General: No focal deficit present.      Mental Status: He is alert.      Motor: Weakness (Mild, generalized) present.   Psychiatric:         Mood and Affect: Mood normal.         Speech: Speech normal.         Behavior: Behavior normal. Behavior is cooperative.      Patient seen and evaluated today on 9/28, unchanged from 9/27.     Fluids  No intake or output data in the 24 hours ending 09/28/20 0826    Laboratory  Recent Labs     09/25/20  1051 09/26/20  0954 09/27/20  0432   WBC 12.9* 12.4* 13.6*   RBC 4.24* 3.91* 3.99*   HEMOGLOBIN 12.8* 11.9* 12.0*   HEMATOCRIT 40.1* 37.8* 37.6*   MCV 94.6 96.7 94.2   MCH 30.2 30.4 30.1   MCHC 31.9* 31.5* 31.9*   RDW 48.0 49.1 47.2   PLATELETCT 360 343 322   MPV 9.0 9.4 9.2     Recent Labs     09/25/20  1051 09/26/20  0954 09/26/20  1807 09/27/20  0432   SODIUM 133* 126* 132* 131*   POTASSIUM 4.7 3.7  --  3.6   CHLORIDE 94* 89*  --  92*   CO2 29 27  --  26   GLUCOSE 115* 123*  --  129*   BUN 9 13  --  11   CREATININE 0.61 0.64  --  0.56   CALCIUM 9.0 8.6  --  8.3*                   Imaging  MR-LUMBAR SPINE-W/O   Final Result      1.  Acute L1 vertebral compression fracture. This would be amenable to percutaneous augmentation with vertebroplasty or kyphoplasty  if clinically appropriate. Interventional radiology is available for consultation and therapy.   2.  Multilevel multifactorial degenerative changes   3.  LEFT pleural effusion      CTA ABDOMEN PELVIS W & W/O POST PROCESS   Final Result      1.  There is no evidence of solid organ injury or bowel injury.   2.  There is a mild acute or subacute L1 superior endplate compression fracture deformity. There is no paravertebral hematoma.   3.  There is atherosclerosis.   4.  There is no evidence of lower thoracic or abdominal aortic dissection.   5.  There is stable mild aneurysmal dilatation of the ascending thoracic aorta.   6.  There is a loculated left-sided pleural effusion slightly increased over prior study with left lower lobe atelectasis.      CT-LSPINE W/O PLUS RECONS   Final Result      Mild compression fracture of L1 is new compared to 06/03/2020.      Mild degenerative changes.      Left pleural effusion      IR-CONSULT AND TREAT    (Results Pending)   IR-VERTEBROPLASTY    (Results Pending)        Assessment/Plan  * Compression fracture of first lumbar vertebra (HCC)- (present on admission)  Assessment & Plan  He endorsed dizziness and imbalance that resulted in his fall. Denies syncope. CT showing a mild L1 compression fracture. MRI shows it is amendable for kyphoplasty.   -Hold Xarelto for procedure today; resume tomorrow  -kyphoplasty on Monday  -PT/OT evaluation pending, post kyphoplasty  -pain control; bowel regimen    Recurrent pleural effusion on left- (present on admission)  Assessment & Plan  Worked up on prior admits and with patient pulmonary consult. Thora from June 2020 was exudative and pathology was inconclusive as there was just a single cluster of atypical cells.    -Outpatient follow-up  -RT protocol    Fever  Assessment & Plan  Resolved. Episode of fever at 4am on 9/27. Denies cough or worsening SOB, no dysuria or abdominal pain. No evidence of an infectious process at this time. BP and HR  stable. Leukocytosis resolved without intervention.  - follow up blood cultures  - monitor clinically    Hyponatremia- (present on admission)  Assessment & Plan  Improved from 126 --> 131--> 138 today.  - s/p gentle IVF  - monitor fluid status daily  - held lasix as he was dry on examination; resume tomorrow at lower dose (was taking 40mg BID, resume at 20mg BID)    History of pulmonary embolism- (present on admission)  Assessment & Plan  New PE diagnosed on September 10, 2020.  Segmental and subsegmental pulmonary emboli of the right lower lobe.    -was taking xarelto on discharge from his last admission   -Hold Xarelto for kyphoplasty today  -RT protocol; supplemental oxygen as needed    Chronic hypoxemic respiratory failure (HCC)- (present on admission)  Assessment & Plan  Baseline uses 3L NC at home.  - RT protocol as needed  - supplemental oxygen as needed    Hypokalemia  Assessment & Plan  K down to 3.6, mildly low. Magnesium level is normal.   - monitor and replete    Dementia (HCC)- (present on admission)  Assessment & Plan  - Continue with Aricept  - Reorient as needed, maintain day/night cycle  - Avoid overly sedating medications as possible     HLD (hyperlipidemia)- (present on admission)  Assessment & Plan  -Continue pravastatin    Hypothyroidism- (present on admission)  Assessment & Plan  TSH normal.   -continue home Synthroid        VTE prophylaxis: SCDs until home xarelto can be resumed

## 2020-09-29 NOTE — THERAPY
Missed Therapy     Patient Name: Chucho Marin  Age:  84 y.o., Sex:  male  Medical Record #: [id number]  Today's Date: 9/29/2020 09/29/20 0916   Interdisciplinary Plan of Care Collaboration   IDT Collaboration with  Nursing   Patient Position at End of Therapy In Bed   Collaboration Comments Pt on hold for PT, surgery on 9/29. PT to see pt post-operatively as appropriate.

## 2020-09-29 NOTE — PROGRESS NOTES
Hospital Medicine Daily Progress Note    Date of Service  9/29/2020    Chief Complaint  84 y.o. male admitted 9/24/2020 with back pain.    Hospital Course    85yo M with h/o recurrently left pleural effusions, HTN, COPD on 2L NC who presented with dizziness resulting in falls x2 and back pain.       Interval Problem Update  Kyphoplasty postponed 9/28 because patient ate food, plan for kyphoplasty today 9/29  Nursing report of patient confused overnight, patient pleasant and oriented today  Patient requests phone to speak with his wife  PT/OT evaluation after kyphoplasty    Consultants/Specialty  None    Code Status  Full Code    Disposition  Pending PT/OT evaluation, kyphoplasty today. Anticipate d/c home in 1-2 days.    Review of Systems  Review of Systems   Constitutional: Positive for malaise/fatigue (unchanged). Negative for chills, diaphoresis and fever.   HENT: Positive for hearing loss (baseline; right ear hearing loss).    Respiratory: Negative for cough and shortness of breath.    Cardiovascular: Negative for chest pain, palpitations and leg swelling.   Gastrointestinal: Negative for abdominal pain, nausea and vomiting.   Genitourinary: Negative for dysuria.   Musculoskeletal: Positive for back pain, falls (at home) and myalgias. Negative for neck pain.   Neurological: Positive for weakness. Negative for dizziness (resolved), focal weakness, loss of consciousness and headaches.   Psychiatric/Behavioral: Negative for depression. The patient is not nervous/anxious and does not have insomnia.    All other systems reviewed and are negative.       Physical Exam  Temp:  [36.6 °C (97.8 °F)-37.1 °C (98.7 °F)] 36.9 °C (98.4 °F)  Pulse:  [72-79] 75  Resp:  [16-18] 17  BP: ()/(57-70) 98/57  SpO2:  [95 %-96 %] 96 %    Physical Exam  Vitals signs reviewed.   Constitutional:       General: He is not in acute distress.     Appearance: He is not diaphoretic.      Interventions: Nasal cannula in place.   HENT:       Head: Normocephalic.      Right Ear: Decreased hearing noted.      Mouth/Throat:      Mouth: Mucous membranes are moist.      Pharynx: Oropharynx is clear.   Eyes:      General:         Right eye: No discharge.         Left eye: No discharge.      Extraocular Movements: Extraocular movements intact.   Neck:      Musculoskeletal: Normal range of motion. No neck rigidity.   Cardiovascular:      Rate and Rhythm: Normal rate and regular rhythm.      Pulses: Normal pulses.   Pulmonary:      Effort: Pulmonary effort is normal. No respiratory distress.   Abdominal:      General: There is no distension.      Palpations: Abdomen is soft.      Tenderness: There is no abdominal tenderness. There is no guarding.   Musculoskeletal:         General: No swelling or tenderness.   Skin:     General: Skin is dry.      Coloration: Skin is pale.   Neurological:      General: No focal deficit present.      Mental Status: He is alert.      Motor: Weakness (Mild, generalized) present.   Psychiatric:         Mood and Affect: Mood normal.         Speech: Speech normal.         Behavior: Behavior normal. Behavior is cooperative.      Patient seen and evaluated today on 9/28, unchanged from 9/27.     Fluids    Intake/Output Summary (Last 24 hours) at 9/29/2020 1029  Last data filed at 9/28/2020 1900  Gross per 24 hour   Intake 240 ml   Output --   Net 240 ml       Laboratory  Recent Labs     09/27/20  0432 09/28/20  1027   WBC 13.6* 10.4   RBC 3.99* 4.09*   HEMOGLOBIN 12.0* 12.3*   HEMATOCRIT 37.6* 39.1*   MCV 94.2 95.6   MCH 30.1 30.1   MCHC 31.9* 31.5*   RDW 47.2 47.3   PLATELETCT 322 338   MPV 9.2 9.1     Recent Labs     09/26/20  1807 09/27/20  0432 09/28/20  1027   SODIUM 132* 131* 138   POTASSIUM  --  3.6 3.9   CHLORIDE  --  92* 97   CO2  --  26 32   GLUCOSE  --  129* 121*   BUN  --  11 11   CREATININE  --  0.56 0.52   CALCIUM  --  8.3* 8.8                   Imaging  MR-LUMBAR SPINE-W/O   Final Result      1.  Acute L1 vertebral  compression fracture. This would be amenable to percutaneous augmentation with vertebroplasty or kyphoplasty if clinically appropriate. Interventional radiology is available for consultation and therapy.   2.  Multilevel multifactorial degenerative changes   3.  LEFT pleural effusion      CTA ABDOMEN PELVIS W & W/O POST PROCESS   Final Result      1.  There is no evidence of solid organ injury or bowel injury.   2.  There is a mild acute or subacute L1 superior endplate compression fracture deformity. There is no paravertebral hematoma.   3.  There is atherosclerosis.   4.  There is no evidence of lower thoracic or abdominal aortic dissection.   5.  There is stable mild aneurysmal dilatation of the ascending thoracic aorta.   6.  There is a loculated left-sided pleural effusion slightly increased over prior study with left lower lobe atelectasis.      CT-LSPINE W/O PLUS RECONS   Final Result      Mild compression fracture of L1 is new compared to 06/03/2020.      Mild degenerative changes.      Left pleural effusion      IR-VERTEBROPLASTY    (Results Pending)        Assessment/Plan  * Compression fracture of first lumbar vertebra (HCC)- (present on admission)  Assessment & Plan  He endorsed dizziness and imbalance that resulted in his fall. Denies syncope. CT showing a mild L1 compression fracture. MRI shows it is amendable for kyphoplasty.   -Hold Xarelto for procedure today  -kyphoplasty today 9/29  -PT/OT evaluation pending, post kyphoplasty  -pain control; bowel regimen    Recurrent pleural effusion on left- (present on admission)  Assessment & Plan  Worked up on prior admits and with patient pulmonary consult. Thora from June 2020 was exudative and pathology was inconclusive as there was just a single cluster of atypical cells.    -Outpatient follow-up  -RT protocol    Fever  Assessment & Plan  Resolved. Episode of fever at 4am on 9/27. Denies cough or worsening SOB, no dysuria or abdominal pain. No evidence of  an infectious process at this time. BP and HR stable. Leukocytosis resolved without intervention.  - follow up blood cultures  - monitor clinically    Hyponatremia- (present on admission)  Assessment & Plan  Improved from 126 --> 131--> 138  - s/p gentle IVF  - monitor fluid status daily  - held lasix as he was dry on examination; resume at lower dose (was taking 40mg BID, resume at 20mg BID)    History of pulmonary embolism- (present on admission)  Assessment & Plan  New PE diagnosed on September 10, 2020.  Segmental and subsegmental pulmonary emboli of the right lower lobe.    -was taking xarelto on discharge from his last admission   -Hold Xarelto for kyphoplasty today  -RT protocol; supplemental oxygen as needed    Chronic hypoxemic respiratory failure (HCC)- (present on admission)  Assessment & Plan  Baseline uses 3L NC at home.  - RT protocol as needed  - supplemental oxygen as needed    Hypokalemia  Assessment & Plan  K down to 3.6, mildly low. Magnesium level is normal.   - monitor and replete    Dementia (HCC)- (present on admission)  Assessment & Plan  - Continue with Aricept  - Reorient as needed, maintain day/night cycle  - Avoid overly sedating medications as possible     HLD (hyperlipidemia)- (present on admission)  Assessment & Plan  -Continue pravastatin    Hypothyroidism- (present on admission)  Assessment & Plan  TSH normal.   -continue home Synthroid        VTE prophylaxis: SCDs until home xarelto can be resumed

## 2020-09-29 NOTE — PROGRESS NOTES
Patients wife call to inform this RN that she will be available to give phone consent for the husbands procedure until 14:45 today 09/29/2020 before she leaves the house for an appiontment. Kim robison IR updated.

## 2020-09-29 NOTE — PROGRESS NOTES
IR RN note:     L1 vertebral augmentation  by MD Cuba       Sedation given per provider direction. Patient appeared to tolerate procedure, patient awake and responsive post procedure.    Report given to BETTY Denny. Pt transported to [facility] via IR RN, then transferred care.    Kyphon bone cement REF: [id number] LOT: [id number] EXP 03-

## 2020-09-30 NOTE — CARE PLAN
Problem: Safety  Goal: Will remain free from falls  Outcome: PROGRESSING AS EXPECTED  Note: Hourly rounding.  Non-skid socks. Bed locked & in low position. Personal belongings and call light  within reach. .      Problem: Knowledge Deficit  Goal: Knowledge of disease process/condition, treatment plan, diagnostic tests, and medications will improve  Outcome: PROGRESSING AS EXPECTED  Note: Information regarding disease process/condition explained,question answered.  Updated with plan of care, verbalized understanding.      Problem: Skin Integrity  Goal: Risk for impaired skin integrity will decrease  Outcome: PROGRESSING AS EXPECTED  Note:   Turned & repositioned every 2 hours,kept dry and clean, mepilex, waffle mattress overlay, barrier paste and pillows on bony prominences to prevent skin breakdown

## 2020-09-30 NOTE — FACE TO FACE
Face to Face Supporting Documentation - Home Health    The encounter with this patient was in whole or in part the primary reason for home health admission.    Date of encounter:   Patient:                    MRN:                       YOB: 2020  Chucho Marin  [id number]  1935     Home health to see patient for:  Physical Therapy evaluation and treatment    Skilled need for:  Surgical Aftercare lumbar vertebra compression fracture treated with kyphoplasty    Skilled nursing interventions to include:  Comment: physical therapy    Homebound status evidenced by:  Need the aid of supportive devices such as crutches, canes, wheelchairs or walkers. Leaving home requires a considerable and taxing effort. There is a normal inability to leave the home.    Community Physician to provide follow up care: Gibson Quintana M.D.     Optional Interventions? No      I certify the face to face encounter for this home health care referral meets the CMS requirements and the encounter/clinical assessment with the patient was, in whole, or in part, for the medical condition(s) listed above, which is the primary reason for home health care. Based on my clinical findings: the service(s) are medically necessary, support the need for home health care, and the homebound criteria are met.  I certify that this patient has had a face to face encounter by myself and the nurse practitioner working collaboratively with me.  Haresh Jarrett M.D. - NPI: [id number]

## 2020-09-30 NOTE — THERAPY
"Occupational Therapy   Initial Evaluation     Patient Name: Chucho Marin  Age:  84 y.o., Sex:  male  Medical Record #: [id number]  Today's Date: 9/30/2020       Precautions: Fall Risk, Spinal / Back Precautions   Comments: cognitive deficits (unclear baseline)    Assessment  Patient is 84 y.o. male with a diagnosis of multiple GLF which resulted in L1 compression fx.  Pt is s/p kyphoplasty.  Pt continued to c/o today of back pain & declined to sit up in chair for lunch.  Pt appears to have some baseline cognitive impairments that impact his safety & balance during ADL's.  Pt currently is able to perform basic ADL's with supervision/Min A, but has poor tolerance for OOB activity.  Unclear what pt's baseline function is or how much assist his wife provides at home.  If pt's wife is able to provide close 24/7 supervision & some assist with ADL's than pt should be able to D/C home with HH therapy.    Plan    Recommend Occupational Therapy 3 times per week until therapy goals are met for the following treatments:  Self care, Adaptive equipment, & cognitive education.    DC Equipment Recommendations: Tub / Shower Seat, Grab Bar(s) in Tub / Shower  Discharge Recommendations: Recommend home health for continued occupational therapy services(as long as wife can provide close 24/7 supervision)     Subjective    \"I need to lay down my back is hurting\"     Objective       09/30/20 1138   Prior Living Situation   Prior Services Unable To Determine At This Time   Housing / Facility [address]  (per chart)   Equipment Owned Unable to Determine At This Time   Lives with - Patient's Self Care Capacity Spouse   Comments pt was not a reliable historian, unclear how much assist pt's wife is able to provide?   Cognition    Cognition / Consciousness X   Speech/ Communication Delayed Responses;Hard of Hearing   Level of Consciousness Alert   Ability To Follow Commands 1 Step   Safety Awareness Impaired   New Learning Impaired "   Attention Impaired   Comments Pt was very pleasant & co-operative, pt appears to have baseline cognitive impairments   Balance Assessment   Sitting Balance (Static) Fair +   Sitting Balance (Dynamic) Fair +   Standing Balance (Static) Fair -   Standing Balance (Dynamic) Fair -   Weight Shift Sitting Good   Weight Shift Standing Fair   Comments pt has had several falls at home & is risk for falls   ADL Assessment   Eating Supervision   Grooming Supervision;Standing   Upper Body Dressing Supervision   Lower Body Dressing Minimal Assist   Toileting Supervision   Functional Mobility   Sit to Stand Supervised   Bed, Chair, Wheelchair Transfer Supervised   Toilet Transfers Supervised  (using grab bar)   Mobility pt amb with FWW & supervision to bathroom.   Patient / Family Goals   Patient / Family Goal #1 To go home   Short Term Goals   Short Term Goal # 1 Pt will groom standing at sink for 10 min with no LOB   Short Term Goal # 2 Pt will dress LB with supervision

## 2020-09-30 NOTE — DISCHARGE PLANNING
Agency/Facility Name: [facility]   Spoke To: Charo  Outcome: Pt accepted. Will be seen 10/1.    LSW informed

## 2020-09-30 NOTE — OR SURGEON
Immediate Post- Operative Note        PostOp Diagnosis: VERTEBRAL INSUFFICIENCY FRACTURE AT L1      Procedure(s): LUMBAR KYPHOPLASTY AT L1    KYPHON DIRECTIONAL ASSIST KIT USED FOR BILAT PARAPEDICULAR ACCESS.      Estimated Blood Loss: < 5 ML        Complications: NONE            9/29/2020             5:36 PM               Elliot Cuba M.D.

## 2020-09-30 NOTE — DISCHARGE PLANNING
Anticipated Discharge Disposition: Home w/ HH    Action: LSW met w/ pt at bedside to discuss dc needs, pt deferred choice to wife Genet.  LSW called and spoke w/ Genet, she confirmed the pt's home address and PCP listed on the facesheet. She verified that the pt already has a FWW at home and says that they have previously used [facility].  Genet provided verbal choice for [facility], choice form faxed to DANIA Hewitt.    Barriers to Discharge: HH acceptance, medical clearance    Plan: F/u w/ HH referral

## 2020-09-30 NOTE — DISCHARGE SUMMARY
Discharge Summary    CHIEF COMPLAINT ON ADMISSION  Chief Complaint   Patient presents with   • Back Pain       Reason for Admission  EMS     Admission Date  9/24/2020    CODE STATUS  Full Code    HPI & HOSPITAL COURSE   83yo M with h/o recurrently left pleural effusions, HTN, COPD on 2L NC who presented with dizziness resulting in falls x2 and back pain.  Patient was found to have lumbar L1 vertebral compression fracture and had vertebral kyphoplasty performed. Patient's home lasix dose decreased during hospitalization and at discharge, patient's dizziness likely due to hypotension secondary to diuretic use. Patient evaluated by physical therapy and deemed appropriate for home health vs skilled nursing facility. Patient and patient's wife preferred home health so physical therapy home health set up for patient. Patient to follow up with PCP in next 2 weeks or when available.    Therefore, he is discharged in fair and stable condition to home with organized home healthcare and close outpatient follow-up.    The patient met 2-midnight criteria for an inpatient stay at the time of discharge.    Discharge Date  9/30/2020     FOLLOW UP ITEMS POST DISCHARGE  PCP to monitor blood pressure and lasix dose, pain management    DISCHARGE DIAGNOSES  Principal Problem:    Compression fracture of first lumbar vertebra (HCC) POA: Yes  Active Problems:    Recurrent pleural effusion on left (Chronic) POA: Yes    Chronic hypoxemic respiratory failure (HCC) (Chronic) POA: Yes    History of pulmonary embolism POA: Yes    Hyponatremia POA: Yes    Fever POA: No    Hypokalemia POA: Clinically Undetermined    Hypothyroidism POA: Yes    HLD (hyperlipidemia) POA: Yes    Dementia (HCC) POA: Yes  Resolved Problems:    * No resolved hospital problems. *      FOLLOW UP  No future appointments.  [facility]  [address]  [place] [place] [phone]        Gibson Quintana M.D.  [address]  [address]  [facility]  [address]  [phone]            MEDICATIONS ON DISCHARGE     Medication List      START taking these medications      Instructions   oxyCODONE immediate-release 5 MG Tabs  Commonly known as: ROXICODONE   Take 1 Tab by mouth every 6 hours as needed for Severe Pain for up to 7 days.  Dose: 5 mg        CHANGE how you take these medications      Instructions   furosemide 40 MG Tabs  What changed: how much to take  Commonly known as: LASIX   Take 0.5 Tabs by mouth 2 Times a Day.  Dose: 20 mg        CONTINUE taking these medications      Instructions   donepezil 5 MG Tabs  Commonly known as: ARICEPT   Take 5 mg by mouth every evening.  Dose: 5 mg     levothyroxine 75 MCG Tabs  Commonly known as: SYNTHROID   Take 75 mcg by mouth Every morning on an empty stomach.  Dose: 75 mcg     metoprolol SR 25 MG Tb24  Commonly known as: TOPROL XL   Take 25 mg by mouth every bedtime.  Dose: 25 mg     potassium chloride SA 20 MEQ Tbcr  Commonly known as: Kdur   Take 20 mEq by mouth 2 times a day.  Dose: 20 mEq     pravastatin 20 MG Tabs  Commonly known as: PRAVACHOL   Take 20 mg by mouth every evening.  Dose: 20 mg     testosterone cypionate 200 MG/ML Soln injection  Commonly known as: DEPO-TESTOSTERONE   100 mg by Intramuscular route every 14 days.  Dose: 100 mg     Xarelto 20 MG Tabs tablet  Generic drug: rivaroxaban   Take 20 mg by mouth every evening.  Dose: 20 mg            Allergies  No Known Allergies    DIET  Orders Placed This Encounter   Procedures   • Diet Order Regular     Standing Status:   Standing     Number of Occurrences:   1     Order Specific Question:   Diet:     Answer:   Regular [1]       ACTIVITY  As tolerated.  Weight bearing as tolerated    CONSULTATIONS  Orthopedic surgery     PROCEDURES  9/29 L1 vertebral kyphoplasty    LABORATORY  Lab Results   Component Value Date    SODIUM 138 09/30/2020    POTASSIUM 3.7 09/30/2020    CHLORIDE 100 09/30/2020    CO2 29 09/30/2020    GLUCOSE 232 (H) 09/30/2020    BUN 9  09/30/2020    CREATININE 0.58 09/30/2020    CREATININE 0.9 10/30/2007        Lab Results   Component Value Date    WBC 8.9 09/30/2020    HEMOGLOBIN 12.7 (L) 09/30/2020    HEMATOCRIT 41.6 (L) 09/30/2020    PLATELETCT 356 09/30/2020        Total time of the discharge process exceeds 43 minutes.

## 2020-09-30 NOTE — THERAPY
Physical Therapy   Initial Evaluation     Patient Name: Chucho Marin  Age:  84 y.o., Sex:  male  Medical Record #: [id number]  Today's Date: 9/30/2020     Precautions: Fall Risk, Other (See Comments)(neutral spine post kypho)    Assessment  Patient presents to PT s/p kyphoplasty. He was able to demonstrate hallway ambulation with FWW with CGA. He does have several + LOB though is able to correct with mild min A/CGA and cueing. His cognitive deficits are exacerbating his functional impairments and risk for falls. See below for details/recs.       Plan    Recommend Physical Therapy 3 times per week until therapy goals are met for the following treatments:  Bed Mobility, Community Re-integration, Equipment, Gait Training, Manual Therapy, Neuro Re-Education / Balance, Self Care/Home Evaluation, Stair Training, Therapeutic Activities and Therapeutic Exercises    DC Equipment Recommendations:  Unable to determine at this time  Discharge Recommendations: Other - (home health v. placement dependent on availability of 24/7 assist and availability from spouse if to dc home; pt needed TC/VC for ambulatory and OOB activity 2' to cognitive deficits which are placing him at increased risk for falls currently)        09/30/20 1057   Prior Living Situation   Prior Services Unable To Determine At This Time   Housing / Facility 1 Story House   Steps Into Home 0   Steps In Home 0   Equipment Owned Unable to Determine At This Time   Lives with - Patient's Self Care Capacity Spouse   Comments unclear if spouse is able to assist with IADLs and light ADLs; current concerns regarding cognition and ability to effectively care for self + fall risk 2' to cog   Prior Level of Functional Mobility   Bed Mobility Independent   Transfer Status Independent   Ambulation Independent   Distance Ambulation (Feet)   (limited community)   Assistive Devices Used None   Stairs Independent   Comments pt reports was I with ADLs prior; difficult subjective hx  as pt may not be accurate historian/cognitive deficits   History of Falls   History of Falls Yes   Date of Last Fall   (pt reports several falls recently; somewhat vague hx)   Cognition    Cognition / Consciousness X   Speech/ Communication Delayed Responses;Hard of Hearing   Level of Consciousness Alert   Ability To Follow Commands 2 Step  (inconsistent)   Safety Awareness Impaired   New Learning Impaired   Attention Impaired   Comments appears to have decreased insight into deficits and affect on safety and function; this may be baseline cognition, need confirmation from spouse/family   Passive ROM Lower Body   Passive ROM Lower Body WDL   Active ROM Lower Body    Active ROM Lower Body  WDL   Strength Lower Body   Lower Body Strength  X   Gross Strength Generalized Weakness, Equal Bilaterally   Sensation Lower Body   Lower Extremity Sensation   WDL   Balance Assessment   Sitting Balance (Static) Fair +   Sitting Balance (Dynamic) Fair +   Standing Balance (Static) Fair -   Standing Balance (Dynamic) Fair -   Weight Shift Sitting Fair   Weight Shift Standing Fair   Comments pt has several slight LOB (posteriorly in bathroom and when attempting to back step to chair) and erquired CGA/min A to correct; see gait   Gait Analysis   Gait Level Of Assist Minimal Assist  (CGA)   Assistive Device Front Wheel Walker   Distance (Feet) 100   # of Times Distance was Traveled 1   Deviation Decreased Base Of Support;Decreased Heel Strike;Decreased Toe Off;Other (Comment)  (reciprocal gait; dec anita/narrow ARUN)   # of Stairs Climbed 0   Weight Bearing Status no restrictions   Comments see balance; issues are exacerbated 2' to cognitive deficits   Bed Mobility    Supine to Sit Modified Independent   Sit to Supine   (left in chair)   Rolling Modified Independent   Comments HOB flat + VCs for mechanics   Functional Mobility   Sit to Stand   (CGA)   Bed, Chair, Wheelchair Transfer   (CGA)   Transfer Method Stand Step   Mobility  with FWW   Activity Tolerance   Comments no overt/acute fatigue   Short Term Goals    Short Term Goal # 1 Pt will be able to perform sit<>stand/transfers wtih FWW with Spv in 6tx to promote dc to home with spouse cueing   Short Term Goal # 2 Pt will be able to ambulate 150ft with FWW with SPv to promote fnx progression to I    Education Group   Spine Precautions Patient Response Patient;Acceptance;Explanation;Verbal Demonstration;Action Demonstration;Reinforcement Needed   Role of Physical Therapist Patient Response Patient;Acceptance;Explanation;Verbal Demonstration   Use of Assistive Device Patient Response Patient;Acceptance;Explanation;Verbal Demonstration;Action Demonstration;Reinforcement Needed   Additional Comments unclear carryover with recs/education

## 2020-09-30 NOTE — DISCHARGE PLANNING
Received Choice form at 1400  Agency/Facility Name: [facility]  Referral sent per Choice form @ 1400    LSW informed

## 2020-10-01 NOTE — DISCHARGE INSTRUCTIONS
Discharge Instructions    Discharged to home by car with relative. Discharged via wheelchair, hospital escort: Yes.  Special equipment needed: Walker    Be sure to schedule a follow-up appointment with your primary care doctor or any specialists as instructed.     Discharge Plan:   Diet Plan: Discussed  Activity Level: Discussed  Confirmed Follow up Appointment: Patient to Call and Schedule Appointment  Confirmed Symptoms Management: Discussed  Medication Reconciliation Updated: Yes  Influenza Vaccine Indication: Indicated: 65 years and older    I understand that a diet low in cholesterol, fat, and sodium is recommended for good health. Unless I have been given specific instructions below for another diet, I accept this instruction as my diet prescription.   Other diet: Resume diet as tolerated    Special Instructions: None    · Is patient discharged on Warfarin / Coumadin?   No     Depression / Suicide Risk    As you are discharged from this [facility] facility, it is important to learn how to keep safe from harming yourself.    Recognize the warning signs:  · Abrupt changes in personality, positive or negative- including increase in energy   · Giving away possessions  · Change in eating patterns- significant weight changes-  positive or negative  · Change in sleeping patterns- unable to sleep or sleeping all the time   · Unwillingness or inability to communicate  · Depression  · Unusual sadness, discouragement and loneliness  · Talk of wanting to die  · Neglect of personal appearance   · Rebelliousness- reckless behavior  · Withdrawal from people/activities they love  · Confusion- inability to concentrate     If you or a loved one observes any of these behaviors or has concerns about self-harm, here's what you can do:  · Talk about it- your feelings and reasons for harming yourself  · Remove any means that you might use to hurt yourself (examples: pills, rope, extension cords, firearm)  · Get professional help  from the community (Mental Health, Substance Abuse, psychological counseling)  · Do not be alone:Call your Safe Contact- someone whom you trust who will be there for you.  · Call your local CRISIS HOTLINE 260-4017 or 195-106-4531  · Call your local Children's Mobile Crisis Response Team [place] (032) 224-5417 or www.Health Essentials  · Call the toll free National Suicide Prevention Hotlines   · National Suicide Prevention Lifeline 115-584-NRAT (8067)  · [employer] Hope Line Network 800-SUICIDE (347-6956)       Percutaneous Vertebroplasty, Care After  This sheet gives you information about how to care for yourself after your procedure. Your health care provider may also give you more specific instructions. If you have problems or questions, contact your health care provider.  What can I expect after the procedure?  After the procedure, it is common to have:  · Discomfort in your back at the site of the procedure.  · Immediate relief of the back pain, or pain relief over the next couple of days, as a result of the stabilized bone fracture.  Follow these instructions at home:  Incision care  · Follow instructions from your health care provider about how to take care of your incision. Make sure you:  ? Wash your hands with soap and water before you change your bandage (dressing). If soap and water are not available, use hand .  ? Change your dressing as told by your health care provider.  ? Leave stitches (sutures), skin glue, or adhesive strips in place. These skin closures may need to stay in place for 2 weeks or longer. If adhesive strip edges start to loosen and curl up, you may trim the loose edges. Do not remove adhesive strips completely unless your health care provider tells you to do that.  · Check your incision area every day for signs of infection. Check for:  ? Redness, swelling, or pain.  ? Fluid or blood.  ? Warmth.  ? Pus or a bad smell.  · Keep the dressing dry as told by your health care  provider. Do not shower or bathe until your health care provider approves.  Managing pain, stiffness, and swelling    · If directed, apply ice to the affected area:  ? Put ice in a plastic bag.  ? Place a towel between your skin and the bag.  ? Leave the ice on for 20 minutes, 2-3 times a day.  · Rest for 24 hours after the procedure or as told by your health care provider.  Activity  · Do not bend or lift anything heavier than 10 lb (4.5 kg). Follow your health care provider's instructions about bending and lifting.  · Do not drive or use heavy machinery while taking prescription pain medicine.  · Slowly return to normal activities as told by your health care provider.  · Ask what type of stretching and strengthening exercises you should do after the procedure.  General instructions  · Take over-the-counter and prescription medicines only as told by your health care provider.  · Do not drive for 24 hours if you were given a medicine to help you relax (sedative).  · Do not use any products that contain nicotine or tobacco, such as cigarettes and e-cigarettes. If you need help quitting, ask your health care provider.  · To prevent or treat constipation while you are taking prescription pain medicine, your health care provider may recommend that you:  ? Drink enough fluid to keep your urine clear or pale yellow.  ? Take over-the-counter or prescription medicines.  ? Eat foods that are high in fiber, such as fresh fruits and vegetables, whole grains, and beans.  ? Limit foods that are high in fat and processed sugars, such as fried and sweet foods.  · Keep all follow-up visits as told by your health care provider. This is important.  Contact a health care provider if:  · You have redness, swelling, or pain around your incision.  · You have fluid or blood coming from your incision.  · Your incision feels warm to the touch.  · You have pus or a bad smell coming from your incision.  · You have a fever.  · You become  nauseous or you vomit for more than 24 hours.  · Your back pain does not get better.  Get help right away if:  · You have sudden, severe back pain.  · You cannot control when you urinate or have a bowel movement.  · You have new tingling, numbness, or weakness in your legs or feet.  · You have sudden weakness in your arms or legs.  · You have shooting pain down your legs.  · You have chest pain, trouble breathing, or are short of breath.  · You feel dizzy or faint.  · You have changes in your speech or vision.  Summary  · After your procedure, it is common to have immediate relief of the back pain from the collapsed bones of the spine, or it may go away over the next couple of days.  · Do not drive for 24 hours if you were given a medicine to help you relax (sedative).  · Take over-the-counter and prescription medicines only as told by your health care provider.  This information is not intended to replace advice given to you by your health care provider. Make sure you discuss any questions you have with your health care provider.  Document Released: 08/16/2012 Document Revised: 11/30/2018 Document Reviewed: 02/26/2018  Elsevier Patient Education © 2020 [employer]

## 2020-10-01 NOTE — PROGRESS NOTES
Patient discharged home with wife. Patient and family verbalized understanding of d/c instructions and have no further questions or concerns at this time.

## 2020-10-14 NOTE — TELEPHONE ENCOUNTER
"Breann with [facility] called. She had a visit with pt today and she stated that he had \"decresed lung sound in his left lower lobe\". She informed that his O2 sat was 95 on 2LPM and he stated that his pain scale was a 7-8. She advised that his breathing Is fine but he was complaining of back pain. Routing as an FYI as she had already advised pt and spouse to report to ER or UC before things get worse.  "

## 2020-10-16 NOTE — TELEPHONE ENCOUNTER
Michle Martinez M.D.  You 12 hours ago (9:09 PM)     Thank you Tamra Alvarez- can you call the pt to see if he'd like to followup with us? I dont see an upcoming appt

## 2020-10-19 NOTE — TELEPHONE ENCOUNTER
Called and spoke with pt and his wife.  Asked if they would like to come in this week to see Dr Pelayo to follow up regarding his left lung. Pt declined, he would like to wait to see Dr Martinez only.

## 2020-11-01 PROBLEM — I50.9 CHF (CONGESTIVE HEART FAILURE) (HCC): Status: ACTIVE | Noted: 2020-01-01

## 2020-11-01 PROBLEM — D72.829 LEUKOCYTOSIS: Status: ACTIVE | Noted: 2020-01-01

## 2020-11-01 PROBLEM — R10.9 AP (ABDOMINAL PAIN): Status: ACTIVE | Noted: 2020-01-01

## 2020-11-01 PROBLEM — K76.0 HEPATIC STEATOSIS: Status: ACTIVE | Noted: 2020-01-01

## 2020-11-01 NOTE — ED NOTES
Med rec complete per pt's wife via phone interview. Permission given by pt to contact wife for med list. NKDA

## 2020-11-01 NOTE — ASSESSMENT & PLAN NOTE
-resolved  -possible reactive in the setting of abdominal pain and constipation/ chronic left pleural effusion

## 2020-11-01 NOTE — PROGRESS NOTES
Triage officer note    ER physician requested admission Dr. Escalante    84-year-old male presents to the hospital with complaint of abdominal pain for the last 24 hours.  He found to have white blood cell count of 12.3.  He underwent CT scan abdomen and pelvis and it did not show any acute abnormalities.  He also underwent CT scan lumbar spine due to back pain and it showed prior kyphoplasty and no acute changes.  Per ERP he does not have any focal neurological deficit on his lower extremities.      CMP showed blood glucose of 127, normal electrolytes and lipase of 58.    I requested [facility] internal medicine resident Dr. Jeronimo to evaluate this patient for admission.      CT-LSPINE W/O PLUS RECONS   Final Result      1.  L1 compression fracture status post vertebral augmentation.      2.  Mild multilevel degenerative disc disease.      3.  Moderate multilevel facet arthropathy is most pronounced in the lower spine.      4.  Partially visualized right pleural effusion.      5.  Atherosclerosis.      CT-ABDOMEN-PELVIS WITH   Final Result      1.  Hepatic steatosis and hepatomegaly      2.  Left pleural effusion which is probably moderate in size      3.  Atelectasis of both lung bases      4.  Decreased flow to the lateral segment left lobe of the liver which is likely secondary to a vascular etiology although none is directly identified      5.  Aortic and branch vessel atherosclerotic plaque      6.  Enlarged prostate

## 2020-11-01 NOTE — ED PROVIDER NOTES
ED Provider Note    CHIEF COMPLAINT  Chief Complaint   Patient presents with   • Abdominal Pain       HPI  Cuhcho Marin is a 84 y.o. male who presents with 2 months of abdominal pain, much more severe in the last 24 hours.  Patient states he became much worse early this morning at approximately 3 AM.  He states he could not get out of bed today as the lower abdomen hurt too much to do so.  Pain is felt both in the anterior lower abdomen as well as the lumbar spine region equally bilateral.  He describes it as midline lower abdominal discomfort, worse with movement.  He states his bowel movements are irregular but otherwise normal.  No vomiting.  Patient treated with nausea medication and fentanyl in route to the hospital by the paramedics.  No chest pain, no shortness of breath.  Patient denies leg swelling.  No associated trauma.  He denies dysuria.  Symptoms gradual onset while at rest at home.    REVIEW OF SYSTEMS  Constitutional: No fever, denies dizziness  Respiratory: No shortness of breath  Cardiac: No chest pain or syncope  Gastrointestinal: Abdominal pain, lower abdominal distention  Musculoskeletal: History of chronic back pain.  Abdominal pain in the lower abdomen is associated with lumbar pain circumferentially.  Neurologic: No headache  Genitourinary: Denies dysuria       All other systems are negative.     PAST MEDICAL HISTORY  Past Medical History:   Diagnosis Date   • Back pain    • Bronchitis    • CHEST PAIN 6/1/2011   • CHF (congestive heart failure) ([facility]) 5/29/2020   • Daytime sleepiness    • Frequent urination    • Pleural effusion    • Ringing in ears    • Shortness of breath    • Sweat, sweating, excessive    • Wears glasses    • Weight loss        FAMILY HISTORY  History reviewed. No pertinent family history.    SOCIAL HISTORY  Social History     Socioeconomic History   • Marital status:      Spouse name: Not on file   • Number of children: Not on file   • Years of education: Not on  file   • Highest education level: Not on file   Occupational History   • Not on file   Social Needs   • Financial resource strain: Not on file   • Food insecurity     Worry: Not on file     Inability: Not on file   • Transportation needs     Medical: Not on file     Non-medical: Not on file   Tobacco Use   • Smoking status: Never Smoker   • Smokeless tobacco: Never Used   Substance and Sexual Activity   • Alcohol use: No   • Drug use: No   • Sexual activity: Not on file     Comment: na   Lifestyle   • Physical activity     Days per week: Not on file     Minutes per session: Not on file   • Stress: Not on file   Relationships   • Social connections     Talks on phone: Not on file     Gets together: Not on file     Attends Voodoo service: Not on file     Active member of club or organization: Not on file     Attends meetings of clubs or organizations: Not on file     Relationship status: Not on file   • Intimate partner violence     Fear of current or ex partner: Not on file     Emotionally abused: Not on file     Physically abused: Not on file     Forced sexual activity: Not on file   Other Topics Concern   • Not on file   Social History Narrative   • Not on file       SURGICAL HISTORY  Past Surgical History:   Procedure Laterality Date   • PB REMV 2ND CATARACT,CORN-SCLER SECTN     • PROSTATECTOMY ROBOTIC     • TONSILLECTOMY         CURRENT MEDICATIONS  Home Medications     Reviewed by Yoly Contreras R.N. (Registered Nurse) on 11/01/20 at 0743  Med List Status: Not Addressed   Medication Last Dose Status   donepezil (ARICEPT) 5 MG Tab  Active   furosemide (LASIX) 40 MG Tab  Active   levothyroxine (SYNTHROID) 75 MCG Tab  Active   metoprolol SR (TOPROL XL) 25 MG TABLET SR 24 HR  Active   potassium chloride SA (KDUR) 20 MEQ Tab CR  Active   pravastatin (PRAVACHOL) 20 MG Tab  Active   rivaroxaban (XARELTO) 20 MG Tab tablet  Active   testosterone cypionate (DEPO-TESTOSTERONE) 200 MG/ML Solution injection  Active        "         ALLERGIES  No Known Allergies    PHYSICAL EXAM  VITAL SIGNS: /78   Pulse 72   Temp 36.6 °C (97.8 °F) (Tympanic)   Resp 20   Ht 1.803 m (5' 11\")   Wt 70.3 kg (155 lb)   SpO2 99%   BMI 21.62 kg/m²   Constitutional: No distress  ENT: Nares clear, mucous membranes moist.  Eyes:  Conjunctiva normal, No discharge.    Lymphatic: No adenopathy.   Cardiovascular: Normal heart rate, Normal rhythm.   Pulmonary: No wheezing, no rales  Gastrointestinal: Lower abdominal distention.  Lower abdominal tenderness, no pulsatile mass.  No peritoneal signs.  After abdomen is soft, lower midline tenderness.  Negative Hendricks sign.  No pain over McBurney's point  Skin: Warm, Dry, No jaundice.  No asymmetric edema  Musculoskeletal:  No CVA tenderness.  Midline lumbar tenderness.  Thoracic spine nontender.  Pelvis nontender.  Hips nontender  Neurologic:  Normal motor and sensory function, No focal deficits noted.   Psychiatric: Depressed mood    RADIOLOGY/PROCEDURES/Labs  Results for orders placed or performed during the hospital encounter of 11/01/20   CBC WITH DIFFERENTIAL   Result Value Ref Range    WBC 12.3 (H) 4.8 - 10.8 K/uL    RBC 4.34 (L) 4.70 - 6.10 M/uL    Hemoglobin 12.4 (L) 14.0 - 18.0 g/dL    Hematocrit 41.4 (L) 42.0 - 52.0 %    MCV 95.4 81.4 - 97.8 fL    MCH 28.6 27.0 - 33.0 pg    MCHC 30.0 (L) 33.7 - 35.3 g/dL    RDW 49.5 35.9 - 50.0 fL    Platelet Count 419 164 - 446 K/uL    MPV 9.5 9.0 - 12.9 fL    Neutrophils-Polys 82.40 (H) 44.00 - 72.00 %    Lymphocytes 9.50 (L) 22.00 - 41.00 %    Monocytes 7.10 0.00 - 13.40 %    Eosinophils 0.20 0.00 - 6.90 %    Basophils 0.20 0.00 - 1.80 %    Immature Granulocytes 0.60 0.00 - 0.90 %    Nucleated RBC 0.00 /100 WBC    Neutrophils (Absolute) 10.10 (H) 1.82 - 7.42 K/uL    Lymphs (Absolute) 1.16 1.00 - 4.80 K/uL    Monos (Absolute) 0.87 (H) 0.00 - 0.85 K/uL    Eos (Absolute) 0.02 0.00 - 0.51 K/uL    Baso (Absolute) 0.03 0.00 - 0.12 K/uL    Immature Granulocytes (abs) " 0.07 0.00 - 0.11 K/uL    NRBC (Absolute) 0.00 K/uL   COMP METABOLIC PANEL   Result Value Ref Range    Sodium 140 135 - 145 mmol/L    Potassium 3.9 3.6 - 5.5 mmol/L    Chloride 102 96 - 112 mmol/L    Co2 29 20 - 33 mmol/L    Anion Gap 9.0 7.0 - 16.0    Glucose 127 (H) 65 - 99 mg/dL    Bun 8 8 - 22 mg/dL    Creatinine 0.51 0.50 - 1.40 mg/dL    Calcium 8.9 8.5 - 10.5 mg/dL    AST(SGOT) 8 (L) 12 - 45 U/L    ALT(SGPT) 7 2 - 50 U/L    Alkaline Phosphatase 86 30 - 99 U/L    Total Bilirubin 0.4 0.1 - 1.5 mg/dL    Albumin 3.0 (L) 3.2 - 4.9 g/dL    Total Protein 6.7 6.0 - 8.2 g/dL    Globulin 3.7 (H) 1.9 - 3.5 g/dL    A-G Ratio 0.8 g/dL   LIPASE   Result Value Ref Range    Lipase 58 11 - 82 U/L   URINALYSIS,CULTURE IF INDICATED    Specimen: Urine, Clean Catch   Result Value Ref Range    Color Yellow     Character Clear     Ph 7.0 5.0 - 8.0    Glucose Negative Negative mg/dL    Ketones Negative Negative mg/dL    Protein Negative Negative mg/dL    Bilirubin Negative Negative    Urobilinogen, Urine 1.0 Negative    Nitrite Negative Negative    Leukocyte Esterase Negative Negative    Occult Blood Trace (A) Negative    Micro Urine Req Microscopic    ESTIMATED GFR   Result Value Ref Range    GFR If African American >60 >60 mL/min/1.73 m 2    GFR If Non African American >60 >60 mL/min/1.73 m 2   REFRACTOMETER SG   Result Value Ref Range    Specific Gravity 1.040    URINE MICROSCOPIC (W/UA)   Result Value Ref Range    WBC 0-2 (A) /hpf    RBC 10-20 (A) /hpf     CT-LSPINE W/O PLUS RECONS   Final Result      1.  L1 compression fracture status post vertebral augmentation.      2.  Mild multilevel degenerative disc disease.      3.  Moderate multilevel facet arthropathy is most pronounced in the lower spine.      4.  Partially visualized right pleural effusion.      5.  Atherosclerosis.      CT-ABDOMEN-PELVIS WITH   Final Result      1.  Hepatic steatosis and hepatomegaly      2.  Left pleural effusion which is probably moderate in size       3.  Atelectasis of both lung bases      4.  Decreased flow to the lateral segment left lobe of the liver which is likely secondary to a vascular etiology although none is directly identified      5.  Aortic and branch vessel atherosclerotic plaque      6.  Enlarged prostate            COURSE & MEDICAL DECISION MAKING  Pertinent Labs & Imaging studies reviewed. (See chart for details)  Lower abdominal distention improved after patient urinated.  Patient with back and abdominal pain of unknown etiology.  He has history of chronic back problems, old compression fracture seen on CT scan.  Given elevated white blood cell count of 12 and abdominal pain, CT scan of the abdomen pelvis obtained to rule out appendicitis or other intra-abdominal infection, the CT scan was negative.  Urinalysis negative for infection.  Patient continues to complain of pain to great to actually sit up on his own.  Plan for hospitalization, ongoing evaluation and treatment.    FINAL IMPRESSION  1. Lower abdominal pain     2. Lumbar back pain             Electronically signed by: Vu Escalante M.D., 11/1/2020 8:00 AM

## 2020-11-01 NOTE — ASSESSMENT & PLAN NOTE
-S/p L1 Kyphoplasty on 9/2020  -Per patient wife, patient has been having difficulties with functioning due to pain  -Patient occasionally walks around the house but has been bed bound since his fracture and kypoplasty.   -Tylenol and Lidocaine Patches PRN  -Percocet prn for pain

## 2020-11-01 NOTE — H&P
"History & Physical Note    Date of Admission: 11/1/2020  Admission Status: Observation-Outpatient  [facility] Team: [facility] Purple Team  Attending: Timothy Hernández M.D.   Senior Resident: Dr. Jeronimo  Intern: Dr. Guardado  Contact Number: [phone]    Chief Complaint: Abdominal Pain, Unable to Walk    History of Present Illness (HPI):   Chucho is a 84 y.o. male with a PMH that includes prior hospitalization on 5/2020 for plural effusion with Exudate of unclear etiology on 2L of O2 at home (3L when walking), hypothyroidism, CHF (EF ~45% on 5/28/2020), R Pulmonary embolism on 9/2020 on Xareloto, Dementia, Chronic Back pain s/p Kyphoplasty on 9/2020, who presented 11/01/2020 with complaints of acute on chronic abdominal pain.   History taking is accomplished through the patient and his wife at beside. Per the patient, he has had this chronic lower abdominal pain for the past several months, starting initially back in May following a thoracentesis procedure for his above admission for pleural effusion. He states that he was prompted to come to the hospital after, on day of presentation, he found that he was unable to get out of bed, saying that there was too much pain to move. He shares that he also has not had a bowel movement in \"a couple days\".  He describes the pain as 6/10, improved with laying down, located primarily in midline of his lower quadrant, intermittent, non-radiating.  Denies any incontinence, saddle anesthesia, or sensation change. Wife does mention he has been steadily losing weight as of late, estimating at least a 20lb weight loss.     While in the ED, he had a CBC, CMP, Urinalysis, which were notable for an elevated white count to 12.3 and negative UA. CT L spine negative for acute change, positive for prior kyphoplasty, and CT Abdomen Pelvis w/, positive for hepatic steatosis, hepatomegaly, moderate left pleural effusion, aortic and branch vessel atherosclerotic plaque.    Also of note, patient has a history " "of recurrent pleural effusion of his left lung despite thoracentesis and follows with pulmonology. There is a suspicion for underlying pleural malignancy per their notes, however there has not been an actually identified malignancy found. Found on 6/3 to have relatively low cell count with pathology identifying a \"single cluster of atypical cells that could not be further characterized\".    Review of Systems:   Review of Systems   Constitutional: Positive for weight loss. Negative for chills, diaphoresis and fever.   HENT: Positive for hearing loss (Chronic hearing loss, worse in Right Ear). Negative for congestion, ear pain, nosebleeds, sore throat and tinnitus.    Eyes: Negative for blurred vision and double vision.   Respiratory: Negative for cough, hemoptysis, shortness of breath, wheezing and stridor.    Cardiovascular: Negative for chest pain, palpitations, orthopnea, claudication and leg swelling.   Gastrointestinal: Positive for abdominal pain (Lower abdomen) and constipation. Negative for blood in stool, diarrhea, heartburn, melena, nausea and vomiting.   Genitourinary: Negative for dysuria and urgency.   Musculoskeletal: Negative for back pain, joint pain, myalgias and neck pain.   Skin: Negative for rash.   Neurological: Negative for dizziness, tingling, tremors, sensory change, speech change, focal weakness, seizures, loss of consciousness, weakness and headaches.   Psychiatric/Behavioral: Negative for depression, substance abuse and suicidal ideas.         Past Medical History:   Past Medical History was reviewed with patient.   has a past medical history of Back pain, Bronchitis, CHEST PAIN (6/1/2011), CHF (congestive heart failure) ([facility]) (5/29/2020), Daytime sleepiness, Frequent urination, Pleural effusion, Ringing in ears, Shortness of breath, Sweat, sweating, excessive, Wears glasses, and Weight loss. He also has no past medical history of AAA (abdominal aortic aneurysm) ([facility]), Anemia, Atrial " fibrillation (HCC), Blood transfusion, Cancer (HCC), Carotid artery disease (HCC), COPD, Diabetes, Heart attack (HCC), Hypertension, Kidney disease, or PVD (peripheral vascular disease) (HCC).    Past Surgical History: Past Surgical History was reviewed with patient.   has a past surgical history that includes pr remv 2nd cataract,corn-scler sectn; prostatectomy robotic; and tonsillectomy.    Medications: Medications have been reviewed with patient.  Prior to Admission Medications   Prescriptions Last Dose Informant Patient Reported? Taking?   donepezil (ARICEPT) 5 MG Tab 10/31/2020 at Unknown time Patient's Home Pharmacy Yes No   Sig: Take 5 mg by mouth every evening.   furosemide (LASIX) 40 MG Tab Not Taking at Unknown time  No No   Sig: Take 0.5 Tabs by mouth 2 Times a Day.   Patient not taking: Reported on 11/1/2020   levothyroxine (SYNTHROID) 75 MCG Tab 10/31/2020 at am Patient's Home Pharmacy Yes No   Sig: Take 75 mcg by mouth Every morning on an empty stomach.   metoprolol SR (TOPROL XL) 25 MG TABLET SR 24 HR 10/31/2020 at Unknown time Patient's Home Pharmacy Yes No   Sig: Take 25 mg by mouth every bedtime.   oxyCODONE-acetaminophen (PERCOCET) 5-325 MG Tab 10/31/2020 at hs  Yes Yes   Sig: Take 1 Tab by mouth 2 times a day as needed. Indications: Pain   rivaroxaban (XARELTO) 20 MG Tab tablet 10/31/2020 at am Patient's Home Pharmacy Yes No   Sig: Take 20 mg by mouth every morning.      Facility-Administered Medications: None        Allergies: Allergies have been reviewed with patient.  No Known Allergies    Family History:   family history is not on file.   Denies any family history of diseases    Social History:   Tobacco: Never smoker  Alcohol: Denies any EtOH  Recreational drugs (illegal and prescription):  Denies  Employment: Retired  Activity Level: Walks using Walker  Living situation:  Lives with Wife  Recent travel:  Denies  Primary Care Provider: reviewed Gibson Quintana M.D.  Other (stressors,  spirituality, exposures):  [facility] [employer]   Physical Exam:   Vitals:  Temp:  [36.6 °C (97.8 °F)] 36.6 °C (97.8 °F)  Pulse:  [67-87] 87  Resp:  [20] 20  BP: (106-142)/(67-84) 142/84  SpO2:  [90 %-99 %] 95 %    Physical Exam  Constitutional:       General: He is not in acute distress.     Appearance: He is not ill-appearing or diaphoretic.      Comments: Thin appearing   HENT:      Head: Normocephalic and atraumatic.      Ears:      Comments: Notably hard of hearing, worse in Right Ear     Nose: Nose normal. No congestion.      Comments: NC in place     Mouth/Throat:      Mouth: Mucous membranes are dry.      Comments: Teeth Grinding Mouthguard in place, foodstuff stuck onto mouthguard  Eyes:      General: No scleral icterus.     Extraocular Movements: Extraocular movements intact.      Pupils: Pupils are equal, round, and reactive to light.   Neck:      Musculoskeletal: Normal range of motion and neck supple. No neck rigidity or muscular tenderness.   Cardiovascular:      Rate and Rhythm: Normal rate and regular rhythm.      Pulses: Normal pulses.      Heart sounds: No murmur.   Pulmonary:      Effort: Pulmonary effort is normal. No respiratory distress.      Breath sounds: No wheezing.      Comments: Reduced breath sounds on Left lung, throughout  Abdominal:      General: Abdomen is flat. There is no distension.      Palpations: Abdomen is soft. There is no mass.      Tenderness: There is abdominal tenderness. There is no guarding or rebound.      Hernia: No hernia is present.   Musculoskeletal: Normal range of motion.         General: No swelling or tenderness.      Right lower leg: No edema.      Left lower leg: No edema.   Skin:     General: Skin is warm and dry.      Capillary Refill: Capillary refill takes 2 to 3 seconds.      Coloration: Skin is not jaundiced or pale.      Findings: No bruising.   Neurological:      General: No focal deficit present.      Mental Status: He is alert and oriented to person,  place, and time.      Cranial Nerves: No cranial nerve deficit.      Sensory: No sensory deficit.      Motor: No weakness.      Coordination: Coordination normal.   Psychiatric:         Mood and Affect: Mood normal.         Behavior: Behavior normal.         Labs:   Results for MONTSERRAT ARAUJO (MRN [id number]) as of 11/1/2020 14:04   Ref. Range 11/1/2020 07:53   WBC Latest Ref Range: 4.8 - 10.8 K/uL 12.3 (H)   RBC Latest Ref Range: 4.70 - 6.10 M/uL 4.34 (L)   Hemoglobin Latest Ref Range: 14.0 - 18.0 g/dL 12.4 (L)   Hematocrit Latest Ref Range: 42.0 - 52.0 % 41.4 (L)   MCV Latest Ref Range: 81.4 - 97.8 fL 95.4   MCH Latest Ref Range: 27.0 - 33.0 pg 28.6   MCHC Latest Ref Range: 33.7 - 35.3 g/dL 30.0 (L)   RDW Latest Ref Range: 35.9 - 50.0 fL 49.5   Platelet Count Latest Ref Range: 164 - 446 K/uL 419   MPV Latest Ref Range: 9.0 - 12.9 fL 9.5   Neutrophils-Polys Latest Ref Range: 44.00 - 72.00 % 82.40 (H)   Neutrophils (Absolute) Latest Ref Range: 1.82 - 7.42 K/uL 10.10 (H)   Lymphocytes Latest Ref Range: 22.00 - 41.00 % 9.50 (L)   Lymphs (Absolute) Latest Ref Range: 1.00 - 4.80 K/uL 1.16   Monocytes Latest Ref Range: 0.00 - 13.40 % 7.10   Monos (Absolute) Latest Ref Range: 0.00 - 0.85 K/uL 0.87 (H)   Eosinophils Latest Ref Range: 0.00 - 6.90 % 0.20   Eos (Absolute) Latest Ref Range: 0.00 - 0.51 K/uL 0.02   Basophils Latest Ref Range: 0.00 - 1.80 % 0.20   Baso (Absolute) Latest Ref Range: 0.00 - 0.12 K/uL 0.03   Immature Granulocytes Latest Ref Range: 0.00 - 0.90 % 0.60   Immature Granulocytes (abs) Latest Ref Range: 0.00 - 0.11 K/uL 0.07   Nucleated RBC Latest Units: /100 WBC 0.00   NRBC (Absolute) Latest Units: K/uL 0.00   Sodium Latest Ref Range: 135 - 145 mmol/L 140   Potassium Latest Ref Range: 3.6 - 5.5 mmol/L 3.9   Chloride Latest Ref Range: 96 - 112 mmol/L 102   Co2 Latest Ref Range: 20 - 33 mmol/L 29   Anion Gap Latest Ref Range: 7.0 - 16.0  9.0   Glucose Latest Ref Range: 65 - 99 mg/dL 127 (H)   Bun Latest Ref  "Range: 8 - 22 mg/dL 8   Creatinine Latest Ref Range: 0.50 - 1.40 mg/dL 0.51   GFR If  Latest Ref Range: >60 mL/min/1.73 m 2 >60   GFR If Non  Latest Ref Range: >60 mL/min/1.73 m 2 >60   Calcium Latest Ref Range: 8.5 - 10.5 mg/dL 8.9   AST(SGOT) Latest Ref Range: 12 - 45 U/L 8 (L)   ALT(SGPT) Latest Ref Range: 2 - 50 U/L 7   Alkaline Phosphatase Latest Ref Range: 30 - 99 U/L 86   Total Bilirubin Latest Ref Range: 0.1 - 1.5 mg/dL 0.4   Albumin Latest Ref Range: 3.2 - 4.9 g/dL 3.0 (L)   Total Protein Latest Ref Range: 6.0 - 8.2 g/dL 6.7   Globulin Latest Ref Range: 1.9 - 3.5 g/dL 3.7 (H)   A-G Ratio Latest Units: g/dL 0.8   Lipase Latest Ref Range: 11 - 82 U/L 58   Urobilinogen, Urine Latest Ref Range: Negative  1.0   Color Unknown Yellow   Character Unknown Clear   Specific Gravity Unknown 1.040   Ph Latest Ref Range: 5.0 - 8.0  7.0   Glucose Latest Ref Range: Negative mg/dL Negative   Ketones Latest Ref Range: Negative mg/dL Negative   Bilirubin Latest Ref Range: Negative  Negative   Occult Blood Latest Ref Range: Negative  Trace (A)   Protein Latest Ref Range: Negative mg/dL Negative   Nitrite Latest Ref Range: Negative  Negative   Leukocyte Esterase Latest Ref Range: Negative  Negative   Micro Urine Req Unknown Microscopic   WBC Latest Units: /hpf 0-2 (A)   RBC Latest Units: /hpf 10-20 (A)       Imaging:   CT Abdomen Pelvis, per radiology read:    \"1.  Hepatic steatosis and hepatomegaly     2.  Left pleural effusion which is probably moderate in size     3.  Atelectasis of both lung bases     4.  Decreased flow to the lateral segment left lobe of the liver which is likely secondary to a vascular etiology although none is directly identified     5.  Aortic and branch vessel atherosclerotic plaque\"    CT L-Spine, per radiology read:   \"IMPRESSION:     1.  L1 compression fracture status post vertebral augmentation.     2.  Mild multilevel degenerative disc disease.     3.  Moderate " "multilevel facet arthropathy is most pronounced in the lower spine.     4.  Partially visualized right pleural effusion.     5.  Atherosclerosis.\"    XR Chest: Pending  KUB: Pending      Previous Data Review: reviewed    Problem Representation: 84 year old man with PMH notable for recurrent pleural effusions of unclear etiology, chronic back pain s/p L1 kyphoplasty in September of this year, presenting with an acute on chronic worsening of abdominal pain that now prevents him from ambulating as well as an elevated white count on labs. Possibly secondary to high stool burden, possibly secondary to urinary retention. With additional finding of above pleural effusion on imaging, possibly representing an infection point versus a chronic finding.    * AP (abdominal pain)  Assessment & Plan  -Unclear etiology, possibly due to stool burden vs urinary retention  -Imaging of back negative for acute change in spine  Plan  -KUB ordered  -Bladder scan  -Will avoid opiates for pain control, Tylenol and lidocaine patches prn    Leukocytosis  Assessment & Plan  -Possibly secondary to high stool burden/ urinary retention acute phase reactant  -Plan  -Last TB test on 6/2020: negative, will repeat on this admission given positive history of weight loss and that TB may cause pleural effusions  -KUB and Bladder scan as above  -Lactate, ESR, CRP, and Ferritin ordered  -Holding off antibiotics at present time, as low clinical suspicion for active infection based on physical exam  -Will continue to monitor, low threshold to obtain blood cultures and start antibiotics      Compression fracture of first lumbar vertebra (HCC)- (present on admission)  Assessment & Plan  -S/p L1 Kyphoplasty on 9/2020  -Holding Oxycodone for pain due to concern of stool impaction  -Tylenol and Lidocaine Patches PRN    Recurrent pleural effusion on left- (present on admission)  Assessment & Plan  -Likely represents chronic findings on imaging  -Patient is on 2L " "NC at home, 3L when walking  -Holding Xarelto in case of possible need for procedure  -Workup for problem \"Leukocytosis\" as above  -CXR pending    History of pulmonary embolism- (present on admission)  Assessment & Plan  -Noted on hospitalization on 9/2020  -Holding Xarelto in case patient may need procedure, SCDs for VTE prophylaxis    Hepatic steatosis  Assessment & Plan  -Patient denies ETOH intake  -Incidental finding on imaging, likely chronic  -LFTs grossly unremarkable  -Will continue to clinically monitor    CHF (congestive heart failure) (HCC)  Assessment & Plan  -Per patient and patient wife, was instructed to stop taking Furosemide, Statin, and Aspirin by cardiologist, however no clear documentation of this was able to be found  -Will hold of on these medications until further information can be obtained  -Continue home metoprolol    Dementia (HCC)- (present on admission)  Assessment & Plan  -Continue home Donepezil    Hypothyroidism- (present on admission)  Assessment & Plan  -Continue home synthroid     "

## 2020-11-01 NOTE — ASSESSMENT & PLAN NOTE
-Likely represents chronic findings on imaging  -Patient is on 2L NC at home, 3L when walking  -ESR and Crp is 116 and 25 respectively. Quantiferon ordered to test for latent Tb   -Not in distress, hence holding off on thoracentesis. Previous analysis on pleural fluid showed exudative and cytology negative for malignancy  -As per notes, patient refused bronchoscopy and pleurodesis

## 2020-11-01 NOTE — ED TRIAGE NOTES
RLQ abdominal pain that has worsened over past 3 days. Patient endorses abdominal surgeries on hernia, but has all organs. Patient states last BM was 3 days ago. Patient denies nausea, vomiting, diarrhea, dysuria. Patient denies fevers at home.

## 2020-11-01 NOTE — ASSESSMENT & PLAN NOTE
-resolved  -Presented with abdominal pain   -Per patient has been several days since last BM  -Possible secondary to his opioid use for his back pain  -Admission imaging of back negative for acute change in spine    Plan  -Has miralax scheduled daily   -Will add other regimen if not having a bowel movement

## 2020-11-01 NOTE — ASSESSMENT & PLAN NOTE
-Per patient and patient wife, has history of CHF, was instructed to stop taking Furosemide, Statin, and Aspirin by cardiologist, however no clear documentation of this was able to be found  -Will hold of on these medications until further information can be obtained  -Continue home metoprolol  -EF of 45 on 5/2020  -On exam patient does not appear to be fluid overloaded

## 2020-11-01 NOTE — ASSESSMENT & PLAN NOTE
-Patient denies ETOH intake  -Incidental finding on imaging, likely chronic  -LFTs grossly unremarkable  -Will continue to clinically monitor

## 2020-11-01 NOTE — SENIOR ADMIT NOTE
"Senior Admit Note    Pertinent History  Chucho Marin is a 84 y.o. male with a history of recurrent left pleural effusions, hypertension, COPD on 2 L O2, pulmonary embolism on Xarelto who presents for abdominal pain. The pain has been progressive for the last three months but much worse this morning. He was unable to get out of bed due to the pain. He also has pain in his lower back. He recently was admitted 9/24-9/30 with findings of L1 compression fracture and underwent kyphoplasty. He took oxycodone which did not help. No saddle anesthesia, incontinence of bladder or bowels or significant weakness or sensory loss of lower extremities. No fevers or cough. Has not had a bowel movement in a couple of days.    In the ED, he was at baseline oxygen requirements and found to have leukocytosis to 12.1 with left shift. Lipase normal. CT lumbar showed prior kyphoplasty and no acute changes. CT abdomen and pelvis showed left pleural effusion. He has chronic recurrent left-sided effusion status post multiple thoracenteses with subsequent reaccumulation of fluid. Previous pleural fluid studies were consistent with exudate, cultures have been negative and cytology not consistent with malignancy.     Most recently recorded vitals  Blood Pressure : 142/84, NIBP MAP (Calculated): 91, Pulse: 87, Respiration: 20, Temperature: 36.6 °C (97.8 °F), Height: 180.3 cm (5' 11\"), Weight: 70.3 kg (155 lb), BMI (Calculated): 21.62, BSA (Calculated): 1.9, Pulse Oximetry: 95 %, O2 (LPM): 2, O2 Delivery Device: Nasal Cannula    Exam  General: No acute distress.  Eyes: Extraocular movements grossly intact.  Throat: No erythema or exudates noted.  Neck: Supple. Normal range of motion.  Lungs: No respiratory distress. Diminished breath sounds on left. No crackles or wheezes.  Cardiovascular: Regular rate. Regular rhythm.  Abdomen: Soft, mildly tender to palpation in left lower quadrant, non-distended.  Extremities: No cyanosis. No edema.  Skin: No rash " or suspicious skin lesions noted on exposed skin.  Neurological: Alert and oriented. 5/5 strength in upper and lower extremities. No paraspinal tenderness. Deaf in right ear.  Psychiatric: Normal mood and affect.    Assessment  #Abdominal pain  #Leukocytosis  #Diminished breath sounds on left, concern for recurrent pleural effusion    Plan  -CXR  -KUB to evaluate stool burden as cause of abdominal pain  -Bladder scan to rule out retention  -Quanterferon as TB can cause effusions  -Lactate, ESR, CRP, ferritin    DVT prophylaxis: On xarelto, will hold for possible procedure  Diet: Reg  Code: DNR/DNI    Please see Dr. Guardado's H&P for full detals

## 2020-11-02 NOTE — ED NOTES
This RN requesting patient be turned to a side to prevent pressure injury. Patient refused to have pillow placed under side.

## 2020-11-02 NOTE — PROGRESS NOTES
Pt took IV morphine, for med pass, put the bed in trendelenburg so pt was at a 45 deg angle, did not adjust the HOB, just rotated the whole bed, and pt was able to drink water and swallow pills with no complications.

## 2020-11-02 NOTE — DIETARY
Nutrition Services: Day 0 of admit.  Chucho Marin is a 84 y.o. male with admitting DX of Abdominal pain  Consult received for Poor PO & Wt Loss 24-33 lbs in 3 Months on Nutrition Admit Screen (MST 4)    Assessment:  Ht: 180.3 cm, Wt 11/1: 70.308 kg via other healthcare provider scale, BMI: 21.62 - Normal  Diet/Intake: Cardiac - No PO documented yet. Diet started this morning.       Evaluation:   Per department guidelines dietary staff not permitted to enter airborne isolation rooms  Current problem list: abdominal pain, recurrent pleural effusion on left, compression fracture of first lumbar vertebra, leukocytosis, hypothyroidism, dementia, CHF, hepatic steatosis  Only other healthcare provider wt documented, unknown when wt was obtained. Requested measure wt with RN/CNA when feasible.   Per chart review pt's wt history is  · Wt 5/26/20: 75 kg via stand up scale  · Wt 8/23/20: 67 kg via stand up scale  · Wt 9/15/20: 61.3 kg via willy up scale  Per previous wts, pt has lost wt. Wt loss percent is 8.5% in a month, which is severe. Wt loss percent is 18.3% in 4 months, which is also severe.   Labs: Glucose 123, Creatinine 0.44  Meds: aricept, synthroid, toprol xl, pericolace  Skin: Wound team consult pending, will await wound staging to make recommendations if appropriate.    Malnutrition Risk: Pt with previously severe wt loss from May to September and August to September, however no recent wt this admit. No other criteria noted at this time.     Recommendations/Plan:  1. Encourage intake of meals  2. Document intake of all meals as % taken in ADL's to provide interdisciplinary communication across all shifts.   3. Monitor weight.  4. Nutrition rep will continue to see patient for ongoing meal and snack preferences.  5. Obtain supplement order per RD as needed.    RD following

## 2020-11-02 NOTE — ED NOTES
Multiple warm blankets applied to patient. Patient refusing to take tylenol for pain. This RN updated patient and wife of delay in bed assignment.

## 2020-11-02 NOTE — ED NOTES
This RN requesting patient be turned to a side to prevent pressure injury. Patient refused to have pillow placed under side.     This RN put patient in reversed trendelenburg.

## 2020-11-02 NOTE — PROGRESS NOTES
"Received report from ER RN Leonarda.  Pt AxO x4 but seemed confused, did not understand reasoning and explanations despite multiple attempts to explain care plan. Pt on a continuous heparin drip and IV pump started beeping and pt pushed all the buttons on the pump trying to turn it off.  Pt refused to sit up to take evening pills, stating that it was too painful, wasted meds. Pt refused to change into hospital gown, refused application of sacral mepilex, but did allow non skid socks to be applied.  Pt allowed for gray foam to be applied to oxygen tubing.  Pt very hard of hearing on L ear and unable to hear at all out of R ear.  Pt has a mouth guard on teeth for \"grinding\" and said he wanted to keep it on. Pt allowed assessment and skin check but refused all other interventions and teaching.  Will attempt to get pt to take meds orally or request IV pain meds if pt pain is intolerable.    "

## 2020-11-02 NOTE — RESPIRATORY CARE
Oxygen Rounds      Patient found on    O2 L/m:  _2________    Oxygen device:  ___SNC_____   Spo2: __93_______%        Respiratory device skin site inspection completed.

## 2020-11-02 NOTE — PROGRESS NOTES
"2 RN skin check completed with Tonya Gonzalez RN.   Devices in place: [facility].  Skin assessed under devices: yes.  Confirmed pressure ulcers found: n/a.  New potential pressure ulcers noted: R buttocks.  Wound consult placed:  yes.      Skin assessment: generally intact, fragile.  Ears: Red and blanching  Face: intact  Trunk: intact, fragile  BUE: small bruises throughout  Elbows: pink and blanching  BLE: intact  Heels: dry, intact, pink and blanching   Sacrum/buttocks: discoloration, redness and blanching.  Area on R buttocks of an unknown abrasion/lesion that pt stated was a \"growth\" and that his MD is following      The following interventions in place:     Pt refused majority of interventions, would not allow me to place a sacral mepilex, would not roll in bed to place a waffle cushion, would not allow pillows for support/positioning -     encouraged pt to turn himself in bed, will attempt to turn pt, 2 RN skin check, gray foam applied to oxygen tubing.   "

## 2020-11-02 NOTE — ED NOTES
Pt aox4, skin cool and dry, airway patent, rr even and unlabored. Vitals stable. Pt in nad noted at this time with no new complaints. Transports with transport team in stable condition on 2lpm oxygen and heparin drip at 18 units/hg/kr.

## 2020-11-02 NOTE — CARE PLAN
Problem: Pain Management  Goal: Pain level will decrease to patient's comfort goal  Outcome: PROGRESSING SLOWER THAN EXPECTED   Pt refused to sit up to take pain meds or to be moved into a different position.     Problem: Skin Integrity  Goal: Risk for impaired skin integrity will decrease  Outcome: PROGRESSING SLOWER THAN EXPECTED   Pt refused skin interventions.

## 2020-11-02 NOTE — PROGRESS NOTES
"Daily Progress Note:     Date of Service: 11/2/2020  Primary Team: [facility]   Attending: Rachael Hough M.D.   Senior Resident: Dr. Archibald  Intern: Dr. Guardado  Contact:  [phone]    Chief Complaint:   Abdominal Pain    Subjective:  -Admitted to hospital yesterday  -No acute events overnight  -Denies any new symptoms or complaints of pain  -Reports pain somewhat improved this AM \"4-5/10\"      Review of Systems:    Review of Systems   Constitutional: Positive for weight loss. Negative for chills, diaphoresis and fever.   HENT: Positive for hearing loss (Chronic hearing loss, worse in Right Ear). Negative for congestion, ear pain, nosebleeds, sore throat and tinnitus.    Eyes: Negative for blurred vision and double vision.   Respiratory: Negative for cough, hemoptysis, shortness of breath, wheezing and stridor.    Cardiovascular: Negative for chest pain, palpitations, orthopnea, claudication and leg swelling.   Gastrointestinal: Positive for abdominal pain (Lower abdomen) and constipation. Negative for blood in stool, diarrhea, heartburn, melena, nausea and vomiting.   Genitourinary: Negative for dysuria and urgency.   Musculoskeletal: Negative for back pain, joint pain, myalgias and neck pain.   Skin: Negative for rash.   Neurological: Negative for dizziness, tingling, tremors, sensory change, speech change, focal weakness, seizures, loss of consciousness, weakness and headaches.   Psychiatric/Behavioral: Negative for depression, substance abuse and suicidal ideas.       Objective Data:   Physical Exam:   Vitals:   Temp:  [37.2 °C (99 °F)-37.4 °C (99.3 °F)] 37.3 °C (99.2 °F)  Pulse:  [74-94] 86  Resp:  [18] 18  BP: (109-127)/(69-80) 112/76  SpO2:  [91 %-97 %] 95 %     Physical Exam  Constitutional:       General: He is not in acute distress.     Appearance: He is not ill-appearing or diaphoretic.      Comments: Thin appearing   HENT:      Head: Normocephalic and atraumatic.      Ears:      Comments: " Notably hard of hearing, worse in Right Ear     Nose: Nose normal. No congestion.      Comments: NC in place 2L     Mouth/Throat:      Mouth: Mucous membranes are dry.   Eyes:      General: No scleral icterus.     Extraocular Movements: Extraocular movements intact.      Pupils: Pupils are equal, round, and reactive to light.   Neck:      Musculoskeletal: Normal range of motion and neck supple. No neck rigidity or muscular tenderness.   Cardiovascular:      Rate and Rhythm: Normal rate and regular rhythm.      Pulses: Normal pulses.      Heart sounds: No murmur.   Pulmonary:      Effort: Pulmonary effort is normal. No respiratory distress.      Breath sounds: No wheezing.      Comments: Reduced breath sounds on Left lung, throughout  Abdominal:      General: Abdomen is flat. There is no distension.      Palpations: Abdomen is soft. There is no mass.      Tenderness: There is abdominal tenderness. There is no guarding or rebound.      Hernia: No hernia is present.   Musculoskeletal: Normal range of motion.         General: No swelling or tenderness.      Right lower leg: No edema.      Left lower leg: No edema.   Skin:     General: Skin is warm and dry.      Capillary Refill: Capillary refill takes less than 2 seconds.      Coloration: Skin is not jaundiced or pale.      Findings: No bruising.   Neurological:      General: No focal deficit present.      Mental Status: He is alert and oriented to person, place, and time.      Cranial Nerves: No cranial nerve deficit.      Sensory: No sensory deficit.      Motor: No weakness.      Coordination: Coordination normal.   Psychiatric:         Mood and Affect: Mood normal.         Behavior: Behavior normal.           Labs:   Results for MONTSERRAT ARAUJO (MRN [id number]) as of 11/2/2020 15:13   Ref. Range 11/2/2020 00:17 11/2/2020 06:33 11/2/2020 12:59   WBC Latest Ref Range: 4.8 - 10.8 K/uL 12.4 (H)     RBC Latest Ref Range: 4.70 - 6.10 M/uL 4.29 (L)     Hemoglobin Latest Ref  Range: 14.0 - 18.0 g/dL 12.2 (L)     Hematocrit Latest Ref Range: 42.0 - 52.0 % 40.1 (L)     MCV Latest Ref Range: 81.4 - 97.8 fL 93.5     MCH Latest Ref Range: 27.0 - 33.0 pg 28.4     MCHC Latest Ref Range: 33.7 - 35.3 g/dL 30.4 (L)     RDW Latest Ref Range: 35.9 - 50.0 fL 48.7     Platelet Count Latest Ref Range: 164 - 446 K/uL 409     MPV Latest Ref Range: 9.0 - 12.9 fL 9.8     Neutrophils-Polys Latest Ref Range: 44.00 - 72.00 % 82.00 (H)     Neutrophils (Absolute) Latest Ref Range: 1.82 - 7.42 K/uL 10.18 (H)     Lymphocytes Latest Ref Range: 22.00 - 41.00 % 9.90 (L)     Lymphs (Absolute) Latest Ref Range: 1.00 - 4.80 K/uL 1.23     Monocytes Latest Ref Range: 0.00 - 13.40 % 7.00     Monos (Absolute) Latest Ref Range: 0.00 - 0.85 K/uL 0.87 (H)     Eosinophils Latest Ref Range: 0.00 - 6.90 % 0.20     Eos (Absolute) Latest Ref Range: 0.00 - 0.51 K/uL 0.02     Basophils Latest Ref Range: 0.00 - 1.80 % 0.20     Baso (Absolute) Latest Ref Range: 0.00 - 0.12 K/uL 0.03     Immature Granulocytes Latest Ref Range: 0.00 - 0.90 % 0.70     Immature Granulocytes (abs) Latest Ref Range: 0.00 - 0.11 K/uL 0.09     Nucleated RBC Latest Units: /100 WBC 0.00     NRBC (Absolute) Latest Units: K/uL 0.00     Sodium Latest Ref Range: 135 - 145 mmol/L 137     Potassium Latest Ref Range: 3.6 - 5.5 mmol/L 3.9     Chloride Latest Ref Range: 96 - 112 mmol/L 99     Co2 Latest Ref Range: 20 - 33 mmol/L 26     Anion Gap Latest Ref Range: 7.0 - 16.0  12.0     Glucose Latest Ref Range: 65 - 99 mg/dL 123 (H)     Bun Latest Ref Range: 8 - 22 mg/dL 10     Creatinine Latest Ref Range: 0.50 - 1.40 mg/dL 0.44 (L)     GFR If  Latest Ref Range: >60 mL/min/1.73 m 2 >60     GFR If Non  Latest Ref Range: >60 mL/min/1.73 m 2 >60     Calcium Latest Ref Range: 8.5 - 10.5 mg/dL 9.1     AST(SGOT) Latest Ref Range: 12 - 45 U/L 11 (L)     ALT(SGPT) Latest Ref Range: 2 - 50 U/L 6     Alkaline Phosphatase Latest Ref Range: 30 - 99 U/L  81     Total Bilirubin Latest Ref Range: 0.1 - 1.5 mg/dL 0.4     Albumin Latest Ref Range: 3.2 - 4.9 g/dL 2.8 (L)     Total Protein Latest Ref Range: 6.0 - 8.2 g/dL 6.3     Globulin Latest Ref Range: 1.9 - 3.5 g/dL 3.5     A-G Ratio Latest Units: g/dL 0.8     Phosphorus Latest Ref Range: 2.5 - 4.5 mg/dL 3.9     Magnesium Latest Ref Range: 1.5 - 2.5 mg/dL 2.0     Iron Latest Ref Range: 50 - 180 ug/dL  26 (L)    Total Iron Binding Latest Ref Range: 250 - 450 ug/dL  142 (L)    % Saturation Latest Ref Range: 15 - 55 %  18    Unsat Iron Binding Latest Ref Range: 110 - 370 ug/dL  116    Heparin Xa (UFH) Latest Units: IU/mL 0.71 0.59 0.49   Ferritin Latest Ref Range: 22.0 - 322.0 ng/mL 1281.0 (H)       Imaging:   No new imaging    Problem Representation: 84 year old man with PMH notable for recurrent pleural effusions of unclear etiology, chronic back pain s/p L1 kyphoplasty in September of this year, presenting with an acute on chronic worsening of abdominal pain that now prevents him from ambulating as well as an elevated white count on labs.Likely secondary to constipation. Also found to have additional finding of above pleural effusion on imaging, most likely representing a chronic finding.    * AP (abdominal pain)  Assessment & Plan  -Unclear etiology, likely secondary to stool burden  -Admission imaging of back negative for acute change in spine  Plan  -Will avoid opiates for pain control, Tylenol and lidocaine patches prn  -Scheduled bowel regimen of Doc-Senna BID and Miralax QD    Leukocytosis  Assessment & Plan  -Possibly secondary to high stool burden acute phase reactant vs chronic  -Plan  -Last TB test on 6/2020: negative, will repeat on this admission given positive history of weight loss and that TB may cause pleural effusions  Ferritin significantly elevated, likely representing chronic disease state  -Holding off antibiotics at present time, as low clinical suspicion for active infection based on physical  "exam  -Will continue to monitor, low threshold to obtain blood cultures and start antibiotics      Compression fracture of first lumbar vertebra (HCC)- (present on admission)  Assessment & Plan  -S/p L1 Kyphoplasty on 9/2020  -Holding Oxycodone for pain due to concern of stool impaction  -Tylenol and Lidocaine Patches PRN    Recurrent pleural effusion on left- (present on admission)  Assessment & Plan  -Likely represents chronic findings on imaging  -Patient is on 2L NC at home, 3L when walking  -Holding Xarelto in case of possible need for procedure  -Workup for problem \"Leukocytosis\" as above      History of pulmonary embolism- (present on admission)  Assessment & Plan  -Noted on hospitalization on 9/2020  -Holding Xarelto in case patient may need procedure, Heparin Drip for VTE prophylaxis    Hepatic steatosis  Assessment & Plan  -Patient denies ETOH intake  -Incidental finding on imaging, likely chronic  -LFTs grossly unremarkable  -Will continue to clinically monitor    CHF (congestive heart failure) (HCC)  Assessment & Plan  -Per patient and patient wife, has history of CHF, was instructed to stop taking Furosemide, Statin, and Aspirin by cardiologist, however no clear documentation of this was able to be found  -Will hold of on these medications until further information can be obtained  -Continue home metoprolol  -EF of 45 on 5/2020    Dementia (HCC)- (present on admission)  Assessment & Plan  -Continue home Donepezil    Hypothyroidism- (present on admission)  Assessment & Plan  -Continue home synthroid        Quality Measures:  Code: DNR/DNI  VTE: Heparin Drip (Holding Xarelto due to possible procedure)  Diet: Cardiac  Disposition: Inpatient  "

## 2020-11-02 NOTE — PROGRESS NOTES
"Went in to pt room to attempt to pass morning meds, pt refused to sit up stating that \"it hurt too much\" and said that \"his wife gives him meds one by one while he lays down all the time.\" I educated pt on why we cannot do this, for risk of aspiration.  Offered to call MD to request IV pain meds so he is comfortable enough to sit up to be able to do a swallow eval and to receive meds orally, pt declined.  Notified charge nurse.  Attempting to page [facility] to see if they would like IV fluids to be started.     0530: NATTY Meza provider Dr Esteves came and saw pt, convinced him to take IV pain meds, will give then re-attempt to sit pt up to receive PO medis  "

## 2020-11-02 NOTE — ED NOTES
This RN requesting patient be turned to a side to prevent pressure injury. Patient refused to have pillow placed under side.     Warm blankets applied, HOB tried to be at 30 degrees. Patient refused to have head of bed elevated.

## 2020-11-02 NOTE — PROGRESS NOTES
It is clear from the notes that patient is admitted primarily for abdominal pain, which has been determined to be the result of constipation.    Heart failure is mentioned in the notes including that per patient and his wife, he was taken off of furosemide, statin and ASA by cardiology.    Patient hasn't seen cardiology within our system since 2015 and there is no mention therein of a heart failure diagnosis.    I reached out to Dr. Hough for clarification.    Thank you, Farida Cardio RN Navigator [phone]    Addendum 11.03.20 0943    Messaged resident Lv Guardado. No response from my message to Dr. Hough yesterday and yesterday's provider note still does not clarify whether or not this patient is in acute HF along with his abdominal pain.    Addendum 11.03.20 0949: spoke with Dr. Guardado who states that patient is not in acute heart failure, and that today's attending note will reflect that.

## 2020-11-02 NOTE — ED NOTES
This RN requesting patient be turned to a side to prevent pressure injury. Patient refused to have pillow placed under side.     Wife at bedside with patient and updated on patient's status.

## 2020-11-03 PROBLEM — K59.00 CONSTIPATION: Status: ACTIVE | Noted: 2020-01-01

## 2020-11-03 PROBLEM — Z86.79 HISTORY OF CHF (CONGESTIVE HEART FAILURE): Status: ACTIVE | Noted: 2020-01-01

## 2020-11-03 NOTE — PROGRESS NOTES
Pt. Received in bed awake, alert and orientedx3-4, disoriented to time. Pt. Pleasant, [place] but able to respond to instructions. Pt. With complaints of abdominal pain with PRN pain med given for comfort. Educated pt. About fall risks and precautions and interventions placed, skin preventive measures placed. Pt. On O2 via NC, sats at 96% and tolerating well. Reinforced education about airborne isolation precaution.    Pt. Was placed on Telesitter for tonight for safety as pt. Has attempted to removed nasal cannula without notice and being on airborne isolation without immediate staff to attend in case pt. Has attempted to remove it again and tries to get out of bed. Informed telesitter personnel [place] about pt. Safety concerns and to alert staff via phone call or redirect pt. If able to.

## 2020-11-03 NOTE — PROGRESS NOTES
Assumed pt care at shift change.  Pt alert/oriented x4, NC 2L/min, pleasant to talk with.  Education provided regarding repositioning and HOB while eating and taking medications.  Telesitter in place.  Pt is no longer on airborne precautions.  Safety precautions in place, call light within reach, bed locked and in lowest position.  No further needs at this time.

## 2020-11-03 NOTE — WOUND TEAM
[facility]  Inpatient Services  Initial Wound and Skin Care Evaluation    Admission Date: 11/1/2020     Last order of IP CONSULT TO WOUND CARE was found on 11/2/2020 from Hospital Encounter on 11/1/2020       HPI, PMH, SH: Reviewed    Unit where seen by Wound Team: [id number]     WOUND CONSULT/FOLLOW UP RELATED TO: buttocks    Self Report / Pain Level:  C/o pain to abd, RN  aware      OBJECTIVE:  On pressure redistribution mattress, supine and flat    WOUND TYPE, LOCATION, CHARACTERISTICS (Pressure Injuries: location, stage, POA or date identified)  Wound 11/01/20 Buttocks Right Moisrure associated skin damage (MASD) (Active)   Wound Image   11/02/20 0022   Site Assessment Red;White 11/02/20 1445   Periwound Assessment Maceration;Hyperpigmented    Margins Defined edges    Closure Open to air    Drainage Amount None    Periwound Protectant Skin Protectant Wipes to Periwound    Dressing Options Open to Air    Dressing Status Open to Air    NEXT Weekly Photo (Inpatient Only) 11/09/20    Non-staged Wound Description Partial thickness    Wound Length (cm) 3 cm 11/02/20 1445   Wound Width (cm) 3.5 cm 11/02/20 1445   Wound Depth (cm) 0.2 cm 11/02/20 1445   Wound Surface Area (cm^2) 10.5 cm^2 11/02/20 1445   Wound Volume (cm^3) 2.1 cm^3 11/02/20 1445   Tunneling (cm) 0 cm 11/02/20 1445   Undermining (cm) 0 cm 11/02/20 1445   Shape circular    Wound Odor None    Exposed Structures None    Number of days: 1          Vascular:    YANDEL:   No results found.      Lab Values:    Lab Results   Component Value Date/Time    WBC 12.4 (H) 11/02/2020 12:17 AM    RBC 4.29 (L) 11/02/2020 12:17 AM    HEMOGLOBIN 12.2 (L) 11/02/2020 12:17 AM    HEMATOCRIT 40.1 (L) 11/02/2020 12:17 AM    CREACTPROT 13.27 (H) 11/01/2020 05:57 PM    SEDRATEWES 119 (H) 11/01/2020 05:57 PM          Culture Results show:  No results found for this or any previous visit (from the past 720 hour(s)).      INTERVENTIONS BY WOUND TEAM:  Pt tried to use  urinal. Pt then turned to L side assessed buttocks and sacrococcygeal. Pt damp and RN notified. Barrier paste recommended.     Interdisciplinary consultation: Patient, Bedside RN     EVALUATION: Pt has MASD to buttocks, macerated area to R buttock  2 x 1.2 cm. He stated he needed urinal, given and assisted to try to urinate he was unsuccessful. C/o pain to abd.     Goals: Steady decrease in wound area and depth weekly.    NURSING PLAN OF CARE ORDERS (X):    Dressing changes: See Dressing Care orders:   Skin care: See Skin Care orders: x  Rectal tube care: See Rectal Tube Care orders:   Other orders:    RSKIN:   CURRENTLY IN PLACE (X), APPLIED THIS VISIT (A), ORDERED (O):  Q shift Albaro:  x  Q shift pressure point assessments:    Pressure redistribution mattress  x        Low Airloss          Bariatric BYRON         Bariatric foam           Heel float boots     Heel Silicone dressing        Float Heels off Bed with Pillows               Barrier wipes  o       Barrier Cream         Barrier paste   o       Sacral silicone dressing         Silicone O2 tubing x        Anchorfast         Cannula fixation Device (Tender )          Gray Foam Ear protectors   x        Trach with Optifoam split foam                 Waffle cushion        Waffle Overlay         Rectal tube or BMS    Purwick/Condom Cath          Antifungal tx      Interdry          Reposition q 2 hours   X  Assist with pillows     Up to chair        Ambulate      PT/OT        Dietician        Diabetes Education      PO  x   TF     TPN     NPO   # days   Other        WOUND TEAM PLAN OF CARE   Dressing changes by wound team:          Follow up 1-2 times weekly:               Follow up 3 times weekly:                NPWT change 3 times weekly:     Follow up as needed:  PRN Nursing to take weekly wound photos and notify wound team if wound deteriorates or fails to progress     Other (explain):     Anticipated discharge plans:  LTACH:        SNF/Rehab:                   Home Care:           Outpatient Wound Center:            Self Care:            Other: unsure of needs @ this time

## 2020-11-03 NOTE — PROGRESS NOTES
2 RN Skin Check      2 RN skin check complete with: Shobha HERNÁNDEZ  Devices in place: PIV, nasal cannula  Skin assess under devices: yes  Confirmed pressure ulcers found on: n/a  New potential pressure ulcers noted on: n/a  Wound consult placed: n/a    The following interventions in place: waffle cushion, 2RN skin check, Q2hr turns, barrier cream, mepilex heels, incontinent care with appropriate linen changes, gray pads on oxygen tubing, pillows for positioning/comfort.    Skin assessment:  General:  Fragile and dry, scattered bruising BUE, skin intact  Ears: bilateral back of ears red, blanching  Elbows: bilateral pink and blanching  Heels:  Bilateral dry, boggy, intact  Sacrum: red, darkened pigmentation, blanching

## 2020-11-03 NOTE — RESPIRATORY CARE
Oxygen Rounds      Patient found on    O2 L/m:  __2_______    Oxygen device:  ___NC_____   Spo2: _96________%        Respiratory device skin site inspection completed.

## 2020-11-03 NOTE — PROGRESS NOTES
"Daily Progress Note:     Date of Service: 11/3/2020  Primary Team: [facility] Purple Team   Attending: Rachael Hough M.D.   Senior Resident: Dr. Archibald  Intern: Dr. Guardado  Contact:  [phone]    Chief Complaint:   Abdominal Pain    Subjective:  -Passed stool at around 0200, per nursing notes patient reported improvement in abdominal pain and was able to sit up  -Still endorsing his chronic back pain this AM, when asked how patient does things like eat/ take medications at home, he says he usually is lying down flat and his wife will feed him  -Per wife patient has been mostly bed bound at home, reports patient needing assistance to \"barely get to the mailbox\", notes that going over speed bumps at low speed while in a car are very painful to the paitient      Review of Systems:    Review of Systems   Constitutional: Negative for chills, diaphoresis and fever.   HENT: Positive for hearing loss (Chronic hearing loss, worse in Right Ear). Negative for congestion, ear pain, nosebleeds, sore throat and tinnitus.    Eyes: Negative for blurred vision and double vision.   Respiratory: Negative for cough, hemoptysis, shortness of breath, wheezing and stridor.    Cardiovascular: Negative for chest pain, palpitations, orthopnea, claudication and leg swelling.   Gastrointestinal: Negative for abdominal pain, constipation, heartburn, nausea and vomiting.   Genitourinary: Negative for dysuria and urgency.   Musculoskeletal: Negative for back pain, joint pain, myalgias and neck pain.   Skin: Negative for rash.   Neurological: Negative for dizziness, tingling, tremors, sensory change, speech change, focal weakness, seizures, weakness and headaches.   Psychiatric/Behavioral: Negative for depression, substance abuse and suicidal ideas.       Objective Data:   Physical Exam:   Vitals:   Temp:  [36.7 °C (98 °F)-37.6 °C (99.7 °F)] 36.7 °C (98 °F)  Pulse:  [77-92] 80  Resp:  [17-20] 18  BP: (100-113)/(60-72) 110/72  SpO2:  [94 %-96 %] " 95 %     Physical Exam  Constitutional:       General: He is not in acute distress.     Appearance: He is not ill-appearing or diaphoretic.      Comments: Thin appearing   HENT:      Head: Normocephalic and atraumatic.      Ears:      Comments: Notably hard of hearing, worse in Right Ear     Nose: Nose normal. No congestion.      Comments: NC in place 2L     Mouth/Throat:      Mouth: Mucous membranes are dry.   Eyes:      General: No scleral icterus.     Extraocular Movements: Extraocular movements intact.      Pupils: Pupils are equal, round, and reactive to light.   Neck:      Musculoskeletal: Normal range of motion and neck supple. No neck rigidity or muscular tenderness.   Cardiovascular:      Rate and Rhythm: Normal rate and regular rhythm.      Pulses: Normal pulses.      Heart sounds: No murmur.   Pulmonary:      Effort: Pulmonary effort is normal. No respiratory distress.      Breath sounds: No wheezing.      Comments: Reduced breath sounds on Left lung, throughout  Abdominal:      General: Abdomen is flat. There is no distension.      Palpations: Abdomen is soft. There is no mass.      Tenderness: There is abdominal tenderness. There is no guarding or rebound.      Hernia: No hernia is present.   Musculoskeletal: Normal range of motion.         General: No swelling or tenderness.      Right lower leg: No edema.      Left lower leg: No edema.   Skin:     General: Skin is warm and dry.      Capillary Refill: Capillary refill takes less than 2 seconds.      Coloration: Skin is not jaundiced or pale.      Findings: No bruising.   Neurological:      General: No focal deficit present.      Mental Status: He is alert and oriented to person, place, and time.      Cranial Nerves: No cranial nerve deficit.      Sensory: No sensory deficit.      Motor: No weakness.      Coordination: Coordination normal.   Psychiatric:         Mood and Affect: Mood normal.         Behavior: Behavior normal.           Labs:   Results  for MONTSERRAT ARAUJO (MRN [id number]) as of 11/3/2020 15:33   Ref. Range 11/3/2020 04:12   WBC Latest Ref Range: 4.8 - 10.8 K/uL 11.4 (H)   RBC Latest Ref Range: 4.70 - 6.10 M/uL 4.40 (L)   Hemoglobin Latest Ref Range: 14.0 - 18.0 g/dL 12.5 (L)   Hematocrit Latest Ref Range: 42.0 - 52.0 % 40.6 (L)   MCV Latest Ref Range: 81.4 - 97.8 fL 92.3   MCH Latest Ref Range: 27.0 - 33.0 pg 28.4   MCHC Latest Ref Range: 33.7 - 35.3 g/dL 30.8 (L)   RDW Latest Ref Range: 35.9 - 50.0 fL 47.9   Platelet Count Latest Ref Range: 164 - 446 K/uL 383   MPV Latest Ref Range: 9.0 - 12.9 fL 9.4     Imaging:   No new imaging    Problem Representation:84 year old man with PMH notable for recurrent pleural effusions of unclear etiology, chronic back pain s/p L1 kyphoplasty in September of this year, presenting with an acute on chronic worsening of abdominal pain that now prevents him from ambulating as well as an elevated white count on labs. Reporting improvement in abdominal pain symptoms following passing stool. Still endorsing chronic back pain, per family patient has limited functional status at home. PT/ OT pending. Also found to have additional finding of above pleural effusion on imaging, most likely representing a chronic finding.    * Constipation  Assessment & Plan  -Per patient has been several days since last BM  -Had stool passage on 11/3 in early AM, per notes patient experienced improvement in abdominal pain and was able to sit up  -Admission imaging of back negative for acute change in spine  Plan  -Will avoid opiates for pain control, Tylenol and lidocaine patches prn  -Scheduled bowel regimen of Doc-Senna BID and Miralax QD    Compression fracture of first lumbar vertebra (HCC)- (present on admission)  Assessment & Plan  -S/p L1 Kyphoplasty on 9/2020  -Per patient wife, patient has been having difficulties with functioning due to pain  -Holding Oxycodone for pain due to concern of stool impaction, likely chronic pain  -Unclear  "baseline functional status at home, per patient he often lays on his bed and has his wife feed him while he lays flat  -Tylenol and Lidocaine Patches PRN  -PT/OT eval    Recurrent pleural effusion on left- (present on admission)  Assessment & Plan  -Likely represents chronic findings on imaging  -Patient is on 2L NC at home, 3L when walking  -Lovenox 80 BID for VTE prophylaxis, holding home xarelto  -Workup for problem \"Leukocytosis\" as above      Leukocytosis  Assessment & Plan  IMPROVING  -down trending on 11/3 after passing stool earlier that AM  -Possibly secondary to high stool burden acute phase reactant vs chronic  -Plan  -Last TB test on 6/2020: negative, will repeat on this admission given positive history of weight loss and that TB may cause pleural effusions, however low clinical suspicion for active TB infection  Ferritin significantly elevated, likely representing chronic disease state  -Holding off antibiotics at present time, as low clinical suspicion for active infection based on physical exam      History of pulmonary embolism- (present on admission)  Assessment & Plan  -Noted on hospitalization on 9/2020  -Holding Xarelto in case patient may need procedure, Lovenox BID for VTE prophylaxis    Hepatic steatosis  Assessment & Plan  -Patient denies ETOH intake  -Incidental finding on imaging, likely chronic  -LFTs grossly unremarkable  -Will continue to clinically monitor    History of CHF (congestive heart failure)  Assessment & Plan  -Per patient and patient wife, has history of CHF, was instructed to stop taking Furosemide, Statin, and Aspirin by cardiologist, however no clear documentation of this was able to be found  -Will hold of on these medications until further information can be obtained  -Continue home metoprolol  -EF of 45 on 5/2020  -On exam patient does not appear to be fluid overloaded  -patient is NOT in acutely decompensated heart failure at this time based on on clinical exam " findings    Dementia (HCC)- (present on admission)  Assessment & Plan  -Continue home Donepezil    Hypothyroidism- (present on admission)  Assessment & Plan  -Continue home synthroid        Quality Measures:  Code: DNR/DNI  VTE: Lovenox BID  Diet: Cardiac  Disposition: Inpatient

## 2020-11-03 NOTE — PROGRESS NOTES
2 RN skin check completed with BETTY Beltre.   Devices in place PIV and nasal cannula.  Skin assessed under devices yes.  Confirmed pressure ulcers found on n/a.  New potential pressure ulcers noted on n/a. Wound consult placed seen already today.    Skin assessment:  -bilateral heels dry boggy but intact  -sacral area hyperpigmented/discolored but intact and blanching, with a spot of  Growth? noted on R buttocks that wound team has seen today.   -bilateral elbows pink and blanching pt. Able to move BUE more  -back of the ears has some redness, foam gray pads applied on cannula  -some bruises on BUE, intact skin but fragile.    The following interventions in place  -waffle cushion placed under the pt.   -2 RN skin check  -routine incontinence check, though pt. Was able to utilized urinal at bedside also. Continent/incontinent  -mepilex placed on bilateral heels  -pillows in use for turning  -gray foam pads for the nasal cannula

## 2020-11-03 NOTE — CARE PLAN
Problem: Pain Management  Goal: Pain level will decrease to patient's comfort goal  Outcome: PROGRESSING AS EXPECTED  Flowsheets (Taken 11/2/2020 2040)  Pain Rating Scale (NPRS): 3   Pt. Was able to have a bowel movement this morning, able to get up and felt relief from abdominal pain. PRN pain med on board.    Problem: Safety  Goal: Will remain free from injury  Outcome: PROGRESSING AS EXPECTED  Bed alarm on, telesitter now placed because of pt. History of fall and pt. Removing nasal cannula at times.

## 2020-11-03 NOTE — CARE PLAN
Problem: Safety  Goal: Will remain free from injury  Outcome: PROGRESSING AS EXPECTED  Goal: Will remain free from falls  Outcome: PROGRESSING AS EXPECTED     Problem: Pain Management  Goal: Pain level will decrease to patient's comfort goal  Outcome: PROGRESSING AS EXPECTED  Flowsheets (Taken 11/2/2020 2040)  Pain Rating Scale (NPRS): 3

## 2020-11-04 NOTE — CARE PLAN
Problem: Skin Integrity  Goal: Risk for impaired skin integrity will decrease  Outcome: PROGRESSING AS EXPECTED  Note: 2RN skin check, Q2hr turns, barrier cream, mepilex, incontinent care, cheek savers, grey padding on oxygen tubing.       Problem: Respiratory:  Goal: Respiratory status will improve  11/4/2020 1154 by Kim Morales R.N.  Note: Pt denies shortness of breath, reports a mild non-productive cough.  11/4/2020 1152 by Kim Morales R.N.  Outcome: PROGRESSING AS EXPECTED

## 2020-11-04 NOTE — CARE PLAN
Problem: Safety  Goal: Will remain free from falls  Outcome: PROGRESSING AS EXPECTED     Problem: Skin Integrity  Goal: Risk for impaired skin integrity will decrease  Outcome: PROGRESSING AS EXPECTED  Note: 2RN skin check, Q2hr turns, mepilex bilateral heels, barrier cream to buttocks/sacrum area.  Incontinent care with appropriate linen changes.  Education provided regarding the importance of changing positions while in bed.

## 2020-11-04 NOTE — TELEPHONE ENCOUNTER
Caller: Gen[employer]    Phone number: [phone]    Message: Pt's wife called and lm. Pt is in the hospital in [facility] in [place].

## 2020-11-04 NOTE — PROGRESS NOTES
Pt AOx4, [place], able to make needs known. Not in acute distress. No SOB. On O2 @2LPM via NC at 93% O2 sat. C/o abdominal and back pain, 5/10 PS declined pain medication at this time. Placed in a comfortable position and environment. Educated about importance on q2 turns in prevention of skin breakdown, needs reinforcement. Provided needs. Safety precaution in place. Bed locked and placed in lowest position. Treaded socks on. Call lights w/in reach. Hourly rounds in place.

## 2020-11-04 NOTE — CARE PLAN
Problem: Pain Management  Goal: Pain level will decrease to patient's comfort goal  Outcome: PROGRESSING AS EXPECTED  Intervention: Follow pain managment plan developed in collaboration with patient and Interdisciplinary Team  Note: Pt c/o back and abdominal pain, provided pain medication as ordered. Placed in a comfortable environment and position. Encouraged to rest.     Problem: Skin Integrity  Goal: Risk for impaired skin integrity will decrease  Outcome: PROGRESSING AS EXPECTED  Note: Patient refused to do q2 turns at times d/t pain. Educated about importance of mobility in prevention of skin breakdown, needs reinforcements. The following interventions in place: waffle cushion, 2RN skin check, Q2hr turns, barrier cream, mepilex , frequent incontinence checks and bed linen changes as needed, gray pads on oxygen tubing, pillows for positioning/comfort

## 2020-11-04 NOTE — PROGRESS NOTES
2 RN Skin Check      2 RN skin check complete with: Shobha HERNÁNDEZ  Devices in place: PIV, nasal cannula  Skin assess under devices: yes  Confirmed pressure ulcers found on: n/a  New potential pressure ulcers noted on: n/a  Wound consult placed: n/a    The following interventions in place:  Waffle cushion, 2RN skin check, Q2hr turns, barrier cream, mepilex, incontinent care with linen changes as necessary, grey pads on oxygen tubing, cheek savers, pillows for positioning/support.    Skin assessment:  General: dry, fragile, intact, scattered bruising BUE  Ears: bilateral back of ears red and blanching  Elbows: bilateral red and blanching  Heels:  Bilateral pink, blanching, boggy, dry (mepilex in place)  Sacrum:  Blanching, darkened pigmentation (barrier cream placed)  Upper back:  Red and blanching (mepilex to bony prominences)

## 2020-11-04 NOTE — THERAPY
Occupational Therapy   Initial Evaluation     Patient Name: Chucho Marin  Age:  84 y.o., Sex:  male  Medical Record #: [id number]  Today's Date: 11/4/2020     Precautions  Precautions: Fall Risk  Comments: EF: 45%    Assessment  Patient is 84 y.o. male presents to [facility] following c/o abdominal pain, s/p kypho 9/2020. Per EMR, it appears spouse has been providing 24/7 caregiving at home since last d/c, mentions of pt being bed bound and then barely walking to mailbox reported, pt also providing contradictory information regarding his function, appears confused regarding timeline given multiple admissions. Today, pt completed ADLs, functional mobility and t/f's at min a level using FWW, required vc's for sequencing, noted moderate sob with toileting and required cues for initiating restbreaks during activity w/ 3L of supplemental O2. Pt will benefit from acute skilled OT services and post acute placement recommended at this time to decreased caregiver burden and maximize pt's balance, strength and endurance to safely and independently engage in ADLs.     Plan    Recommend Occupational Therapy 3 times per week until therapy goals are met for the following treatments:  Adaptive Equipment, Self Care/Activities of Daily Living, Therapeutic Activities and Therapeutic Exercises.    DC Equipment Recommendations: Unable to determine at this time  Discharge Recommendations: Recommend post-acute placement for additional occupational therapy services prior to discharge home        Objective       11/04/20 0848   Prior Living Situation   Prior Services Continuous (24 Hour) Care Giving Family   Housing / Facility 1 Story House   Steps Into Home 3   Bathroom Set up Bathtub / Shower Combination;Grab Bars   Equipment Owned Grab Bar(s) In Tub / Shower;Front-Wheel Walker   Lives with - Patient's Self Care Capacity Spouse   Comments Per EMR, it appears pt has been bed boud since last d/c with spouse performing ADLs, then mentions walking  to BR. Pt reports walking to BR byself and then reports hasn't been able to take shower since d/c from hospital   Prior Level of ADL Function   Self Feeding Independent   Grooming / Hygiene Independent   Bathing Independent   Dressing Requires Assist   Toileting Requires Assist   Comments spouse has been helping since last d/c. Unclear what's baseline at this point   Prior Level of IADL Function   Medication Management Requires Assist   Laundry Requires Assist   Kitchen Mobility Requires Assist   Shopping Dependent   Cognition    Cognition / Consciousness X   Level of Consciousness Alert   New Learning Impaired   Sequencing Impaired  (delayed)   Comments A&O, making odd comments occasionaly to question being asked, pleasant overall and followoing 1-step directions appropriately   Balance Assessment   Sitting Balance (Static) Fair   Sitting Balance (Dynamic) Fair -   Standing Balance (Static) Fair -   Standing Balance (Dynamic) Fair -   Weight Shift Sitting Fair   Weight Shift Standing Fair   Comments w/FWW   Bed Mobility    Supine to Sit Minimal Assist   Sit to Supine Minimal Assist   Scooting Minimal Assist   Rolling Minimal Assist to Rt.   Comments mostly cues for sequencing for log rolling   ADL Assessment   Eating Supervision   Grooming Minimal Assist;Seated   Bathing   (NT)   Upper Body Dressing Minimal Assist   Lower Body Dressing Moderate Assist  (seated eob fixed socks with supervision)   Toileting Moderate Assist  (assist w/ hospital gown management; completed pericare )   Comments noted increased sob with pericare post BM and required verbal cues for pacing the activity as pt wanting to rush through through exertion   Functional Mobility   Sit to Stand Minimal Assist   Bed, Chair, Wheelchair Transfer Minimal Assist   Toilet Transfers Minimal Assist   Comments w/FWW, cues for sequencing and hand positioning during t/f's   Short Term Goals   Short Term Goal # 1 Pt will perform LB dressing with min a   Short  Term Goal # 2 Pt will perform toileting task with supervision w/o overt s/s of physical exertion   Short Term Goal # 2 B  Pt will tolerate oob ADL session [id number] with min vc's for pacing and no overt s/s of physical exertion   Anticipated Discharge Equipment and Recommendations   DC Equipment Recommendations Unable to determine at this time

## 2020-11-04 NOTE — RESPIRATORY CARE
Oxygen Rounds      Patient found on    O2 L/m:  _______3__    Oxygen device:  ___SNC_____   Spo2: ______98___%        Respiratory device skin site inspection completed.

## 2020-11-04 NOTE — THERAPY
Physical Therapy   Initial Evaluation     Patient Name: Chucoh Marin  Age:  84 y.o., Sex:  male  Medical Record #: [id number]  Today's Date: 11/4/2020     Precautions: Fall Risk  Comments: cognitive deficits, recept kypho 9/2020, [facility]    Assessment  Patient is 84 y.o. male admitted with abdominal pain, s/p kypho 9/2020. Pt lives with his wife who appears to have been assisting pt with ADLs and IADLs. Pt agreeable to work with therapist. Pt required min assist and max cues for log roll for bed mobility. Pt required min assist with transfers and ambulating ~10ft x2. Pt with kyphotic posture and increased knee flexion when fatigue while ambulating. Pt with observable increased WOB with toileting requiring verbal cues for activity pacing. Pt would benefit from post acute placement to improve overall strength and endurance and safety return home with spouse. Acute PT will cont to address deficits    Plan    Recommend Physical Therapy 3 times per week until therapy goals are met for the following treatments:  Bed Mobility, Community Re-integration, Gait Training, Neuro Re-Education / Balance, Self Care/Home Evaluation, Stair Training, Therapeutic Activities and Therapeutic Exercises    DC Equipment Recommendations: Unable to determine at this time  Discharge Recommendations: Recommend post-acute placement for additional physical therapy services prior to discharge home          11/04/20 0910   Vitals   O2 (LPM) 3   O2 Delivery Device Silicone Nasal Cannula   Pain   Non Verbal Scale  Calm   Pain 0 - 10 Group   Therapist Pain Assessment Post Activity Pain Same as Prior to Activity;Nurse Notified   Prior Living Situation   Prior Services Continuous (24 Hour) Care Giving Family   Housing / Facility 1 Story House   Steps Into Home 3   Bathroom Set up Bathtub / Shower Combination;Grab Bars   Equipment Owned Grab Bar(s) In Tub / Shower;Front-Wheel Walker   Lives with - Patient's Self Care Capacity Spouse   Comments unclear if  patient had caregivers at home prior to admission   Prior Level of Functional Mobility   Bed Mobility Independent   Transfer Status Independent   Ambulation Independent   Distance Ambulation (Feet)   (short household distances)   Assistive Devices Used Front-Wheel Walker   Comments per conversation with pt, he was limited in his mobility and his wife was helping with ADLs   History of Falls   History of Falls Yes   Cognition    Cognition / Consciousness X   Level of Consciousness Alert   New Learning Impaired   Sequencing Impaired   Comments answering simple questions appropriately but very [place]   Passive ROM Lower Body   Passive ROM Lower Body WDL   Active ROM Lower Body    Active ROM Lower Body  WDL   Strength Lower Body   Lower Body Strength  X   Gross Strength Generalized Weakness, Equal Bilaterally   Sensation Lower Body   Lower Extremity Sensation   WDL   Coordination Lower Body    Coordination Lower Body  WDL   Balance Assessment   Sitting Balance (Static) Fair   Sitting Balance (Dynamic) Fair -   Standing Balance (Static) Fair -   Standing Balance (Dynamic) Fair -   Weight Shift Sitting Fair   Weight Shift Standing Fair   Comments with FWW   Gait Analysis   Gait Level Of Assist Minimal Assist   Assistive Device Front Wheel Walker   Distance (Feet) 10   # of Times Distance was Traveled 2   Deviation Shuffled Gait;Other (Comment)  (kyphotic posture)   # of Stairs Climbed 0   Weight Bearing Status FWB   Comments limited by faitgue and poor activity tolerance.   Bed Mobility    Supine to Sit Minimal Assist   Sit to Supine Minimal Assist   Scooting Minimal Assist   Rolling Minimal Assist to Rt.   Comments max cues for sequencing   Functional Mobility   Sit to Stand Minimal Assist   Bed, Chair, Wheelchair Transfer Minimal Assist   Toilet Transfers Minimal Assist   Transfer Method Stand Step   Mobility in room   Short Term Goals    Short Term Goal # 1 Pt will be able to ambulate >150ft with FWW and SPV in order to  return to community distances   Short Term Goal # 2 Pt will be able to negotiate 3 steps with no AD and SPV in 6tx in order to enter and exit home   Short Term Goal # 3 Pt will be able to complete functional transfers with FWW and SPV in 6tx in order to return to prior level   Short Term Goal # 4 Pt will be able to complete bed mobility with SPV in 6tx in order to return home   Anticipated Discharge Equipment and Recommendations   DC Equipment Recommendations Unable to determine at this time   Discharge Recommendations Recommend post-acute placement for additional physical therapy services prior to discharge home

## 2020-11-04 NOTE — PROGRESS NOTES
2 RN skin check complete with: Elisha HERNÁNDEZ  Devices in place: PIV, nasal cannula  Skin assess under devices: yes  Confirmed pressure ulcers found on: n/a  New potential pressure ulcers noted on: n/a  Wound consult placed: n/a     The following interventions in place: waffle cushion, 2RN skin check, Q2hr turns, barrier cream, mepilex , frequent incontinence checks and bed linen changes as needed, gray pads on oxygen tubing, pillows for positioning/comfort.     Skin assessment:  General:  Fragile and dry, scattered bruising BUE, skin intact  Ears: bilateral back of ears red, blanching  Elbows: bilateral pink and blanching  Heels:  Bilateral dry, boggy, intact; mepilex in place  Sacrum: red, darkened pigmentation, blanching; barrier cream placed  Upper back: redness and blanching; placed mepilex on bony prominence

## 2020-11-04 NOTE — PROGRESS NOTES
"Assumed pt care at shift change.  Pt resting in bed, pleasant to talk with.  Pt alert/oriented x4, hard of hearing.  States pain is 7/10, declines medication at this time, provided distraction.  Pt was seen by PT/OT today, pt stated \"they are really good at what they do, the session was excellent\".  Safety precautions in place, bed locked and in lowest position, call light and personal belongings within reach.  No further needs at this time.    "

## 2020-11-04 NOTE — PROGRESS NOTES
Daily Progress Note:     Date of Service: 11/4/2020  Primary Team: [facility] Team   Attending: Rachael Hough M.D.   Senior Resident: Dr. Archibald  Intern: Dr. Guardado   Contact:  [phone]    ID: 84 year old male with vertebral compression fracture s/p kyphoplasty admitted with abdominal pain secondary to constipation    Subjective:   No acute events   Patient states he is feeling okay, no abdominal pain. Still has back pain but is not worse   Patient is anxious to go home     Plan:   -Continue percocet prn for back pain, has scheduled bowel protocol daily   -PT/OT recommended placement for further strengthening. Referral placed. Will also update patient's wife.     Consultants/Specialty:  None     Review of Systems:    Review of Systems   Constitutional: Negative for chills, fever and weight loss.   HENT: Negative for ear pain, hearing loss and tinnitus.    Eyes: Negative for blurred vision, double vision and photophobia.   Respiratory: Negative for cough, hemoptysis and sputum production.    Cardiovascular: Negative for chest pain, palpitations and orthopnea.   Gastrointestinal: Negative for abdominal pain, nausea and vomiting.   Genitourinary: Negative for dysuria, frequency and urgency.   Musculoskeletal: Positive for back pain.   Skin: Negative for itching and rash.   Neurological: Negative for dizziness, tingling and headaches.   Psychiatric/Behavioral: Negative for depression and suicidal ideas.       Objective Data:   Physical Exam:   Vitals:   Temp:  [36.7 °C (98 °F)-37 °C (98.6 °F)] 37 °C (98.6 °F)  Pulse:  [79-82] 82  Resp:  [16-18] 17  BP: ()/(60-72) 106/64  SpO2:  [93 %-98 %] 98 %    Physical Exam  Constitutional:       Appearance: Normal appearance.   HENT:      Head: Normocephalic and atraumatic.      Right Ear: Tympanic membrane normal.      Left Ear: Tympanic membrane normal.      Nose: Nose normal.      Mouth/Throat:      Mouth: Mucous membranes are moist.   Eyes:      Extraocular  Movements: Extraocular movements intact.      Pupils: Pupils are equal, round, and reactive to light.   Neck:      Musculoskeletal: Normal range of motion.   Cardiovascular:      Rate and Rhythm: Normal rate and regular rhythm.      Pulses: Normal pulses.      Heart sounds: Normal heart sounds.   Pulmonary:      Effort: Pulmonary effort is normal.      Breath sounds: Normal breath sounds.   Abdominal:      General: Bowel sounds are normal.      Palpations: Abdomen is soft.   Musculoskeletal: Normal range of motion.   Skin:     General: Skin is warm.   Neurological:      General: No focal deficit present.      Mental Status: He is alert.      Comments: Alert and orientedx2   Psychiatric:         Mood and Affect: Mood normal.         Behavior: Behavior normal.           Labs:   None     Imaging:   None       * Constipation  Assessment & Plan  -resolved  -Presented with abdominal pain   -Per patient has been several days since last BM  -Possible secondary to his opioid use for his back pain  -Admission imaging of back negative for acute change in spine    Plan  -Has miralax scheduled daily   -Will add other regimen if not having a bowel movement    Compression fracture of first lumbar vertebra (HCC)- (present on admission)  Assessment & Plan  -S/p L1 Kyphoplasty on 9/2020  -Per patient wife, patient has been having difficulties with functioning due to pain  -Patient occasionally walks around the house but has been bed bound since his fracture and kypoplasty.   -Tylenol and Lidocaine Patches PRN  -Percocet prn for pain     Recurrent pleural effusion on left- (present on admission)  Assessment & Plan  -Likely represents chronic findings on imaging  -Patient is on 2L NC at home, 3L when walking  -Lovenox 80 BID for VTE prophylaxis, holding home xarelto  -ESR and Crp is 116 and 25 respectively. Quantiferon ordered to test for latent Tb   -Not in distress, hence holding off on thoracentesis. Previous analysis on pleural  fluid showed exudative and cytology negative for malignancy  -As per notes, patient refused bronchoscopy and pleurodesis      Leukocytosis  Assessment & Plan  -resolved  -possible reactive in the setting of abdominal pain and constipation/ chronic left pleural effusion      History of pulmonary embolism- (present on admission)  Assessment & Plan  -Noted on hospitalization on 9/2020  -Holding Xarelto in case patient may need procedure, Lovenox BID for VTE prophylaxis    Hepatic steatosis  Assessment & Plan  -Patient denies ETOH intake  -Incidental finding on imaging, likely chronic  -LFTs grossly unremarkable  -Will continue to clinically monitor    History of CHF (congestive heart failure)  Assessment & Plan  -Per patient and patient wife, has history of CHF, was instructed to stop taking Furosemide, Statin, and Aspirin by cardiologist, however no clear documentation of this was able to be found  -Will hold of on these medications until further information can be obtained  -Continue home metoprolol  -EF of 45 on 5/2020  -On exam patient does not appear to be fluid overloaded    Dementia (HCC)- (present on admission)  Assessment & Plan  -Continue home Donepezil    Hypothyroidism- (present on admission)  Assessment & Plan  -Continue home synthroid

## 2020-11-05 NOTE — WOUND TEAM
In to see patient right medial heel for wound consult. Patient very agreeable. Heels floated on pillows. Removed sock and mepilex from right heel. Assessed entire heel and medial heel, patient has no wounds or pressure injuries present, all areas easily blanchable. Medial heel does have a very small 0.1 x 0.1cm scab but it is dry and intact. Patient has some scattered hyperpigmentation throughout body, but expected due to age. No wounds or pressure injuries at this time. Basic skin care orders placed, updated bedside RN. No need for wound team follow up at this time.

## 2020-11-05 NOTE — PROGRESS NOTES
Daily Progress Note:     Date of Service: 11/5/2020  Primary Team: [facility]   Attending: Rachael Hough M.D.   Senior Resident: Dr. Archibald  Intern: Dr. Guardado  Contact:  [phone]    Chief Complaint:   Abdominal pain thought to be secondary to constipation    Subjective:  -No acute events overnight  -Continues to have chronic back pain, unchanged from its usual nature  -Denies any abdominal pain  -Expressing an eagerness to go home, however states he is amendable to going to placement facility to gain strength before going home    Consultants/Specialty:  None  Review of Systems:    Review of Systems   Constitutional: Negative for chills and fever.   HENT: Positive for hearing loss (Chronic Right sided worse). Negative for ear pain and tinnitus.    Eyes: Negative for blurred vision, double vision and photophobia.   Respiratory: Negative for cough, hemoptysis, shortness of breath and wheezing.    Cardiovascular: Negative for chest pain and palpitations.   Gastrointestinal: Negative for abdominal pain, constipation, diarrhea, nausea and vomiting.   Genitourinary: Negative for dysuria, frequency and urgency.   Musculoskeletal: Positive for back pain. Negative for myalgias and neck pain.   Skin: Negative for itching and rash.   Neurological: Negative for dizziness, focal weakness, weakness and headaches.   Psychiatric/Behavioral: Negative for depression.       Objective Data:   Physical Exam:   Vitals:   Temp:  [36.4 °C (97.6 °F)-37.1 °C (98.7 °F)] 36.4 °C (97.6 °F)  Pulse:  [82-89] 82  Resp:  [16-18] 18  BP: (102-109)/(60-67) 104/67  SpO2:  [91 %-98 %] 93 %     Physical Exam  Constitutional:       Appearance: Normal appearance.   HENT:      Head: Normocephalic and atraumatic.      Right Ear: External ear normal.      Left Ear: External ear normal.      Nose: Nose normal.      Mouth/Throat:      Mouth: Mucous membranes are moist.   Eyes:      Extraocular Movements: Extraocular movements intact.       Pupils: Pupils are equal, round, and reactive to light.   Neck:      Musculoskeletal: Normal range of motion. No neck rigidity.   Cardiovascular:      Rate and Rhythm: Normal rate and regular rhythm.      Pulses: Normal pulses.      Heart sounds: Normal heart sounds.   Pulmonary:      Effort: Pulmonary effort is normal.      Breath sounds: Normal breath sounds. No wheezing.   Abdominal:      General: Bowel sounds are normal.      Palpations: Abdomen is soft.      Tenderness: There is no abdominal tenderness.   Musculoskeletal: Normal range of motion.      Right lower leg: No edema.      Left lower leg: No edema.   Skin:     General: Skin is warm.      Capillary Refill: Capillary refill takes less than 2 seconds.      Coloration: Skin is not jaundiced.   Neurological:      General: No focal deficit present.      Mental Status: He is alert.      Comments: Alert and orientedx2 (Self and Location)   Psychiatric:         Mood and Affect: Mood normal.         Behavior: Behavior normal.           Labs:   No new labs  Imaging:   No new imaging  Problem Representation: 84 year old man with PMH notable for recurrent pleural effusions of unclear etiology, chronic back pain s/p L1 kyphoplasty in September of this year, presenting with an acute on chronic worsening of abdominal pain that now prevents him from ambulating as well as an elevated white count on labs. Reporting improvement in abdominal pain symptoms following passing stool. Still endorsing chronic back pain, per family patient has limited functional status at home. Also found to have additional finding of above pleural effusion on imaging, most likely representing a chronic finding. Now pending placement.     * Constipation  Assessment & Plan  -resolved  -Presented with abdominal pain   -Per patient has been several days since last BM  -Possible secondary to his opioid use for his back pain  -Admission imaging of back negative for acute change in spine    Plan  -Has  miralax scheduled daily   -Will add other regimen if not having a bowel movement    Compression fracture of first lumbar vertebra (HCC)- (present on admission)  Assessment & Plan  -S/p L1 Kyphoplasty on 9/2020  -Per patient wife, patient has been having difficulties with functioning due to pain  -Patient occasionally walks around the house but has been bed bound since his fracture and kypoplasty.   -Tylenol and Lidocaine Patches PRN  -Percocet prn for pain     Recurrent pleural effusion on left- (present on admission)  Assessment & Plan  -Likely represents chronic findings on imaging  -Patient is on 2L NC at home, 3L when walking  -ESR and Crp is 116 and 25 respectively. Quantiferon ordered to test for latent Tb   -Not in distress, hence holding off on thoracentesis. Previous analysis on pleural fluid showed exudative and cytology negative for malignancy  -As per notes, patient refused bronchoscopy and pleurodesis      Leukocytosis  Assessment & Plan  -resolved  -possible reactive in the setting of abdominal pain and constipation/ chronic left pleural effusion      History of pulmonary embolism- (present on admission)  Assessment & Plan  -Noted on hospitalization on 9/2020  -Resumed home Xarelto on 11/5, previously on Lovenox    Hepatic steatosis  Assessment & Plan  -Patient denies ETOH intake  -Incidental finding on imaging, likely chronic  -LFTs grossly unremarkable  -Will continue to clinically monitor    History of CHF (congestive heart failure)  Assessment & Plan  -Per patient and patient wife, has history of CHF, was instructed to stop taking Furosemide, Statin, and Aspirin by cardiologist, however no clear documentation of this was able to be found  -Will hold of on these medications until further information can be obtained  -Continue home metoprolol  -EF of 45 on 5/2020  -On exam patient does not appear to be fluid overloaded    Dementia (HCC)- (present on admission)  Assessment & Plan  -Continue home  Donepezil    Hypothyroidism- (present on admission)  Assessment & Plan  -Continue home synthroid        Quality Measures:  Code: DNAR/DNI  VTE: Xarelto  Diet: Cardiac  Disposition: Pending Placement

## 2020-11-05 NOTE — PROGRESS NOTES
Pt AOx4, Nikolai, able to make needs known.Not in acute distress. C/o of back pain, declined pain medication. Repositioned.  On O2 @3LPM via NC at 95% O2 sat.  Pt's wife was at the bedside during shift change.  Discussed POC w/ family, verbalized understanding. Provided needs. Safety precaution in place. Bed locked and placed in lowest position. Treaded socks on. Call lights w/in reach. Hourly rounds in place.

## 2020-11-05 NOTE — CARE PLAN
Problem: Skin Integrity  Goal: Risk for impaired skin integrity will decrease  Outcome: PROGRESSING AS EXPECTED  Note: Pt most of the time stays in bed. Educated about importance of mobility to prevent skin breakdown. Verbalized understanding. The following interventions in place: waffle cushion, 2RN skin check, Q2hr turns, barrier cream, mepilex , frequent incontinence checks and bed linen changes as needed, gray pads on oxygen tubing, pillows for positioning/comfort. Wound consult in place for non blanching spot on right medial heel.      Problem: Respiratory:  Goal: Respiratory status will improve  Outcome: PROGRESSING AS EXPECTED  Note: Pt came in d/t pleural effusion. Monitored respiratory status. Not in acute respiratory distress. No c/o SOB and dyspnea.  On O2 @3LPM via NC at 95% O2 sat. Will continue to monitor.

## 2020-11-05 NOTE — DISCHARGE PLANNING
Received Choice form at [phone]   Agency/Facility Name: [facility]  Referral sent per Choice form @ [phone]

## 2020-11-05 NOTE — DISCHARGE PLANNING
Care Transition Team Assessment      Chucho Marin is a 84 y.o. male with a history of recurrent left pleural effusions, hypertension, COPD on 2 L O2, pulmonary embolism on Xarelto who presents for abdominal pain. The pain has been progressive for the last three months but much worse this morning. He was unable to get out of bed due to the pain. He also has pain in his lower back. He recently was admitted 9/24-9/30 with findings of L1 compression fracture and underwent kyphoplasty. He took oxycodone which did not help. No saddle anesthesia, incontinence of bladder or bowels or significant weakness or sensory loss of lower extremities. No fevers or cough. Has not had a bowel movement in a couple of days.    Anticipated Discharge Disposition: Skilled Nursing Facility for PT/OT therapies    Action: This RN,  completed an initial case management assessment with patient's spouse Genet via telephonically. Spouse and Provider Dr. Lv Guardado would like patient to discharge to a SNF. Spouse stated she would like her  discharging to [facility]. He was already at that facility and likes it. Choice referral was faxed to Joy NAJERA to [phone].     Barriers to Discharge: Accepting SNF, preference is [facility]    Plan: RN,  to follow up with accepting SNF's. Please call patient's spouse Genet when patient has been accepted and with discharge date and time. Thank you.       Information Source  Orientation : Disoriented to Time  Information Given By: Spouse  Informant's Name: Genet Marin  Who is responsible for making decisions for patient? : Spouse  Name(s) of Primary Decision Maker: (Genet Tomas)    Readmission Evaluation  Is this a readmission?: No    Elopement Risk  Legal Hold: No  Ambulatory or Self Mobile in Wheelchair: No-Not an Elopement Risk  Disoriented: No  Psychiatric Symptoms: None  History of Wandering: No  Elopement this Admit: No  Vocalizing Wanting to Leave:  No  Displays Behaviors, Body Language Wanting to Leave: No-Not at Risk for Elopement  Elopement Risk: Not at Risk for Elopement    Interdisciplinary Discharge Planning  Lives with - Patient's Self Care Capacity: Spouse  Patient or legal guardian wants to designate a caregiver: No  Housing / Facility: [address] [place] [address]  Prior Services: Continuous (24 Hour) Care Giving Family    Discharge Preparedness  What is your plan after discharge?: Skilled nursing facility  What are your discharge supports?: Spouse  Prior Functional Level: Needs Assist with Activities of Daily Living, Needs Assist with Medication Management, Uses Walker  Difficulity with ADLs: Bathing, Brushing teeth, Dressing, Toileting, Walking  Difficulty with ADLs Comment: (needs help with ADL's)  Difficulity with IADLs: Cooking, Driving, Keeping track of finances, Laundry, Managing medication, Shopping, Using the telephone or computer  Difficulity with IADL Comments: (spouse helps with all ADL's)    Functional Assesment  Prior Functional Level: Needs Assist with Activities of Daily Living, Needs Assist with Medication Management, Uses Walker    Finances  Financial Barriers to Discharge: No  Prescription Coverage: Yes    Vision / Hearing Impairment  Right Eye Vision: Impaired, Wears Glasses  Left Eye Vision: Impaired, Wears Glasses  Hearing Impairment: Both Ears, Hearing Device Not Available, Other (Comments)(R ear completely deaf; L ear impaired)         Advance Directive  Advance Directive?: None  Advance Directive offered?: AD Booklet refused    Domestic Abuse  Have you ever been the victim of abuse or violence?: No  Physical Abuse or Sexual Abuse: No  Verbal Abuse or Emotional Abuse: No  Possible Abuse/Neglect Reported to:: Not Applicable         Discharge Risks or Barriers  Discharge risks or barriers?: Complex medical needs  Patient risk factors: Complex medical needs, Vulnerable adult    Anticipated Discharge Information  Discharge Disposition: D/T to SNF  with Medicare cert in anticipation of skilled care (03)  Discharge Address: (TBD, accepting SNF)  Discharge Contact Phone Number: [phone]

## 2020-11-05 NOTE — PROGRESS NOTES
2 RN skin check complete with: Patt RN  Devices in place: PIV, nasal cannula  Skin assess under devices: yes  Confirmed pressure ulcers found on: n/a  New potential pressure ulcers noted on: right medial heel non blanching spot  Wound consult placed: yes     The following interventions in place: waffle cushion, 2RN skin check, Q2hr turns, barrier cream, mepilex , frequent incontinence checks and bed linen changes as needed, gray pads on oxygen tubing, pillows for positioning/comfort.     Skin assessment:  General:  Fragile and dry, scattered bruising BUE, skin intact  Ears: bilateral back of ears red, blanching  Elbows: bilateral pink and blanching  Heels:  Bilateral dry, boggy, intact; mepilex in place; right medial heel has nonblanching spot; wound consult placed  Sacrum: red, darkened pigmentation, blanching; barrier cream placed  Upper back: redness and blanching; placed mepilex on bony prominence

## 2020-11-05 NOTE — CARE PLAN
Problem: Nutritional:  Goal: Achieve adequate nutritional intake  Description: Patient will consume 50% of meals  Outcome: PROGRESSING AS EXPECTED  Per chart pt PO varies < 25-75%. Please document intake of all meals as % taken in ADL's to provide interdisciplinary communication across all shifts.  RD following.

## 2020-11-06 NOTE — CARE PLAN
Problem: Pain Management  Goal: Pain level will decrease to patient's comfort goal  Outcome: PROGRESSING AS EXPECTED     Problem: Skin Integrity  Goal: Risk for impaired skin integrity will decrease  Outcome: PROGRESSING AS EXPECTED  Note: Discussed importance of mobilization to protect the integrity of his skin.  Pt stated understanding and participated in [id number].

## 2020-11-06 NOTE — PROGRESS NOTES
2 RN skin check complete with: BETTY Mckinnon  Devices in place: CHRISTOPHER gray  Skin assess under devices: yes   Confirmed pressure ulcers found on: n/a  New potential pressure ulcers noted on: n/a  Wound consult placed: n/a     The following interventions in place:  Waffle cushion, 2RN skin check, Q2hr turns, barrier cream, mepilex, incontinent care with linen changes as necessary, grey pads on oxygen tubing, cheek savers, pillows for positioning/support.     Skin assessment:  General: dry, fragile, intact, scattered bruising BUE  Ears: bilateral pink and blanching   Elbows: bilateral red, pink and blanching  Heels:  Bilateral pink, blanching, boggy, dry (mepilex in place)  Sacrum: blanching, darkened pigmentation (barrier cream)  Upper back: pink and blanching (mepilex to bony prominences)

## 2020-11-06 NOTE — PROGRESS NOTES
[facility] was [facility]. Pt left unit via wheelchair with transport. Personal belongings with pt when leaving unit. Pt given discharge instructions prior to leaving unit including where to  prescriptions and when to follow-up; verbalizes understanding. Copy of discharge instructions with pt and in the chart.

## 2020-11-06 NOTE — PROGRESS NOTES
Assumed pt care at shift change.  Pt resting in bed, alert/oriented x4.  Safety precautions in place, bed locked and in lowest position, call light and personal belongings within reach.  No further needs at this time.

## 2020-11-06 NOTE — DISCHARGE PLANNING
Agency/Facility Name: [facility]  Spoke To: Maranda  Outcome: Pt accepted. Facility can provide transport at 1400.

## 2020-11-06 NOTE — TELEPHONE ENCOUNTER
Called and spoke with pt's spouse.  She said pt is currently at [facility].  He just got there today.  She is not sure when he will be out.  She will call us to schedule him an appt. When pt is able to come to his appt.

## 2020-11-06 NOTE — TELEPHONE ENCOUNTER
"Encounter Date: 6/16/2017       History     Chief Complaint   Patient presents with    Back Pain     reports lower back pain, states he can't urinate, states he was working on his car on it fell on him, states he thiink he has a kidney stone, states " I just need something for pain and then I will be able to pee"     Review of patient's allergies indicates:   Allergen Reactions    Aspirin Shortness Of Breath    Toradol [ketorolac] Shortness Of Breath    Toradol [ketorolac] Anaphylaxis    Aspirin     Codeine     Penicillins     Shellfish containing products     Sulfa (sulfonamide antibiotics)      The patient presents with 2 separate complaints of.  His initial complaint is that he thinks that he might have a kidney stone as he has been having intermittent pain in his lower back.  Not on either side but in the middle of his lower back.  Additionally the patient states that he was working underneath his automobile earlier today and the valerio sort of [address],  states that the back of his vehicle came down partially on him but stopped that the wheels.  So essentially the car fell from a jacked up-down and struck him in his pelvic area.      The history is provided by the patient.   Abdominal Pain   The current episode started today. The onset of the illness was abrupt. The problem has not changed since onset.The abdominal pain is located in the suprapubic region. The abdominal pain does not radiate. The severity of the abdominal pain is 3/10. The abdominal pain is relieved by nothing. The other symptoms of the illness do not include fever, fatigue, jaundice, melena, nausea, vomiting, diarrhea, dysuria, hematemesis, hematochezia, vaginal discharge or vaginal bleeding.   The patient states that she believes she is currently not pregnant. The patient has not had a change in bowel habit. Additional symptoms associated with the illness include back pain. Symptoms associated with the illness do not include " Michel Martinez M.D.  You 19 hours ago (8:19 PM)     Lets get him rescheduled for the next week I am in clinic         chills, anorexia, diaphoresis, heartburn, constipation, urgency, hematuria or frequency.     Past Medical History:   Diagnosis Date    Anxiety     Depression     Heart murmur     Hepatitis C     History of psychiatric hospitalization     5 previous hospitalizations.  Last was in 2015 at [facility]    HTN (hypertension) 11/6/2014    Hx of psychiatric care     Celexa, Zoloft, Seroquel     Psychiatric problem     depression and anxiety    Stroke     right side weakness    Therapy     reports last saw psychiatrist last year and he does not remember the name     Past Surgical History:   Procedure Laterality Date    APPENDECTOMY      FOOT SURGERY       Family History   Problem Relation Age of Onset    Drug abuse Mother     Heart disease Mother     Alcohol abuse Father     Heart disease Father      Social History   Substance Use Topics    Smoking status: Current Every Day Smoker     Packs/day: 1.00     Years: 43.00     Types: Cigarettes    Smokeless tobacco: Not on file      Comment: patient ran out of [employer] (T/A)    Alcohol use Yes      Comment: Occasionally, last use was 3 days ago     Review of Systems   Constitutional: Negative.  Negative for chills, diaphoresis, fatigue and fever.   HENT: Negative.    Eyes: Negative.    Respiratory: Negative.    Cardiovascular: Negative.    Gastrointestinal: Positive for abdominal pain. Negative for anorexia, constipation, diarrhea, heartburn, hematemesis, hematochezia, jaundice, melena, nausea and vomiting.   Endocrine: Negative.    Genitourinary: Negative.  Negative for dysuria, frequency, hematuria, urgency, vaginal bleeding and vaginal discharge.   Musculoskeletal: Positive for back pain.   Skin: Negative.    Allergic/Immunologic: Negative.    Neurological: Negative.    Hematological: Negative.    Psychiatric/Behavioral: Negative.    All other systems reviewed and are negative.      Physical Exam     Initial Vitals [06/16/17 0818]   BP Pulse Resp Temp SpO2   136/73 69 18  "98.5 °F (36.9 °C) 100 %     Physical Exam    Nursing note and vitals reviewed.  Constitutional: Vital signs are normal. He appears well-developed. He is active and cooperative.   The patient was seen ambulating into the emergency department without any gait difficulty as well as in no apparent extremitas.  This is out of proportion to his complaint of "I am in severe pain".  The patient walks with a cane secondary to a prior left-sided stroke.   HENT:   Head: Normocephalic and atraumatic.   Eyes: Conjunctivae, EOM and lids are normal. Pupils are equal, round, and reactive to light.   Neck: Trachea normal and full passive range of motion without pain. Neck supple. No thyroid mass present.   Cardiovascular: Normal rate, regular rhythm, S1 normal, S2 normal, normal heart sounds, intact distal pulses and normal pulses.   Abdominal: Soft. Normal appearance, normal aorta and bowel sounds are normal. There is no tenderness.       Musculoskeletal: Normal range of motion.   Lymphadenopathy:     He has no axillary adenopathy.   Neurological: He is alert and oriented to person, place, and time.   Skin: Skin is warm, dry and intact.   Psychiatric: He has a normal mood and affect. His speech is normal and behavior is normal. Judgment and thought content normal. Cognition and memory are normal.         ED Course   Procedures  Labs Reviewed   POCT URINALYSIS W/O SCOPE             Medical Decision Making:   Clinical Tests:   Lab Tests: Ordered and Reviewed  The following lab test(s) were unremarkable: Urinalysis       <> Summary of Lab: 3+ blood in his urine I'm uncertain as to whether this is secondary to Plavix or related to kidney stones I'll obtain a CT scan  ED Management:  CT scan was unremarkable for any stones but did show some chronic diverticulosis.  I do not see an acute etiology to the patient's presenting complaint of back pain.  This may be more of a chronic recurrent issue as I had further discussion with the " patient.  I strongly encouraged the patient to follow-up with primary health care physician as he has multiple medical issues that need to be addressed on an outpatient basis I will give him the referral line number.        Results for orders placed or performed during the hospital encounter of 06/16/17   POCT URINALYSIS W/O SCOPE   Result Value Ref Range    Glucose, UA Negative (NG)     Bilirubin, UA Negative (NG)     Ketones, UA Negative (NG)     Spec Grav UA 1.010     Blood, UA 3+ (A)     PH, UA 7.0     Protein, UA Negative (NG)     Urobilinogen, UA 0.2 E.U./dL    Nitrite, UA Negative (NG)     Leukocytes, UA Trace (A)     Color, UA Jemima     Clarity, UA Clear            Imaging Results          CT Renal Stone Study ABD Pelvis WO (Final result)  Result time 06/16/17 09:41:44    Final result by Alem Mcfarland DO (06/16/17 09:41:44)                 Narrative:    Study Desc:   CT RENAL STONE STUDY ABD PELVIS WO     History: Abdominal pain     Comparison: 2/23/17     TECHNIQUE: CT of the abdomen and pelvis is performed without iodinated contrast,   according to the renal stone protocol. Multi-detector helical CT axial images were   obtained from the level of the diaphragm through the lower pelvis. Coronal and sagittal   reconstructions were obtained.     The total exam DLP is 456.23 mGy-cm.     FINDINGS:  KIDNEYS, URETERS AND BLADDER: There are no renal calculi or hydronephrosis. There is no   ureterectasis or ureteral calculi. There are no contour deforming renal masses on this   non-contrast CT.     The bladder is unremarkable.     NON-CONTRAST OPACIFIED STOMACH AND BOWEL: The non-contrast opacified stomach appears   grossly unremarkable. The non contrast opacified loops of colon in the abdomen and pelvis   are grossly unremarkable.  There is mild sigmoid diverticulosis without evidence of acute   diverticulitis.  The lack of orally administered contrast material limits bowel   evaluation.     NON-CONTRAST  ENHANCED ABDOMINAL AND PELVIC ORGANS: The non contrast enhanced images of   the liver, gallbladder, spleen, pancreas, and adrenals are grossly unremarkable.     NON-CONTRAST ENHANCED PERITONEUM AND RETROPERITONEUM STRUCTURES: There is no abdominal   lymphadenopathy or ascites. There is no pneumoperitoneum. The retroperitoneal region   appears unremarkable. The non-contrast opacified abdominal aorta appears grossly   unremarkable.     OSSEOUS STRUCTURES: There are no definite significant osseous abnormalities seen.     VISUALIZED LUNG BASES: The visualized lung bases are clear.     IMPRESSION:  1.  Mild sigmoid diverticulosis without evidence of acute diverticulitis.     SL:24 Signed by: Alem Mcfarland DO  2017-06-16 09:41:42                             X-Ray Pelvis Routine AP (Final result)  Result time 06/16/17 09:11:00    Final result by Adriano Virk MD (06/16/17 09:11:00)                 Narrative:    Exam: 3 radiographic views of the pelvis     Indication: Pain     Comparison: None available      No fracture or subluxation identified.  Mild to moderate bilateral hip joint space   narrowing and osteophytosis are present.  Mild degenerative changes also involve   symphysis pubis.  Sacroiliac joints are fairly well-maintained.  No soft tissue gas or   unexpected radiopaque foreign body identified.     Impression:     No acute osseous findings.     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     SL: 24 Signed by: Adriano Virk MD  2017-06-16 09:10:57                                       ED Course     Clinical Impression:   The primary encounter diagnosis was Chronic bilateral back pain, unspecified back location. A diagnosis of Pain was also pertinent to this visit.          Chau Carroll MD  06/16/17 0951

## 2020-11-06 NOTE — DISCHARGE SUMMARY
Discharge Summary    Date of Admission: 11/1/2020  Date of Discharge: 11/06/2020  Discharging Attending: Rachael Hough M.D.   Discharging Senior Resident: Dr. Archibald  Discharging Intern: Dr. Guardado    CHIEF COMPLAINT ON ADMISSION  Chief Complaint   Patient presents with   • Abdominal Pain       Reason for Admission  Abdominal pain     Secondary problems:   Lumbar compression fracture s/p kyphoplasty  Chronic obstructive pulmonary disease  Recurrent exudative left pleural effusion  H/o Pulmonary embolism    Hypothyroidism   Dementia     Admission Date  11/1/2020    CODE STATUS  DNAR/DNI    HPI & HOSPITAL COURSE  Mr. Marin is an 84 year old male with above mentioned past medical history is admitted with complaints of abdominal pain. On admission, labs did not show any signs of infection and CT abdomen and CT thoracic spine was unremarkable.     Abdominal pain: During the course, he was started on scheduled bowel regimen and he had a good bowel movement. He improved in regards to his pain and tolerated diet well.   Of note, patient has chronic low back given due to his compression fracture. He is mostly bed bound due to pain. His pain is controlled on as needed percocet. Give his decreased strength and back pain, physical therapy recommended skilled nursing facility for continued therapies.     Left pleural effusion : recurrent and previous pleural fluid analysis showed exudative. As per pulmonology notes, patient refused bronchoscopy and pleurodesis for further evaluation. His effusion remained stable and he did not have any respiratory difficulty. Hence, effusion was not drained. Quantiferon test done to rule out Tb was negative       Therefore, he is discharged in fair and stable condition to skilled nursing facility.    The patient met 2-midnight criteria for an inpatient stay at the time of discharge.    PHYSICAL EXAM ON DISCHARGE  Temp:  [36.8 °C (98.3 °F)-37.1 °C (98.7 °F)] 37.1 °C (98.7 °F)  Pulse:  [77-82]  77  Resp:  [14-18] 14  BP: ()/(56-64) 104/58  SpO2:  [95 %-96 %] 96 %    Physical Exam  Constitutional:       Appearance: Normal appearance.   HENT:      Head: Normocephalic and atraumatic.      Right Ear: Tympanic membrane normal.      Left Ear: Tympanic membrane normal.      Nose: Nose normal.      Mouth/Throat:      Mouth: Mucous membranes are dry.   Eyes:      Extraocular Movements: Extraocular movements intact.      Pupils: Pupils are equal, round, and reactive to light.   Neck:      Musculoskeletal: Normal range of motion.   Cardiovascular:      Rate and Rhythm: Normal rate and regular rhythm.      Pulses: Normal pulses.      Heart sounds: Normal heart sounds.   Pulmonary:      Effort: Pulmonary effort is normal.      Breath sounds: Normal breath sounds.   Abdominal:      General: Bowel sounds are normal. There is no distension.      Palpations: Abdomen is soft.      Tenderness: There is no abdominal tenderness.   Musculoskeletal:         General: No swelling.   Skin:     General: Skin is warm.   Neurological:      Mental Status: He is alert. Mental status is at baseline.      Comments: Alert and orientedx2   Psychiatric:         Mood and Affect: Mood normal.         Behavior: Behavior normal.         Discharge Date  11/06/2020    FOLLOW UP ITEMS POST DISCHARGE  Follow up with PCP for regular care   Follow up with pulmonology for further evaluation of left pleural effusion, may need bronchoscopy    DISCHARGE DIAGNOSES  Principal Problem:    Constipation POA: Unknown  Active Problems:    Recurrent pleural effusion on left (Chronic) POA: Yes    Compression fracture of first lumbar vertebra (HCC) POA: Yes    History of pulmonary embolism POA: Yes    Leukocytosis POA: Unknown    Hypothyroidism POA: Yes    Dementia (HCC) POA: Yes    History of CHF (congestive heart failure) POA: Unknown    Hepatic steatosis POA: Unknown  Resolved Problems:    * No resolved hospital problems. *      FOLLOW UP  No future  appointments.  No follow-up provider specified.    MEDICATIONS ON DISCHARGE     Medication List      START taking these medications      Instructions   lidocaine 5 % Ptch  Commonly known as: LIDODERM   Apply 1 Patch to skin as directed every 24 hours.  Dose: 1 Patch     polyethylene glycol/lytes 17 g Pack  Start taking on: November 7, 2020  Commonly known as: MIRALAX   Take 1 Packet by mouth every day for 15 days.  Dose: 17 g        CONTINUE taking these medications      Instructions   donepezil 5 MG Tabs  Commonly known as: ARICEPT   Take 5 mg by mouth every evening.  Dose: 5 mg     levothyroxine 75 MCG Tabs  Commonly known as: SYNTHROID   Take 75 mcg by mouth Every morning on an empty stomach.  Dose: 75 mcg     metoprolol SR 25 MG Tb24  Commonly known as: TOPROL XL   Take 25 mg by mouth every bedtime.  Dose: 25 mg     oxyCODONE-acetaminophen 5-325 MG Tabs  Commonly known as: PERCOCET   Take 1 Tab by mouth 2 times a day as needed. Indications: Pain  Dose: 1 Tab     Xarelto 20 MG Tabs tablet  Generic drug: rivaroxaban   Take 20 mg by mouth every morning.  Dose: 20 mg        STOP taking these medications    furosemide 40 MG Tabs  Commonly known as: LASIX            Allergies  No Known Allergies    DIET  Orders Placed This Encounter   Procedures   • Diet Order Diet: Level 6 - Soft and Bite Sized; Liquid level: Level 0 - Thin     Standing Status:   Standing     Number of Occurrences:   1     Order Specific Question:   Diet:     Answer:   Level 6 - Soft and Bite Sized [23]     Order Specific Question:   Liquid level     Answer:   Level 0 - Thin       ACTIVITY  As tolerated.  Weight bearing as tolerated    CONSULTATIONS  None    PROCEDURES  None     Time spent on discharge: 35 minutes

## 2020-11-06 NOTE — CARE PLAN
Problem: Pain Management  Goal: Pain level will decrease to patient's comfort goal  Outcome: PROGRESSING AS EXPECTED  Flowsheets  Taken 11/5/2020 2100 by Ashanti Hough R.N.  Comfort Goal:   Comfort with Movement   Sleep Comfortably  Non Verbal Scale: Grimacing  Pain Rating Scale (NPRS): 6  Taken 11/4/2020 0910 by Verito Montes, PT  Therapist Pain Assessment:   Post Activity Pain Same as Prior to Activity   Nurse Notified  Note: Pt c/o roro  Intervention: Educate and implement non-pharmacologic comfort measures. Examples: relaxation, distration, play therapy, activity therapy, massage, etc.  Flowsheets (Taken 11/5/2020 2100)  Intervention: Declines  Note: Pt c/o back pain, declined pain medication. Repositioned. Encouraged to rest.      Problem: Pain Management  Goal: Pain level will decrease to patient's comfort goal  Intervention: Educate and implement non-pharmacologic comfort measures. Examples: relaxation, distration, play therapy, activity therapy, massage, etc.  Flowsheets (Taken 11/5/2020 2100)  Intervention: Declines  Note: Pt c/o back pain, declined pain medication. Repositioned. Encouraged to rest.

## 2020-11-06 NOTE — PROGRESS NOTES
2 RN Skin Check      2 RN skin check complete with: BETTY Kline  Devices in place: [place], [place]  Skin assess under devices: yes   Confirmed pressure ulcers found on: n/a  New potential pressure ulcers noted on: n/a  Wound consult placed: n/a    The following interventions in place:  Waffle cushion, 2RN skin check, Q2hr turns, barrier cream, mepilex, incontinent care with linen changes as necessary, grey pads on oxygen tubing, cheek savers, pillows for positioning/support.    Skin assessment:  General: dry, fragile, intact, scattered bruising BUE  Ears: bilateral pink and blanching (back of ears)  Elbows: bilateral red and blanching (mepilex applied)  Heels:  Bilateral pink, blanching, boggy, dry (mepilex in place)  Sacrum: blanching, darkened pigmentation (barrier cream)  Upper back: pink and blanching (mepilex to bony prominences)

## 2020-11-06 NOTE — DISCHARGE INSTRUCTIONS
Discharge Instructions    Discharged to other by medical transportation with escort. Discharged via ambulance, hospital escort: Yes.  Special equipment needed: Not Applicable    Be sure to schedule a follow-up appointment with your primary care doctor or any specialists as instructed.     Discharge Plan:   Diet Plan: Discussed(level 6 soft and bite sized)  Activity Level: Discussed(activity as tolerated)  Confirmed Follow up Appointment: Patient to Call and Schedule Appointment  Confirmed Symptoms Management: Discussed  Medication Reconciliation Updated: No (Comments)  Influenza Vaccine Indication: Patient Refuses    I understand that a diet low in cholesterol, fat, and sodium is recommended for good health. Unless I have been given specific instructions below for another diet, I accept this instruction as my diet prescription.   Other diet: level 6 soft and bite sized    Special Instructions: None    · Is patient discharged on Warfarin / Coumadin?   No       Abdominal Pain, Adult    Many things can cause belly (abdominal) pain. Most times, belly pain is not dangerous. Many cases of belly pain can be watched and treated at home. Sometimes belly pain is serious, though. Your doctor will try to find the cause of your belly pain.  Follow these instructions at home:  · Take over-the-counter and prescription medicines only as told by your doctor. Do not take medicines that help you poop (laxatives) unless told to by your doctor.  · Drink enough fluid to keep your pee (urine) clear or pale yellow.  · Watch your belly pain for any changes.  · Keep all follow-up visits as told by your doctor. This is important.  Contact a doctor if:  · Your belly pain changes or gets worse.  · You are not hungry, or you lose weight without trying.  · You are having trouble pooping (constipated) or have watery poop (diarrhea) for more than 2-3 days.  · You have pain when you pee or poop.  · Your belly pain wakes you up at night.  · Your pain  gets worse with meals, after eating, or with certain foods.  · You are throwing up and cannot keep anything down.  · You have a fever.  Get help right away if:  · Your pain does not go away as soon as your doctor says it should.  · You cannot stop throwing up.  · Your pain is only in areas of your belly, such as the right side or the left lower part of the belly.  · You have bloody or black poop, or poop that looks like tar.  · You have very bad pain, cramping, or bloating in your belly.  · You have signs of not having enough fluid or water in your body (dehydration), such as:  ? Dark pee, very little pee, or no pee.  ? Cracked lips.  ? Dry mouth.  ? Sunken eyes.  ? Sleepiness.  ? Weakness.  This information is not intended to replace advice given to you by your health care provider. Make sure you discuss any questions you have with your health care provider.  Document Released: 06/05/2009 Document Revised: 07/07/2017 Document Reviewed: 05/31/2017  [employer] Patient Education © 2020 [employer]                Depression / Suicide Risk    As you are discharged from this [facility] facility, it is important to learn how to keep safe from harming yourself.    Recognize the warning signs:  · Abrupt changes in personality, positive or negative- including increase in energy   · Giving away possessions  · Change in eating patterns- significant weight changes-  positive or negative  · Change in sleeping patterns- unable to sleep or sleeping all the time   · Unwillingness or inability to communicate  · Depression  · Unusual sadness, discouragement and loneliness  · Talk of wanting to die  · Neglect of personal appearance   · Rebelliousness- reckless behavior  · Withdrawal from people/activities they love  · Confusion- inability to concentrate     If you or a loved one observes any of these behaviors or has concerns about self-harm, here's what you can do:  · Talk about it- your feelings and reasons for  harming yourself  · Remove any means that you might use to hurt yourself (examples: pills, rope, extension cords, firearm)  · Get professional help from the community (Mental Health, Substance Abuse, psychological counseling)  · Do not be alone:Call your Safe Contact- someone whom you trust who will be there for you.  · Call your local CRISIS HOTLINE 619-9791 or 932-502-9591  · Call your local Children's Mobile Crisis Response Team [place] (672) 589-4997 or www.[employer]  · Call the toll free National Suicide Prevention Hotlines   · National Suicide Prevention Lifeline 643-311-EYKE (0511)  · National Hope Line Network 800-SUICIDE (885-3415)

## 2020-11-06 NOTE — DISCHARGE PLANNING
Care Transition Team Discharge Planning    Anticipated Discharge Information  Discharge Disposition: D/T to SNF with Medicare cert in anticipation of skilled care (03)  Discharge Address: (TBD, accepting SNF)  Discharge Contact Phone Number: [phone]       Discharge Plan:  Patient is discharging today to [facility] at 1400 via [facility]'s Van. COBRA completed, JENNIFER done. This RN,  informed the bedside RN, patient, Patient's spouse Genet, and Provider.     Lana Landon RN,

## 2020-11-06 NOTE — PROGRESS NOTES
Pt AOx4, [place], able to make needs known.Not in acute distress. No c/o dyspnea or SOB. C/o of back pain 7/10 PS, declined pain medication. Repositioned. On O2 @3LPM via NC at 96% O2 sat. Pt was able to sit up at the edge of the bed to urinate using a urinal. Provided needs. Safety precaution in place. Bed locked and placed in lowest position. Treaded socks on. Call lights w/in reach. Hourly rounds in place

## 2020-11-07 NOTE — DOCUMENTATION QUERY
"                                                                         Pending sale to [facility]                                                                       Query Response Note      PATIENT:               MONTSERRAT ARAUJO  ACCT #:                  [id number]  MRN:                     [id number]  :                      1935  ADMIT DATE:       2020 9:45 AM  DISCH DATE:        2020 5:21 PM  RESPONDING  PROVIDER #:        [id number]           QUERY TEXT:    Pulmonary embolism (PE) is documented in the Medical Record. Please specify type or etiology and whether with or without acute cor pulmonale    NOTE:  If an appropriate response is not listed below, please respond with a new note.    The patient's Clinical Indicators include:  Admitted with [id number] fracture.  H&P states : Last discharge summary : CT of chest incidentally identified new right- sided PE along with DVT  Assessment note 2020: \"New PE diagnosed on September 10,2020.\"    O2 3L at home.  [place]  VS    Risk factors:  Dizziness,compression fracture L1 , COPD and CHF.  Options provided:   -- Acute pulmonary embolism with acute cor pulmonale   -- Acute pulmonary embolism without acute cor pulmonale   -- Chronic pulmonary embolism   -- History of pulmonary embolism   -- Unable to determine      Query created by: Jailene Sawant on 10/8/2020 12:48 PM    RESPONSE TEXT:    History of pulmonary embolism          Electronically signed by:  KIERAN ARAUJO MD 2020 11:44 AM              "

## 2021-01-13 DIAGNOSIS — Z23 NEED FOR VACCINATION: ICD-10-CM

## 2023-01-01 NOTE — CARE PLAN
Problem: Knowledge Deficit  Goal: Knowledge of disease process/condition, treatment plan, diagnostic tests, and medications will improve  Outcome: PROGRESSING AS EXPECTED  Discuss POC with Pt. Assess Pt's knowledge of disease process, treatment plan, diagnostic test, labs, and medications; and explain and give information as needed.        Problem: Respiratory:  Goal: Respiratory status will improve  Outcome: PROGRESSING AS EXPECTED  Assess respiratory status. Encouraged deep breathing and coughing. Monitor O2 Sat and administer supplemental O2 as needed to keep sats >90%       normal (ped)...
